# Patient Record
Sex: FEMALE | Race: WHITE | Employment: FULL TIME | URBAN - METROPOLITAN AREA
[De-identification: names, ages, dates, MRNs, and addresses within clinical notes are randomized per-mention and may not be internally consistent; named-entity substitution may affect disease eponyms.]

---

## 2017-01-03 ENCOUNTER — HOSPITAL ENCOUNTER (EMERGENCY)
Facility: HOSPITAL | Age: 32
Discharge: HOME | End: 2017-01-03
Attending: EMERGENCY MEDICINE

## 2017-01-03 ENCOUNTER — APPOINTMENT (OUTPATIENT)
Dept: CT IMAGING | Facility: HOSPITAL | Age: 32
End: 2017-01-03
Attending: EMERGENCY MEDICINE

## 2017-01-03 VITALS
WEIGHT: 180 LBS | HEART RATE: 73 BPM | TEMPERATURE: 97 F | DIASTOLIC BLOOD PRESSURE: 99 MMHG | RESPIRATION RATE: 20 BRPM | BODY MASS INDEX: 28.93 KG/M2 | SYSTOLIC BLOOD PRESSURE: 152 MMHG | OXYGEN SATURATION: 99 % | HEIGHT: 66 IN

## 2017-01-03 DIAGNOSIS — R51.9 ACUTE NONINTRACTABLE HEADACHE, UNSPECIFIED HEADACHE TYPE: Primary | ICD-10-CM

## 2017-01-03 LAB
ANION GAP SERPL CALC-SCNC: 13 MMOL/L (ref 0–18)
B-HCG UR QL: NEGATIVE
BASOPHILS # BLD: 0 K/UL (ref 0–0.2)
BASOPHILS NFR BLD: 0 %
BNP SERPL-MCNC: 45 PG/ML (ref 0–100)
BUN SERPL-MCNC: 12 MG/DL (ref 8–20)
BUN/CREAT SERPL: 19.4 (ref 10–20)
CALCIUM SERPL-MCNC: 9.7 MG/DL (ref 8.5–10.5)
CHLORIDE SERPL-SCNC: 103 MMOL/L (ref 95–110)
CO2 SERPL-SCNC: 26 MMOL/L (ref 22–32)
CREAT SERPL-MCNC: 0.62 MG/DL (ref 0.5–1.5)
EOSINOPHIL # BLD: 0 K/UL (ref 0–0.7)
EOSINOPHIL NFR BLD: 0 %
ERYTHROCYTE [DISTWIDTH] IN BLOOD BY AUTOMATED COUNT: 13.4 % (ref 11–15)
GLUCOSE SERPL-MCNC: 100 MG/DL (ref 70–99)
HCT VFR BLD AUTO: 42.8 % (ref 35–48)
HGB BLD-MCNC: 14.3 G/DL (ref 12–16)
LYMPHOCYTES # BLD: 2.6 K/UL (ref 1–4)
LYMPHOCYTES NFR BLD: 29 %
MCH RBC QN AUTO: 30.3 PG (ref 27–32)
MCHC RBC AUTO-ENTMCNC: 33.4 G/DL (ref 32–37)
MCV RBC AUTO: 90.8 FL (ref 80–100)
MONOCYTES # BLD: 0.4 K/UL (ref 0–1)
MONOCYTES NFR BLD: 4 %
NEUTROPHILS # BLD AUTO: 5.9 K/UL (ref 1.8–7.7)
NEUTROPHILS NFR BLD: 66 %
OSMOLALITY UR CALC.SUM OF ELEC: 294 MOSM/KG (ref 275–295)
PLATELET # BLD AUTO: 228 K/UL (ref 140–400)
PMV BLD AUTO: 8.4 FL (ref 7.4–10.3)
POTASSIUM SERPL-SCNC: 3.4 MMOL/L (ref 3.3–5.1)
RBC # BLD AUTO: 4.72 M/UL (ref 3.7–5.4)
SODIUM SERPL-SCNC: 142 MMOL/L (ref 136–144)
WBC # BLD AUTO: 8.9 K/UL (ref 4–11)

## 2017-01-03 PROCEDURE — 85025 COMPLETE CBC W/AUTO DIFF WBC: CPT | Performed by: EMERGENCY MEDICINE

## 2017-01-03 PROCEDURE — 70450 CT HEAD/BRAIN W/O DYE: CPT

## 2017-01-03 PROCEDURE — 99284 EMERGENCY DEPT VISIT MOD MDM: CPT

## 2017-01-03 PROCEDURE — 83880 ASSAY OF NATRIURETIC PEPTIDE: CPT | Performed by: EMERGENCY MEDICINE

## 2017-01-03 PROCEDURE — 96372 THER/PROPH/DIAG INJ SC/IM: CPT

## 2017-01-03 PROCEDURE — 81025 URINE PREGNANCY TEST: CPT

## 2017-01-03 PROCEDURE — 80048 BASIC METABOLIC PNL TOTAL CA: CPT | Performed by: EMERGENCY MEDICINE

## 2017-01-03 RX ORDER — ACETAMINOPHEN 500 MG
1000 TABLET ORAL ONCE
Status: COMPLETED | OUTPATIENT
Start: 2017-01-03 | End: 2017-01-03

## 2017-01-03 RX ORDER — ONDANSETRON 4 MG/1
4 TABLET, ORALLY DISINTEGRATING ORAL ONCE
Status: COMPLETED | OUTPATIENT
Start: 2017-01-03 | End: 2017-01-03

## 2017-01-03 RX ORDER — DIPHENHYDRAMINE HCL 25 MG
50 CAPSULE ORAL ONCE
Status: COMPLETED | OUTPATIENT
Start: 2017-01-03 | End: 2017-01-03

## 2017-01-03 RX ORDER — BUTALBITAL, ACETAMINOPHEN AND CAFFEINE 50; 325; 40 MG/1; MG/1; MG/1
1-2 TABLET ORAL EVERY 4 HOURS PRN
Qty: 20 TABLET | Refills: 0 | Status: SHIPPED | OUTPATIENT
Start: 2017-01-03 | End: 2017-07-06 | Stop reason: ALTCHOICE

## 2017-01-03 RX ORDER — KETOROLAC TROMETHAMINE 30 MG/ML
60 INJECTION, SOLUTION INTRAMUSCULAR; INTRAVENOUS ONCE
Status: COMPLETED | OUTPATIENT
Start: 2017-01-03 | End: 2017-01-03

## 2017-01-04 NOTE — ED PROVIDER NOTES
Patient Seen in: [facility] AND [facility] Emergency Department    History   Patient presents with:  Headache (neurologic)      HPI    Patient presents complaining of a headache that started roughly 6 hours ago.   She states pain is located to the left side of he Respiratory: Negative for cough and shortness of breath. Cardiovascular: Negative for chest pain and palpitations. Gastrointestinal: Negative for vomiting and abdominal pain. Genitourinary: Negative for dysuria and hematuria.    Musculoskeletal: Ne Skin: Skin is warm and dry. No rash noted. Nursing note and vitals reviewed.       ED Course        Labs Reviewed   BASIC METABOLIC PANEL (8) - Abnormal; Notable for the following:     Glucose 100 (*)     All other components within normal limits   BNP [address] [address]  [phone]    Schedule an appointment as soon as possible for a visit in 2 days        Medications Prescribed:  Discharge Medication List as of 1/3/2017 11:03 PM    START taking these medications    Butalbital-AP

## 2017-01-04 NOTE — ED INITIAL ASSESSMENT (HPI)
Pt reports headache that started this am, pt reports pain to the back of her head with facial numbness. Pt denies light sensitivity or nausea.    Pt sts that this is one of the worst headaches shes had. css negative

## 2017-01-13 NOTE — TELEPHONE ENCOUNTER
Pt requesting   Current Outpatient Prescriptions:        HYDROcodone-acetaminophen  MG Oral Tab Take 1 to 2 tabs po q 8 hr prn for back pain(total of 6tabs/day only) Disp: 84 tablet Rfl: 0

## 2017-01-16 RX ORDER — HYDROCODONE BITARTRATE AND ACETAMINOPHEN 10; 325 MG/1; MG/1
TABLET ORAL
Qty: 84 TABLET | Refills: 0 | Status: SHIPPED | OUTPATIENT
Start: 2017-01-16 | End: 2017-01-23

## 2017-01-23 ENCOUNTER — OFFICE VISIT (OUTPATIENT)
Dept: INTERNAL MEDICINE CLINIC | Facility: CLINIC | Age: 32
End: 2017-01-23

## 2017-01-23 VITALS
HEART RATE: 112 BPM | DIASTOLIC BLOOD PRESSURE: 92 MMHG | SYSTOLIC BLOOD PRESSURE: 144 MMHG | TEMPERATURE: 99 F | WEIGHT: 188 LBS | BODY MASS INDEX: 30 KG/M2

## 2017-01-23 DIAGNOSIS — S39.012A BACK STRAIN, INITIAL ENCOUNTER: Primary | ICD-10-CM

## 2017-01-23 DIAGNOSIS — G43.009 MIGRAINE WITHOUT AURA AND WITHOUT STATUS MIGRAINOSUS, NOT INTRACTABLE: ICD-10-CM

## 2017-01-23 PROCEDURE — 99213 OFFICE O/P EST LOW 20 MIN: CPT | Performed by: INTERNAL MEDICINE

## 2017-01-23 PROCEDURE — 99212 OFFICE O/P EST SF 10 MIN: CPT | Performed by: INTERNAL MEDICINE

## 2017-01-23 RX ORDER — HYDROCODONE BITARTRATE AND ACETAMINOPHEN 10; 325 MG/1; MG/1
TABLET ORAL
Qty: 84 TABLET | Refills: 0 | Status: SHIPPED | OUTPATIENT
Start: 2017-01-26 | End: 2017-02-06

## 2017-01-23 RX ORDER — CYCLOBENZAPRINE HCL 10 MG
10 TABLET ORAL 3 TIMES DAILY
Qty: 30 TABLET | Refills: 0 | Status: SHIPPED | OUTPATIENT
Start: 2017-01-23 | End: 2017-07-10

## 2017-01-23 NOTE — PROGRESS NOTES
HPI:    Patient ID: Mehul Reece is a 33year old female. Back Pain  This is a new (midback pain) problem. The current episode started in the past 7 days. The problem occurs constantly. The problem has been gradually improving since onset.  The pain i reactive to light. Right eye exhibits no discharge. Left eye exhibits no discharge. No scleral icterus. Neck: Normal range of motion. Neck supple. No JVD present. No thyromegaly present.    Pulmonary/Chest: Effort normal and breath sounds normal. No respi

## 2017-01-30 ENCOUNTER — TELEPHONE (OUTPATIENT)
Dept: FAMILY MEDICINE CLINIC | Facility: CLINIC | Age: 32
End: 2017-01-30

## 2017-01-30 NOTE — TELEPHONE ENCOUNTER
Pt is calling state that she fax over some FMLA paper work on Monday pt want to know if form was filled out   Pt is requesting a call back

## 2017-01-31 ENCOUNTER — OFFICE VISIT (OUTPATIENT)
Dept: INTERNAL MEDICINE CLINIC | Facility: CLINIC | Age: 32
End: 2017-01-31

## 2017-01-31 VITALS
BODY MASS INDEX: 30.05 KG/M2 | DIASTOLIC BLOOD PRESSURE: 90 MMHG | RESPIRATION RATE: 12 BRPM | HEART RATE: 120 BPM | HEIGHT: 66 IN | SYSTOLIC BLOOD PRESSURE: 126 MMHG | TEMPERATURE: 100 F | WEIGHT: 187 LBS

## 2017-01-31 DIAGNOSIS — J02.9 SORE THROAT: Primary | ICD-10-CM

## 2017-01-31 LAB
CONTROL LINE PRESENT WITH A CLEAR BACKGROUND (YES/NO): YES YES/NO
KIT LOT #: NORMAL NUMERIC

## 2017-01-31 PROCEDURE — 87880 STREP A ASSAY W/OPTIC: CPT | Performed by: INTERNAL MEDICINE

## 2017-01-31 PROCEDURE — 99213 OFFICE O/P EST LOW 20 MIN: CPT | Performed by: INTERNAL MEDICINE

## 2017-01-31 RX ORDER — HYDROCODONE BITARTRATE AND HOMATROPINE METHYLBROMIDE ORAL SOLUTION 5; 1.5 MG/5ML; MG/5ML
5 LIQUID ORAL EVERY 6 HOURS PRN
Qty: 240 ML | Refills: 0 | Status: SHIPPED | OUTPATIENT
Start: 2017-01-31 | End: 2017-02-10

## 2017-01-31 NOTE — PROGRESS NOTES
HPI:    Patient ID: Marcus Levi is a 33year old female. Ear Pain   Associated symptoms include coughing, rhinorrhea and a sore throat. Pertinent negatives include no ear discharge.    Cough  Associated symptoms include ear pain, postnasal drip, rhin reaction(s): ASPIRIN  Codeine                 Swelling    Comment:Other reaction(s): Swelling   PHYSICAL EXAM:   Physical Exam   Constitutional: She appears well-developed. No distress.    obese   HENT:   Right Ear: External ear normal.   Left Ear: External

## 2017-01-31 NOTE — TELEPHONE ENCOUNTER
FMLA form completed by Dr. Windy Hartley. Patient has office visit today with Dr. Windy Hartley. Will give patient form at that time. Form at nurses desk now.

## 2017-02-06 ENCOUNTER — TELEPHONE (OUTPATIENT)
Dept: INTERNAL MEDICINE CLINIC | Facility: CLINIC | Age: 32
End: 2017-02-06

## 2017-02-06 RX ORDER — CEPHALEXIN 500 MG/1
500 CAPSULE ORAL 2 TIMES DAILY
Qty: 14 CAPSULE | Refills: 0 | Status: SHIPPED | OUTPATIENT
Start: 2017-02-06 | End: 2017-03-06

## 2017-02-06 RX ORDER — HYDROCODONE BITARTRATE AND ACETAMINOPHEN 10; 325 MG/1; MG/1
TABLET ORAL
Qty: 84 TABLET | Refills: 0 | Status: SHIPPED | OUTPATIENT
Start: 2017-02-06 | End: 2017-02-17

## 2017-02-06 NOTE — TELEPHONE ENCOUNTER
Reason for Call/Chief Complaint:   Urinary problems  Onset:   Since yesterday morning. Nursing Assessment/Associated Symptoms:   Patient stated has her usual \"UTI\" symptoms. Has urinary frequency and urgency.   Denies a fever, back pain, blood in the ur

## 2017-02-06 NOTE — TELEPHONE ENCOUNTER
Pt is requesting to have a call from the MD Manish fagan she paiged him this morning   Pt starts training today in 45min and can not use her phone   Pt did not give the reason for the call back

## 2017-02-06 NOTE — TELEPHONE ENCOUNTER
Pt is calling back, states she will be in a training at work. Pt would like a call back right now, to speak to nurse.

## 2017-02-06 NOTE — TELEPHONE ENCOUNTER
Pt called, message per Dr given.   Verbalized understanding and compliance  Will  Rx from [facility]

## 2017-02-10 RX ORDER — HYDROCODONE BITARTRATE AND HOMATROPINE METHYLBROMIDE ORAL SOLUTION 5; 1.5 MG/5ML; MG/5ML
5 LIQUID ORAL EVERY 6 HOURS PRN
Qty: 240 ML | Refills: 0 | Status: SHIPPED | OUTPATIENT
Start: 2017-02-10 | End: 2017-03-06

## 2017-02-10 NOTE — TELEPHONE ENCOUNTER
SATISH changed request from refill to acute. Patient reports that she is still experiencing cough and is nearly out of her medication. Concerned because  is out of office starting tomorrow. Please call [phone] to discuss.

## 2017-02-10 NOTE — TELEPHONE ENCOUNTER
Phone call to patient. Advised patient script ready for p/u at reception B. Related Dr. Eric Millan message will need office visit if symptoms do not resolve.

## 2017-02-15 ENCOUNTER — TELEPHONE (OUTPATIENT)
Dept: FAMILY MEDICINE CLINIC | Facility: CLINIC | Age: 32
End: 2017-02-15

## 2017-02-15 NOTE — TELEPHONE ENCOUNTER
Pt is calling requesting a refill on med    Current Outpatient Prescriptions:  HYDROcodone-acetaminophen  MG Oral Tab Take 1 to 2 tabs po q 8 hr prn for back pain(total of 6tabs/day only) Disp: 84 tablet Rfl: 0

## 2017-02-17 RX ORDER — HYDROCODONE BITARTRATE AND ACETAMINOPHEN 10; 325 MG/1; MG/1
TABLET ORAL
Qty: 84 TABLET | Refills: 0 | Status: SHIPPED | OUTPATIENT
Start: 2017-02-17 | End: 2017-02-23

## 2017-02-17 NOTE — TELEPHONE ENCOUNTER
Pt called in to follow up, pt states she will be out of medication on Monday. Pt states she wants to pick this up on her lunch break, please.

## 2017-02-17 NOTE — TELEPHONE ENCOUNTER
Pt is calling to check status of refill request   Pt is asking if this RX can be approved for today btwn 11-12:30pm  Please advise     Good Call back [phone]

## 2017-02-23 ENCOUNTER — OFFICE VISIT (OUTPATIENT)
Dept: INTERNAL MEDICINE CLINIC | Facility: CLINIC | Age: 32
End: 2017-02-23

## 2017-02-23 VITALS
DIASTOLIC BLOOD PRESSURE: 88 MMHG | TEMPERATURE: 98 F | WEIGHT: 183 LBS | HEART RATE: 102 BPM | RESPIRATION RATE: 18 BRPM | BODY MASS INDEX: 32.43 KG/M2 | SYSTOLIC BLOOD PRESSURE: 127 MMHG | HEIGHT: 63 IN

## 2017-02-23 DIAGNOSIS — G89.29 CHRONIC LOW BACK PAIN, UNSPECIFIED BACK PAIN LATERALITY, WITH SCIATICA PRESENCE UNSPECIFIED: ICD-10-CM

## 2017-02-23 DIAGNOSIS — M54.5 CHRONIC LOW BACK PAIN, UNSPECIFIED BACK PAIN LATERALITY, WITH SCIATICA PRESENCE UNSPECIFIED: ICD-10-CM

## 2017-02-23 DIAGNOSIS — G43.709 CHRONIC MIGRAINE WITHOUT AURA WITHOUT STATUS MIGRAINOSUS, NOT INTRACTABLE: Primary | ICD-10-CM

## 2017-02-23 PROCEDURE — 99212 OFFICE O/P EST SF 10 MIN: CPT | Performed by: INTERNAL MEDICINE

## 2017-02-23 PROCEDURE — 99213 OFFICE O/P EST LOW 20 MIN: CPT | Performed by: INTERNAL MEDICINE

## 2017-02-23 RX ORDER — HYDROCODONE BITARTRATE AND ACETAMINOPHEN 10; 325 MG/1; MG/1
TABLET ORAL
Qty: 112 TABLET | Refills: 0 | Status: SHIPPED | OUTPATIENT
Start: 2017-03-01 | End: 2017-03-13

## 2017-02-23 RX ORDER — KETOROLAC TROMETHAMINE 10 MG/1
10 TABLET, FILM COATED ORAL 2 TIMES DAILY
Qty: 10 TABLET | Refills: 0 | Status: SHIPPED | OUTPATIENT
Start: 2017-02-23 | End: 2017-05-15

## 2017-02-23 NOTE — PROGRESS NOTES
HPI:    Patient ID: Grant Jean-Baptiste is a 33year old female. Back Pain  This is a chronic problem. The current episode started in the past 7 days. The problem has been rapidly worsening since onset. The pain is present in the lumbar spine.  The quality o Rfl: 0   Butalbital-APAP-Caffeine -40 MG Oral Tab Take 1-2 tablets by mouth every 4 (four) hours as needed for Headaches. Disp: 20 tablet Rfl: 0   Fluticasone Propionate 50 MCG/ACT Nasal Suspension 2 sprays by Each Nare route daily.  Disp: 1 Bottle Rf it. I advised pt to call her neurologist and may need to restart migraine prophylactic treatment.     2. Chronic low back pain, unspecified back pain laterality, with sciatica presence unspecified  Pt had increasing low back pain over the weekend since she

## 2017-02-25 NOTE — TELEPHONE ENCOUNTER
Phone call to patient to verify that patient was given script for Hydrocodone at 2/23/17 OV. S/W patient who states she did p/u script.

## 2017-03-06 ENCOUNTER — OFFICE VISIT (OUTPATIENT)
Dept: INTERNAL MEDICINE CLINIC | Facility: CLINIC | Age: 32
End: 2017-03-06

## 2017-03-06 VITALS
HEIGHT: 63 IN | SYSTOLIC BLOOD PRESSURE: 165 MMHG | WEIGHT: 190 LBS | DIASTOLIC BLOOD PRESSURE: 109 MMHG | OXYGEN SATURATION: 99 % | HEART RATE: 140 BPM | BODY MASS INDEX: 33.66 KG/M2 | TEMPERATURE: 99 F

## 2017-03-06 DIAGNOSIS — R68.89 FLU-LIKE SYMPTOMS: Primary | ICD-10-CM

## 2017-03-06 LAB
FLUAV + FLUBV RNA SPEC NAA+PROBE: NEGATIVE

## 2017-03-06 PROCEDURE — 99212 OFFICE O/P EST SF 10 MIN: CPT | Performed by: INTERNAL MEDICINE

## 2017-03-06 PROCEDURE — 99213 OFFICE O/P EST LOW 20 MIN: CPT | Performed by: INTERNAL MEDICINE

## 2017-03-06 RX ORDER — AZITHROMYCIN 250 MG/1
TABLET, FILM COATED ORAL
Qty: 1 PACKAGE | Refills: 0 | Status: SHIPPED | OUTPATIENT
Start: 2017-03-06 | End: 2017-07-06 | Stop reason: ALTCHOICE

## 2017-03-06 RX ORDER — HYDROCODONE BITARTRATE AND HOMATROPINE METHYLBROMIDE ORAL SOLUTION 5; 1.5 MG/5ML; MG/5ML
5 LIQUID ORAL EVERY 6 HOURS PRN
Qty: 240 ML | Refills: 0 | Status: SHIPPED | OUTPATIENT
Start: 2017-03-06 | End: 2017-04-03

## 2017-03-06 RX ORDER — OSELTAMIVIR PHOSPHATE 75 MG/1
75 CAPSULE ORAL 2 TIMES DAILY
Qty: 10 CAPSULE | Refills: 0 | Status: SHIPPED | OUTPATIENT
Start: 2017-03-06 | End: 2017-07-06 | Stop reason: ALTCHOICE

## 2017-03-10 ENCOUNTER — TELEPHONE (OUTPATIENT)
Dept: INTERNAL MEDICINE CLINIC | Facility: CLINIC | Age: 32
End: 2017-03-10

## 2017-03-10 NOTE — TELEPHONE ENCOUNTER
Per pt, she needs refill on her HYDROcodone-acetaminophen  MG Oral Tab will  in [facility] by Tuesday 03/14/2017.       Current Outpatient Prescriptions:                          HYDROcodone-acetaminophen  MG Oral Tab Take 1 to 2 tabs po q 6 hr

## 2017-03-13 ENCOUNTER — OFFICE VISIT (OUTPATIENT)
Dept: INTERNAL MEDICINE CLINIC | Facility: CLINIC | Age: 32
End: 2017-03-13

## 2017-03-13 VITALS
TEMPERATURE: 98 F | SYSTOLIC BLOOD PRESSURE: 120 MMHG | DIASTOLIC BLOOD PRESSURE: 84 MMHG | WEIGHT: 191 LBS | BODY MASS INDEX: 30.7 KG/M2 | HEIGHT: 66 IN | RESPIRATION RATE: 12 BRPM | HEART RATE: 109 BPM

## 2017-03-13 DIAGNOSIS — B34.9 VIRAL SYNDROME: Primary | ICD-10-CM

## 2017-03-13 DIAGNOSIS — G89.29 CHRONIC LOW BACK PAIN, UNSPECIFIED BACK PAIN LATERALITY, WITH SCIATICA PRESENCE UNSPECIFIED: ICD-10-CM

## 2017-03-13 DIAGNOSIS — M54.5 CHRONIC LOW BACK PAIN, UNSPECIFIED BACK PAIN LATERALITY, WITH SCIATICA PRESENCE UNSPECIFIED: ICD-10-CM

## 2017-03-13 RX ORDER — HYDROCODONE BITARTRATE AND ACETAMINOPHEN 10; 325 MG/1; MG/1
TABLET ORAL
Qty: 112 TABLET | Refills: 0 | Status: SHIPPED | OUTPATIENT
Start: 2017-03-13 | End: 2017-03-22

## 2017-03-14 NOTE — PROGRESS NOTES
HPI:    Patient ID: China Romero is a 33year old female. HPI Comments: Patient presents today just to get letter for work for her absence last week due to flu like symptoms.  She was seen last week in clinic and was treated with tamiflu for possible needed a note for work for her absence last week so letter for work given to pt.     2. Chronic low back pain, unspecified back pain laterality, with sciatica presence unspecified  Pt asking for refill of norco; will be 2days earlier though states she wont

## 2017-03-17 NOTE — PROGRESS NOTES
HPI:    Patient ID: Prachi Beavers is a 33year old female.     HPI    Flu like symptoms for a few days  /109 mmHg  Pulse 140  Temp(Src) 99.2 °F (37.3 °C) (Tympanic)  Ht 5' 3\" (1.6 m)  Wt 190 lb (86.183 kg)  BMI 33.67 kg/m2  SpO2 99%  HISTORY:  Past rash.   Neurological: Negative for syncope, weakness, light-headedness and headaches. Hematological: Negative for adenopathy. Does not bruise/bleed easily. Psychiatric/Behavioral: Negative for behavioral problems and agitation.            Current Outpat         Family History   Problem Relation Age of Onset   • Heart Disease Mother    • Lipids Mother    • Hypertension Mother    • ADHD Sister    • Heart Disease Other    • Hypertension Other    • Glaucoma Other       Social History:   Smoking Status:  Fo Empiric tamiflu  rx zithromax bronchitis  rpn hydromet   SIDE EFFECT DISCUSSED  PATIENT IS NOT AT ALL TO OPERATE A MAHCINERY OR DRIVE A VEHICLE WHEN TAKING THIS MEDICATION. PATIENT VOICED UNDERSTANDING.   Patient voiced understanding  and agrees with plan

## 2017-03-22 ENCOUNTER — TELEPHONE (OUTPATIENT)
Dept: INTERNAL MEDICINE CLINIC | Facility: CLINIC | Age: 32
End: 2017-03-22

## 2017-03-22 RX ORDER — HYDROCODONE BITARTRATE AND ACETAMINOPHEN 10; 325 MG/1; MG/1
TABLET ORAL
Qty: 112 TABLET | Refills: 0 | Status: SHIPPED | OUTPATIENT
Start: 2017-03-27 | End: 2017-04-06

## 2017-03-22 NOTE — TELEPHONE ENCOUNTER
Pt  Had asked if she can get her script postdated for March 27 when her refill is due so she doesn't have to come again (was picking up script for her spouse today).  She also told me she had set up apptment with spine surgeon Dr Cristian Tirado

## 2017-03-22 NOTE — TELEPHONE ENCOUNTER
Pt called in requesting a refill on her HYDROcodone-acetaminophen. Pt states her rx is not due until Monday, but she would like to try and  rx before the weekend so she can have it filled on Monday.     Current outpatient prescriptions:   •  HYDROco

## 2017-04-03 ENCOUNTER — TELEPHONE (OUTPATIENT)
Dept: INTERNAL MEDICINE CLINIC | Facility: CLINIC | Age: 32
End: 2017-04-03

## 2017-04-03 RX ORDER — HYDROCODONE BITARTRATE AND HOMATROPINE METHYLBROMIDE ORAL SOLUTION 5; 1.5 MG/5ML; MG/5ML
5 LIQUID ORAL EVERY 6 HOURS PRN
Qty: 240 ML | Refills: 0 | Status: SHIPPED | OUTPATIENT
Start: 2017-04-03 | End: 2017-04-25

## 2017-04-03 RX ORDER — AZITHROMYCIN 250 MG/1
TABLET, FILM COATED ORAL
Qty: 6 TABLET | Refills: 0 | Status: SHIPPED | OUTPATIENT
Start: 2017-04-03 | End: 2017-07-06 | Stop reason: ALTCHOICE

## 2017-04-03 NOTE — TELEPHONE ENCOUNTER
Pt paged me and states for past 3 days had not of sinus congestion/pain, pressure, green thick nasal discharge, maxillary teeth pain, as well as persistent cough; no fever noted. Had called Saturday afternoon but on call doctor didn't respond per pt.  I alecia

## 2017-04-07 RX ORDER — HYDROCODONE BITARTRATE AND ACETAMINOPHEN 10; 325 MG/1; MG/1
TABLET ORAL
Qty: 112 TABLET | Refills: 0 | Status: SHIPPED | OUTPATIENT
Start: 2017-04-10 | End: 2017-04-20

## 2017-04-07 NOTE — TELEPHONE ENCOUNTER
Phone call to patient. S/w patient and advised script for Hydrocodone is ready for  at reception area. Script handed to patient; related Dr. Morro Quintanilla instructions cannot fill until 4/10/17. Patient states she understands.

## 2017-04-18 NOTE — TELEPHONE ENCOUNTER
Pt requesting refill asking if script can be dated for Saturday 4/22 because she will be going out of town until next Wednesday.       Current Outpatient Prescriptions:  HYDROcodone-acetaminophen  MG Oral Tab Take 1 to 2 tabs po q 6 hr prn for back pa

## 2017-04-20 NOTE — TELEPHONE ENCOUNTER
Pt requesting refill asking if script can be dated for Saturday 4/22 because she will be going out of town until next Wednesday.     Medication last refilled on 4/10/17 quantity #112 with zero refills (take 1-2 tabs Q6 hours - can take up to 8 tabs per day)

## 2017-04-21 RX ORDER — HYDROCODONE BITARTRATE AND ACETAMINOPHEN 10; 325 MG/1; MG/1
TABLET ORAL
Qty: 112 TABLET | Refills: 0 | Status: SHIPPED | OUTPATIENT
Start: 2017-04-22 | End: 2017-05-04

## 2017-04-21 NOTE — TELEPHONE ENCOUNTER
Her refill should really  Be on 04/24/2017 but since she is going out of town then I will refill for date 4/22/17 however the next refill should be would be 2 weeks from 4/24/17.

## 2017-04-21 NOTE — TELEPHONE ENCOUNTER
Pt is calling for status of her refill request. Pt would like to  the script today and is leaving out of town first thing tomorrow morning. Pt states that she has to  the script in the next hour and a half.  Pt would like a call back at [phone]

## 2017-04-21 NOTE — TELEPHONE ENCOUNTER
Message from 4//212/17, 10:31 pm fwd high priority.  to Dr. Bertrand Carbajal to address for [facility] request.

## 2017-04-25 ENCOUNTER — TELEPHONE (OUTPATIENT)
Dept: INTERNAL MEDICINE CLINIC | Facility: CLINIC | Age: 32
End: 2017-04-25

## 2017-04-25 ENCOUNTER — OFFICE VISIT (OUTPATIENT)
Dept: INTERNAL MEDICINE CLINIC | Facility: CLINIC | Age: 32
End: 2017-04-25

## 2017-04-25 VITALS
DIASTOLIC BLOOD PRESSURE: 80 MMHG | WEIGHT: 192 LBS | SYSTOLIC BLOOD PRESSURE: 136 MMHG | HEART RATE: 96 BPM | TEMPERATURE: 100 F | BODY MASS INDEX: 31 KG/M2

## 2017-04-25 DIAGNOSIS — J06.9 VIRAL UPPER RESPIRATORY TRACT INFECTION: Primary | ICD-10-CM

## 2017-04-25 DIAGNOSIS — M54.5 CHRONIC LOW BACK PAIN, UNSPECIFIED BACK PAIN LATERALITY, WITH SCIATICA PRESENCE UNSPECIFIED: ICD-10-CM

## 2017-04-25 DIAGNOSIS — G89.29 CHRONIC LOW BACK PAIN, UNSPECIFIED BACK PAIN LATERALITY, WITH SCIATICA PRESENCE UNSPECIFIED: ICD-10-CM

## 2017-04-25 DIAGNOSIS — H92.01 OTALGIA, RIGHT: ICD-10-CM

## 2017-04-25 PROCEDURE — 99213 OFFICE O/P EST LOW 20 MIN: CPT | Performed by: INTERNAL MEDICINE

## 2017-04-25 PROCEDURE — 99212 OFFICE O/P EST SF 10 MIN: CPT | Performed by: INTERNAL MEDICINE

## 2017-04-25 RX ORDER — HYDROCODONE BITARTRATE AND HOMATROPINE METHYLBROMIDE ORAL SOLUTION 5; 1.5 MG/5ML; MG/5ML
5 LIQUID ORAL EVERY 6 HOURS PRN
Qty: 240 ML | Refills: 0 | Status: SHIPPED | OUTPATIENT
Start: 2017-04-25 | End: 2017-06-06

## 2017-04-25 NOTE — PROGRESS NOTES
HPI:    Patient ID: Yael Markham is a 33year old female. Ear Pain   There is pain in the right ear. This is a new problem. The current episode started in the past 7 days. The problem occurs constantly. The problem has been unchanged.  There has been today, then one tablet daily. Disp: 6 tablet Rfl: 0   azithromycin (ZITHROMAX) 250 MG Oral Tab Taken as directed Disp: 1 Package Rfl: 0   Oseltamivir Phosphate (TAMIFLU) 75 MG Oral Cap Take 1 capsule (75 mg total) by mouth 2 (two) times daily.  Disp: 10 cap hyrdromet which both has hyrdocodone.    (H92.01) Otalgia, right  Plan: likely due to ET tube dysfunction from above.  Call back if persist/worsens.    (M54.5,  G89.29) Chronic low back pain, unspecified back pain laterality, with sciatica presence unspecif

## 2017-04-25 NOTE — TELEPHONE ENCOUNTER
Actions Requested: pt demanding to be added to schedule today.   PCP has no access  Situation/Background   Problem: right earache and pressure   Onset: > 4 days    Associated Symptoms: afebrile, no ear drainage, + productive cough, + sinus pressure + right patient)  * Bloody discharge or unexplained bleeding from ear canal  * All other earaches (Exceptions: earache lasting < 1 hour, and earache from air travel)    Care Advice Discussed:  * Pain Medicines  * Pain Medicines - Extra Notes  * Apply Cold to the A

## 2017-04-25 NOTE — TELEPHONE ENCOUNTER
Pt states that she has an ear ache/pain and cold symptoms. Pt wants to make an appt only with . There are no available appointments.

## 2017-04-26 PROBLEM — J06.9 VIRAL UPPER RESPIRATORY TRACT INFECTION: Status: ACTIVE | Noted: 2017-04-26

## 2017-05-02 ENCOUNTER — TELEPHONE (OUTPATIENT)
Dept: INTERNAL MEDICINE CLINIC | Facility: CLINIC | Age: 32
End: 2017-05-02

## 2017-05-02 NOTE — TELEPHONE ENCOUNTER
Pt calling requesting refill on medication.       Current Outpatient Prescriptions:  HYDROcodone-acetaminophen  MG Oral Tab Take 1 to 2 tabs po q 6 hr prn for back pain(total of 8 tabs/day only) Disp: 112 tablet Rfl: 0

## 2017-05-04 ENCOUNTER — TELEPHONE (OUTPATIENT)
Dept: INTERNAL MEDICINE CLINIC | Facility: CLINIC | Age: 32
End: 2017-05-04

## 2017-05-04 RX ORDER — HYDROCODONE BITARTRATE AND ACETAMINOPHEN 10; 325 MG/1; MG/1
TABLET ORAL
Qty: 112 TABLET | Refills: 0 | Status: SHIPPED | OUTPATIENT
Start: 2017-05-05 | End: 2017-05-15

## 2017-05-04 NOTE — TELEPHONE ENCOUNTER
Patient states she needs to s/p with dr. Junie Belcher   Patient does not want to s/p with the nurse  Dr Twin Garland please call her/

## 2017-05-04 NOTE — TELEPHONE ENCOUNTER
Pt leaving out of town tomorrow and would need to get her norco refilled early instead. I told her then next refill will be 17 days from tomorrow and we both agreed we will also start wean down to 6 tabs/day on next refill.

## 2017-05-09 NOTE — TELEPHONE ENCOUNTER
Rx request for Hydrocodone-Acetaminophen Addressed and filled by MD on 5/5/17 #112 with 0 refills. Called pt to informed pt states already being informed of rx and has already picked up prescription. Pt had no further questions at this time.  No further act

## 2017-05-11 ENCOUNTER — TELEPHONE (OUTPATIENT)
Dept: INTERNAL MEDICINE CLINIC | Facility: CLINIC | Age: 32
End: 2017-05-11

## 2017-05-11 NOTE — TELEPHONE ENCOUNTER
Patient came in to speak to Dr. Davide Crum in office today regarding her spouse. Dr. Davide Crum spoke to pt's spouse over the phone.

## 2017-05-11 NOTE — TELEPHONE ENCOUNTER
Patient states she needs to s/p with dr. Florian Garcia    Patient does not want to s/p with the nurse  Dr John Moreau please call her/

## 2017-05-15 ENCOUNTER — OFFICE VISIT (OUTPATIENT)
Dept: INTERNAL MEDICINE CLINIC | Facility: CLINIC | Age: 32
End: 2017-05-15

## 2017-05-15 VITALS
HEIGHT: 65 IN | WEIGHT: 189 LBS | HEART RATE: 106 BPM | SYSTOLIC BLOOD PRESSURE: 138 MMHG | RESPIRATION RATE: 12 BRPM | BODY MASS INDEX: 31.49 KG/M2 | DIASTOLIC BLOOD PRESSURE: 86 MMHG | TEMPERATURE: 98 F

## 2017-05-15 DIAGNOSIS — G89.29 CHRONIC LOW BACK PAIN, UNSPECIFIED BACK PAIN LATERALITY, WITH SCIATICA PRESENCE UNSPECIFIED: ICD-10-CM

## 2017-05-15 DIAGNOSIS — R39.15 URINARY URGENCY: Primary | ICD-10-CM

## 2017-05-15 DIAGNOSIS — M54.5 CHRONIC LOW BACK PAIN, UNSPECIFIED BACK PAIN LATERALITY, WITH SCIATICA PRESENCE UNSPECIFIED: ICD-10-CM

## 2017-05-15 PROCEDURE — 81003 URINALYSIS AUTO W/O SCOPE: CPT | Performed by: INTERNAL MEDICINE

## 2017-05-15 PROCEDURE — 99213 OFFICE O/P EST LOW 20 MIN: CPT | Performed by: INTERNAL MEDICINE

## 2017-05-15 RX ORDER — HYDROCODONE BITARTRATE AND ACETAMINOPHEN 10; 325 MG/1; MG/1
TABLET ORAL
Qty: 98 TABLET | Refills: 0 | Status: SHIPPED | OUTPATIENT
Start: 2017-05-18 | End: 2017-06-01

## 2017-05-15 RX ORDER — KETOROLAC TROMETHAMINE 10 MG/1
TABLET, FILM COATED ORAL
Qty: 10 TABLET | Refills: 0 | Status: SHIPPED | OUTPATIENT
Start: 2017-05-15 | End: 2017-08-17

## 2017-05-15 RX ORDER — CEPHALEXIN 500 MG/1
500 CAPSULE ORAL 2 TIMES DAILY
Qty: 14 CAPSULE | Refills: 0 | Status: SHIPPED | OUTPATIENT
Start: 2017-05-15 | End: 2017-07-06 | Stop reason: ALTCHOICE

## 2017-05-15 NOTE — PROGRESS NOTES
HPI:    Patient ID: Mehul Reece is a 33year old female. Urinary  This is a new problem. The current episode started yesterday. The problem has been unchanged. Associated symptoms include urinary symptoms.  Pertinent negatives include no chills, feve Comment:Other reaction(s): Swelling   PHYSICAL EXAM:   Physical Exam   Constitutional: She appears well-developed. No distress. obese   Pulmonary/Chest: Effort normal and breath sounds normal. No respiratory distress. She has no wheezes.  She has no rales

## 2017-05-30 NOTE — TELEPHONE ENCOUNTER
Pt requesting refill for medication below.     Current outpatient prescriptions:   •  HYDROcodone-acetaminophen  MG Oral Tab, Take 1 to 2 tabs po q 6 hr prn for back pain(total of 7 tabs/day only), Disp: 98 tablet, Rfl: 0

## 2017-05-30 NOTE — TELEPHONE ENCOUNTER
See refill request below. Last office visit 5/15/17. Last refill for Hydrocodone written 5/18/17, qty # 98.  Message fwd to Carlo Grant.

## 2017-05-31 NOTE — TELEPHONE ENCOUNTER
Pt would like to have this refilled on Saturday. Pt stts you can tack on the extra days for the next refill, pt is going out of town.    Pt would like a call from a nurse or the

## 2017-05-31 NOTE — TELEPHONE ENCOUNTER
Her last refill was supposed to last for 18 days since she got the refill 4 days earlier since she was going out of town. She will be due for refill by June 4(sunday) and we can give her refill by Saturday morning since I will be here in the clinic.

## 2017-06-02 RX ORDER — HYDROCODONE BITARTRATE AND ACETAMINOPHEN 10; 325 MG/1; MG/1
TABLET ORAL
Qty: 98 TABLET | Refills: 0 | Status: SHIPPED | OUTPATIENT
Start: 2017-06-03 | End: 2017-06-15

## 2017-06-02 NOTE — TELEPHONE ENCOUNTER
Dr. Camila Hebert, please see message below. Spoke to pt, states she will be going out of town over the weekend will be leaving tomorrow morning and would like to pick prescription up as early as possible. Will be coming back on Tuesday.  Pt requesting if y

## 2017-06-06 ENCOUNTER — OFFICE VISIT (OUTPATIENT)
Dept: INTERNAL MEDICINE CLINIC | Facility: CLINIC | Age: 32
End: 2017-06-06

## 2017-06-06 VITALS
HEIGHT: 66 IN | DIASTOLIC BLOOD PRESSURE: 86 MMHG | RESPIRATION RATE: 12 BRPM | WEIGHT: 187 LBS | SYSTOLIC BLOOD PRESSURE: 136 MMHG | HEART RATE: 82 BPM | TEMPERATURE: 99 F | BODY MASS INDEX: 30.05 KG/M2

## 2017-06-06 DIAGNOSIS — J06.9 URI, ACUTE: ICD-10-CM

## 2017-06-06 DIAGNOSIS — G43.709 CHRONIC MIGRAINE WITHOUT AURA WITHOUT STATUS MIGRAINOSUS, NOT INTRACTABLE: Primary | ICD-10-CM

## 2017-06-06 PROCEDURE — 99213 OFFICE O/P EST LOW 20 MIN: CPT | Performed by: INTERNAL MEDICINE

## 2017-06-06 PROCEDURE — 99212 OFFICE O/P EST SF 10 MIN: CPT | Performed by: INTERNAL MEDICINE

## 2017-06-06 RX ORDER — HYDROCODONE BITARTRATE AND HOMATROPINE METHYLBROMIDE ORAL SOLUTION 5; 1.5 MG/5ML; MG/5ML
5 LIQUID ORAL EVERY 6 HOURS PRN
Qty: 240 ML | Refills: 0 | Status: SHIPPED | OUTPATIENT
Start: 2017-06-06 | End: 2017-06-15

## 2017-06-06 NOTE — PROGRESS NOTES
HPI:    Patient ID: Meño Guajardo is a 33year old female. Migraine   This is a chronic problem. The current episode started more than 1 year ago. The problem occurs intermittently. Associated symptoms include coughing, rhinorrhea and sinus pressure. today, then one tablet daily. Disp: 6 tablet Rfl: 0   azithromycin (ZITHROMAX) 250 MG Oral Tab Taken as directed Disp: 1 Package Rfl: 0   Oseltamivir Phosphate (TAMIFLU) 75 MG Oral Cap Take 1 capsule (75 mg total) by mouth 2 (two) times daily.  Disp: 10 cap orders of the defined types were placed in this encounter. Meds This Visit:  Signed Prescriptions Disp Refills    hydrocodone-homatropine (HYDROMET) 5-1.5 MG/5ML Oral Syrup 240 mL 0      Sig: Take 5 mL by mouth every 6 (six) hours as needed.

## 2017-06-13 NOTE — TELEPHONE ENCOUNTER
Pt needs a refill on med below. Pt stts that the dr approved a refill for Friday, but told her to call it in on Tuesday. Since she will be going out of town this weekend.        HYDROcodone-acetaminophen  MG Oral Tab 98 tablet 0 6/3/2017      Sig :

## 2017-06-14 NOTE — TELEPHONE ENCOUNTER
Pt called in to follow up on refill. Pt asking if she can  script today because she cannot  tomorrow due to work. Pt states she is leaving early Friday morning.

## 2017-06-15 RX ORDER — HYDROCODONE BITARTRATE AND ACETAMINOPHEN 10; 325 MG/1; MG/1
TABLET ORAL
Qty: 98 TABLET | Refills: 0 | OUTPATIENT
Start: 2017-06-15 | End: 2017-06-15

## 2017-06-15 RX ORDER — HYDROCODONE BITARTRATE AND HOMATROPINE METHYLBROMIDE ORAL SOLUTION 5; 1.5 MG/5ML; MG/5ML
5 LIQUID ORAL EVERY 6 HOURS PRN
Qty: 240 ML | Refills: 0 | Status: SHIPPED | OUTPATIENT
Start: 2017-06-15 | End: 2017-07-06 | Stop reason: ALTCHOICE

## 2017-06-15 RX ORDER — HYDROCODONE BITARTRATE AND ACETAMINOPHEN 10; 325 MG/1; MG/1
TABLET ORAL
Qty: 98 TABLET | Refills: 0 | Status: SHIPPED | OUTPATIENT
Start: 2017-06-15 | End: 2017-06-27

## 2017-06-15 NOTE — TELEPHONE ENCOUNTER
Please advise. Patient asking for a refill on her Norco.  Last refilled on 6/3/17 for  98 tablets.     Refill Protocol Appointment Criteria  · Appointment scheduled in the past 6 months or in the next 3 months    Last office visit on 6/6/17

## 2017-06-15 NOTE — TELEPHONE ENCOUNTER
Pt going on lunch in 45 min asking to pick this up today on her lunch break   Pt will be going out of town tomorrow

## 2017-06-15 NOTE — TELEPHONE ENCOUNTER
Script given to pt; on next refill, will need to get it in 16 days instead of 14 days; pt understood. Also made error in printing hydromet instead of norco initially. Pt did return the script for hydromet which I destroyed.

## 2017-06-23 ENCOUNTER — TELEPHONE (OUTPATIENT)
Dept: INTERNAL MEDICINE CLINIC | Facility: CLINIC | Age: 32
End: 2017-06-23

## 2017-06-23 NOTE — TELEPHONE ENCOUNTER
Patient called and stated requesting a call back from Carlo Grant  In regarding to traveling out of town-

## 2017-06-23 NOTE — TELEPHONE ENCOUNTER
Pt states that she was told by Dr. Dick Yanez to call back with the following information:    Pt called to notify  that she will be out of town on Friday, June 30th through Wednesday, July 5th     \"Thursday will be 2 weeks\" and pt has no other plans to leave

## 2017-06-27 NOTE — TELEPHONE ENCOUNTER
Current Outpatient Prescriptions:  HYDROcodone-acetaminophen  MG Oral Tab Take 1 to 2 tabs po q 6 hr prn for back pain(total of 7 tabs/day only) Disp: 98 tablet Rfl: 0     Patient said doctor told her to route to him and to call in on Wednesday.   Debara Bosworth

## 2017-06-27 NOTE — TELEPHONE ENCOUNTER
Dr. Akilah Ann - please see pending Norco prescription. Thank you. Patient states she will  on Wednesday 6/28/17. Last office visit - 6/6/17 and last refill 6/15/17.

## 2017-06-28 RX ORDER — HYDROCODONE BITARTRATE AND ACETAMINOPHEN 10; 325 MG/1; MG/1
TABLET ORAL
Qty: 98 TABLET | Refills: 0 | Status: SHIPPED | OUTPATIENT
Start: 2017-06-28 | End: 2017-07-06

## 2017-07-06 ENCOUNTER — OFFICE VISIT (OUTPATIENT)
Dept: INTERNAL MEDICINE CLINIC | Facility: CLINIC | Age: 32
End: 2017-07-06

## 2017-07-06 ENCOUNTER — TELEPHONE (OUTPATIENT)
Dept: INTERNAL MEDICINE CLINIC | Facility: CLINIC | Age: 32
End: 2017-07-06

## 2017-07-06 VITALS
TEMPERATURE: 98 F | HEART RATE: 108 BPM | DIASTOLIC BLOOD PRESSURE: 115 MMHG | WEIGHT: 184 LBS | BODY MASS INDEX: 30 KG/M2 | SYSTOLIC BLOOD PRESSURE: 164 MMHG

## 2017-07-06 DIAGNOSIS — S30.0XXA LUMBAR CONTUSION, INITIAL ENCOUNTER: Primary | ICD-10-CM

## 2017-07-06 PROCEDURE — 99212 OFFICE O/P EST SF 10 MIN: CPT | Performed by: INTERNAL MEDICINE

## 2017-07-06 PROCEDURE — 99214 OFFICE O/P EST MOD 30 MIN: CPT | Performed by: INTERNAL MEDICINE

## 2017-07-06 RX ORDER — HYDROCODONE BITARTRATE AND ACETAMINOPHEN 10; 325 MG/1; MG/1
TABLET ORAL
Qty: 21 TABLET | Refills: 0 | Status: SHIPPED | OUTPATIENT
Start: 2017-07-06 | End: 2017-07-10

## 2017-07-06 NOTE — TELEPHONE ENCOUNTER
Patient reports that she was just seen in office by one of Dr Joi Suárez colleagues. She scheduled a follow-up appt for Tuesday next week, but she is requesting a call from  or RN as she does not feel that Dr Latoya Parkinson will need to see her.  Please juan

## 2017-07-06 NOTE — PROGRESS NOTES
HPI:    Patient ID: Nazario Galvez is a 33year old female. HPI     Back Pain   This is a new problem. The current episode started in the past 7 days (Pt fell last week). The problem occurs constantly. The problem is unchanged.  The pain is present in t Smoking status: Former Smoker                                                              Packs/day: 0.50      Years: 2.00         Types: Cigarettes     Quit date: 1/1/2000  Smokeless tobacco: Never Used                      Alcohol use:  No examination, slight tenderness of lower lumbar region upon palpation   -XR LUMBAR SPINE  was ordered for further evaluation. Pt was instructed to avoid heavy lifting.   -Pt had elevated BP upon arrival: 164/115.  Pt has white coat syndrome and upon second e

## 2017-07-06 NOTE — TELEPHONE ENCOUNTER
Actions Requested: appointment  Problem: low back and right leg pain due to fall  Onset and Timin week  Associated Symptoms: Pt stts she fell 1 week ago and hurt low back and right leg. Didn't break anything.    Aggravating by: walking  Alleviated by: occur      - Bowel/bladder problems occur      - Pain lasts for more than 2 weeks      - You become worse  Patient provided with clinic contact information, hours of operation and will call back if needed.  Patient was able to restate instructions in their

## 2017-07-10 ENCOUNTER — TELEPHONE (OUTPATIENT)
Dept: INTERNAL MEDICINE CLINIC | Facility: CLINIC | Age: 32
End: 2017-07-10

## 2017-07-10 DIAGNOSIS — S30.0XXA LUMBAR CONTUSION, INITIAL ENCOUNTER: ICD-10-CM

## 2017-07-10 RX ORDER — HYDROCODONE BITARTRATE AND ACETAMINOPHEN 10; 325 MG/1; MG/1
TABLET ORAL
Qty: 112 TABLET | Refills: 0 | Status: SHIPPED | OUTPATIENT
Start: 2017-07-10 | End: 2017-07-20

## 2017-07-10 RX ORDER — HYDROCODONE BITARTRATE AND ACETAMINOPHEN 10; 325 MG/1; MG/1
TABLET ORAL
Qty: 112 TABLET | Refills: 0 | Status: SHIPPED | OUTPATIENT
Start: 2017-07-10 | End: 2017-07-10

## 2017-07-10 RX ORDER — CYCLOBENZAPRINE HCL 10 MG
10 TABLET ORAL 3 TIMES DAILY
Qty: 30 TABLET | Refills: 0 | Status: SHIPPED | OUTPATIENT
Start: 2017-07-10 | End: 2017-07-30

## 2017-07-10 NOTE — TELEPHONE ENCOUNTER
Pt came in today; didn't think and felt xray ordered was not needed; her trauma was noted more the right posterior thgh and not her back although twisted her lumbar muscles on her fall.  She had taken total of  norco tabs/day since her fall so needed to get

## 2017-07-19 DIAGNOSIS — S30.0XXA LUMBAR CONTUSION, INITIAL ENCOUNTER: ICD-10-CM

## 2017-07-19 NOTE — TELEPHONE ENCOUNTER
Pt called in a refill request on medication below. Pt requesting this to be ready for  on Friday because the medication renews on Monday.     Current Outpatient Prescriptions:     •  HYDROcodone-acetaminophen  MG Oral Tab, Take 1to 2 tab po q

## 2017-07-20 RX ORDER — HYDROCODONE BITARTRATE AND ACETAMINOPHEN 10; 325 MG/1; MG/1
TABLET ORAL
Qty: 98 TABLET | Refills: 0 | Status: SHIPPED | OUTPATIENT
Start: 2017-07-23 | End: 2017-08-03

## 2017-07-20 NOTE — TELEPHONE ENCOUNTER
norco refill request  LR 7/10/17 #112    Refill Protocol Appointment Criteria  · Appointment scheduled in the past 6 months or in the next 3 months  Recent Outpatient Visits            2 weeks ago Lumbar contusion, initial encounter    [address]

## 2017-07-21 NOTE — TELEPHONE ENCOUNTER
I had cut down total daily dose to 7 tabs as per discussion with pt last refill that we will start trying to wean down the dose.

## 2017-07-21 NOTE — TELEPHONE ENCOUNTER
Script for Hydrocodone printed and signed by Dr. Daryle Draft placed in file [facility] at reception area B.

## 2017-07-25 NOTE — TELEPHONE ENCOUNTER
Patient was seen by Dr. Erick Loredo 7/6/17 for back pain related to a fall. Was given Hydrocodone. X ray was ordered by Dr. Erick Loredo; our system shows it only as ' ordered ' not done. Phone call to patient. S/W patient who states her back is much better now.  Jalen Prophet

## 2017-07-26 ENCOUNTER — OFFICE VISIT (OUTPATIENT)
Dept: INTERNAL MEDICINE CLINIC | Facility: CLINIC | Age: 32
End: 2017-07-26

## 2017-07-26 VITALS
HEART RATE: 134 BPM | DIASTOLIC BLOOD PRESSURE: 106 MMHG | WEIGHT: 182 LBS | SYSTOLIC BLOOD PRESSURE: 142 MMHG | BODY MASS INDEX: 29 KG/M2 | TEMPERATURE: 99 F

## 2017-07-26 DIAGNOSIS — M54.5 CHRONIC LOW BACK PAIN, UNSPECIFIED BACK PAIN LATERALITY, WITH SCIATICA PRESENCE UNSPECIFIED: Primary | ICD-10-CM

## 2017-07-26 DIAGNOSIS — J06.9 VIRAL UPPER RESPIRATORY TRACT INFECTION: ICD-10-CM

## 2017-07-26 DIAGNOSIS — G89.29 CHRONIC LOW BACK PAIN, UNSPECIFIED BACK PAIN LATERALITY, WITH SCIATICA PRESENCE UNSPECIFIED: Primary | ICD-10-CM

## 2017-07-26 PROCEDURE — 99213 OFFICE O/P EST LOW 20 MIN: CPT | Performed by: INTERNAL MEDICINE

## 2017-07-26 PROCEDURE — 99212 OFFICE O/P EST SF 10 MIN: CPT | Performed by: INTERNAL MEDICINE

## 2017-07-26 RX ORDER — HYDROCODONE BITARTRATE AND HOMATROPINE METHYLBROMIDE ORAL SOLUTION 5; 1.5 MG/5ML; MG/5ML
5 LIQUID ORAL EVERY 6 HOURS PRN
Qty: 240 ML | Refills: 0 | Status: SHIPPED | OUTPATIENT
Start: 2017-07-26 | End: 2017-09-05

## 2017-07-27 NOTE — PROGRESS NOTES
HPI:    Patient ID: Mann Buchanan is a 27year old female. Back Pain   This is a chronic problem. The current episode started more than 1 year ago. The problem occurs constantly. The problem has been waxing and waning since onset.  The pain is present tablet (10 mg total) by mouth 3 (three) times daily.  Disp: 30 tablet Rfl: 0   KETOROLAC TROMETHAMINE 10 MG Oral Tab TAKE 1 TABLET BY MOUTH TWICE DAILY Disp: 10 tablet Rfl: 0   Fluticasone Propionate 50 MCG/ACT Nasal Suspension 2 sprays by Each Nare route d tolerate and had been treated with norco for pain for last 2 yrs. She is planning to get surgical consult again by early next year when she has vacation needed for post op recovery.  We had agreed to contnue to wean down her norco to lowest dose possible fo

## 2017-07-31 DIAGNOSIS — S30.0XXA LUMBAR CONTUSION, INITIAL ENCOUNTER: ICD-10-CM

## 2017-07-31 NOTE — TELEPHONE ENCOUNTER
Pt. States that she needs to get a refill for the Hydrocodone, before she leaves out of town, on Friday 8/4. She states that this will be her last long trip for the summer.        Current Outpatient Prescriptions:        HYDROcodone-acetaminophen  MG

## 2017-08-03 RX ORDER — HYDROCODONE BITARTRATE AND ACETAMINOPHEN 10; 325 MG/1; MG/1
TABLET ORAL
Qty: 98 TABLET | Refills: 0 | Status: SHIPPED | OUTPATIENT
Start: 2017-08-03 | End: 2017-08-17

## 2017-08-03 NOTE — TELEPHONE ENCOUNTER
Dr Austin Foster, patient requests refill for Norco, see pending rx, patient states she is going out of town 8/4/17  Pharmacy contacted, [facility] last filled 7/23/17, Hydromet 7/26/17

## 2017-08-03 NOTE — TELEPHONE ENCOUNTER
Patient called and stated checking status of refill request-  Patient will be traveling out of town in the morning-  Requesting a call back from nurse-

## 2017-08-03 NOTE — TELEPHONE ENCOUNTER
Patient is upset that this is not yet taken care of. She is leaving out of town tomorrow and needs available for  ASAP. Please call when ready.

## 2017-08-03 NOTE — TELEPHONE ENCOUNTER
Pt calling checking status of refill, pt requesting rush since she will be traveling and needs ASAP. Please call when available.

## 2017-08-03 NOTE — TELEPHONE ENCOUNTER
Patient called and informed Rx for Gerri Lu is ready for  at [address] [place] [address] . Patient verbalized understanding and will .

## 2017-08-16 ENCOUNTER — TELEPHONE (OUTPATIENT)
Dept: INTERNAL MEDICINE CLINIC | Facility: CLINIC | Age: 32
End: 2017-08-16

## 2017-08-16 NOTE — TELEPHONE ENCOUNTER
Pt called in requesting a refill on medication below. Pt states she might need to cancel tomorrow's appt because her work may not be letting her have the morning off to make that appt time.   Pt states she will also be out of town again this weekend and is

## 2017-08-17 ENCOUNTER — OFFICE VISIT (OUTPATIENT)
Dept: INTERNAL MEDICINE CLINIC | Facility: CLINIC | Age: 32
End: 2017-08-17

## 2017-08-17 VITALS
TEMPERATURE: 98 F | DIASTOLIC BLOOD PRESSURE: 80 MMHG | BODY MASS INDEX: 29.63 KG/M2 | WEIGHT: 180 LBS | HEIGHT: 65.5 IN | HEART RATE: 94 BPM | SYSTOLIC BLOOD PRESSURE: 134 MMHG

## 2017-08-17 DIAGNOSIS — M54.5 CHRONIC LOW BACK PAIN, UNSPECIFIED BACK PAIN LATERALITY, WITH SCIATICA PRESENCE UNSPECIFIED: ICD-10-CM

## 2017-08-17 DIAGNOSIS — G43.709 CHRONIC MIGRAINE WITHOUT AURA WITHOUT STATUS MIGRAINOSUS, NOT INTRACTABLE: Primary | ICD-10-CM

## 2017-08-17 DIAGNOSIS — G89.29 CHRONIC LOW BACK PAIN, UNSPECIFIED BACK PAIN LATERALITY, WITH SCIATICA PRESENCE UNSPECIFIED: ICD-10-CM

## 2017-08-17 PROCEDURE — 99212 OFFICE O/P EST SF 10 MIN: CPT | Performed by: INTERNAL MEDICINE

## 2017-08-17 PROCEDURE — 99214 OFFICE O/P EST MOD 30 MIN: CPT | Performed by: INTERNAL MEDICINE

## 2017-08-17 RX ORDER — KETOROLAC TROMETHAMINE 10 MG/1
10 TABLET, FILM COATED ORAL 2 TIMES DAILY
Qty: 10 TABLET | Refills: 0 | Status: SHIPPED | OUTPATIENT
Start: 2017-08-17 | End: 2018-04-18

## 2017-08-17 RX ORDER — HYDROCODONE BITARTRATE AND ACETAMINOPHEN 10; 325 MG/1; MG/1
TABLET ORAL
Qty: 84 TABLET | Refills: 0 | Status: SHIPPED | OUTPATIENT
Start: 2017-08-17 | End: 2017-08-28

## 2017-08-17 RX ORDER — HYDROCODONE BITARTRATE AND ACETAMINOPHEN 10; 325 MG/1; MG/1
TABLET ORAL
Qty: 84 TABLET | Refills: 0 | Status: SHIPPED | OUTPATIENT
Start: 2017-08-17 | End: 2017-08-17

## 2017-08-17 RX ORDER — HYDROCODONE BITARTRATE AND ACETAMINOPHEN 10; 325 MG/1; MG/1
TABLET ORAL
Qty: 98 TABLET | Refills: 0 | Status: SHIPPED | OUTPATIENT
Start: 2017-08-17 | End: 2017-08-17

## 2017-08-17 RX ORDER — METOPROLOL SUCCINATE 25 MG/1
25 TABLET, EXTENDED RELEASE ORAL DAILY
Qty: 90 TABLET | Refills: 0 | Status: SHIPPED | OUTPATIENT
Start: 2017-08-17 | End: 2018-05-22

## 2017-08-17 NOTE — PROGRESS NOTES
HPI:    Patient ID: Stephenie Kirk is a 27year old female. Migraine    This is a chronic problem. The current episode started more than 1 year ago. The pain is located in the bilateral region. The pain quality is similar to prior headaches.  The qualit ear normal.   Left Ear: External ear normal.   Nose: Nose normal.   Mouth/Throat: Oropharynx is clear and moist. No oropharyngeal exudate. Eyes: Conjunctivae are normal. Right eye exhibits no discharge. Left eye exhibits no discharge. No scleral icterus.

## 2017-08-22 ENCOUNTER — NURSE TRIAGE (OUTPATIENT)
Dept: OTHER | Age: 32
End: 2017-08-22

## 2017-08-22 ENCOUNTER — OFFICE VISIT (OUTPATIENT)
Dept: INTERNAL MEDICINE CLINIC | Facility: CLINIC | Age: 32
End: 2017-08-22

## 2017-08-22 VITALS
HEART RATE: 90 BPM | RESPIRATION RATE: 12 BRPM | DIASTOLIC BLOOD PRESSURE: 84 MMHG | HEIGHT: 66 IN | WEIGHT: 181 LBS | SYSTOLIC BLOOD PRESSURE: 124 MMHG | TEMPERATURE: 98 F | BODY MASS INDEX: 29.09 KG/M2
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
## 2017-08-22 PROCEDURE — 99213 OFFICE O/P EST LOW 20 MIN: CPT | Performed by: INTERNAL MEDICINE

## 2017-08-22 PROCEDURE — 99212 OFFICE O/P EST SF 10 MIN: CPT | Performed by: INTERNAL MEDICINE

## 2017-08-22 NOTE — PROGRESS NOTES
HPI:    Patient ID: Suzanne Elkins is a 27year old female. Insect Bite   This is a new problem. The current episode started in the past 7 days (3 days). The problem has been gradually worsening since onset. The affected locations include the neck.  The dried already. No redness nor tenderness. Lymphadenopathy:     She has no cervical adenopathy. Skin: No rash noted. She is not diaphoretic. No erythema.               ASSESSMENT/PLAN:   (W57.XXXA) Insect bite, initial encounter  (primary encounter diagn

## 2017-08-22 NOTE — TELEPHONE ENCOUNTER
Reason for Disposition  • Red or very tender (to touch) area, getting larger over 48 hours after the bite    Protocols used: INSECT BITE-A-OH  Action Requested: Summary for Provider     []  Critical Lab, Recommendations Needed  [] Need Additional Advice

## 2017-08-28 NOTE — TELEPHONE ENCOUNTER
Pt called requesting refill.        HYDROcodone-acetaminophen  MG Oral Tab Take 1to 2 tab po q 4 to 6 hr  Prn for back pain Disp: 84 tablet Rfl: 0

## 2017-08-28 NOTE — TELEPHONE ENCOUNTER
Last Rx: 8/17/17 #84    Medication pending for review. If approved, please print, sign, and ask staff to inform pt when ready for .     Recent Outpatient Visits            6 days ago Insect bite, initial encounter    [facility], [facility], [address], [address]

## 2017-08-30 RX ORDER — HYDROCODONE BITARTRATE AND ACETAMINOPHEN 10; 325 MG/1; MG/1
TABLET ORAL
Qty: 84 TABLET | Refills: 0 | Status: SHIPPED | OUTPATIENT
Start: 2017-08-30 | End: 2017-09-09

## 2017-09-05 ENCOUNTER — NURSE TRIAGE (OUTPATIENT)
Dept: INTERNAL MEDICINE CLINIC | Facility: CLINIC | Age: 32
End: 2017-09-05

## 2017-09-05 ENCOUNTER — OFFICE VISIT (OUTPATIENT)
Dept: INTERNAL MEDICINE CLINIC | Facility: CLINIC | Age: 32
End: 2017-09-05

## 2017-09-05 VITALS
TEMPERATURE: 99 F | WEIGHT: 178.81 LBS | DIASTOLIC BLOOD PRESSURE: 92 MMHG | OXYGEN SATURATION: 100 % | SYSTOLIC BLOOD PRESSURE: 128 MMHG | BODY MASS INDEX: 29 KG/M2 | HEART RATE: 120 BPM

## 2017-09-05 DIAGNOSIS — J01.00 ACUTE NON-RECURRENT MAXILLARY SINUSITIS: Primary | ICD-10-CM

## 2017-09-05 PROCEDURE — 99212 OFFICE O/P EST SF 10 MIN: CPT | Performed by: INTERNAL MEDICINE

## 2017-09-05 PROCEDURE — 99214 OFFICE O/P EST MOD 30 MIN: CPT | Performed by: INTERNAL MEDICINE

## 2017-09-05 RX ORDER — AMOXICILLIN 875 MG/1
875 TABLET, COATED ORAL 2 TIMES DAILY
Qty: 20 TABLET | Refills: 0 | Status: SHIPPED | OUTPATIENT
Start: 2017-09-05 | End: 2017-09-15

## 2017-09-05 RX ORDER — HYDROCODONE BITARTRATE AND HOMATROPINE METHYLBROMIDE ORAL SOLUTION 5; 1.5 MG/5ML; MG/5ML
5 LIQUID ORAL EVERY 6 HOURS PRN
Qty: 240 ML | Refills: 0 | Status: SHIPPED | OUTPATIENT
Start: 2017-09-05 | End: 2017-11-10

## 2017-09-05 NOTE — TELEPHONE ENCOUNTER
Action Requested: Summary for Provider     []  Critical Lab, Recommendations Needed  [] Need Additional Advice  []   FYI    []   Need Orders  [] Need Medications Sent to Pharmacy  []  Other     SUMMARY: OFFICE VISIT, sinus pain, congestion  Pt states on 8/

## 2017-09-05 NOTE — PROGRESS NOTES
HPI:    Patient ID: Ainsley Rothman is a 27year old female. Cough   This is a new problem. The current episode started in the past 7 days. The problem has been unchanged. The problem occurs constantly. The cough is non-productive.  Associated symptoms i Rarely              Current Outpatient Prescriptions:  hydrocodone-homatropine (HYDROMET) 5-1.5 MG/5ML Oral Syrup Take 5 mL by mouth every 6 (six) hours as needed.  Disp: 240 mL Rfl: 0   amoxicillin 875 MG Oral Tab Take 1 tablet (875 mg total) by mouth 2 (t Her behavior is normal. Judgment normal.   Nursing note and vitals reviewed. 09/05/17  1326   BP: 128/92   Pulse: 120   Temp: 98.7 °F (37.1 °C)   TempSrc: Oral   SpO2: 100%   Weight: 178 lb 12.8 oz (81.1 kg)         Body mass index is 28.86 kg/m².

## 2017-09-08 NOTE — TELEPHONE ENCOUNTER
Pt calling requesting refill of norco, please call when ready for  at [facility].        Current Outpatient Prescriptions:  HYDROcodone-acetaminophen  MG Oral Tab Take 1to 2 tab po q 4 to 6 hr  Prn for back pain Disp: 84 tablet Rfl: 0

## 2017-09-11 RX ORDER — HYDROCODONE BITARTRATE AND ACETAMINOPHEN 10; 325 MG/1; MG/1
TABLET ORAL
Qty: 84 TABLET | Refills: 0 | Status: SHIPPED | OUTPATIENT
Start: 2017-09-11 | End: 2017-09-21

## 2017-09-21 NOTE — TELEPHONE ENCOUNTER
Current Outpatient Prescriptions:  HYDROcodone-acetaminophen  MG Oral Tab Take 1to 2 tab po q 4 to 6 hr  Prn for back pain Disp: 84 tablet Rfl: 0     P/u ADO today or tomorrow    Pt said she normally stops at [email or web address] does rx   Huan Baldwin she is going ou

## 2017-09-22 RX ORDER — HYDROCODONE BITARTRATE AND ACETAMINOPHEN 10; 325 MG/1; MG/1
TABLET ORAL
Qty: 84 TABLET | Refills: 0 | Status: SHIPPED | OUTPATIENT
Start: 2017-09-22 | End: 2017-09-28

## 2017-09-22 NOTE — TELEPHONE ENCOUNTER
Talked to pt;  Her next refill would be 16 days from now since she is filling the script tomorrow whch is 2 days earlier. I again d/w pt regarding trying to cut down again on pain meds.  Pt states current dose working well though willing to try to cut down

## 2017-09-25 ENCOUNTER — HOSPITAL ENCOUNTER (EMERGENCY)
Facility: HOSPITAL | Age: 32
Discharge: HOME | End: 2017-09-25
Attending: EMERGENCY MEDICINE
Payer: COMMERCIAL

## 2017-09-25 VITALS
BODY MASS INDEX: 28.13 KG/M2 | DIASTOLIC BLOOD PRESSURE: 70 MMHG | SYSTOLIC BLOOD PRESSURE: 144 MMHG | TEMPERATURE: 98 F | OXYGEN SATURATION: 98 % | WEIGHT: 175 LBS | RESPIRATION RATE: 18 BRPM | HEART RATE: 78 BPM | HEIGHT: 66 IN

## 2017-09-25 DIAGNOSIS — L03.211 FACIAL CELLULITIS: Primary | ICD-10-CM

## 2017-09-25 PROCEDURE — 99284 EMERGENCY DEPT VISIT MOD MDM: CPT

## 2017-09-25 PROCEDURE — 96365 THER/PROPH/DIAG IV INF INIT: CPT

## 2017-09-25 RX ORDER — AMOXICILLIN AND CLAVULANATE POTASSIUM 875; 125 MG/1; MG/1
1 TABLET, FILM COATED ORAL 2 TIMES DAILY
Qty: 14 TABLET | Refills: 0 | Status: SHIPPED | OUTPATIENT
Start: 2017-09-25 | End: 2017-10-02

## 2017-09-26 ENCOUNTER — OFFICE VISIT (OUTPATIENT)
Dept: INTERNAL MEDICINE CLINIC | Facility: CLINIC | Age: 32
End: 2017-09-26

## 2017-09-26 VITALS
DIASTOLIC BLOOD PRESSURE: 94 MMHG | BODY MASS INDEX: 28.93 KG/M2 | TEMPERATURE: 99 F | RESPIRATION RATE: 12 BRPM | HEART RATE: 87 BPM | SYSTOLIC BLOOD PRESSURE: 150 MMHG | HEIGHT: 66 IN | WEIGHT: 180 LBS

## 2017-09-26 DIAGNOSIS — L03.211 FACIAL CELLULITIS: Primary | ICD-10-CM

## 2017-09-26 PROCEDURE — 99214 OFFICE O/P EST MOD 30 MIN: CPT | Performed by: INTERNAL MEDICINE

## 2017-09-26 PROCEDURE — 99212 OFFICE O/P EST SF 10 MIN: CPT | Performed by: INTERNAL MEDICINE

## 2017-09-26 RX ORDER — CYCLOBENZAPRINE HCL 10 MG
1 TABLET ORAL EVERY 12 HOURS
COMMUNITY
Start: 2017-07-10 | End: 2017-10-13

## 2017-09-26 RX ORDER — ZOLMITRIPTAN 5 MG/1
5 TABLET, FILM COATED ORAL
COMMUNITY
End: 2017-09-26

## 2017-09-26 NOTE — ED PROVIDER NOTES
Patient Seen in: [facility] Emergency Department    History   Patient presents with:  Cellulitis (integumentary, infectious)    Stated Complaint:     HPI    Patient is a 22-year-old female with a history of migraine headaches she states she had a 88  Resp: 16  Temp: 98.1 °F (36.7 °C)  Temp src: Temporal  SpO2: 99 %  O2 Device: None (Room air)    Current:/100   Pulse 77   Temp 98.1 °F (36.7 °C) (Temporal)   Resp 16   Ht 167.6 cm (5' 6\")   Wt 79.4 kg   LMP 09/11/2017   SpO2 98%   BMI 28.25 kg/ diagnosis)    Disposition:  Discharge    Follow-up:  Christine Jonas MD  [address] [place] [address]  [phone]    Schedule an appointment as soon as possible for a visit in 1 day        Medications Prescribed:  Current Discharg

## 2017-09-26 NOTE — PROGRESS NOTES
HPI:    Patient ID: Farzana Duarte is a 27year old female. Swelling   This is a new (pt here for postEr ffup, had right face rednes and swelling ;initiall had pimple on the bridge of her nose) problem. The current episode started in the past 7 days.  Kavya Mccormack Comment:Other reaction(s): Swelling   PHYSICAL EXAM:   Physical Exam   Constitutional: She appears well-developed. She is active and cooperative. Non-toxic appearance. She does not have a sickly appearance. She does not appear ill. No distress.    obese prescriptions requested or ordered in this encounter       Imaging & Referrals:  None       ME#[id number]

## 2017-09-27 ENCOUNTER — TELEPHONE (OUTPATIENT)
Dept: INTERNAL MEDICINE CLINIC | Facility: CLINIC | Age: 32
End: 2017-09-27

## 2017-09-27 NOTE — TELEPHONE ENCOUNTER
Patient called in wanting to request an appointment with Dr. Bertrand Carbajal for FIRST thing in the morning tomorrow at 09/28. She has to request her job to let her see him. Please advise. Thanks!

## 2017-09-28 ENCOUNTER — OFFICE VISIT (OUTPATIENT)
Dept: INTERNAL MEDICINE CLINIC | Facility: CLINIC | Age: 32
End: 2017-09-28

## 2017-09-28 VITALS
HEART RATE: 99 BPM | SYSTOLIC BLOOD PRESSURE: 135 MMHG | BODY MASS INDEX: 28.93 KG/M2 | TEMPERATURE: 99 F | WEIGHT: 180 LBS | DIASTOLIC BLOOD PRESSURE: 95 MMHG | HEIGHT: 66 IN

## 2017-09-28 DIAGNOSIS — L03.211 FACIAL CELLULITIS: Primary | ICD-10-CM

## 2017-09-28 PROCEDURE — 99212 OFFICE O/P EST SF 10 MIN: CPT | Performed by: INTERNAL MEDICINE

## 2017-09-28 RX ORDER — HYDROCODONE BITARTRATE AND ACETAMINOPHEN 10; 325 MG/1; MG/1
TABLET ORAL
Qty: 126 TABLET | Refills: 0 | Status: SHIPPED | OUTPATIENT
Start: 2017-09-28 | End: 2017-10-13

## 2017-09-28 NOTE — PROGRESS NOTES
HPI:    Patient ID: Deondre Fritz is a 27year old female. Patient here today for ffup of left facial cellulitis; she had been on augmentin for 3 days and showed improvement of redness and swelling of her left face. No fevers noted.  She states her amanda redness. Still no fluctuance or signs of abscess on exam   Neurological: She is alert. Skin: She is not diaphoretic.               ASSESSMENT/PLAN:   (L03.211) Facial cellulitis  (primary encounter diagnosis)  Plan: much better with 2 to 3 days of augment

## 2017-10-04 ENCOUNTER — NURSE TRIAGE (OUTPATIENT)
Dept: OTHER | Age: 32
End: 2017-10-04

## 2017-10-04 NOTE — TELEPHONE ENCOUNTER
Action Requested: Summary for Provider     []  Critical Lab, Recommendations Needed  [x] Need Additional Advice  []   FYI    []   Need Orders  [x] Need Medications Sent to Pharmacy  []  Other     SUMMARY:MD advice/possible additional antibiotic Rx    Pt st

## 2017-10-04 NOTE — TELEPHONE ENCOUNTER
pls have her see our ENT clinic Dr Ayaz Weems or partners; pls assist pt in getting apptment; want her to be checked if she is developing abscess on the area on her nose.

## 2017-10-13 ENCOUNTER — TELEPHONE (OUTPATIENT)
Dept: INTERNAL MEDICINE CLINIC | Facility: CLINIC | Age: 32
End: 2017-10-13

## 2017-10-13 RX ORDER — CYCLOBENZAPRINE HCL 10 MG
10 TABLET ORAL EVERY 12 HOURS
Qty: 30 TABLET | Refills: 0 | Status: SHIPPED | OUTPATIENT
Start: 2017-10-13 | End: 2018-03-08

## 2017-10-13 RX ORDER — HYDROCODONE BITARTRATE AND ACETAMINOPHEN 10; 325 MG/1; MG/1
TABLET ORAL
Qty: 84 TABLET | Refills: 0 | Status: SHIPPED | OUTPATIENT
Start: 2017-10-13 | End: 2017-10-18

## 2017-10-13 NOTE — TELEPHONE ENCOUNTER
Current Outpatient Prescriptions:   • •  Cyclobenzaprine HCl 10 MG Oral Tab, Take 1 tablet by mouth Q12H., Disp: , Rfl:   •

## 2017-10-18 ENCOUNTER — OFFICE VISIT (OUTPATIENT)
Dept: INTERNAL MEDICINE CLINIC | Facility: CLINIC | Age: 32
End: 2017-10-18

## 2017-10-18 VITALS
TEMPERATURE: 99 F | BODY MASS INDEX: 29 KG/M2 | DIASTOLIC BLOOD PRESSURE: 86 MMHG | HEART RATE: 108 BPM | WEIGHT: 181.19 LBS | SYSTOLIC BLOOD PRESSURE: 136 MMHG

## 2017-10-18 DIAGNOSIS — L03.211 FACIAL CELLULITIS: Primary | ICD-10-CM

## 2017-10-18 PROCEDURE — 99212 OFFICE O/P EST SF 10 MIN: CPT | Performed by: INTERNAL MEDICINE

## 2017-10-18 PROCEDURE — 99213 OFFICE O/P EST LOW 20 MIN: CPT | Performed by: INTERNAL MEDICINE

## 2017-10-18 RX ORDER — HYDROCODONE BITARTRATE AND ACETAMINOPHEN 10; 325 MG/1; MG/1
TABLET ORAL
Qty: 126 TABLET | Refills: 0 | Status: SHIPPED | OUTPATIENT
Start: 2017-10-20 | End: 2017-10-30

## 2017-10-18 RX ORDER — AMOXICILLIN AND CLAVULANATE POTASSIUM 875; 125 MG/1; MG/1
1 TABLET, FILM COATED ORAL 2 TIMES DAILY
Qty: 28 TABLET | Refills: 0 | Status: SHIPPED | OUTPATIENT
Start: 2017-10-18 | End: 2017-10-25

## 2017-10-18 NOTE — PROGRESS NOTES
HPI:    Patient ID: Clara Tripp is a 27year old female. Patient presents today with complaint of recurrence of right facial swelling, redness and pain. I had treated her for facial cellulitis about 2 weeks ago which did respond well to augmentin.  P MG/5ML Oral Syrup Take 5 mL by mouth every 6 (six) hours as needed.  Disp: 240 mL Rfl: 0     Allergies:  Acetaminophen-Codei*        Comment:Other reaction(s): Swelling  Aspirin                 Swelling    Comment:Other reaction(s): ASPIRIN  Codeine her pain ast least 9 tabs /day since Friday last week when her swelling recurred so will need to get earlier refill by Oct 20, 2017. Pt told to go to ER if any worsening. No orders of the defined types were placed in this encounter.       Meds This

## 2017-10-20 ENCOUNTER — OFFICE VISIT (OUTPATIENT)
Dept: OTOLARYNGOLOGY | Facility: CLINIC | Age: 32
End: 2017-10-20

## 2017-10-20 VITALS
HEIGHT: 66 IN | WEIGHT: 181 LBS | BODY MASS INDEX: 29.09 KG/M2 | DIASTOLIC BLOOD PRESSURE: 72 MMHG | SYSTOLIC BLOOD PRESSURE: 148 MMHG | TEMPERATURE: 98 F

## 2017-10-20 DIAGNOSIS — L03.211 FACIAL CELLULITIS: Primary | ICD-10-CM

## 2017-10-20 PROCEDURE — 99243 OFF/OP CNSLTJ NEW/EST LOW 30: CPT | Performed by: OTOLARYNGOLOGY

## 2017-10-20 PROCEDURE — 99212 OFFICE O/P EST SF 10 MIN: CPT | Performed by: OTOLARYNGOLOGY

## 2017-10-20 RX ORDER — CLINDAMYCIN HYDROCHLORIDE 150 MG/1
300 CAPSULE ORAL EVERY 8 HOURS
Qty: 42 CAPSULE | Refills: 0 | Status: SHIPPED | OUTPATIENT
Start: 2017-10-20 | End: 2017-10-27

## 2017-10-20 NOTE — PATIENT INSTRUCTIONS
Facial Cellulitis  Cellulitis is an infection of the deep layers of skin. A break in the skin, such as a cut or scratch, can let bacteria under the skin. It may also occur from an infected oil gland (pimple) or hair follicle.  If the bacteria get to deep · Headache or neck pain that gets worse  · Unusual drowsiness or confusion  · Convulsions (seizure)  · Change in eyesight     Date Last Reviewed: 9/1/2016  © 9574-5294 [employer]. [address] [facility], [address] [place] [address].  All rights reserve

## 2017-10-20 NOTE — PROGRESS NOTES
Stephenie Kirk is a 27year old female. Patient presents with:   Other: patient presents for facial cellulitis took a course took 1 course of augmentin and is currently on another course of augmentin    HPI:   He is a small bump but came up along the right Alcohol use:  No               Comment: Rarely       REVIEW OF SYSTEMS:   GENERAL HEALTH: feels well otherwise  GENERAL : denies fever, chills, sweats, weight loss, weight gain  SKIN: denies any unusual skin lesions or rashes  RESPIRATORY: denies shortn understanding of these issues and agrees to the plan. David Bal MD  10/20/2017  7:10 AM

## 2017-10-25 ENCOUNTER — OFFICE VISIT (OUTPATIENT)
Dept: INTERNAL MEDICINE CLINIC | Facility: CLINIC | Age: 32
End: 2017-10-25

## 2017-10-25 ENCOUNTER — LAB ENCOUNTER (OUTPATIENT)
Dept: LAB | Age: 32
End: 2017-10-25
Attending: INTERNAL MEDICINE
Payer: COMMERCIAL

## 2017-10-25 VITALS
DIASTOLIC BLOOD PRESSURE: 80 MMHG | RESPIRATION RATE: 12 BRPM | SYSTOLIC BLOOD PRESSURE: 130 MMHG | BODY MASS INDEX: 29.25 KG/M2 | TEMPERATURE: 98 F | HEART RATE: 114 BPM | WEIGHT: 182 LBS | HEIGHT: 66 IN

## 2017-10-25 DIAGNOSIS — Z00.00 ANNUAL PHYSICAL EXAM: ICD-10-CM

## 2017-10-25 DIAGNOSIS — G89.29 CHRONIC LOW BACK PAIN, UNSPECIFIED BACK PAIN LATERALITY, WITH SCIATICA PRESENCE UNSPECIFIED: ICD-10-CM

## 2017-10-25 DIAGNOSIS — M54.5 CHRONIC LOW BACK PAIN, UNSPECIFIED BACK PAIN LATERALITY, WITH SCIATICA PRESENCE UNSPECIFIED: ICD-10-CM

## 2017-10-25 DIAGNOSIS — Z00.00 ANNUAL PHYSICAL EXAM: Primary | ICD-10-CM

## 2017-10-25 DIAGNOSIS — E78.00 HYPERCHOLESTEROLEMIA: ICD-10-CM

## 2017-10-25 DIAGNOSIS — G43.009 MIGRAINE WITHOUT AURA AND WITHOUT STATUS MIGRAINOSUS, NOT INTRACTABLE: ICD-10-CM

## 2017-10-25 PROCEDURE — 36415 COLL VENOUS BLD VENIPUNCTURE: CPT

## 2017-10-25 PROCEDURE — 80053 COMPREHEN METABOLIC PANEL: CPT

## 2017-10-25 PROCEDURE — 80307 DRUG TEST PRSMV CHEM ANLYZR: CPT

## 2017-10-25 PROCEDURE — 85025 COMPLETE CBC W/AUTO DIFF WBC: CPT

## 2017-10-25 PROCEDURE — 81001 URINALYSIS AUTO W/SCOPE: CPT

## 2017-10-25 PROCEDURE — 99395 PREV VISIT EST AGE 18-39: CPT | Performed by: INTERNAL MEDICINE

## 2017-10-25 PROCEDURE — 80061 LIPID PANEL: CPT

## 2017-10-25 RX ORDER — GABAPENTIN 300 MG/1
300 CAPSULE ORAL NIGHTLY
Qty: 30 CAPSULE | Refills: 0 | Status: SHIPPED | OUTPATIENT
Start: 2017-10-25 | End: 2018-03-08

## 2017-10-25 NOTE — PROGRESS NOTES
HPI:    Patient ID: Prachi Beavers is a 27year old female. Patient presents today for her annual physical.       Back Pain   This is a chronic (low back pain) problem. The current episode started more than 1 year ago. The problem occurs constantly.  Faye Contreras chest pain. Gastrointestinal: Negative. Negative for bowel incontinence. Genitourinary: Negative for bladder incontinence, difficulty urinating, frequency and hematuria. Musculoskeletal: Positive for back pain. Skin: Positive for rash.    Allergic/ Oropharynx is clear and moist. No oropharyngeal exudate. Eyes: Conjunctivae are normal. Pupils are equal, round, and reactive to light. Right eye exhibits no discharge. Left eye exhibits no discharge. No scleral icterus. Neck: Normal range of motion.  [place] adjuvant med like gabapentin to help control her low back pain and also to get her off or use least amount of norco. She will be seeing spine surgeon for consultation as had been discussed before.  She had to use higher dose of norco past 2 weeks due to cur

## 2017-10-26 ENCOUNTER — TELEPHONE (OUTPATIENT)
Dept: INTERNAL MEDICINE CLINIC | Facility: CLINIC | Age: 32
End: 2017-10-26

## 2017-10-26 RX ORDER — CEPHALEXIN 500 MG/1
500 CAPSULE ORAL 2 TIMES DAILY
Qty: 14 CAPSULE | Refills: 0 | Status: SHIPPED | OUTPATIENT
Start: 2017-10-26 | End: 2017-12-15 | Stop reason: ALTCHOICE

## 2017-10-30 ENCOUNTER — TELEPHONE (OUTPATIENT)
Dept: INTERNAL MEDICINE CLINIC | Facility: CLINIC | Age: 32
End: 2017-10-30

## 2017-10-30 RX ORDER — HYDROCODONE BITARTRATE AND ACETAMINOPHEN 10; 325 MG/1; MG/1
TABLET ORAL
Qty: 112 TABLET | Refills: 0 | Status: SHIPPED | OUTPATIENT
Start: 2017-11-01 | End: 2017-11-10

## 2017-10-30 NOTE — TELEPHONE ENCOUNTER
Pt came in to get refill of norco; going out of town so needed to refill by 11/1/17 so I told her next refill after would be on 11/15/17 and agreed to cut down to 8 tab max/day; she will also be starting to gabapentin once done with her abx   As we had agr

## 2017-11-10 ENCOUNTER — TELEPHONE (OUTPATIENT)
Dept: OTHER | Age: 32
End: 2017-11-10

## 2017-11-10 ENCOUNTER — OFFICE VISIT (OUTPATIENT)
Dept: INTERNAL MEDICINE CLINIC | Facility: CLINIC | Age: 32
End: 2017-11-10

## 2017-11-10 VITALS
RESPIRATION RATE: 16 BRPM | DIASTOLIC BLOOD PRESSURE: 86 MMHG | HEIGHT: 66 IN | OXYGEN SATURATION: 99 % | TEMPERATURE: 99 F | WEIGHT: 185 LBS | SYSTOLIC BLOOD PRESSURE: 146 MMHG | HEART RATE: 103 BPM | BODY MASS INDEX: 29.73 KG/M2

## 2017-11-10 DIAGNOSIS — J06.9 VIRAL UPPER RESPIRATORY TRACT INFECTION: Primary | ICD-10-CM

## 2017-11-10 PROCEDURE — 99213 OFFICE O/P EST LOW 20 MIN: CPT | Performed by: INTERNAL MEDICINE

## 2017-11-10 PROCEDURE — 99212 OFFICE O/P EST SF 10 MIN: CPT | Performed by: INTERNAL MEDICINE

## 2017-11-10 RX ORDER — HYDROCODONE BITARTRATE AND HOMATROPINE METHYLBROMIDE ORAL SOLUTION 5; 1.5 MG/5ML; MG/5ML
5 LIQUID ORAL EVERY 6 HOURS PRN
Qty: 240 ML | Refills: 0 | Status: SHIPPED | OUTPATIENT
Start: 2017-11-10 | End: 2017-11-20

## 2017-11-10 RX ORDER — HYDROCODONE BITARTRATE AND ACETAMINOPHEN 10; 325 MG/1; MG/1
TABLET ORAL
Qty: 98 TABLET | Refills: 0 | Status: SHIPPED | OUTPATIENT
Start: 2017-11-15 | End: 2017-11-24

## 2017-11-10 NOTE — TELEPHONE ENCOUNTER
Yes, I had seen her for ffup already and discussed about holding off doing her lumbar xray ordered by Dr Ace Ham since p is getting better already. See progress note. 79 yo male recent Pancreatic CA diagnosis admitted with sepsis after recent chemo with no clear localization no in the proces of dying with palliative care

## 2017-11-10 NOTE — TELEPHONE ENCOUNTER
Spoke with pt & c/o cough with greenish mucous, sinus pressure, sore throat from coughing x 4 days. Pt have not try any OTC due to h/o HTN.    Pt req rx for cough or ov,  she said she has abx ( augmentin ) left at home so she can take that if MD ok for he

## 2017-11-10 NOTE — PROGRESS NOTES
HPI:    Patient ID: Farzana Duarte is a 27year old female. Cough   This is a new problem. The current episode started in the past 7 days (3 days ago). The problem has been unchanged. The problem occurs hourly. The cough is non-productive.  Associated s capsule (500 mg total) by mouth 2 (two) times daily.  Disp: 14 capsule Rfl: 0     Allergies:  Acetaminophen-Codei*        Comment:Other reaction(s): Swelling  Aspirin                 Swelling    Comment:Other reaction(s): ASPIRIN  Codeine                 Sw

## 2017-11-20 ENCOUNTER — TELEPHONE (OUTPATIENT)
Dept: INTERNAL MEDICINE CLINIC | Facility: CLINIC | Age: 32
End: 2017-11-20

## 2017-11-20 RX ORDER — HYDROCODONE BITARTRATE AND HOMATROPINE METHYLBROMIDE ORAL SOLUTION 5; 1.5 MG/5ML; MG/5ML
5 LIQUID ORAL EVERY 6 HOURS PRN
Qty: 240 ML | Refills: 0 | Status: SHIPPED | OUTPATIENT
Start: 2017-11-20 | End: 2018-02-26

## 2017-11-20 RX ORDER — AMOXICILLIN AND CLAVULANATE POTASSIUM 875; 125 MG/1; MG/1
1 TABLET, FILM COATED ORAL 2 TIMES DAILY
Qty: 20 TABLET | Refills: 0 | Status: SHIPPED | OUTPATIENT
Start: 2017-11-20 | End: 2017-11-30

## 2017-11-20 NOTE — TELEPHONE ENCOUNTER
Still having coughing; states cant find her augmentin she had befeore so I gave her script; she also asked for refill of hydromet cough med so one given today. Again  Told pt plan to continue wean her off norco and start gabapentin.  Will cut down # of pill

## 2017-11-24 ENCOUNTER — TELEPHONE (OUTPATIENT)
Dept: INTERNAL MEDICINE CLINIC | Facility: CLINIC | Age: 32
End: 2017-11-24

## 2017-11-24 RX ORDER — HYDROCODONE BITARTRATE AND ACETAMINOPHEN 10; 325 MG/1; MG/1
TABLET ORAL
Qty: 84 TABLET | Refills: 0 | Status: SHIPPED | OUTPATIENT
Start: 2017-11-27 | End: 2017-12-11

## 2017-11-24 NOTE — TELEPHONE ENCOUNTER
states patient here requesting refill on Norco. Note last refill for Nornco , 11/27/17. Patient in waiting room.

## 2017-11-24 NOTE — TELEPHONE ENCOUNTER
Pt here to pickup script for norco. She is going out of town by Monday so needs to get refilled by Monday next week which is 2 days ahead of schedule so her next refill will be 16 days from Monday.   We will also cut down dose to 6 tabs/day and she will sta

## 2017-12-11 ENCOUNTER — TELEPHONE (OUTPATIENT)
Dept: INTERNAL MEDICINE CLINIC | Facility: CLINIC | Age: 32
End: 2017-12-11

## 2017-12-11 RX ORDER — HYDROCODONE BITARTRATE AND ACETAMINOPHEN 10; 325 MG/1; MG/1
TABLET ORAL
Qty: 84 TABLET | Refills: 0 | Status: SHIPPED | OUTPATIENT
Start: 2017-12-13 | End: 2017-12-11

## 2017-12-11 RX ORDER — HYDROCODONE BITARTRATE AND ACETAMINOPHEN 10; 325 MG/1; MG/1
TABLET ORAL
Qty: 84 TABLET | Refills: 0 | Status: SHIPPED | OUTPATIENT
Start: 2017-12-11 | End: 2018-02-01 | Stop reason: ALTCHOICE

## 2017-12-11 RX ORDER — HYDROCODONE BITARTRATE AND ACETAMINOPHEN 10; 325 MG/1; MG/1
TABLET ORAL
Qty: 84 TABLET | Refills: 0 | Status: SHIPPED | OUTPATIENT
Start: 2017-12-13 | End: 2017-12-22

## 2017-12-11 NOTE — TELEPHONE ENCOUNTER
LMTCB. Let patient know script for Mikie Cyr is ready for p/u. Script at nurses station pod 3 by [facility] work space.

## 2017-12-11 NOTE — TELEPHONE ENCOUNTER
Patient need by Tuesday am     Current Outpatient Prescriptions:  HYDROcodone-acetaminophen  MG Oral Tab Take 1to 2 tab po q 4 to 6 hr  Prn for  pain Disp: 84 tablet Rfl: 0

## 2017-12-11 NOTE — TELEPHONE ENCOUNTER
Phone room states pt.  Calling to find out when she can p/u her script for [place]. Message fwd to Dr. Eric Mata

## 2017-12-15 ENCOUNTER — HOSPITAL ENCOUNTER (OUTPATIENT)
Age: 32
Discharge: HOME | End: 2017-12-15
Payer: COMMERCIAL

## 2017-12-15 VITALS
DIASTOLIC BLOOD PRESSURE: 94 MMHG | WEIGHT: 185 LBS | HEART RATE: 122 BPM | OXYGEN SATURATION: 100 % | HEIGHT: 66 IN | RESPIRATION RATE: 20 BRPM | TEMPERATURE: 98 F | SYSTOLIC BLOOD PRESSURE: 170 MMHG | BODY MASS INDEX: 29.73 KG/M2

## 2017-12-15 DIAGNOSIS — J40 BRONCHITIS: ICD-10-CM

## 2017-12-15 DIAGNOSIS — J01.00 ACUTE MAXILLARY SINUSITIS, RECURRENCE NOT SPECIFIED: Primary | ICD-10-CM

## 2017-12-15 PROCEDURE — 99213 OFFICE O/P EST LOW 20 MIN: CPT

## 2017-12-15 PROCEDURE — 99214 OFFICE O/P EST MOD 30 MIN: CPT

## 2017-12-15 RX ORDER — AMOXICILLIN AND CLAVULANATE POTASSIUM 875; 125 MG/1; MG/1
1 TABLET, FILM COATED ORAL 2 TIMES DAILY
Qty: 20 TABLET | Refills: 0 | Status: SHIPPED | OUTPATIENT
Start: 2017-12-15 | End: 2017-12-25

## 2017-12-15 RX ORDER — HYDROCODONE BITARTRATE AND HOMATROPINE METHYLBROMIDE ORAL SOLUTION 5; 1.5 MG/5ML; MG/5ML
5 LIQUID ORAL EVERY 6 HOURS PRN
Qty: 240 ML | Refills: 0 | Status: SHIPPED | OUTPATIENT
Start: 2017-12-15 | End: 2017-12-28

## 2017-12-15 NOTE — ED INITIAL ASSESSMENT (HPI)
PATIENT ARRIVED AMBULATORY TO ROOM C/O A CONGESTED COUGH AND NASAL CONGESTION X1 WEEK. +SINUS PRESSURE. +BILATERAL EAR PAIN. NO N/V/D. NO FEVERS. EASY NON LABORED RESPIRATIONS.  NO DISTRESS

## 2017-12-15 NOTE — ED PROVIDER NOTES
Patient Seen in: [facility]    History   Patient presents with:  Cough/URI    Stated Complaint: both ear pain, sinus    HPI    Patient is a 66-year-old female former smoker who presents for evaluation of sinus pain, bilat All other systems reviewed and negative except as noted above.     Physical Exam   ED Triage Vitals [12/15/17 1728]  BP: (!) 170/94  Pulse: 122  Resp: 20  Temp: 98 °F (36.7 °C)  Temp src: Oral  SpO2: 100 %  O2 Device: None (Room air)    Current:BP (!) 170/9 Patient presented with tachycardia for which she states is normal for her especially since she took Tylenol Cold prior to arrival, otherwise vitals stable. Physical exam as above, lungs CTA. I do not feel CXR is warranted at this time and pt agrees.  Coun

## 2017-12-22 ENCOUNTER — TELEPHONE (OUTPATIENT)
Dept: INTERNAL MEDICINE CLINIC | Facility: CLINIC | Age: 32
End: 2017-12-22

## 2017-12-22 RX ORDER — HYDROCODONE BITARTRATE AND ACETAMINOPHEN 10; 325 MG/1; MG/1
TABLET ORAL
Qty: 70 TABLET | Refills: 0 | Status: SHIPPED | OUTPATIENT
Start: 2017-12-22 | End: 2018-01-05

## 2017-12-22 NOTE — TELEPHONE ENCOUNTER
Pt walked in to  her script for norco; as per our discussion before, we will continue to wean down so now down to 5 tabs/day; she also knows she is getting script early since we are not going to be here over the holiday weekend.  Her refill was suppo

## 2017-12-28 ENCOUNTER — TELEPHONE (OUTPATIENT)
Dept: OTHER | Age: 32
End: 2017-12-28

## 2017-12-28 RX ORDER — HYDROCODONE BITARTRATE AND HOMATROPINE METHYLBROMIDE ORAL SOLUTION 5; 1.5 MG/5ML; MG/5ML
5 LIQUID ORAL EVERY 6 HOURS PRN
Qty: 240 ML | Refills: 0 | Status: SHIPPED | OUTPATIENT
Start: 2017-12-28 | End: 2018-01-11

## 2017-12-28 NOTE — TELEPHONE ENCOUNTER
Patient called and states that she was rx last 12/15/17 with HYDROMET from [facility] ,patient still with \"wicked cold\", \"super congested\", \"feel garbage\", and still coughing, requesting med refill, medication pended, advised that will send the message to MD

## 2017-12-28 NOTE — TELEPHONE ENCOUNTER
Pt called to follow up on refill request, informed her prescription has been refilled and reviewed doctor's recommendations. Pt agreed with plan of care, she state her  is going to  prescription at [facility].

## 2018-01-05 ENCOUNTER — TELEPHONE (OUTPATIENT)
Dept: INTERNAL MEDICINE CLINIC | Facility: CLINIC | Age: 33
End: 2018-01-05

## 2018-01-05 RX ORDER — HYDROCODONE BITARTRATE AND ACETAMINOPHEN 10; 325 MG/1; MG/1
TABLET ORAL
Qty: 56 TABLET | Refills: 0 | Status: SHIPPED | OUTPATIENT
Start: 2018-01-08 | End: 2018-01-05

## 2018-01-05 RX ORDER — HYDROCODONE BITARTRATE AND ACETAMINOPHEN 10; 325 MG/1; MG/1
TABLET ORAL
Qty: 56 TABLET | Refills: 0 | Status: SHIPPED | OUTPATIENT
Start: 2018-01-07 | End: 2018-01-16

## 2018-01-05 NOTE — TELEPHONE ENCOUNTER
Pt here to  her norco script; told she will be due for refill not til 1/8/18 which she said she knew so postdated script for 1/07/18 and to start on 1/8/18.  Also told pt we will continue to wean her off norco. Told to increase gabapentin to bid dosi

## 2018-01-11 ENCOUNTER — TELEPHONE (OUTPATIENT)
Dept: INTERNAL MEDICINE CLINIC | Facility: CLINIC | Age: 33
End: 2018-01-11

## 2018-01-11 ENCOUNTER — OFFICE VISIT (OUTPATIENT)
Dept: INTERNAL MEDICINE CLINIC | Facility: CLINIC | Age: 33
End: 2018-01-11

## 2018-01-11 VITALS
WEIGHT: 187 LBS | HEART RATE: 106 BPM | OXYGEN SATURATION: 97 % | BODY MASS INDEX: 30 KG/M2 | DIASTOLIC BLOOD PRESSURE: 80 MMHG | TEMPERATURE: 99 F | SYSTOLIC BLOOD PRESSURE: 130 MMHG

## 2018-01-11 DIAGNOSIS — M51.26 HERNIATED INTERVERTEBRAL DISC OF LUMBAR SPINE: ICD-10-CM

## 2018-01-11 DIAGNOSIS — R05.9 COUGH: ICD-10-CM

## 2018-01-11 DIAGNOSIS — M54.50 CHRONIC BILATERAL LOW BACK PAIN WITHOUT SCIATICA: Primary | ICD-10-CM

## 2018-01-11 DIAGNOSIS — J01.00 ACUTE MAXILLARY SINUSITIS, RECURRENCE NOT SPECIFIED: Primary | ICD-10-CM

## 2018-01-11 DIAGNOSIS — G89.29 CHRONIC BILATERAL LOW BACK PAIN WITHOUT SCIATICA: Primary | ICD-10-CM

## 2018-01-11 PROCEDURE — 99213 OFFICE O/P EST LOW 20 MIN: CPT | Performed by: INTERNAL MEDICINE

## 2018-01-11 PROCEDURE — 99212 OFFICE O/P EST SF 10 MIN: CPT | Performed by: INTERNAL MEDICINE

## 2018-01-11 RX ORDER — HYDROCODONE BITARTRATE AND HOMATROPINE METHYLBROMIDE ORAL SOLUTION 5; 1.5 MG/5ML; MG/5ML
5 LIQUID ORAL EVERY 6 HOURS PRN
Qty: 240 ML | Refills: 0 | Status: SHIPPED | OUTPATIENT
Start: 2018-01-11 | End: 2018-02-26

## 2018-01-11 RX ORDER — DOXYCYCLINE 100 MG/1
100 TABLET ORAL 2 TIMES DAILY
Qty: 20 TABLET | Refills: 0 | Status: SHIPPED | OUTPATIENT
Start: 2018-01-11 | End: 2018-03-08 | Stop reason: ALTCHOICE

## 2018-01-11 NOTE — TELEPHONE ENCOUNTER
Pt called in stating she seen Dr. Zuri Montes today and was told to call her ortho doctor, asap. Pt states she tried to make an appt with them, but they will not schedule anything unless she has had an MRI or a CT scan.   Pt asking for Dr. Alfaro Moment thoughts or if he

## 2018-01-11 NOTE — PROGRESS NOTES
HPI:    Patient ID: Doreen Lea is a 27year old female. Sinus Problem   This is a new problem. The current episode started 1 to 4 weeks ago (4 weeks). The problem has been gradually worsening since onset. There has been no fever.  The pain is modera Vaginal Ring Place 1 Device vaginally every 21 days.  Disp:  Rfl:    HYDROcodone-acetaminophen  MG Oral Tab Take 1to 2 tab po q 4 to 6 hr  Prn for  pain Disp: 56 tablet Rfl: 0   hydrocodone-homatropine (HYDROMET) 5-1.5 MG/5ML Oral Syrup Take 5 mL by m and breath sounds normal. No respiratory distress. She has no wheezes. She has no rales. She exhibits no tenderness. Musculoskeletal: She exhibits no edema. Neurological: She is alert. Skin: No rash noted. She is not diaphoretic.               ASSESSM

## 2018-01-15 ENCOUNTER — TELEPHONE (OUTPATIENT)
Dept: INTERNAL MEDICINE CLINIC | Facility: CLINIC | Age: 33
End: 2018-01-15

## 2018-01-16 ENCOUNTER — TELEPHONE (OUTPATIENT)
Dept: INTERNAL MEDICINE CLINIC | Facility: CLINIC | Age: 33
End: 2018-01-16

## 2018-01-16 RX ORDER — HYDROCODONE BITARTRATE AND ACETAMINOPHEN 10; 325 MG/1; MG/1
TABLET ORAL
Qty: 56 TABLET | Refills: 0 | Status: SHIPPED | OUTPATIENT
Start: 2018-01-16 | End: 2018-02-01

## 2018-01-16 NOTE — TELEPHONE ENCOUNTER
Pt leaving out of town tomorrow to go to [place] and will be  Out of pain meds while there in 6 days so will need to get her refill earlier to bring with her.  She was supposed to get refill on 1/22/18 so since she is getting her refill nasreen, she will

## 2018-01-17 NOTE — TELEPHONE ENCOUNTER
We will order the mri of lumbar spine but this will need preauthoization from her insurance.  dont really know if  Insurance will approve though with orders coming from us PCP rather than spine specialist.

## 2018-01-18 NOTE — TELEPHONE ENCOUNTER
Prior Kerrie Oiler is pending, need to be worked on my [facility].

## 2018-01-26 ENCOUNTER — TELEPHONE (OUTPATIENT)
Dept: INTERNAL MEDICINE CLINIC | Facility: CLINIC | Age: 33
End: 2018-01-26

## 2018-01-26 NOTE — TELEPHONE ENCOUNTER
Rcv call and fax from [facility], pre-auth was denied for Lumbar Spine MRI because it does not meet medical necessity. A peer to peer can be set up by contacting [phone], referencing case # [id number].

## 2018-01-30 NOTE — TELEPHONE ENCOUNTER
Call pt; I will not be able to get her mri approved since it was denied by insurance. These kind of tests have to be ordered by the spine specialist before the insurance will get approve them.  She needs to see her spine specialist for consultation and then

## 2018-02-01 ENCOUNTER — TELEPHONE (OUTPATIENT)
Dept: INTERNAL MEDICINE CLINIC | Facility: CLINIC | Age: 33
End: 2018-02-01

## 2018-02-01 DIAGNOSIS — M54.5 CHRONIC BILATERAL LOW BACK PAIN, WITH SCIATICA PRESENCE UNSPECIFIED: Primary | ICD-10-CM

## 2018-02-01 DIAGNOSIS — G89.29 CHRONIC BILATERAL LOW BACK PAIN, WITH SCIATICA PRESENCE UNSPECIFIED: Primary | ICD-10-CM

## 2018-02-01 RX ORDER — HYDROCODONE BITARTRATE AND ACETAMINOPHEN 10; 325 MG/1; MG/1
TABLET ORAL
Qty: 56 TABLET | Refills: 0 | Status: SHIPPED | OUTPATIENT
Start: 2018-02-01 | End: 2018-02-19

## 2018-02-01 NOTE — TELEPHONE ENCOUNTER
Pt spoke with Dr. Jeffrey Esparza this morning and was given information about the denial, pt understood and will try calling Dr. King Gillespie

## 2018-02-01 NOTE — TELEPHONE ENCOUNTER
Pt here to get refill of her norco; she will fill this out by Feb 5, 2018 as per our agreement from last refill. Pt also told mri spine denied by insurance. Needs to see neurosurgeon for eval of her chronic back pain and they can order it.  I referred her t

## 2018-02-13 ENCOUNTER — HOSPITAL ENCOUNTER (OUTPATIENT)
Age: 33
Discharge: HOME | End: 2018-02-13
Payer: COMMERCIAL

## 2018-02-13 VITALS
DIASTOLIC BLOOD PRESSURE: 93 MMHG | BODY MASS INDEX: 29.25 KG/M2 | HEART RATE: 122 BPM | WEIGHT: 182 LBS | RESPIRATION RATE: 18 BRPM | OXYGEN SATURATION: 100 % | HEIGHT: 66 IN | TEMPERATURE: 98 F | SYSTOLIC BLOOD PRESSURE: 178 MMHG

## 2018-02-13 DIAGNOSIS — B34.9 VIRAL SYNDROME: Primary | ICD-10-CM

## 2018-02-13 LAB — S PYO AG THROAT QL: NEGATIVE

## 2018-02-13 PROCEDURE — 87430 STREP A AG IA: CPT

## 2018-02-13 PROCEDURE — 99213 OFFICE O/P EST LOW 20 MIN: CPT

## 2018-02-13 PROCEDURE — 99214 OFFICE O/P EST MOD 30 MIN: CPT

## 2018-02-13 RX ORDER — OSELTAMIVIR PHOSPHATE 75 MG/1
75 CAPSULE ORAL 2 TIMES DAILY
Qty: 10 CAPSULE | Refills: 0 | Status: SHIPPED | OUTPATIENT
Start: 2018-02-13 | End: 2018-02-18

## 2018-02-13 NOTE — ED PROVIDER NOTES
Patient presents with:  Cough/URI  Sore Throat      HPI:     Zainab Harris is a 56 [place] old female who presents for evaluation and management of a chief complaint of cough without respiratory distress for the past 2 days.   He denies any chest pain or val LMP 01/23/2018 (Approximate)   SpO2 100%   Breastfeeding? No   BMI 29.38 kg/m²   GENERAL: well developed, well nourished, well hydrated, no distress  SKIN: good skin turgor, no obvious rashes  NECK: supple, no adenopathy. No meningismus.   CARDIO:  [place] [id number]  [phone]    Schedule an appointment as soon as possible for a visit in 2 days

## 2018-02-13 NOTE — ED INITIAL ASSESSMENT (HPI)
Pt reporting mild cough starting yesterday. States woke this morning with body aches, sore throat, sinus congestion. Pt states she had a fever of 101 this morning. Did receive the flu shot this year.

## 2018-02-19 ENCOUNTER — HOSPITAL ENCOUNTER (OUTPATIENT)
Age: 33
Discharge: HOME | End: 2018-02-19
Payer: COMMERCIAL

## 2018-02-19 ENCOUNTER — TELEPHONE (OUTPATIENT)
Dept: ADMINISTRATIVE | Age: 33
End: 2018-02-19

## 2018-02-19 ENCOUNTER — TELEPHONE (OUTPATIENT)
Dept: INTERNAL MEDICINE CLINIC | Facility: CLINIC | Age: 33
End: 2018-02-19

## 2018-02-19 VITALS
WEIGHT: 182 LBS | DIASTOLIC BLOOD PRESSURE: 95 MMHG | BODY MASS INDEX: 29.25 KG/M2 | RESPIRATION RATE: 16 BRPM | HEIGHT: 66 IN | TEMPERATURE: 98 F | OXYGEN SATURATION: 100 % | HEART RATE: 105 BPM | SYSTOLIC BLOOD PRESSURE: 157 MMHG

## 2018-02-19 DIAGNOSIS — Z09 FOLLOW-UP EXAM: Primary | ICD-10-CM

## 2018-02-19 DIAGNOSIS — H92.01 RIGHT EAR PAIN: ICD-10-CM

## 2018-02-19 PROCEDURE — 99212 OFFICE O/P EST SF 10 MIN: CPT

## 2018-02-19 PROCEDURE — 99213 OFFICE O/P EST LOW 20 MIN: CPT

## 2018-02-19 RX ORDER — HYDROCODONE BITARTRATE AND ACETAMINOPHEN 10; 325 MG/1; MG/1
TABLET ORAL
Qty: 56 TABLET | Refills: 0 | Status: SHIPPED | OUTPATIENT
Start: 2018-02-19 | End: 2018-03-02

## 2018-02-19 NOTE — TELEPHONE ENCOUNTER
Short term disabilty recvd in [place] at [facility] by [facility]. Logged for processing. Will contact pt for HIPAA and fee.

## 2018-02-19 NOTE — ED INITIAL ASSESSMENT (HPI)
Pt states she is currently being treated for flu. States she still has cough and pain in right ear. Denies fever.

## 2018-02-19 NOTE — ED PROVIDER NOTES
[facility]    History   Patient presents with:  Cough/URI  Ear Pain    Stated Complaint: follow up     HPI    Patient is a 55-year-old female who presents for follow-up.   Was seen here on 2/13/2018 and [facility] All other systems reviewed and negative except as noted above.     Physical Exam   ED Triage Vitals  BP: 152/95 [02/19/18 1037]  Pulse: 105 [02/19/18 1025]  Resp: 16 [02/19/18 1025]  Temp: 98.1 °F (36.7 °C) [02/19/18 1025]  Temp src: Oral [02/19/18 102 no evidence of infection, lungs CTA. No indication for antibiotics or further workup at this time. Recommended over-the-counter decongestant and close PCP follow-up if symptoms persist or worsen. All questions answered prior to discharge.   Patient unders

## 2018-02-19 NOTE — TELEPHONE ENCOUNTER
dw pt again; she will need to see Dr Mercedes Lora spine specialist and pt plans to do it next month when she is on vacation

## 2018-02-24 ENCOUNTER — HOSPITAL ENCOUNTER (OUTPATIENT)
Age: 33
Discharge: HOME | End: 2018-02-24
Attending: FAMILY MEDICINE
Payer: COMMERCIAL

## 2018-02-24 VITALS
HEART RATE: 100 BPM | BODY MASS INDEX: 28.13 KG/M2 | RESPIRATION RATE: 18 BRPM | DIASTOLIC BLOOD PRESSURE: 84 MMHG | OXYGEN SATURATION: 100 % | SYSTOLIC BLOOD PRESSURE: 145 MMHG | TEMPERATURE: 98 F | HEIGHT: 66 IN | WEIGHT: 175 LBS

## 2018-02-24 DIAGNOSIS — J06.9 VIRAL URI: Primary | ICD-10-CM

## 2018-02-24 LAB — S PYO AG THROAT QL: NEGATIVE

## 2018-02-24 PROCEDURE — 99213 OFFICE O/P EST LOW 20 MIN: CPT

## 2018-02-24 PROCEDURE — 99212 OFFICE O/P EST SF 10 MIN: CPT

## 2018-02-24 PROCEDURE — 87430 STREP A AG IA: CPT

## 2018-02-24 NOTE — ED INITIAL ASSESSMENT (HPI)
Flu last week. Sore throat with green mucus, productive cough started yesterday. Tactile fever yesterday. No N/V/D. Abdominal discomfort.

## 2018-02-24 NOTE — ED PROVIDER NOTES
Patient presents with:  Cough/URI  Sore Throat      HPI:     Yael Markham is a 27year old female who presents with for chief complaint of nasal congestion, sore throat,   X 4 days. Had the flu approximately 10 days ago. And felt better.   Patient he (Oral)   Resp 18   Ht 167.6 cm (5' 6\")   Wt 79.4 kg   LMP  (Within Weeks)   SpO2 100%   BMI 28.25 kg/m²   General Examination:  alert, well hydrated. No acute distress.    HEENT -   Head: Normocephalic, without obvious abnormality, atraumatic  Eyes: conjun

## 2018-02-26 ENCOUNTER — OFFICE VISIT (OUTPATIENT)
Dept: INTERNAL MEDICINE CLINIC | Facility: CLINIC | Age: 33
End: 2018-02-26

## 2018-02-26 ENCOUNTER — TELEPHONE (OUTPATIENT)
Dept: FAMILY MEDICINE CLINIC | Facility: CLINIC | Age: 33
End: 2018-02-26

## 2018-02-26 VITALS
WEIGHT: 193 LBS | BODY MASS INDEX: 31.02 KG/M2 | DIASTOLIC BLOOD PRESSURE: 80 MMHG | HEART RATE: 121 BPM | HEIGHT: 66 IN | TEMPERATURE: 99 F | SYSTOLIC BLOOD PRESSURE: 134 MMHG | RESPIRATION RATE: 12 BRPM

## 2018-02-26 DIAGNOSIS — R05.9 COUGH: ICD-10-CM

## 2018-02-26 DIAGNOSIS — H10.31 ACUTE CONJUNCTIVITIS OF RIGHT EYE, UNSPECIFIED ACUTE CONJUNCTIVITIS TYPE: Primary | ICD-10-CM

## 2018-02-26 PROCEDURE — 99212 OFFICE O/P EST SF 10 MIN: CPT | Performed by: INTERNAL MEDICINE

## 2018-02-26 PROCEDURE — 99213 OFFICE O/P EST LOW 20 MIN: CPT | Performed by: INTERNAL MEDICINE

## 2018-02-26 RX ORDER — HYDROCODONE BITARTRATE AND HOMATROPINE METHYLBROMIDE ORAL SOLUTION 5; 1.5 MG/5ML; MG/5ML
5 LIQUID ORAL EVERY 6 HOURS PRN
Qty: 240 ML | Refills: 0 | Status: SHIPPED | OUTPATIENT
Start: 2018-02-26 | End: 2018-07-17

## 2018-02-26 RX ORDER — TOBRAMYCIN 3 MG/ML
1 SOLUTION/ DROPS OPHTHALMIC EVERY 6 HOURS
Qty: 1 BOTTLE | Refills: 0 | Status: SHIPPED | OUTPATIENT
Start: 2018-02-26 | End: 2018-05-22

## 2018-02-26 NOTE — PROGRESS NOTES
HPI:    Patient ID: Mehul Reece is a 27year old female. Cough   This is a new problem. The current episode started 1 to 4 weeks ago (2 to 3 weeks). The problem has been unchanged. The problem occurs every few hours.  The cough is productive of sputu Monohydrate 100 MG Oral Tab Take 100 mg by mouth 2 (two) times daily. Disp: 20 tablet Rfl: 0   gabapentin 300 MG Oral Cap Take 1 capsule (300 mg total) by mouth nightly.  Disp: 30 capsule Rfl: 0   mupirocin 2 % External Ointment Applied to the facial lesion tobrex eyedrops. I told her to see her optha if not better in 2 to 3 days. (R05) Cough  Plan: suspect pt has now developed acute sinusitis with postnasal drainage and subsequent cough.  Pt still has doxycycline at home which I told her to start (pt stat

## 2018-02-27 NOTE — TELEPHONE ENCOUNTER
Spoke to pt and pt was off less than 7 days. Doesn't need   STD form to be completed.  Will scan blank

## 2018-03-02 ENCOUNTER — TELEPHONE (OUTPATIENT)
Dept: INTERNAL MEDICINE CLINIC | Facility: CLINIC | Age: 33
End: 2018-03-02

## 2018-03-02 RX ORDER — HYDROCODONE BITARTRATE AND ACETAMINOPHEN 10; 325 MG/1; MG/1
TABLET ORAL
Qty: 56 TABLET | Refills: 0 | Status: SHIPPED | OUTPATIENT
Start: 2018-03-05 | End: 2018-03-12

## 2018-03-08 ENCOUNTER — OFFICE VISIT (OUTPATIENT)
Dept: INTERNAL MEDICINE CLINIC | Facility: CLINIC | Age: 33
End: 2018-03-08

## 2018-03-08 VITALS
SYSTOLIC BLOOD PRESSURE: 138 MMHG | TEMPERATURE: 98 F | WEIGHT: 191 LBS | DIASTOLIC BLOOD PRESSURE: 93 MMHG | HEIGHT: 66 IN | HEART RATE: 106 BPM | BODY MASS INDEX: 30.7 KG/M2

## 2018-03-08 DIAGNOSIS — M54.50 ACUTE RIGHT-SIDED LOW BACK PAIN WITHOUT SCIATICA: Primary | ICD-10-CM

## 2018-03-08 PROCEDURE — 99213 OFFICE O/P EST LOW 20 MIN: CPT | Performed by: INTERNAL MEDICINE

## 2018-03-08 PROCEDURE — 99212 OFFICE O/P EST SF 10 MIN: CPT | Performed by: INTERNAL MEDICINE

## 2018-03-08 RX ORDER — CYCLOBENZAPRINE HCL 5 MG
5 TABLET ORAL 3 TIMES DAILY PRN
Qty: 30 TABLET | Refills: 0 | Status: SHIPPED | OUTPATIENT
Start: 2018-03-08 | End: 2018-05-28

## 2018-03-08 RX ORDER — GABAPENTIN 300 MG/1
300 CAPSULE ORAL NIGHTLY
Qty: 30 CAPSULE | Refills: 0 | Status: SHIPPED | OUTPATIENT
Start: 2018-03-08 | End: 2018-04-18

## 2018-03-08 NOTE — PROGRESS NOTES
HPI:    Patient ID: Alfredo Toure is a 27year old female. Back Pain   This is a chronic problem. The current episode started more than 1 year ago. The problem occurs constantly. Progression since onset: worsened over the weekend.  The pain is present 24 Hr Take 1 tablet (25 mg total) by mouth daily. Disp: 90 tablet Rfl: 0   NUVARING 0.12-0.015 MG/24HR Vaginal Ring Place 1 Device vaginally every 21 days.  Disp:  Rfl:      Allergies:  Acetaminophen-Codei*        Comment:Other reaction(s): Swelling  Aspiri lower back likely due to her coughing. She had to increased her  norco to 6 tabs/day for the past 5 days. We will keep her for now on same dose, add flexeril 5mg TID(warned about drowsiness) and also did refill her gabapentin(pt denies being pregnant).  We

## 2018-03-12 ENCOUNTER — TELEPHONE (OUTPATIENT)
Dept: INTERNAL MEDICINE CLINIC | Facility: CLINIC | Age: 33
End: 2018-03-12

## 2018-03-12 RX ORDER — HYDROCODONE BITARTRATE AND ACETAMINOPHEN 10; 325 MG/1; MG/1
TABLET ORAL
Qty: 84 TABLET | Refills: 0 | Status: SHIPPED | OUTPATIENT
Start: 2018-03-13 | End: 2018-03-22

## 2018-03-12 NOTE — TELEPHONE ENCOUNTER
Pt feeling earlier since we did increase her norco dose to 6 /day since last week due to acute back pain. Pt told we will contnue to do weaning down and she will see Dr Zaria pulliam surgery.  [place]

## 2018-03-14 ENCOUNTER — TELEPHONE (OUTPATIENT)
Dept: INTERNAL MEDICINE CLINIC | Facility: CLINIC | Age: 33
End: 2018-03-14

## 2018-03-15 NOTE — TELEPHONE ENCOUNTER
Disability form for Dr. Roberson Form received in [facility]. Logged for processing.  NK

## 2018-03-16 ENCOUNTER — TELEPHONE (OUTPATIENT)
Dept: INTERNAL MEDICINE CLINIC | Facility: CLINIC | Age: 33
End: 2018-03-16

## 2018-03-16 RX ORDER — FLUTICASONE PROPIONATE 50 MCG
2 SPRAY, SUSPENSION (ML) NASAL DAILY
Qty: 1 BOTTLE | Refills: 2 | Status: SHIPPED | OUTPATIENT
Start: 2018-03-16 | End: 2018-06-28

## 2018-03-16 NOTE — TELEPHONE ENCOUNTER
Pt states still coughing from postnasal drip; told to use flonase NS, claritin, and saline sinus rinses; see Dr Ellie Uribe ENT.  Pt agreed

## 2018-03-19 ENCOUNTER — OFFICE VISIT (OUTPATIENT)
Dept: INTERNAL MEDICINE CLINIC | Facility: CLINIC | Age: 33
End: 2018-03-19

## 2018-03-19 ENCOUNTER — HOSPITAL ENCOUNTER (OUTPATIENT)
Dept: GENERAL RADIOLOGY | Age: 33
Discharge: HOME | End: 2018-03-19
Attending: INTERNAL MEDICINE
Payer: COMMERCIAL

## 2018-03-19 DIAGNOSIS — R05.9 COUGH: ICD-10-CM

## 2018-03-19 DIAGNOSIS — Z32.02 ENCOUNTER FOR PREGNANCY TEST WITH RESULT NEGATIVE: ICD-10-CM

## 2018-03-19 DIAGNOSIS — R05.9 COUGH: Primary | ICD-10-CM

## 2018-03-19 PROCEDURE — 99212 OFFICE O/P EST SF 10 MIN: CPT | Performed by: INTERNAL MEDICINE

## 2018-03-19 PROCEDURE — 81025 URINE PREGNANCY TEST: CPT | Performed by: INTERNAL MEDICINE

## 2018-03-19 PROCEDURE — 99213 OFFICE O/P EST LOW 20 MIN: CPT | Performed by: INTERNAL MEDICINE

## 2018-03-19 PROCEDURE — 71046 X-RAY EXAM CHEST 2 VIEWS: CPT | Performed by: INTERNAL MEDICINE

## 2018-03-19 NOTE — TELEPHONE ENCOUNTER
Spoke to pt and she doesn't need forms filled out. She will only be off for a couple days. Will scan in blank.

## 2018-03-20 ENCOUNTER — TELEPHONE (OUTPATIENT)
Dept: INTERNAL MEDICINE CLINIC | Facility: CLINIC | Age: 33
End: 2018-03-20

## 2018-03-20 VITALS
SYSTOLIC BLOOD PRESSURE: 124 MMHG | DIASTOLIC BLOOD PRESSURE: 80 MMHG | WEIGHT: 190 LBS | HEART RATE: 80 BPM | HEIGHT: 66 IN | TEMPERATURE: 99 F | RESPIRATION RATE: 12 BRPM | BODY MASS INDEX: 30.53 KG/M2

## 2018-03-20 LAB
CONTROL LINE PRESENT WITH A CLEAR BACKGROUND (YES/NO): YES YES/NO
KIT LOT #: NORMAL NUMERIC
PREGNANCY TEST, URINE: NEGATIVE

## 2018-03-20 NOTE — PROGRESS NOTES
HPI:    Patient ID: Meño Guajardo is a 27year old female. Sore Throat    This is a new problem. The current episode started in the past 7 days (3 days). The problem has been unchanged. There has been no fever. The pain is moderate.  Associated symptom Oral Cap Take 1 capsule (300 mg total) by mouth nightly. Disp: 30 capsule Rfl: 0   Tobramycin Sulfate 0.3 % Ophthalmic Solution Place 1 drop into the right eye every 6 (six) hours.  Disp: 1 Bottle Rfl: 0   hydrocodone-homatropine (HYDROMET) 5-1.5 MG/5ML [place] sounds normal. No stridor. No respiratory distress. She has no wheezes. She has no rales. Abdominal: Soft. There is no tenderness. Musculoskeletal: She exhibits no edema or tenderness. Lymphadenopathy:     She has no cervical adenopathy.    Neurologic

## 2018-03-20 NOTE — TELEPHONE ENCOUNTER
pls notify pt; her cxr done yesterday was normal. Continue meds as prescribed yesterday.  Call back if symptoms persist.

## 2018-03-22 ENCOUNTER — TELEPHONE (OUTPATIENT)
Dept: INTERNAL MEDICINE CLINIC | Facility: CLINIC | Age: 33
End: 2018-03-22

## 2018-03-22 RX ORDER — HYDROCODONE BITARTRATE AND ACETAMINOPHEN 10; 325 MG/1; MG/1
TABLET ORAL
Qty: 70 TABLET | Refills: 0 | Status: SHIPPED | OUTPATIENT
Start: 2018-03-23 | End: 2018-04-05

## 2018-03-22 NOTE — TELEPHONE ENCOUNTER
Pt leaving out of town for spring break vacation tomorrow so needs refill by [facility]; will go down to 5 tabs/day; next [facility] will be [facility] April 9,2018 [facility]

## 2018-04-05 ENCOUNTER — TELEPHONE (OUTPATIENT)
Dept: INTERNAL MEDICINE CLINIC | Facility: CLINIC | Age: 33
End: 2018-04-05

## 2018-04-05 RX ORDER — HYDROCODONE BITARTRATE AND ACETAMINOPHEN 10; 325 MG/1; MG/1
TABLET ORAL
Qty: 70 TABLET | Refills: 0 | Status: SHIPPED | OUTPATIENT
Start: 2018-04-09 | End: 2018-04-18

## 2018-04-05 NOTE — TELEPHONE ENCOUNTER
Pt rescheduled apptment with DR Bhagat Heading for 4/20/18 [place]

## 2018-04-16 ENCOUNTER — OFFICE VISIT (OUTPATIENT)
Dept: INTERNAL MEDICINE CLINIC | Facility: CLINIC | Age: 33
End: 2018-04-16

## 2018-04-16 VITALS
WEIGHT: 191.5 LBS | HEART RATE: 108 BPM | BODY MASS INDEX: 31 KG/M2 | SYSTOLIC BLOOD PRESSURE: 144 MMHG | TEMPERATURE: 100 F | DIASTOLIC BLOOD PRESSURE: 84 MMHG

## 2018-04-16 DIAGNOSIS — S67.22XA: Primary | ICD-10-CM

## 2018-04-16 PROCEDURE — 99213 OFFICE O/P EST LOW 20 MIN: CPT | Performed by: INTERNAL MEDICINE

## 2018-04-16 PROCEDURE — 99212 OFFICE O/P EST SF 10 MIN: CPT | Performed by: INTERNAL MEDICINE

## 2018-04-16 NOTE — PROGRESS NOTES
HPI:    Patient ID: Ray Johnson is a 27year old female. Hand Pain    The pain is present in the left hand and left fingers. This is a new problem. The current episode started in the past 7 days (3 days).  There has been a history of trauma (left boyce mouth daily.  Disp: 90 tablet Rfl: 0     Allergies:  Acetaminophen-Codei*        Comment:Other reaction(s): Swelling  Aspirin                 Swelling    Comment:Other reaction(s): ASPIRIN  Codeine                 Swelling    Comment:Other reaction(s): Swel

## 2018-04-18 ENCOUNTER — TELEPHONE (OUTPATIENT)
Dept: INTERNAL MEDICINE CLINIC | Facility: CLINIC | Age: 33
End: 2018-04-18

## 2018-04-18 RX ORDER — HYDROCODONE BITARTRATE AND ACETAMINOPHEN 10; 325 MG/1; MG/1
TABLET ORAL
Qty: 112 TABLET | Refills: 0 | Status: SHIPPED | OUTPATIENT
Start: 2018-04-18 | End: 2018-05-01

## 2018-04-18 RX ORDER — KETOROLAC TROMETHAMINE 10 MG/1
10 TABLET, FILM COATED ORAL 2 TIMES DAILY
Qty: 10 TABLET | Refills: 0 | Status: SHIPPED | OUTPATIENT
Start: 2018-04-18 | End: 2018-05-28

## 2018-04-18 RX ORDER — GABAPENTIN 300 MG/1
300 CAPSULE ORAL NIGHTLY
Qty: 30 CAPSULE | Refills: 0 | Status: SHIPPED | OUTPATIENT
Start: 2018-04-18 | End: 2018-06-06

## 2018-04-18 NOTE — TELEPHONE ENCOUNTER
Refill for norco given today; had to increase dose due to last weeks crush injury of left hand, with dose up to 8/day since Friday last week. [place] Plan to go back to previous dose in next refill if hand pain better.  Also pt again told she needs

## 2018-04-30 ENCOUNTER — TELEPHONE (OUTPATIENT)
Dept: INTERNAL MEDICINE CLINIC | Facility: CLINIC | Age: 33
End: 2018-04-30

## 2018-04-30 NOTE — TELEPHONE ENCOUNTER
Pt called in requesting a new Rx for the following medication:  Pt is requesting to lower the quantity to 6 tablets per day.   Please advise      Current Outpatient Prescriptions:     •  HYDROcodone-acetaminophen  MG Oral Tab, Take 1to 2 tab po q 4 to

## 2018-05-01 RX ORDER — HYDROCODONE BITARTRATE AND ACETAMINOPHEN 10; 325 MG/1; MG/1
TABLET ORAL
Qty: 84 TABLET | Refills: 0 | Status: SHIPPED | OUTPATIENT
Start: 2018-05-01 | End: 2018-05-10

## 2018-05-01 NOTE — TELEPHONE ENCOUNTER
Pt here to pickup script for refill of norco. [place] down [place] 6 tabs/day[place] Pt also did see Dr Salvador Valdez spine specialist already per pt and will be getting mri of her spine .

## 2018-05-07 ENCOUNTER — OFFICE VISIT (OUTPATIENT)
Dept: INTERNAL MEDICINE CLINIC | Facility: CLINIC | Age: 33
End: 2018-05-07

## 2018-05-07 VITALS
HEIGHT: 66 IN | BODY MASS INDEX: 30.53 KG/M2 | TEMPERATURE: 99 F | WEIGHT: 190 LBS | DIASTOLIC BLOOD PRESSURE: 108 MMHG | SYSTOLIC BLOOD PRESSURE: 152 MMHG | RESPIRATION RATE: 12 BRPM | HEART RATE: 118 BPM

## 2018-05-07 DIAGNOSIS — L50.8 ACUTE URTICARIA: Primary | ICD-10-CM

## 2018-05-07 PROCEDURE — 99212 OFFICE O/P EST SF 10 MIN: CPT | Performed by: INTERNAL MEDICINE

## 2018-05-07 PROCEDURE — 99213 OFFICE O/P EST LOW 20 MIN: CPT | Performed by: INTERNAL MEDICINE

## 2018-05-07 RX ORDER — PREDNISONE 20 MG/1
20 TABLET ORAL DAILY
Qty: 5 TABLET | Refills: 0 | Status: SHIPPED | OUTPATIENT
Start: 2018-05-07 | End: 2018-05-14

## 2018-05-07 NOTE — PROGRESS NOTES
HPI:    Patient ID: China Romero is a 27year old female. Hives   This is a new problem. The current episode started in the past 7 days. The problem has been gradually improving since onset. The rash is diffuse.  The rash is characterized by itchiness Succinate ER 25 MG Oral Tablet 24 Hr Take 1 tablet (25 mg total) by mouth daily.  Disp: 90 tablet Rfl: 0     Allergies:  Grass                   HIVES    Comment:Nasal congestion  Acetaminophen-Codei*        Comment:Other reaction(s): Swelling  Aspirin This Visit:  No prescriptions requested or ordered in this encounter    Imaging & Referrals:  None       [facility]#[id number]

## 2018-05-10 ENCOUNTER — TELEPHONE (OUTPATIENT)
Dept: INTERNAL MEDICINE CLINIC | Facility: CLINIC | Age: 33
End: 2018-05-10

## 2018-05-10 RX ORDER — HYDROCODONE BITARTRATE AND ACETAMINOPHEN 10; 325 MG/1; MG/1
TABLET ORAL
Qty: 84 TABLET | Refills: 0 | Status: SHIPPED | OUTPATIENT
Start: 2018-05-12 | End: 2018-05-22

## 2018-05-10 NOTE — TELEPHONE ENCOUNTER
Pt came in today asking for refill of her norco. She is leaving out of town on 5/12/18 so needs to refill it then instead of 5/14/18.    I told her I received consult note from Dr Becky Montano and appears to be against the chronic use of norco. I relayed this to p

## 2018-05-16 ENCOUNTER — HOSPITAL ENCOUNTER (OUTPATIENT)
Dept: MRI IMAGING | Facility: HOSPITAL | Age: 33
Discharge: HOME | End: 2018-05-16
Attending: PHYSICIAN ASSISTANT
Payer: COMMERCIAL

## 2018-05-16 DIAGNOSIS — M54.50 LOW BACK PAIN: ICD-10-CM

## 2018-05-16 PROCEDURE — 72148 MRI LUMBAR SPINE W/O DYE: CPT | Performed by: PHYSICIAN ASSISTANT

## 2018-05-18 ENCOUNTER — TELEPHONE (OUTPATIENT)
Dept: INTERNAL MEDICINE CLINIC | Facility: CLINIC | Age: 33
End: 2018-05-18

## 2018-05-18 ENCOUNTER — HOSPITAL ENCOUNTER (OUTPATIENT)
Age: 33
Discharge: HOME | End: 2018-05-18
Payer: COMMERCIAL

## 2018-05-18 VITALS
BODY MASS INDEX: 29.89 KG/M2 | WEIGHT: 186 LBS | HEART RATE: 98 BPM | HEIGHT: 66 IN | RESPIRATION RATE: 18 BRPM | DIASTOLIC BLOOD PRESSURE: 92 MMHG | SYSTOLIC BLOOD PRESSURE: 166 MMHG | OXYGEN SATURATION: 99 %
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
## 2018-05-18 PROCEDURE — 99214 OFFICE O/P EST MOD 30 MIN: CPT

## 2018-05-18 PROCEDURE — 99213 OFFICE O/P EST LOW 20 MIN: CPT

## 2018-05-18 RX ORDER — AMOXICILLIN AND CLAVULANATE POTASSIUM 875; 125 MG/1; MG/1
1 TABLET, FILM COATED ORAL 2 TIMES DAILY
Qty: 20 TABLET | Refills: 0 | Status: SHIPPED | OUTPATIENT
Start: 2018-05-18 | End: 2018-05-28

## 2018-05-18 RX ORDER — GINSENG 100 MG
CAPSULE ORAL ONCE
Status: COMPLETED | OUTPATIENT
Start: 2018-05-18 | End: 2018-05-18

## 2018-05-18 NOTE — ED PROVIDER NOTES
No chief complaint on file. HPI:     Prachi Beavers is a 27year old female presents for a chief complaint of puncture wounds to the left pointer digit that occurred at 1 AM.  Patient states her old cat bit her.   She has a puncture wound to the pad o wounds present to the distal aspect of the left pointer digit. There is a puncture wound to the pad of the digit and a puncture wound to the lateral aspect of the digit. Bleeding is controlled with pressure.   There is no surrounding redness, swelling, or with her primary care doctor in 2 days for wound check. She is aware for any signs of infection, to go to the nearest emergency department for further evaluation. Diagnosis:    ICD-10-CM    1. Cat bite, initial encounter W55. 01XA        All results rev

## 2018-05-18 NOTE — TELEPHONE ENCOUNTER
Pt came in to pickup spouse refill of norco and then told me she was bitten by her cat in her finger last night. I told her to go to ER so they can evaluate since I had full schedule already this am. Pt agreed and will go to ER today.

## 2018-05-22 ENCOUNTER — HOSPITAL ENCOUNTER (OUTPATIENT)
Dept: GENERAL RADIOLOGY | Age: 33
Discharge: HOME | End: 2018-05-22
Attending: INTERNAL MEDICINE
Payer: COMMERCIAL

## 2018-05-22 ENCOUNTER — OFFICE VISIT (OUTPATIENT)
Dept: INTERNAL MEDICINE CLINIC | Facility: CLINIC | Age: 33
End: 2018-05-22

## 2018-05-22 VITALS
TEMPERATURE: 99 F | HEART RATE: 98 BPM | HEIGHT: 66 IN | RESPIRATION RATE: 16 BRPM | WEIGHT: 192 LBS | BODY MASS INDEX: 30.86 KG/M2 | DIASTOLIC BLOOD PRESSURE: 84 MMHG | SYSTOLIC BLOOD PRESSURE: 138 MMHG
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
## 2018-05-22 PROBLEM — B34.9 VIRAL SYNDROME: Status: RESOLVED | Noted: 2017-03-13 | Resolved: 2018-05-22

## 2018-05-22 PROBLEM — J02.9 SORE THROAT: Status: RESOLVED | Noted: 2017-01-31 | Resolved: 2018-05-22

## 2018-05-22 PROBLEM — J06.9 VIRAL UPPER RESPIRATORY TRACT INFECTION: Status: RESOLVED | Noted: 2017-04-26 | Resolved: 2018-05-22

## 2018-05-22 PROCEDURE — 99212 OFFICE O/P EST SF 10 MIN: CPT | Performed by: INTERNAL MEDICINE

## 2018-05-22 PROCEDURE — 99213 OFFICE O/P EST LOW 20 MIN: CPT | Performed by: INTERNAL MEDICINE

## 2018-05-22 PROCEDURE — 73140 X-RAY EXAM OF FINGER(S): CPT | Performed by: INTERNAL MEDICINE

## 2018-05-22 RX ORDER — HYDROCODONE BITARTRATE AND ACETAMINOPHEN 10; 325 MG/1; MG/1
TABLET ORAL
Qty: 84 TABLET | Refills: 0 | Status: SHIPPED | OUTPATIENT
Start: 2018-05-24 | End: 2018-06-05

## 2018-05-22 NOTE — PROGRESS NOTES
HPI:    Patient ID: Jennifer Richey is a 27year old female. Trauma   This is a new (cat bite) problem. The current episode started in the past 7 days (4 days ago, got bitten by her cat when she tried to open mouth of the cat ).  The problem occurs const reaction(s): Swelling   PHYSICAL EXAM:   Physical Exam   Constitutional: She appears well-developed and well-nourished. Non-toxic appearance. She does not have a sickly appearance. She does not appear ill. No distress.    Musculoskeletal: She exhibits no e

## 2018-05-23 ENCOUNTER — TELEPHONE (OUTPATIENT)
Dept: INTERNAL MEDICINE CLINIC | Facility: CLINIC | Age: 33
End: 2018-05-23

## 2018-05-23 NOTE — TELEPHONE ENCOUNTER
Please note. Thank you. Pt contacted (Name and  verified) and informed of PCP message below. Pt states that her finger feels much better since yesterday and the redness is almost completely resolved.     Pt verbalizes understanding, expresses intent t

## 2018-05-23 NOTE — TELEPHONE ENCOUNTER
Notify pt; her xray of her finger was normal; continue her antibiotics.  If finger doesn't improve next few days, proceed with seeing Dr Krystyna Redding as d/w pt yesterday

## 2018-05-29 RX ORDER — KETOROLAC TROMETHAMINE 10 MG/1
TABLET, FILM COATED ORAL
Qty: 10 TABLET | Refills: 0 | Status: SHIPPED | OUTPATIENT
Start: 2018-05-29 | End: 2018-07-23

## 2018-05-29 RX ORDER — GABAPENTIN 300 MG/1
CAPSULE ORAL
Qty: 30 CAPSULE | Refills: 0 | Status: SHIPPED | OUTPATIENT
Start: 2018-05-29 | End: 2018-06-06

## 2018-05-29 RX ORDER — CYCLOBENZAPRINE HCL 5 MG
TABLET ORAL
Qty: 30 TABLET | Refills: 0 | Status: SHIPPED | OUTPATIENT
Start: 2018-05-29 | End: 2018-07-05

## 2018-05-31 ENCOUNTER — OFFICE VISIT (OUTPATIENT)
Dept: INTERNAL MEDICINE CLINIC | Facility: CLINIC | Age: 33
End: 2018-05-31

## 2018-05-31 VITALS
SYSTOLIC BLOOD PRESSURE: 120 MMHG | HEART RATE: 109 BPM | DIASTOLIC BLOOD PRESSURE: 82 MMHG | WEIGHT: 180.31 LBS | TEMPERATURE: 100 F | BODY MASS INDEX: 29 KG/M2

## 2018-05-31 DIAGNOSIS — G43.009 MIGRAINE WITHOUT AURA AND WITHOUT STATUS MIGRAINOSUS, NOT INTRACTABLE: Primary | ICD-10-CM

## 2018-05-31 PROCEDURE — 99213 OFFICE O/P EST LOW 20 MIN: CPT | Performed by: INTERNAL MEDICINE

## 2018-05-31 PROCEDURE — 99212 OFFICE O/P EST SF 10 MIN: CPT | Performed by: INTERNAL MEDICINE

## 2018-05-31 NOTE — PROGRESS NOTES
HPI:    Patient ID: Aries Wu is a 27year old female. Migraine    This is a chronic problem. The current episode started more than 1 year ago. The problem occurs intermittently. The problem has been waxing and waning.  The pain is located in the t Swelling  Aspirin                 SWELLING    Comment:Other reaction(s): ASPIRIN  Codeine                 SWELLING    Comment:Other reaction(s): Swelling   PHYSICAL EXAM:   Physical Exam   Constitutional: She appears well-developed. No distress.    obese

## 2018-06-05 NOTE — TELEPHONE ENCOUNTER
Current Outpatient Prescriptions:  HYDROcodone-acetaminophen  MG Oral Tab Take 1to 2 tab po q 4 to 6 hr  Prn for  pain Disp: 84 tablet Rfl: 0     Will be out of town this weekend- will p/u tomorroew ADO   To be effective  Thursday    States JEFFY king

## 2018-06-05 NOTE — TELEPHONE ENCOUNTER
please advise as does not meet RN protocol for refill criteria    LR 5/24/18  #84    Refill Protocol Appointment Criteria  · Appointment scheduled in the past 6 months or in the next 3 months  Recent Outpatient Visits            5 days ago Migraine withou

## 2018-06-06 RX ORDER — GABAPENTIN 300 MG/1
300 CAPSULE ORAL 2 TIMES DAILY
Qty: 180 CAPSULE | Refills: 0 | Status: SHIPPED | OUTPATIENT
Start: 2018-06-06 | End: 2018-09-06

## 2018-06-06 RX ORDER — HYDROCODONE BITARTRATE AND ACETAMINOPHEN 10; 325 MG/1; MG/1
TABLET ORAL
Qty: 77 TABLET | Refills: 0 | Status: SHIPPED | OUTPATIENT
Start: 2018-06-06 | End: 2018-06-19

## 2018-06-06 NOTE — TELEPHONE ENCOUNTER
Pt picked up script for her norco, will start weaning down again, 6/day max for a week and then 5/day max thereafter. Pt on gabapentin already. Pt also ff[place]sur[place]

## 2018-06-18 ENCOUNTER — TELEPHONE (OUTPATIENT)
Dept: INTERNAL MEDICINE CLINIC | Facility: CLINIC | Age: 33
End: 2018-06-18

## 2018-06-18 NOTE — TELEPHONE ENCOUNTER
Pt requesting refill on medication below. Pt states that she would be coming in tomorrow morning, 6/19, to  the script for her refill. Please advise.        Current Outpatient Prescriptions:   •  HYDROcodone-acetaminophen  MG Oral Tab, Take 1to

## 2018-06-19 ENCOUNTER — HOSPITAL ENCOUNTER (OUTPATIENT)
Age: 33
Discharge: HOME | End: 2018-06-19
Attending: EMERGENCY MEDICINE
Payer: COMMERCIAL

## 2018-06-19 VITALS
HEART RATE: 102 BPM | HEIGHT: 66 IN | BODY MASS INDEX: 29.73 KG/M2 | SYSTOLIC BLOOD PRESSURE: 143 MMHG | OXYGEN SATURATION: 98 % | WEIGHT: 185 LBS | DIASTOLIC BLOOD PRESSURE: 99 MMHG | RESPIRATION RATE: 18 BRPM

## 2018-06-19 DIAGNOSIS — H65.91 RIGHT OTITIS MEDIA WITH EFFUSION: Primary | ICD-10-CM

## 2018-06-19 PROCEDURE — 99214 OFFICE O/P EST MOD 30 MIN: CPT

## 2018-06-19 PROCEDURE — 99213 OFFICE O/P EST LOW 20 MIN: CPT

## 2018-06-19 RX ORDER — AMOXICILLIN 875 MG/1
875 TABLET, COATED ORAL 2 TIMES DAILY
Qty: 20 TABLET | Refills: 0 | Status: SHIPPED | OUTPATIENT
Start: 2018-06-19 | End: 2018-06-29

## 2018-06-19 RX ORDER — HYDROCODONE BITARTRATE AND ACETAMINOPHEN 10; 325 MG/1; MG/1
TABLET ORAL
Qty: 70 TABLET | Refills: 0 | Status: SHIPPED | OUTPATIENT
Start: 2018-06-19 | End: 2018-07-02

## 2018-06-19 NOTE — ED PROVIDER NOTES
Patient Seen in: [facility] In [address] [place] [address]    History   Patient presents with:  Ear Problem Pain (neurosensory)    Stated Complaint: ear pain    HPI    The patient is a 38-year-old female with past history of hyperlipidemia who present Normocephalic, no swelling or tenderness  Eyes: Nonicteric sclera, no conjunctival injection  ENT: There is minimal erythema to the left TM. There is moderate erythema and bulging to the right TM. Both external auditory canals are free of wax. .  There is

## 2018-06-28 ENCOUNTER — OFFICE VISIT (OUTPATIENT)
Dept: INTERNAL MEDICINE CLINIC | Facility: CLINIC | Age: 33
End: 2018-06-28

## 2018-06-28 VITALS
WEIGHT: 190 LBS | HEART RATE: 86 BPM | SYSTOLIC BLOOD PRESSURE: 124 MMHG | BODY MASS INDEX: 30.53 KG/M2 | HEIGHT: 66 IN | DIASTOLIC BLOOD PRESSURE: 74 MMHG | TEMPERATURE: 98 F
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
## 2018-06-28 DIAGNOSIS — H92.01 RIGHT EAR PAIN: Primary | ICD-10-CM

## 2018-06-28 PROBLEM — J01.00 ACUTE NON-RECURRENT MAXILLARY SINUSITIS: Status: RESOLVED | Noted: 2017-09-05 | Resolved: 2018-06-28
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
## 2018-06-28 PROBLEM — L50.8 ACUTE URTICARIA: Status: RESOLVED | Noted: 2018-05-07 | Resolved: 2018-06-28

## 2018-06-28 PROBLEM — R39.15 URINARY URGENCY: Status: RESOLVED | Noted: 2017-05-15 | Resolved: 2018-06-28

## 2018-06-28 PROCEDURE — 99212 OFFICE O/P EST SF 10 MIN: CPT | Performed by: INTERNAL MEDICINE

## 2018-06-28 PROCEDURE — 99213 OFFICE O/P EST LOW 20 MIN: CPT | Performed by: INTERNAL MEDICINE

## 2018-06-28 RX ORDER — FLUTICASONE PROPIONATE 50 MCG
2 SPRAY, SUSPENSION (ML) NASAL DAILY
Qty: 1 BOTTLE | Refills: 2 | Status: SHIPPED | OUTPATIENT
Start: 2018-06-28 | End: 2018-07-09

## 2018-06-28 NOTE — PROGRESS NOTES
HPI:    Patient ID: Nathalie Wang is a 27year old female. Ear Pain    There is pain in the right ear. This is a new problem. The current episode started 1 to 4 weeks ago (9 days). The problem has been unchanged. There has been no fever.  Associated sy appears well-developed. No distress.    obese   HENT:   Right Ear: Hearing, tympanic membrane, external ear and ear canal normal.   Left Ear: Hearing, tympanic membrane, external ear and ear canal normal.   Nose: Nose normal.   Mouth/Throat: Oropharynx is c

## 2018-07-02 ENCOUNTER — OFFICE VISIT (OUTPATIENT)
Dept: OTOLARYNGOLOGY | Facility: CLINIC | Age: 33
End: 2018-07-02

## 2018-07-02 ENCOUNTER — TELEPHONE (OUTPATIENT)
Dept: INTERNAL MEDICINE CLINIC | Facility: CLINIC | Age: 33
End: 2018-07-02

## 2018-07-02 VITALS — TEMPERATURE: 97 F | BODY MASS INDEX: 29.73 KG/M2 | WEIGHT: 185 LBS | HEIGHT: 66 IN

## 2018-07-02 DIAGNOSIS — H60.331 ACUTE SWIMMER'S EAR OF RIGHT SIDE: Primary | ICD-10-CM

## 2018-07-02 PROCEDURE — 99212 OFFICE O/P EST SF 10 MIN: CPT | Performed by: OTOLARYNGOLOGY

## 2018-07-02 PROCEDURE — 99213 OFFICE O/P EST LOW 20 MIN: CPT | Performed by: OTOLARYNGOLOGY

## 2018-07-02 RX ORDER — HYDROCODONE BITARTRATE AND ACETAMINOPHEN 10; 325 MG/1; MG/1
TABLET ORAL
Qty: 56 TABLET | Refills: 0 | Status: SHIPPED | OUTPATIENT
Start: 2018-07-02 | End: 2018-07-09

## 2018-07-02 RX ORDER — NEOMYCIN SULFATE, POLYMYXIN B SULFATE AND HYDROCORTISONE 10; 3.5; 1 MG/ML; MG/ML; [USP'U]/ML
4 SUSPENSION/ DROPS AURICULAR (OTIC) 2 TIMES DAILY
Qty: 10 ML | Refills: 1 | Status: SHIPPED | OUTPATIENT
Start: 2018-07-02 | End: 2018-07-09

## 2018-07-02 NOTE — TELEPHONE ENCOUNTER
Pt here for refill of norco. Will continue to wean down, 4max/day ; has apptment with DR Samina Saul this week. [place]  Pt also complained of right ear pain but tm and eac normal to me. TMJ not tender. Pt told to see ENT DR Nandini Suggs.

## 2018-07-03 NOTE — PROGRESS NOTES
Esequiel Ortega is a 27year old female.  Patient presents with:  Ear Problem: ear infection  right side , pt has been on antibiotics , per still c/o pain and tenderness around ear, per pt had a some drainage from ear this morning , pt states pain also when Years: 2.00         Types: Cigarettes     Quit date: 1/1/2000  Smokeless tobacco: Never Used                      Alcohol use:  No               Comment: Rarely       REVIEW OF SYSTEMS:   GENERAL HEALTH: feels well otherwise  GENERAL : denies fever, chills patient indicates understanding of these issues and agrees to the plan. David Reece MD  7/3/2018  7:15 AM

## 2018-07-07 NOTE — TELEPHONE ENCOUNTER
LOV: 6-28-18 Last Rx: 5-29-18     No protocol     Please advise in regards to refill request. Thank You

## 2018-07-09 ENCOUNTER — OFFICE VISIT (OUTPATIENT)
Dept: INTERNAL MEDICINE CLINIC | Facility: CLINIC | Age: 33
End: 2018-07-09

## 2018-07-09 VITALS
WEIGHT: 186 LBS | RESPIRATION RATE: 12 BRPM | DIASTOLIC BLOOD PRESSURE: 80 MMHG | TEMPERATURE: 98 F | BODY MASS INDEX: 29.89 KG/M2 | HEART RATE: 94 BPM | SYSTOLIC BLOOD PRESSURE: 130 MMHG | HEIGHT: 66 IN

## 2018-07-09 DIAGNOSIS — M51.37 DDD (DEGENERATIVE DISC DISEASE), LUMBOSACRAL: Primary | ICD-10-CM

## 2018-07-09 PROBLEM — M51.379 DDD (DEGENERATIVE DISC DISEASE), LUMBOSACRAL: Status: ACTIVE | Noted: 2018-07-09

## 2018-07-09 PROCEDURE — 99213 OFFICE O/P EST LOW 20 MIN: CPT | Performed by: INTERNAL MEDICINE

## 2018-07-09 PROCEDURE — 99212 OFFICE O/P EST SF 10 MIN: CPT | Performed by: INTERNAL MEDICINE

## 2018-07-09 RX ORDER — HYDROCODONE BITARTRATE AND ACETAMINOPHEN 10; 325 MG/1; MG/1
TABLET ORAL
Qty: 70 TABLET | Refills: 0 | Status: SHIPPED | OUTPATIENT
Start: 2018-07-09 | End: 2018-07-26

## 2018-07-09 RX ORDER — CYCLOBENZAPRINE HCL 5 MG
TABLET ORAL
Qty: 30 TABLET | Refills: 0 | Status: SHIPPED | OUTPATIENT
Start: 2018-07-09 | End: 2018-08-03

## 2018-07-09 RX ORDER — CYCLOBENZAPRINE HCL 10 MG
TABLET ORAL
Qty: 30 TABLET | Refills: 0 | OUTPATIENT
Start: 2018-07-09

## 2018-07-09 NOTE — PROGRESS NOTES
HPI:    Patient ID: Grant Jean-Baptiste is a 27year old female. Back Pain   This is a chronic problem. The current episode started more than 1 year ago. The problem occurs constantly. The problem has been waxing and waning since onset.  The pain is present Comment:Other reaction(s): Swelling  Aspirin                 SWELLING    Comment:Other reaction(s): ASPIRIN  Codeine                 SWELLING    Comment:Other reaction(s): Swelling   PHYSICAL EXAM:   Physical Exam   Constitutional: She appears well-develop Sig: Take 1to 2 tab po q 4 to 6 hr  Prn for  pain           Imaging & Referrals:  None       [facility]#[id number]

## 2018-07-17 ENCOUNTER — OFFICE VISIT (OUTPATIENT)
Dept: INTERNAL MEDICINE CLINIC | Facility: CLINIC | Age: 33
End: 2018-07-17
Payer: COMMERCIAL

## 2018-07-17 ENCOUNTER — NURSE TRIAGE (OUTPATIENT)
Dept: OTHER | Age: 33
End: 2018-07-17

## 2018-07-17 VITALS
TEMPERATURE: 99 F | BODY MASS INDEX: 30 KG/M2 | WEIGHT: 187 LBS | OXYGEN SATURATION: 100 % | HEART RATE: 112 BPM | SYSTOLIC BLOOD PRESSURE: 134 MMHG | DIASTOLIC BLOOD PRESSURE: 94 MMHG

## 2018-07-17 DIAGNOSIS — J06.9 UPPER RESPIRATORY TRACT INFECTION, UNSPECIFIED TYPE: Primary | ICD-10-CM

## 2018-07-17 PROCEDURE — 99213 OFFICE O/P EST LOW 20 MIN: CPT | Performed by: INTERNAL MEDICINE

## 2018-07-17 PROCEDURE — 99212 OFFICE O/P EST SF 10 MIN: CPT | Performed by: INTERNAL MEDICINE

## 2018-07-17 RX ORDER — HYDROCODONE BITARTRATE AND HOMATROPINE METHYLBROMIDE ORAL SOLUTION 5; 1.5 MG/5ML; MG/5ML
5 LIQUID ORAL EVERY 6 HOURS PRN
Qty: 240 ML | Refills: 0 | Status: SHIPPED | OUTPATIENT
Start: 2018-07-17 | End: 2018-09-11 | Stop reason: DRUGHIGH

## 2018-07-17 NOTE — PROGRESS NOTES
HPI:    Patient ID: Stephenie Kirk is a 26year old female. Cough   This is a new problem. The current episode started in the past 7 days (3 days). The problem has been unchanged. The problem occurs hourly.  The cough is productive of sputum and product reaction(s): Swelling  Aspirin                 SWELLING    Comment:Other reaction(s): ASPIRIN  Codeine                 SWELLING    Comment:Other reaction(s): Swelling   PHYSICAL EXAM:   Physical Exam   Constitutional: She is oriented to person, place, and encounter    Imaging & Referrals:  None       #[id number]

## 2018-07-17 NOTE — TELEPHONE ENCOUNTER
Action Requested: Summary for Provider     []  Critical Lab, Recommendations Needed  [] Need Additional Advice  []   FYI    []   Need Orders  [] Need Medications Sent to Pharmacy  []  Other     SUMMARY: Pt asking to be added on today to see Dr Kat Vázquez

## 2018-07-23 RX ORDER — KETOROLAC TROMETHAMINE 10 MG/1
TABLET, FILM COATED ORAL
Qty: 10 TABLET | Refills: 0 | Status: SHIPPED | OUTPATIENT
Start: 2018-07-23 | End: 2018-08-03

## 2018-07-26 ENCOUNTER — TELEPHONE (OUTPATIENT)
Dept: INTERNAL MEDICINE CLINIC | Facility: CLINIC | Age: 33
End: 2018-07-26

## 2018-07-26 RX ORDER — HYDROCODONE BITARTRATE AND ACETAMINOPHEN 10; 325 MG/1; MG/1
TABLET ORAL
Qty: 70 TABLET | Refills: 0 | Status: SHIPPED | OUTPATIENT
Start: 2018-07-26 | End: 2018-08-07

## 2018-08-03 RX ORDER — KETOROLAC TROMETHAMINE 10 MG/1
TABLET, FILM COATED ORAL
Qty: 10 TABLET | Refills: 0 | Status: SHIPPED | OUTPATIENT
Start: 2018-08-03 | End: 2018-09-04

## 2018-08-04 RX ORDER — CYCLOBENZAPRINE HCL 5 MG
TABLET ORAL
Qty: 30 TABLET | Refills: 0 | Status: SHIPPED | OUTPATIENT
Start: 2018-08-04 | End: 2018-08-28

## 2018-08-07 ENCOUNTER — TELEPHONE (OUTPATIENT)
Dept: INTERNAL MEDICINE CLINIC | Facility: CLINIC | Age: 33
End: 2018-08-07

## 2018-08-07 RX ORDER — HYDROCODONE BITARTRATE AND ACETAMINOPHEN 10; 325 MG/1; MG/1
TABLET ORAL
Qty: 70 TABLET | Refills: 0 | Status: SHIPPED | OUTPATIENT
Start: 2018-08-08 | End: 2018-08-17

## 2018-08-17 ENCOUNTER — TELEPHONE (OUTPATIENT)
Dept: INTERNAL MEDICINE CLINIC | Facility: CLINIC | Age: 33
End: 2018-08-17

## 2018-08-17 RX ORDER — HYDROCODONE BITARTRATE AND ACETAMINOPHEN 10; 325 MG/1; MG/1
TABLET ORAL
Qty: 70 TABLET | Refills: 0 | Status: SHIPPED | OUTPATIENT
Start: 2018-08-22 | End: 2018-08-31

## 2018-08-29 NOTE — TELEPHONE ENCOUNTER
No Protocol on this med.        Pending Prescriptions Disp Refills    CYCLOBENZAPRINE HCL 5 MG Oral Tab [Pharmacy Med Name: CYCLOBENZAPRINE 5MG TABLETS] 30 tablet 0     Sig: TAKE 1 TABLET(5 MG) BY MOUTH THREE TIMES DAILY AS NEEDED FOR MUSCLE SPASMS

## 2018-08-31 ENCOUNTER — TELEPHONE (OUTPATIENT)
Dept: INTERNAL MEDICINE CLINIC | Facility: CLINIC | Age: 33
End: 2018-08-31

## 2018-08-31 RX ORDER — CYCLOBENZAPRINE HCL 5 MG
TABLET ORAL
Qty: 30 TABLET | Refills: 0 | Status: SHIPPED | OUTPATIENT
Start: 2018-08-31 | End: 2018-09-27

## 2018-08-31 RX ORDER — HYDROCODONE BITARTRATE AND ACETAMINOPHEN 10; 325 MG/1; MG/1
TABLET ORAL
Qty: 70 TABLET | Refills: 0 | Status: SHIPPED | OUTPATIENT
Start: 2018-09-04 | End: 2018-09-14

## 2018-09-04 RX ORDER — KETOROLAC TROMETHAMINE 10 MG/1
TABLET, FILM COATED ORAL
Qty: 10 TABLET | Refills: 0 | Status: SHIPPED | OUTPATIENT
Start: 2018-09-04 | End: 2018-10-25

## 2018-09-05 NOTE — TELEPHONE ENCOUNTER
Refill Protocol Appointment Criteria  · Appointment scheduled in the past 6 months or in the next 3 months  Recent Outpatient Visits            1 month ago Upper respiratory tract infection, unspecified type    [facility], [facility], [address]

## 2018-09-06 RX ORDER — GABAPENTIN 300 MG/1
CAPSULE ORAL
Qty: 180 CAPSULE | Refills: 0 | Status: SHIPPED | OUTPATIENT
Start: 2018-09-06 | End: 2019-11-29

## 2018-09-10 ENCOUNTER — NURSE TRIAGE (OUTPATIENT)
Dept: OTHER | Age: 33
End: 2018-09-10

## 2018-09-10 ENCOUNTER — TELEPHONE (OUTPATIENT)
Dept: INTERNAL MEDICINE CLINIC | Facility: CLINIC | Age: 33
End: 2018-09-10

## 2018-09-10 NOTE — TELEPHONE ENCOUNTER
Action Requested: Summary for Provider     []  Critical Lab, Recommendations Needed  [x] Need Additional Advice  []   FYI    []   Need Orders  [] Need Medications Sent to Pharmacy  []  Other     SUMMARY: Patient requesting to be added on to your schedule f

## 2018-09-10 NOTE — TELEPHONE ENCOUNTER
Pt called I requesting to come as soon as possible for a sinus infection. Advised pt that [facility] is overbooked right now but pt insisted that she would like to see only her doctor.     Please reply to pool: WILFREDO Eddy

## 2018-09-10 NOTE — TELEPHONE ENCOUNTER
Appointment Information   Name: Sunita Lynch MRN: [id number]   Date: 9/11/2018 Status: Francesco   Appt Time: 10:30 AM Length: 15   Visit Type: EXAM - ESTABLISHED [1299] Copay: $0.00   Provider: Bladimir Becker MD Department: [facility]

## 2018-09-11 ENCOUNTER — OFFICE VISIT (OUTPATIENT)
Dept: INTERNAL MEDICINE CLINIC | Facility: CLINIC | Age: 33
End: 2018-09-11
Payer: COMMERCIAL

## 2018-09-11 VITALS
HEIGHT: 66 IN | HEART RATE: 114 BPM | DIASTOLIC BLOOD PRESSURE: 80 MMHG | WEIGHT: 191 LBS | OXYGEN SATURATION: 97 % | TEMPERATURE: 100 F | SYSTOLIC BLOOD PRESSURE: 126 MMHG | BODY MASS INDEX: 30.7 KG/M2 | RESPIRATION RATE: 12 BRPM

## 2018-09-11 DIAGNOSIS — J01.90 ACUTE SINUSITIS, RECURRENCE NOT SPECIFIED, UNSPECIFIED LOCATION: Primary | ICD-10-CM

## 2018-09-11 PROCEDURE — 99212 OFFICE O/P EST SF 10 MIN: CPT | Performed by: INTERNAL MEDICINE

## 2018-09-11 PROCEDURE — 99214 OFFICE O/P EST MOD 30 MIN: CPT | Performed by: INTERNAL MEDICINE

## 2018-09-11 RX ORDER — AZITHROMYCIN 250 MG/1
TABLET, FILM COATED ORAL
Qty: 6 TABLET | Refills: 0 | Status: SHIPPED | OUTPATIENT
Start: 2018-09-11 | End: 2018-09-16 | Stop reason: ALTCHOICE

## 2018-09-11 RX ORDER — HYDROCODONE BITARTRATE AND HOMATROPINE METHYLBROMIDE ORAL SOLUTION 5; 1.5 MG/5ML; MG/5ML
5 LIQUID ORAL EVERY 6 HOURS PRN
Qty: 240 ML | Refills: 0 | Status: SHIPPED | OUTPATIENT
Start: 2018-09-11 | End: 2018-10-08

## 2018-09-11 NOTE — PROGRESS NOTES
HPI:    Patient ID: Lon Chakraborty is a 26year old female. Sinusitis   This is a new problem. The current episode started 1 to 4 weeks ago (2 to 3 weeks). The problem is unchanged.  The maximum temperature recorded prior to her arrival was 101 - 101.9 SWELLING    Comment:Other reaction(s): Swelling   PHYSICAL EXAM:   Physical Exam   Constitutional: She appears well-developed. She is cooperative. Non-toxic appearance. She does not have a sickly appearance. She does not appear ill. No distress.    obese

## 2018-09-14 ENCOUNTER — TELEPHONE (OUTPATIENT)
Dept: INTERNAL MEDICINE CLINIC | Facility: CLINIC | Age: 33
End: 2018-09-14

## 2018-09-14 RX ORDER — HYDROCODONE BITARTRATE AND ACETAMINOPHEN 10; 325 MG/1; MG/1
TABLET ORAL
Qty: 70 TABLET | Refills: 0 | Status: SHIPPED | OUTPATIENT
Start: 2018-09-18 | End: 2018-09-28

## 2018-09-16 ENCOUNTER — HOSPITAL ENCOUNTER (OUTPATIENT)
Age: 33
Discharge: HOME | End: 2018-09-16
Payer: COMMERCIAL

## 2018-09-16 VITALS
SYSTOLIC BLOOD PRESSURE: 137 MMHG | OXYGEN SATURATION: 98 % | TEMPERATURE: 98 F | BODY MASS INDEX: 28.13 KG/M2 | WEIGHT: 175 LBS | HEART RATE: 90 BPM | RESPIRATION RATE: 18 BRPM | DIASTOLIC BLOOD PRESSURE: 105 MMHG | HEIGHT: 66 IN

## 2018-09-16 DIAGNOSIS — N30.00 ACUTE CYSTITIS WITHOUT HEMATURIA: Primary | ICD-10-CM

## 2018-09-16 LAB
B-HCG UR QL: NEGATIVE
CLARITY UR: CLEAR
COLOR UR: YELLOW
GLUCOSE UR STRIP-MCNC: NEGATIVE MG/DL
KETONES UR STRIP-MCNC: NEGATIVE MG/DL
LEUKOCYTE ESTERASE UR QL STRIP: NEGATIVE
NITRITE UR QL STRIP: POSITIVE
PH UR STRIP: 5.5 [PH]
PROT UR STRIP-MCNC: 30 MG/DL
SP GR UR STRIP: >=1.03
UROBILINOGEN UR STRIP-ACNC: <2 MG/DL

## 2018-09-16 PROCEDURE — 99214 OFFICE O/P EST MOD 30 MIN: CPT

## 2018-09-16 PROCEDURE — 81025 URINE PREGNANCY TEST: CPT

## 2018-09-16 PROCEDURE — 81003 URINALYSIS AUTO W/O SCOPE: CPT

## 2018-09-16 PROCEDURE — 87186 SC STD MICRODIL/AGAR DIL: CPT | Performed by: PHYSICIAN ASSISTANT

## 2018-09-16 PROCEDURE — 87086 URINE CULTURE/COLONY COUNT: CPT | Performed by: PHYSICIAN ASSISTANT

## 2018-09-16 PROCEDURE — 87077 CULTURE AEROBIC IDENTIFY: CPT | Performed by: PHYSICIAN ASSISTANT

## 2018-09-16 RX ORDER — PHENAZOPYRIDINE HYDROCHLORIDE 200 MG/1
200 TABLET, FILM COATED ORAL 3 TIMES DAILY PRN
Qty: 9 TABLET | Refills: 0 | Status: SHIPPED | OUTPATIENT
Start: 2018-09-16 | End: 2018-09-23

## 2018-09-16 RX ORDER — CEPHALEXIN 500 MG/1
500 CAPSULE ORAL 2 TIMES DAILY
Qty: 14 CAPSULE | Refills: 0 | Status: SHIPPED | OUTPATIENT
Start: 2018-09-16 | End: 2018-09-23

## 2018-09-16 NOTE — ED PROVIDER NOTES
Patient Seen in: [facility]    History   Patient presents with:  Urinary Symptoms (urologic)    Stated Complaint: UTI    HPI    Patient is a 61-year-old female who presents for evaluation of dysuria, frequency and urgency vital signs reviewed. All other systems reviewed and negative except as noted above.     Physical Exam     ED Triage Vitals   BP 09/16/18 0825 (!) 144/99   Pulse 09/16/18 0818 90   Resp 09/16/18 0818 18   Temp 09/16/18 0818 98.1 °F (36.7 °C)   Temp src nitrites. Patient agrees to send urine culture. Keflex and pyridium dispensed for UTI. Given good ER precautions for any worsening symptoms. All questions answered prior to discharge. Patient's blood pressure remained elevated here.   She does state th

## 2018-09-16 NOTE — ED INITIAL ASSESSMENT (HPI)
PATIENT ARRIVED AMBULATORY TO ROOM C/O SYMPTOMS OF A UTI THAT STARTED YESTERDAY. +FREQUENCY. +URGENCY. +MALODOROUS. PATIENT STATES \"I GET FREQUENT UTI'S AND THIS FEELS LIKE ONE\" NO ABDOMINAL PAIN. NO BACK PAIN. NO FEVERS.

## 2018-09-28 RX ORDER — CYCLOBENZAPRINE HCL 5 MG
TABLET ORAL
Qty: 30 TABLET | Refills: 0 | Status: SHIPPED | OUTPATIENT
Start: 2018-09-28 | End: 2018-11-01

## 2018-09-28 RX ORDER — HYDROCODONE BITARTRATE AND ACETAMINOPHEN 10; 325 MG/1; MG/1
TABLET ORAL
Qty: 70 TABLET | Refills: 0 | Status: SHIPPED | OUTPATIENT
Start: 2018-10-02 | End: 2018-10-11

## 2018-10-08 ENCOUNTER — NURSE TRIAGE (OUTPATIENT)
Dept: OTHER | Age: 33
End: 2018-10-08

## 2018-10-08 ENCOUNTER — OFFICE VISIT (OUTPATIENT)
Dept: INTERNAL MEDICINE CLINIC | Facility: CLINIC | Age: 33
End: 2018-10-08
Payer: COMMERCIAL

## 2018-10-08 VITALS
HEART RATE: 105 BPM | WEIGHT: 192 LBS | TEMPERATURE: 99 F | RESPIRATION RATE: 12 BRPM | SYSTOLIC BLOOD PRESSURE: 136 MMHG | HEIGHT: 66 IN | BODY MASS INDEX: 30.86 KG/M2 | DIASTOLIC BLOOD PRESSURE: 84 MMHG

## 2018-10-08 DIAGNOSIS — H60.502 ACUTE OTITIS EXTERNA OF LEFT EAR, UNSPECIFIED TYPE: Primary | ICD-10-CM

## 2018-10-08 DIAGNOSIS — R05.9 COUGH: ICD-10-CM

## 2018-10-08 PROCEDURE — 99214 OFFICE O/P EST MOD 30 MIN: CPT | Performed by: INTERNAL MEDICINE

## 2018-10-08 PROCEDURE — 99212 OFFICE O/P EST SF 10 MIN: CPT | Performed by: INTERNAL MEDICINE

## 2018-10-08 RX ORDER — ZOLMITRIPTAN 5 MG/1
5 TABLET, FILM COATED ORAL 2 TIMES DAILY PRN
COMMUNITY
End: 2018-12-17

## 2018-10-08 RX ORDER — NEOMYCIN SULFATE, POLYMYXIN B SULFATE, HYDROCORTISONE 3.5; 10000; 1 MG/ML; [USP'U]/ML; MG/ML
4 SOLUTION/ DROPS AURICULAR (OTIC) 4 TIMES DAILY
Qty: 1 BOTTLE | Refills: 0 | Status: SHIPPED | OUTPATIENT
Start: 2018-10-08 | End: 2019-01-14

## 2018-10-08 RX ORDER — HYDROCODONE BITARTRATE AND HOMATROPINE METHYLBROMIDE ORAL SOLUTION 5; 1.5 MG/5ML; MG/5ML
5 LIQUID ORAL EVERY 6 HOURS PRN
Qty: 240 ML | Refills: 0 | Status: SHIPPED | OUTPATIENT
Start: 2018-10-08 | End: 2018-10-25 | Stop reason: ALTCHOICE

## 2018-10-08 NOTE — TELEPHONE ENCOUNTER
Patient stated she was already called, scheduled today with the Dr Daryle Draft at 4:15 pm Vinh. Apologized to the patient for the call. No documentation in the chart that call was made.

## 2018-10-08 NOTE — TELEPHONE ENCOUNTER
Action Requested: Summary for Provider     []  Critical Lab, Recommendations Needed  [] Need Additional Advice  []   FYI    []   Need Orders  [] Need Medications Sent to Pharmacy  []  Other     SUMMARY: Dr Iman Douglas: any way you can add patient today afte

## 2018-10-08 NOTE — PROGRESS NOTES
HPI:    Patient ID: Aries Wu is a 26year old female. Ear Pain    There is pain in both ears. This is a new problem. The current episode started in the past 7 days. The problem occurs constantly. There has been no fever. The pain is moderate.  Ass SWELLING    Comment:Other reaction(s): ASPIRIN  Codeine                 SWELLING    Comment:Other reaction(s): Swelling   PHYSICAL EXAM:   Physical Exam   Constitutional: She appears well-developed. She is cooperative. Non-toxic appearance.  She medina

## 2018-10-11 ENCOUNTER — TELEPHONE (OUTPATIENT)
Dept: INTERNAL MEDICINE CLINIC | Facility: CLINIC | Age: 33
End: 2018-10-11

## 2018-10-11 RX ORDER — HYDROCODONE BITARTRATE AND ACETAMINOPHEN 10; 325 MG/1; MG/1
TABLET ORAL
Qty: 70 TABLET | Refills: 0 | Status: SHIPPED | OUTPATIENT
Start: 2018-10-11 | End: 2018-10-25

## 2018-10-25 ENCOUNTER — OFFICE VISIT (OUTPATIENT)
Dept: INTERNAL MEDICINE CLINIC | Facility: CLINIC | Age: 33
End: 2018-10-25
Payer: COMMERCIAL

## 2018-10-25 VITALS
HEIGHT: 66 IN | BODY MASS INDEX: 30.53 KG/M2 | HEART RATE: 98 BPM | SYSTOLIC BLOOD PRESSURE: 114 MMHG | WEIGHT: 190 LBS | DIASTOLIC BLOOD PRESSURE: 83 MMHG

## 2018-10-25 DIAGNOSIS — G43.009 MIGRAINE WITHOUT AURA AND WITHOUT STATUS MIGRAINOSUS, NOT INTRACTABLE: Primary | ICD-10-CM

## 2018-10-25 PROBLEM — S39.012A BACK STRAIN: Status: RESOLVED | Noted: 2017-01-23 | Resolved: 2018-10-25

## 2018-10-25 PROBLEM — H60.502 ACUTE OTITIS EXTERNA OF LEFT EAR: Status: RESOLVED | Noted: 2018-10-08 | Resolved: 2018-10-25

## 2018-10-25 PROCEDURE — 99213 OFFICE O/P EST LOW 20 MIN: CPT | Performed by: INTERNAL MEDICINE

## 2018-10-25 PROCEDURE — 99212 OFFICE O/P EST SF 10 MIN: CPT | Performed by: INTERNAL MEDICINE

## 2018-10-25 RX ORDER — HYDROCODONE BITARTRATE AND ACETAMINOPHEN 10; 325 MG/1; MG/1
TABLET ORAL
Qty: 70 TABLET | Refills: 0 | Status: SHIPPED | OUTPATIENT
Start: 2018-10-28 | End: 2018-11-03

## 2018-10-25 RX ORDER — KETOROLAC TROMETHAMINE 10 MG/1
TABLET, FILM COATED ORAL
Qty: 10 TABLET | Refills: 0 | Status: SHIPPED | OUTPATIENT
Start: 2018-10-25 | End: 2018-12-18

## 2018-10-25 NOTE — PROGRESS NOTES
HPI:    Patient ID: Jocelyn Nissen is a 26year old female. Migraine    This is a chronic problem. The current episode started more than 1 year ago. The problem occurs intermittently. The problem has been waxing and waning.  The pain is located in the b Comment:Other reaction(s): ASPIRIN  Codeine                 SWELLING    Comment:Other reaction(s): Swelling   PHYSICAL EXAM:   Physical Exam   Constitutional: She is oriented to person, place, and time. She appears well-developed and well-nourished.  No dis

## 2018-11-01 ENCOUNTER — TELEPHONE (OUTPATIENT)
Dept: OTHER | Age: 33
End: 2018-11-01

## 2018-11-01 ENCOUNTER — OFFICE VISIT (OUTPATIENT)
Dept: INTERNAL MEDICINE CLINIC | Facility: CLINIC | Age: 33
End: 2018-11-01

## 2018-11-01 VITALS
BODY MASS INDEX: 32 KG/M2 | TEMPERATURE: 99 F | WEIGHT: 198.31 LBS | HEART RATE: 109 BPM | SYSTOLIC BLOOD PRESSURE: 124 MMHG | DIASTOLIC BLOOD PRESSURE: 80 MMHG

## 2018-11-01 DIAGNOSIS — G89.29 ACUTE EXACERBATION OF CHRONIC LOW BACK PAIN: Primary | ICD-10-CM

## 2018-11-01 DIAGNOSIS — M54.50 ACUTE EXACERBATION OF CHRONIC LOW BACK PAIN: Primary | ICD-10-CM

## 2018-11-01 PROCEDURE — 99214 OFFICE O/P EST MOD 30 MIN: CPT | Performed by: INTERNAL MEDICINE

## 2018-11-01 PROCEDURE — 99212 OFFICE O/P EST SF 10 MIN: CPT | Performed by: INTERNAL MEDICINE

## 2018-11-01 RX ORDER — CYCLOBENZAPRINE HCL 10 MG
10 TABLET ORAL NIGHTLY
Qty: 30 TABLET | Refills: 0 | Status: SHIPPED | OUTPATIENT
Start: 2018-11-01 | End: 2018-12-01

## 2018-11-01 NOTE — TELEPHONE ENCOUNTER
Spoke with patient--she was just at [facility] office--requesting appt for back flare up--was told nothing available until Monday. Patient is available today any time before 4 p.m.     Contacted EL--1:30 p.m. appt today scheduled with his approval--patient verbali

## 2018-11-01 NOTE — PROGRESS NOTES
HPI:    Patient ID: Anibal Bond is a 26year old female. Low Back Pain   This is a new (pt complains of worsening lower back pain after lifting end table, twisted her lower back ) problem. The current episode started in the past 7 days.  The problem Solution Place 4 drops in ear(s) 4 (four) times daily.  Disp: 1 Bottle Rfl: 0     Allergies:  Grass                   HIVES    Comment:Nasal congestion  Acetaminophen-Codei*        Comment:Other reaction(s): Swelling  Aspirin                 SWELLING    Com her norco to 8/day for the past 4 to 5 days. Neuro exam no neurodeficit. Pt told to start flexeril 10mg q hs and continue with norco but cut down to 7tabs/day. She will need to refill norco sooner due to temporary increase in dose.  Continue heating pad and

## 2018-11-03 ENCOUNTER — TELEPHONE (OUTPATIENT)
Dept: INTERNAL MEDICINE CLINIC | Facility: CLINIC | Age: 33
End: 2018-11-03

## 2018-11-03 RX ORDER — HYDROCODONE BITARTRATE AND ACETAMINOPHEN 10; 325 MG/1; MG/1
TABLET ORAL
Qty: 112 TABLET | Refills: 0 | Status: SHIPPED | OUTPATIENT
Start: 2018-11-03 | End: 2018-11-15

## 2018-11-03 NOTE — TELEPHONE ENCOUNTER
Pt here to pickup refill of norco; dose had been increase past week due to exacerbation of back pain. Pt to get it down back to 5 tabs/day [place] next refill.  [place]

## 2018-11-15 ENCOUNTER — TELEPHONE (OUTPATIENT)
Dept: INTERNAL MEDICINE CLINIC | Facility: CLINIC | Age: 33
End: 2018-11-15

## 2018-11-15 RX ORDER — HYDROCODONE BITARTRATE AND ACETAMINOPHEN 10; 325 MG/1; MG/1
TABLET ORAL
Qty: 84 TABLET | Refills: 0 | Status: SHIPPED | OUTPATIENT
Start: 2018-11-15 | End: 2018-11-26

## 2018-11-15 NOTE — TELEPHONE ENCOUNTER
Pt here to pickup refill for norco; will go down to 84 tabs for 2 weeks (6tabs/day max). Will continue to try to wean down.  Pt told to come back for her annual physical.

## 2018-11-26 ENCOUNTER — TELEPHONE (OUTPATIENT)
Dept: INTERNAL MEDICINE CLINIC | Facility: CLINIC | Age: 33
End: 2018-11-26

## 2018-11-26 RX ORDER — HYDROCODONE BITARTRATE AND ACETAMINOPHEN 10; 325 MG/1; MG/1
TABLET ORAL
Qty: 70 TABLET | Refills: 0 | Status: SHIPPED | OUTPATIENT
Start: 2018-11-29 | End: 2018-12-12

## 2018-12-03 RX ORDER — CYCLOBENZAPRINE HCL 10 MG
TABLET ORAL
Qty: 30 TABLET | Refills: 0 | OUTPATIENT
Start: 2018-12-03

## 2018-12-04 ENCOUNTER — TELEPHONE (OUTPATIENT)
Dept: OTHER | Age: 33
End: 2018-12-04

## 2018-12-04 RX ORDER — CYCLOBENZAPRINE HCL 5 MG
TABLET ORAL
Qty: 30 TABLET | Refills: 0 | Status: SHIPPED | OUTPATIENT
Start: 2018-12-04 | End: 2019-01-15

## 2018-12-04 NOTE — TELEPHONE ENCOUNTER
No Protocol on this med.      Requested Prescriptions     Pending Prescriptions Disp Refills   • CYCLOBENZAPRINE HCL 5 MG Oral Tab [Pharmacy Med Name: CYCLOBENZAPRINE 5MG TABLETS] 30 tablet 0     Sig: TAKE 1 TABLET(5 MG) BY MOUTH THREE TIMES DAILY AS NEEDED

## 2018-12-04 NOTE — TELEPHONE ENCOUNTER
Requested Prescriptions     Pending Prescriptions Disp Refills   • CYCLOBENZAPRINE HCL 10 MG Oral Tab [Pharmacy Med Name: CYCLOBENZAPRINE 10MG TABLETS] 30 tablet 0     Sig: TAKE 1 TABLET(10 MG) BY MOUTH EVERY NIGHT       Duplicate request.

## 2018-12-04 NOTE — TELEPHONE ENCOUNTER
Per CSS, patient calling and states that her pharmacy did not received the refill, advised that will call pharmacy today to phone in the prescription. Pharmacy on file.        Disp Refills Start End    CYCLOBENZAPRINE HCL 5 MG Oral Tab 30 tablet 0 12/4/201

## 2018-12-05 ENCOUNTER — OFFICE VISIT (OUTPATIENT)
Dept: INTERNAL MEDICINE CLINIC | Facility: CLINIC | Age: 33
End: 2018-12-05
Payer: COMMERCIAL

## 2018-12-05 VITALS
HEART RATE: 98 BPM | WEIGHT: 196 LBS | OXYGEN SATURATION: 99 % | BODY MASS INDEX: 31.5 KG/M2 | TEMPERATURE: 99 F | HEIGHT: 66 IN | RESPIRATION RATE: 12 BRPM | DIASTOLIC BLOOD PRESSURE: 80 MMHG | SYSTOLIC BLOOD PRESSURE: 142 MMHG

## 2018-12-05 DIAGNOSIS — J06.9 UPPER RESPIRATORY TRACT INFECTION, UNSPECIFIED TYPE: Primary | ICD-10-CM

## 2018-12-05 PROCEDURE — 99213 OFFICE O/P EST LOW 20 MIN: CPT | Performed by: INTERNAL MEDICINE

## 2018-12-05 PROCEDURE — 99212 OFFICE O/P EST SF 10 MIN: CPT | Performed by: INTERNAL MEDICINE

## 2018-12-05 RX ORDER — HYDROCODONE BITARTRATE AND HOMATROPINE METHYLBROMIDE ORAL SOLUTION 5; 1.5 MG/5ML; MG/5ML
5 LIQUID ORAL EVERY 6 HOURS PRN
Qty: 240 ML | Refills: 0 | Status: SHIPPED | OUTPATIENT
Start: 2018-12-05 | End: 2019-01-14

## 2018-12-05 NOTE — PROGRESS NOTES
HPI:    Patient ID: Cristian Nieves is a 26year old female. Cough   This is a new problem. The current episode started in the past 7 days. The problem has been gradually worsening. The problem occurs hourly.  The cough is productive of sputum and produc reaction(s): ASPIRIN  Codeine                 SWELLING    Comment:Other reaction(s): Swelling   PHYSICAL EXAM:   Physical Exam   Constitutional: She appears well-developed. She is cooperative. Non-toxic appearance. She does not appear ill. No distress. Visit:  Requested Prescriptions      No prescriptions requested or ordered in this encounter       Imaging & Referrals:  None       #[id number]

## 2018-12-11 RX ORDER — HYDROCODONE BITARTRATE AND ACETAMINOPHEN 10; 325 MG/1; MG/1
TABLET ORAL
Qty: 70 TABLET | Refills: 0 | Status: CANCELLED | OUTPATIENT
Start: 2018-12-13

## 2018-12-12 ENCOUNTER — TELEPHONE (OUTPATIENT)
Dept: INTERNAL MEDICINE CLINIC | Facility: CLINIC | Age: 33
End: 2018-12-12

## 2018-12-12 RX ORDER — HYDROCODONE BITARTRATE AND ACETAMINOPHEN 10; 325 MG/1; MG/1
TABLET ORAL
Qty: 70 TABLET | Refills: 0 | Status: SHIPPED | OUTPATIENT
Start: 2018-12-12 | End: 2018-12-21

## 2018-12-12 NOTE — TELEPHONE ENCOUNTER
Last refilled on 11-29-18 #70 0RF max 5 per day. Due to fill on 11-13-18 Script dated for then.    Printed script needed     [address]   Recent Outpatient Visits            6 days ago Upper respiratory tract infection, unspecified type    [facility], [address], Asael Cleveland MD    Office Visit    1 month ago Acute exacerbation of chronic low back pain    [facility] [address], Vinh Oates MD    Office Visit    1 month ago Migraine without aura and without status migrainosus, not intractable    [facility], [place], [address], Asael Cleveland MD    Office Visit    2 months ago Acute otitis externa of left ear, unspecified type    [facility], [address], Asael Cleveland MD    Office Visit    3 months ago Acute sinusitis, recurrence not specified, unspecified location    [address] Asael Bravo MD    Office Visit

## 2018-12-17 ENCOUNTER — OFFICE VISIT (OUTPATIENT)
Dept: INTERNAL MEDICINE CLINIC | Facility: CLINIC | Age: 33
End: 2018-12-17
Payer: COMMERCIAL

## 2018-12-17 ENCOUNTER — LAB ENCOUNTER (OUTPATIENT)
Dept: LAB | Age: 33
End: 2018-12-17
Attending: INTERNAL MEDICINE
Payer: COMMERCIAL

## 2018-12-17 VITALS
BODY MASS INDEX: 31.82 KG/M2 | HEIGHT: 66 IN | OXYGEN SATURATION: 100 % | HEART RATE: 100 BPM | DIASTOLIC BLOOD PRESSURE: 74 MMHG | SYSTOLIC BLOOD PRESSURE: 114 MMHG | WEIGHT: 198 LBS

## 2018-12-17 DIAGNOSIS — G43.009 MIGRAINE WITHOUT AURA AND WITHOUT STATUS MIGRAINOSUS, NOT INTRACTABLE: ICD-10-CM

## 2018-12-17 DIAGNOSIS — M51.37 DDD (DEGENERATIVE DISC DISEASE), LUMBOSACRAL: ICD-10-CM

## 2018-12-17 DIAGNOSIS — E78.1 HYPERTRIGLYCERIDEMIA: ICD-10-CM

## 2018-12-17 DIAGNOSIS — Z00.00 ANNUAL PHYSICAL EXAM: ICD-10-CM

## 2018-12-17 DIAGNOSIS — I10 ESSENTIAL HYPERTENSION: ICD-10-CM

## 2018-12-17 DIAGNOSIS — Z00.00 ANNUAL PHYSICAL EXAM: Primary | ICD-10-CM

## 2018-12-17 PROCEDURE — 99395 PREV VISIT EST AGE 18-39: CPT | Performed by: INTERNAL MEDICINE

## 2018-12-17 PROCEDURE — 36415 COLL VENOUS BLD VENIPUNCTURE: CPT

## 2018-12-17 PROCEDURE — 80361 OPIATES 1 OR MORE: CPT

## 2018-12-17 PROCEDURE — 80053 COMPREHEN METABOLIC PANEL: CPT

## 2018-12-17 PROCEDURE — 80061 LIPID PANEL: CPT

## 2018-12-17 PROCEDURE — 80307 DRUG TEST PRSMV CHEM ANLYZR: CPT

## 2018-12-17 PROCEDURE — 84443 ASSAY THYROID STIM HORMONE: CPT

## 2018-12-17 PROCEDURE — 85025 COMPLETE CBC W/AUTO DIFF WBC: CPT

## 2018-12-17 NOTE — PROGRESS NOTES
HPI:    Patient ID: Anibal Bond is a 26year old female. Patient presents today for his annual physical.         Review of Systems   Constitutional: Negative for appetite change, diaphoresis and fever. HENT: Negative. Eyes: Negative.     Ruthann Huntley Right Ear: External ear normal.   Left Ear: External ear normal.   Nose: Nose normal.   Mouth/Throat: Oropharynx is clear and moist. No oropharyngeal exudate. Eyes: Conjunctivae and EOM are normal. Pupils are equal, round, and reactive to light.  Right pain med norco and gabapentin. Had been seen neurosurgeon before and no surgical indication. Had been trying to wean down her norco before and added gabapentin then.     Check urine drug screen test     (Z68.32) BMI 32.0-32.9,adult  Plan: d/w pt need for he

## 2018-12-19 ENCOUNTER — PATIENT MESSAGE (OUTPATIENT)
Dept: INTERNAL MEDICINE CLINIC | Facility: CLINIC | Age: 33
End: 2018-12-19

## 2018-12-19 DIAGNOSIS — R79.89 LOW TSH LEVEL: Primary | ICD-10-CM

## 2018-12-20 RX ORDER — KETOROLAC TROMETHAMINE 10 MG/1
TABLET, FILM COATED ORAL
Qty: 10 TABLET | Refills: 0 | Status: SHIPPED | OUTPATIENT
Start: 2018-12-20 | End: 2019-01-14

## 2018-12-21 ENCOUNTER — TELEPHONE (OUTPATIENT)
Dept: INTERNAL MEDICINE CLINIC | Facility: CLINIC | Age: 33
End: 2018-12-21

## 2018-12-21 ENCOUNTER — APPOINTMENT (OUTPATIENT)
Dept: LAB | Age: 33
End: 2018-12-21
Attending: INTERNAL MEDICINE
Payer: COMMERCIAL

## 2018-12-21 DIAGNOSIS — R79.89 LOW TSH LEVEL: ICD-10-CM

## 2018-12-21 PROCEDURE — 36415 COLL VENOUS BLD VENIPUNCTURE: CPT

## 2018-12-21 PROCEDURE — 84439 ASSAY OF FREE THYROXINE: CPT

## 2018-12-21 PROCEDURE — 84443 ASSAY THYROID STIM HORMONE: CPT

## 2018-12-21 PROCEDURE — 84481 FREE ASSAY (FT-3): CPT

## 2018-12-21 RX ORDER — HYDROCODONE BITARTRATE AND ACETAMINOPHEN 10; 325 MG/1; MG/1
TABLET ORAL
Qty: 70 TABLET | Refills: 0 | Status: SHIPPED | OUTPATIENT
Start: 2018-12-21 | End: 2019-01-07

## 2018-12-21 NOTE — TELEPHONE ENCOUNTER
From: Lon Chakraborty  To: Martin Perez MD  Sent: 12/19/2018 9:12 PM CST  Subject: Test Results Question    I am trying to locate test results from labs I had done on Monday. Can someone advise when they will be viewable?

## 2019-01-03 ENCOUNTER — TELEPHONE (OUTPATIENT)
Dept: INTERNAL MEDICINE CLINIC | Facility: CLINIC | Age: 34
End: 2019-01-03

## 2019-01-07 ENCOUNTER — TELEPHONE (OUTPATIENT)
Dept: INTERNAL MEDICINE CLINIC | Facility: CLINIC | Age: 34
End: 2019-01-07

## 2019-01-07 RX ORDER — HYDROCODONE BITARTRATE AND ACETAMINOPHEN 10; 325 MG/1; MG/1
TABLET ORAL
Qty: 70 TABLET | Refills: 0 | Status: SHIPPED | OUTPATIENT
Start: 2019-01-07 | End: 2019-01-18

## 2019-01-14 ENCOUNTER — OFFICE VISIT (OUTPATIENT)
Dept: INTERNAL MEDICINE CLINIC | Facility: CLINIC | Age: 34
End: 2019-01-14
Payer: COMMERCIAL

## 2019-01-14 VITALS
HEART RATE: 102 BPM | HEIGHT: 66 IN | RESPIRATION RATE: 12 BRPM | DIASTOLIC BLOOD PRESSURE: 84 MMHG | SYSTOLIC BLOOD PRESSURE: 126 MMHG | WEIGHT: 198 LBS | TEMPERATURE: 99 F | BODY MASS INDEX: 31.82 KG/M2

## 2019-01-14 DIAGNOSIS — R05.9 COUGH: Primary | ICD-10-CM

## 2019-01-14 DIAGNOSIS — S00.419A ABRASION OF EAR, UNSPECIFIED LATERALITY, INITIAL ENCOUNTER: ICD-10-CM

## 2019-01-14 PROCEDURE — 99212 OFFICE O/P EST SF 10 MIN: CPT | Performed by: INTERNAL MEDICINE

## 2019-01-14 PROCEDURE — 99214 OFFICE O/P EST MOD 30 MIN: CPT | Performed by: INTERNAL MEDICINE

## 2019-01-14 RX ORDER — KETOROLAC TROMETHAMINE 10 MG/1
10 TABLET, FILM COATED ORAL 2 TIMES DAILY PRN
Qty: 10 TABLET | Refills: 0 | Status: SHIPPED | OUTPATIENT
Start: 2019-01-14 | End: 2019-02-20

## 2019-01-14 RX ORDER — HYDROCODONE BITARTRATE AND HOMATROPINE METHYLBROMIDE ORAL SOLUTION 5; 1.5 MG/5ML; MG/5ML
5 LIQUID ORAL EVERY 6 HOURS PRN
Qty: 240 ML | Refills: 0 | Status: SHIPPED | OUTPATIENT
Start: 2019-01-14 | End: 2019-02-19

## 2019-01-14 RX ORDER — FLUTICASONE PROPIONATE 50 MCG
2 SPRAY, SUSPENSION (ML) NASAL DAILY
Qty: 1 BOTTLE | Refills: 2 | Status: SHIPPED | OUTPATIENT
Start: 2019-01-14 | End: 2019-02-27

## 2019-01-14 NOTE — PROGRESS NOTES
HPI:    Patient ID: Anibal Bond is a 26year old female. Ear Problem    There is pain in both (blood from ears) ears. This is a new problem. The current episode started today. There has been no fever. The patient is experiencing no pain.  Associated HIVES    Comment:Nasal congestion  Acetaminophen-Codei*        Comment:Other reaction(s): Swelling  Aspirin                 SWELLING    Comment:Other reaction(s): ASPIRIN  Codeine                 SWELLING    Comment:Other reaction(s): Swell encounter.       Meds This Visit:  Requested Prescriptions      No prescriptions requested or ordered in this encounter       Imaging & Referrals:  None       IQ#[id number]

## 2019-01-16 RX ORDER — CYCLOBENZAPRINE HCL 5 MG
TABLET ORAL
Qty: 30 TABLET | Refills: 0 | Status: SHIPPED | OUTPATIENT
Start: 2019-01-16 | End: 2019-02-11

## 2019-01-16 RX ORDER — CYCLOBENZAPRINE HCL 5 MG
TABLET ORAL
Qty: 30 TABLET | Refills: 0 | OUTPATIENT
Start: 2019-01-16

## 2019-01-16 NOTE — TELEPHONE ENCOUNTER
Review pended refill request as it does not fall under a protocol.     Requested Prescriptions     Pending Prescriptions Disp Refills   • Cyclobenzaprine HCl 5 MG Oral Tab 30 tablet 0       Last Office Visit with PCP: 1/14/2019

## 2019-01-18 ENCOUNTER — TELEPHONE (OUTPATIENT)
Dept: INTERNAL MEDICINE CLINIC | Facility: CLINIC | Age: 34
End: 2019-01-18

## 2019-01-18 RX ORDER — HYDROCODONE BITARTRATE AND ACETAMINOPHEN 10; 325 MG/1; MG/1
TABLET ORAL
Qty: 70 TABLET | Refills: 0 | Status: SHIPPED | OUTPATIENT
Start: 2019-01-20 | End: 2019-01-28

## 2019-01-28 ENCOUNTER — TELEPHONE (OUTPATIENT)
Dept: INTERNAL MEDICINE CLINIC | Facility: CLINIC | Age: 34
End: 2019-01-28

## 2019-01-28 RX ORDER — HYDROCODONE BITARTRATE AND ACETAMINOPHEN 10; 325 MG/1; MG/1
TABLET ORAL
Qty: 70 TABLET | Refills: 0 | Status: SHIPPED | OUTPATIENT
Start: 2019-02-04 | End: 2019-02-14

## 2019-01-28 NOTE — TELEPHONE ENCOUNTER
Pt given script for refill of norco for Feb 4/2019; pt will refill this next week( I am going out of town this Wednesday). Pt will fill this [place] the due date[place]

## 2019-02-11 ENCOUNTER — PATIENT MESSAGE (OUTPATIENT)
Dept: INTERNAL MEDICINE CLINIC | Facility: CLINIC | Age: 34
End: 2019-02-11

## 2019-02-11 ENCOUNTER — TELEPHONE (OUTPATIENT)
Dept: OTHER | Age: 34
End: 2019-02-11

## 2019-02-11 RX ORDER — CYCLOBENZAPRINE HCL 5 MG
TABLET ORAL
Qty: 30 TABLET | Refills: 0 | Status: CANCELLED | OUTPATIENT
Start: 2019-02-11

## 2019-02-11 RX ORDER — CYCLOBENZAPRINE HCL 5 MG
TABLET ORAL
Qty: 30 TABLET | Refills: 0 | Status: SHIPPED | OUTPATIENT
Start: 2019-02-11 | End: 2019-02-27

## 2019-02-11 NOTE — TELEPHONE ENCOUNTER
Addressed at 2/11/19 condition update encounter. .    From: Farzana Duarte  To:  Gavin Osorio MD  Sent: 2/11/2019  9:22 AM CST  Subject: Other    I saw the chiropractor she said it feels like I have whiplash if you can forward that onto Dr. Delia Cosby

## 2019-02-11 NOTE — TELEPHONE ENCOUNTER
Patient is asking for a refill for Flexeril  The patient also stated she also took 2 extra doses of the Norco.    The patient stated on Wednesday she fell on ice and landed on her lower back and side.  She stated she went to the Chiropractor today and had a

## 2019-02-11 NOTE — TELEPHONE ENCOUNTER
Addressed at 2/11/19 condition update encounter. .      From: Lon Chakraborty  To:  Martin Perez MD  Sent: 2/11/2019  7:52 AM CST  Subject: Other    Hey Dr. Sandy Roman,    Both in a hurry this AM.  Is there any way I can see you late on your late day this

## 2019-02-11 NOTE — TELEPHONE ENCOUNTER
Spoke with patient and advised that Dr Tanisha Spence ordered her flexeril, verbalized understanding.

## 2019-02-13 ENCOUNTER — APPOINTMENT (OUTPATIENT)
Dept: GENERAL RADIOLOGY | Age: 34
End: 2019-02-13
Attending: NURSE PRACTITIONER
Payer: OTHER MISCELLANEOUS

## 2019-02-13 ENCOUNTER — HOSPITAL ENCOUNTER (OUTPATIENT)
Age: 34
Discharge: HOME | End: 2019-02-13
Payer: OTHER MISCELLANEOUS

## 2019-02-13 DIAGNOSIS — S30.0XXA CONTUSION OF SACRUM, INITIAL ENCOUNTER: ICD-10-CM
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
## 2019-02-13 PROCEDURE — 99213 OFFICE O/P EST LOW 20 MIN: CPT

## 2019-02-13 PROCEDURE — 72220 X-RAY EXAM SACRUM TAILBONE: CPT | Performed by: NURSE PRACTITIONER

## 2019-02-13 NOTE — ED PROVIDER NOTES
Patient presents with:  Trauma 1 & 2 (cardiovascular, musculoskeletal)      HPI:     Stephenie Kirk is a 26year old female who presents for evaluation and management of a chief complaint of pain to the coccyx and sacrum after a fall that occurred yesterd date: 2000        Years since quittin.1      Smokeless tobacco: Never Used    Substance and Sexual Activity      Alcohol use: No        Alcohol/week: 0.0 oz        Comment: Rarely      Drug use: No      Sexual activity: Not on file    Other Topics Comments:              SLIPPED/FELL S/p slipped and fall on ice yesterday, tail bone pain, denies loc, no neck               or back pain                        Order Specific Question: What is the Relevant Clinical Indication / Reason for Exam?          A

## 2019-02-14 ENCOUNTER — TELEPHONE (OUTPATIENT)
Dept: INTERNAL MEDICINE CLINIC | Facility: CLINIC | Age: 34
End: 2019-02-14

## 2019-02-14 RX ORDER — HYDROCODONE BITARTRATE AND ACETAMINOPHEN 10; 325 MG/1; MG/1
TABLET ORAL
Qty: 98 TABLET | Refills: 0 | Status: SHIPPED | OUTPATIENT
Start: 2019-02-14 | End: 2019-02-27

## 2019-02-14 NOTE — TELEPHONE ENCOUNTER
Pt here to pickup refill of norco; had fallen in parking at work last week and injured her lower back/tail bone. Was also seen in UC. Had increased her dose of norco to 7/day since the fall so needs to refill earlier.  Pt given 98 tabs for next 2 weeks and

## 2019-02-16 ENCOUNTER — PATIENT MESSAGE (OUTPATIENT)
Dept: INTERNAL MEDICINE CLINIC | Facility: CLINIC | Age: 34
End: 2019-02-16

## 2019-02-16 NOTE — TELEPHONE ENCOUNTER
From: Gregory De Anda  To:  Darlene Sarah MD  Sent: 2/16/2019 6:03 AM CST  Subject: Other    Hey Dr. Nito Anderson,    I worked yesterday and hit my breaking point at 4:30pm.    I ended up leaving work and had to go home to rest.    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

## 2019-02-19 ENCOUNTER — OFFICE VISIT (OUTPATIENT)
Dept: INTERNAL MEDICINE CLINIC | Facility: CLINIC | Age: 34
End: 2019-02-19
Payer: COMMERCIAL

## 2019-02-19 VITALS
WEIGHT: 201 LBS | TEMPERATURE: 97 F | BODY MASS INDEX: 32 KG/M2 | DIASTOLIC BLOOD PRESSURE: 86 MMHG | SYSTOLIC BLOOD PRESSURE: 120 MMHG | HEART RATE: 98 BPM

## 2019-02-19 DIAGNOSIS — J01.00 ACUTE NON-RECURRENT MAXILLARY SINUSITIS: Primary | ICD-10-CM

## 2019-02-19 PROCEDURE — 99212 OFFICE O/P EST SF 10 MIN: CPT | Performed by: INTERNAL MEDICINE

## 2019-02-19 PROCEDURE — 99213 OFFICE O/P EST LOW 20 MIN: CPT | Performed by: INTERNAL MEDICINE

## 2019-02-19 RX ORDER — HYDROCODONE BITARTRATE AND HOMATROPINE METHYLBROMIDE ORAL SOLUTION 5; 1.5 MG/5ML; MG/5ML
5 LIQUID ORAL EVERY 6 HOURS PRN
Qty: 240 ML | Refills: 0 | Status: SHIPPED | OUTPATIENT
Start: 2019-02-19 | End: 2019-02-27

## 2019-02-19 RX ORDER — AMOXICILLIN AND CLAVULANATE POTASSIUM 875; 125 MG/1; MG/1
1 TABLET, FILM COATED ORAL 2 TIMES DAILY
Qty: 20 TABLET | Refills: 0 | Status: SHIPPED | OUTPATIENT
Start: 2019-02-19 | End: 2019-02-27

## 2019-02-19 NOTE — PROGRESS NOTES
HPI:    Patient ID: Danelle Guerrero is a 26year old female. Cough   This is a new problem. The current episode started in the past 7 days (5 dyas). The problem has been gradually worsening. The problem occurs constantly.  The cough is productive of puru SWELLING    Comment:Other reaction(s): Swelling   PHYSICAL EXAM:   Physical Exam   Constitutional: She appears well-developed. No distress.    obese   HENT:   Right Ear: External ear normal.   Left Ear: External ear normal.   Nose: Mucosal edema and rhinor

## 2019-02-20 RX ORDER — KETOROLAC TROMETHAMINE 10 MG/1
TABLET, FILM COATED ORAL
Qty: 10 TABLET | Refills: 0 | Status: SHIPPED | OUTPATIENT
Start: 2019-02-20 | End: 2019-03-18

## 2019-02-26 ENCOUNTER — TELEPHONE (OUTPATIENT)
Dept: OTHER | Age: 34
End: 2019-02-26

## 2019-02-26 NOTE — TELEPHONE ENCOUNTER
Pt scheduled appt via [facility] 2/27/19 fell a couple of weeks ago and hurt tailbone. Needs refill and note for work  Wants appt today instead of tomorrow as not convenient with work .   Advised no access today as PCP overbooked but can see different provide

## 2019-02-27 ENCOUNTER — OFFICE VISIT (OUTPATIENT)
Dept: INTERNAL MEDICINE CLINIC | Facility: CLINIC | Age: 34
End: 2019-02-27
Payer: COMMERCIAL

## 2019-02-27 VITALS
HEART RATE: 101 BPM | WEIGHT: 200 LBS | HEIGHT: 69 IN | SYSTOLIC BLOOD PRESSURE: 130 MMHG | BODY MASS INDEX: 29.62 KG/M2 | TEMPERATURE: 98 F | DIASTOLIC BLOOD PRESSURE: 80 MMHG

## 2019-02-27 DIAGNOSIS — S30.0XXA CONTUSION OF SACRUM, INITIAL ENCOUNTER: ICD-10-CM
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
## 2019-02-27 PROCEDURE — 99212 OFFICE O/P EST SF 10 MIN: CPT | Performed by: INTERNAL MEDICINE

## 2019-02-27 PROCEDURE — 99213 OFFICE O/P EST LOW 20 MIN: CPT | Performed by: INTERNAL MEDICINE

## 2019-02-27 RX ORDER — HYDROCODONE BITARTRATE AND ACETAMINOPHEN 10; 325 MG/1; MG/1
TABLET ORAL
Qty: 84 TABLET | Refills: 0 | Status: SHIPPED | OUTPATIENT
Start: 2019-02-27 | End: 2019-03-11

## 2019-02-27 RX ORDER — CYCLOBENZAPRINE HCL 5 MG
TABLET ORAL
Qty: 30 TABLET | Refills: 0 | Status: SHIPPED | OUTPATIENT
Start: 2019-02-27 | End: 2019-03-18

## 2019-02-27 NOTE — PROGRESS NOTES
HPI:    Patient ID: Farzana Duarte is a 26year old female. Fall   Incident onset: pt fell and slipped in parking garage at work on 2/12/19. The fall occurred while walking. She fell from a height of 1 to 2 ft. She landed on concrete.  The point of impa moist. No oropharyngeal exudate. Eyes: Conjunctivae and EOM are normal. Pupils are equal, round, and reactive to light. Right eye exhibits no discharge. Left eye exhibits no discharge. Neck: Normal range of motion. Neck supple. No JVD present.    Cardio

## 2019-03-01 ENCOUNTER — APPOINTMENT (OUTPATIENT)
Dept: GENERAL RADIOLOGY | Age: 34
End: 2019-03-01
Attending: NURSE PRACTITIONER
Payer: COMMERCIAL

## 2019-03-01 ENCOUNTER — HOSPITAL ENCOUNTER (OUTPATIENT)
Age: 34
Discharge: HOME | End: 2019-03-01
Payer: COMMERCIAL

## 2019-03-01 VITALS
BODY MASS INDEX: 28.93 KG/M2 | TEMPERATURE: 98 F | SYSTOLIC BLOOD PRESSURE: 177 MMHG | RESPIRATION RATE: 16 BRPM | HEART RATE: 94 BPM | WEIGHT: 180 LBS | DIASTOLIC BLOOD PRESSURE: 99 MMHG | OXYGEN SATURATION: 97 % | HEIGHT: 66 IN

## 2019-03-01 DIAGNOSIS — S61.211A LACERATION OF LEFT INDEX FINGER WITHOUT FOREIGN BODY WITHOUT DAMAGE TO NAIL, INITIAL ENCOUNTER: Primary | ICD-10-CM

## 2019-03-01 PROCEDURE — 12001 RPR S/N/AX/GEN/TRNK 2.5CM/<: CPT

## 2019-03-01 PROCEDURE — 73140 X-RAY EXAM OF FINGER(S): CPT | Performed by: NURSE PRACTITIONER

## 2019-03-01 PROCEDURE — 99213 OFFICE O/P EST LOW 20 MIN: CPT

## 2019-03-01 RX ORDER — LIDOCAINE AND PRILOCAINE 25; 25 MG/G; MG/G
CREAM TOPICAL ONCE
Status: COMPLETED | OUTPATIENT
Start: 2019-03-01 | End: 2019-03-01

## 2019-03-01 NOTE — ED INITIAL ASSESSMENT (HPI)
Reports left second finger laceration sustained this am while opening a toy. States she cut her finger with a steak knife. States she is utd with her tdap.

## 2019-03-01 NOTE — ED PROVIDER NOTES
Patient presents with:  Laceration Abrasion (integumentary)      HPI:     Deondre Fritz is a 26year old female presents for a chief complaint of laceration evaluation and repair.   The patient caught herself with a kitchen knife while trying to open a pa Comment: Rarely      Drug use: No      Sexual activity: Not on file    Lifestyle      Physical activity:        Days per week: Not on file        Minutes per session: Not on file      Stress: Not on file    Relationships      Social connections:        T applied.     Physical Exam:  BP (!) 177/99   Pulse 94   Temp 98.2 °F (36.8 °C) (Oral)   Resp 16   Ht 167.6 cm (5' 6\")   Wt 81.6 kg   SpO2 97%   BMI 29.05 kg/m²   GENERAL: well developed, well nourished, well hydrated, no distress  SKIN: good skin turgor, n and following up closely with the hand specialist she has seen in the past.    Diagnosis:    ICD-10-CM    1.  Laceration of left index finger without foreign body without damage to nail, initial encounter Z71.269W        All results reviewed and discussed w

## 2019-03-04 ENCOUNTER — OFFICE VISIT (OUTPATIENT)
Dept: INTERNAL MEDICINE CLINIC | Facility: CLINIC | Age: 34
End: 2019-03-04
Payer: COMMERCIAL

## 2019-03-04 VITALS
RESPIRATION RATE: 12 BRPM | DIASTOLIC BLOOD PRESSURE: 86 MMHG | BODY MASS INDEX: 32.47 KG/M2 | SYSTOLIC BLOOD PRESSURE: 139 MMHG | WEIGHT: 202 LBS | HEIGHT: 66 IN | TEMPERATURE: 99 F | HEART RATE: 105 BPM

## 2019-03-04 DIAGNOSIS — S61.209A OPEN WOUND OF FINGER, INITIAL ENCOUNTER: Primary | ICD-10-CM

## 2019-03-04 PROCEDURE — 99212 OFFICE O/P EST SF 10 MIN: CPT | Performed by: INTERNAL MEDICINE

## 2019-03-04 PROCEDURE — 99213 OFFICE O/P EST LOW 20 MIN: CPT | Performed by: INTERNAL MEDICINE

## 2019-03-04 RX ORDER — FLUTICASONE PROPIONATE 50 MCG
SPRAY, SUSPENSION (ML) NASAL
COMMUNITY
End: 2020-06-18

## 2019-03-04 NOTE — PROGRESS NOTES
HPI:    Patient ID: Cristian Nieves is a 26year old female. Wound Recheck   This is a new problem. The current episode started in the past 7 days. The problem occurs constantly. The problem has been gradually improving.  Pertinent negatives include no f Neurological: She is alert. Skin: No rash noted. She is not diaphoretic. No pallor.               ASSESSMENT/PLAN:   (S94.109H) Open wound of finger, initial encounter  (primary encounter diagnosis)  Plan: wound healing and not infected; pt wants to sut

## 2019-03-05 ENCOUNTER — NURSE TRIAGE (OUTPATIENT)
Dept: OTHER | Age: 34
End: 2019-03-05

## 2019-03-05 RX ORDER — CEPHALEXIN 500 MG/1
500 CAPSULE ORAL 2 TIMES DAILY
Qty: 14 CAPSULE | Refills: 0 | Status: SHIPPED | OUTPATIENT
Start: 2019-03-05 | End: 2019-03-18

## 2019-03-05 NOTE — TELEPHONE ENCOUNTER
Action Requested: Summary for Provider     []  Critical Lab, Recommendations Needed  [] Need Additional Advice  []   FYI    []   Need Orders  [] Need Medications Sent to Pharmacy  []  Other     SUMMARY: Patient reports having urinary frequency and urgency.

## 2019-03-05 NOTE — TELEPHONE ENCOUNTER
Regarding: Other  Contact: [phone]  ----- Message from Mychart Generic sent at 3/4/2019  5:46 PM CST -----    Jo Crum,    This afternoon I started having UTI symptoms. I have the dip test and it is showing protein at 100.       Right now it's urgenc

## 2019-03-11 ENCOUNTER — TELEPHONE (OUTPATIENT)
Dept: INTERNAL MEDICINE CLINIC | Facility: CLINIC | Age: 34
End: 2019-03-11

## 2019-03-11 RX ORDER — HYDROCODONE BITARTRATE AND ACETAMINOPHEN 10; 325 MG/1; MG/1
TABLET ORAL
Qty: 84 TABLET | Refills: 0 | Status: SHIPPED | OUTPATIENT
Start: 2019-03-11 | End: 2019-03-22

## 2019-03-11 NOTE — TELEPHONE ENCOUNTER
Pt here to  script for norco; have to go out of town tonight for  so will need to fill out earlier but next refill to be 16 days [place] 14 days[place] Plan also to cut down to 5tab/day for next refill.  Kept of same dose this re

## 2019-03-18 ENCOUNTER — OFFICE VISIT (OUTPATIENT)
Dept: INTERNAL MEDICINE CLINIC | Facility: CLINIC | Age: 34
End: 2019-03-18
Payer: COMMERCIAL

## 2019-03-18 VITALS
TEMPERATURE: 98 F | BODY MASS INDEX: 33 KG/M2 | SYSTOLIC BLOOD PRESSURE: 130 MMHG | HEART RATE: 94 BPM | WEIGHT: 201.88 LBS | DIASTOLIC BLOOD PRESSURE: 76 MMHG

## 2019-03-18 DIAGNOSIS — G43.009 MIGRAINE WITHOUT AURA AND WITHOUT STATUS MIGRAINOSUS, NOT INTRACTABLE: Primary | ICD-10-CM

## 2019-03-18 PROCEDURE — 99213 OFFICE O/P EST LOW 20 MIN: CPT | Performed by: INTERNAL MEDICINE

## 2019-03-18 PROCEDURE — 99212 OFFICE O/P EST SF 10 MIN: CPT | Performed by: INTERNAL MEDICINE

## 2019-03-18 RX ORDER — KETOROLAC TROMETHAMINE 10 MG/1
TABLET, FILM COATED ORAL
Qty: 20 TABLET | Refills: 2 | Status: SHIPPED | OUTPATIENT
Start: 2019-03-18 | End: 2019-08-20

## 2019-03-18 RX ORDER — CYCLOBENZAPRINE HCL 5 MG
TABLET ORAL
Qty: 30 TABLET | Refills: 0 | Status: SHIPPED | OUTPATIENT
Start: 2019-03-18 | End: 2019-04-11

## 2019-03-18 NOTE — PROGRESS NOTES
HPI:    Patient ID: Jocelyn Nissen is a 26year old female. Migraine    This is a chronic problem. The current episode started more than 1 year ago. The problem occurs intermittently. The problem has been waxing and waning.  The pain is located in the b appears well-developed and well-nourished. No distress. obese   HENT:   Head: Normocephalic and atraumatic.    Right Ear: External ear normal.   Left Ear: External ear normal.   Nose: Nose normal.   Mouth/Throat: Oropharynx is clear and moist. No orophary

## 2019-03-22 ENCOUNTER — TELEPHONE (OUTPATIENT)
Dept: INTERNAL MEDICINE CLINIC | Facility: CLINIC | Age: 34
End: 2019-03-22

## 2019-03-22 RX ORDER — HYDROCODONE BITARTRATE AND ACETAMINOPHEN 10; 325 MG/1; MG/1
TABLET ORAL
Qty: 84 TABLET | Refills: 0 | Status: SHIPPED | OUTPATIENT
Start: 2019-03-23 | End: 2019-04-06

## 2019-03-22 NOTE — TELEPHONE ENCOUNTER
Pt here to pickup script for norco; pt going out [place]  Pt next refill will be on 4/10/19

## 2019-04-01 ENCOUNTER — OFFICE VISIT (OUTPATIENT)
Dept: INTERNAL MEDICINE CLINIC | Facility: CLINIC | Age: 34
End: 2019-04-01
Payer: COMMERCIAL

## 2019-04-01 ENCOUNTER — TELEPHONE (OUTPATIENT)
Dept: INTERNAL MEDICINE CLINIC | Facility: CLINIC | Age: 34
End: 2019-04-01

## 2019-04-01 VITALS
TEMPERATURE: 98 F | HEIGHT: 66 IN | HEART RATE: 87 BPM | DIASTOLIC BLOOD PRESSURE: 84 MMHG | WEIGHT: 198 LBS | SYSTOLIC BLOOD PRESSURE: 136 MMHG | BODY MASS INDEX: 31.82 KG/M2

## 2019-04-01 DIAGNOSIS — R19.7 DIARRHEA OF PRESUMED INFECTIOUS ORIGIN: Primary | ICD-10-CM

## 2019-04-01 PROCEDURE — 99213 OFFICE O/P EST LOW 20 MIN: CPT | Performed by: INTERNAL MEDICINE

## 2019-04-01 PROCEDURE — 99212 OFFICE O/P EST SF 10 MIN: CPT | Performed by: INTERNAL MEDICINE

## 2019-04-01 RX ORDER — ONDANSETRON 4 MG/1
4 TABLET, FILM COATED ORAL EVERY 8 HOURS PRN
Qty: 15 TABLET | Refills: 0 | Status: SHIPPED | OUTPATIENT
Start: 2019-04-01 | End: 2019-08-20

## 2019-04-01 NOTE — TELEPHONE ENCOUNTER
Pt states ate at [employer] on Saturday, and by later that evening  diarrhea and abdominal pain through today. Also states  and young son with same symptoms. Denies fever.  OV scheduled with [facility] today at 4:30 pm.

## 2019-04-01 NOTE — PROGRESS NOTES
HPI:    Patient ID: Alfredo Toure is a 26year old female. Diarrhea    This is a new problem. The current episode started in the past 7 days (3 days ago (saturday)). The problem occurs 5 to 10 times per day. The problem has been gradually improving.  Isidoro Delatorre sickly appearance. She does not appear ill. No distress. obese   HENT:   Head: Normocephalic and atraumatic.    Right Ear: External ear normal.   Left Ear: External ear normal.   Nose: Nose normal.   Mouth/Throat: Oropharynx is clear and moist. No orophar

## 2019-04-06 ENCOUNTER — TELEPHONE (OUTPATIENT)
Dept: INTERNAL MEDICINE CLINIC | Facility: CLINIC | Age: 34
End: 2019-04-06

## 2019-04-06 RX ORDER — HYDROCODONE BITARTRATE AND ACETAMINOPHEN 10; 325 MG/1; MG/1
TABLET ORAL
Qty: 84 TABLET | Refills: 0 | Status: SHIPPED | OUTPATIENT
Start: 2019-04-09 | End: 2019-04-22

## 2019-04-06 NOTE — TELEPHONE ENCOUNTER
Per patient she is going out of town to help mother unpack for her trailer home, patient asking to get RX for Standard Zahl today.

## 2019-04-12 RX ORDER — CYCLOBENZAPRINE HCL 5 MG
TABLET ORAL
Qty: 30 TABLET | Refills: 0 | Status: SHIPPED | OUTPATIENT
Start: 2019-04-12 | End: 2019-04-25

## 2019-04-15 ENCOUNTER — PATIENT MESSAGE (OUTPATIENT)
Dept: INTERNAL MEDICINE CLINIC | Facility: CLINIC | Age: 34
End: 2019-04-15

## 2019-04-15 DIAGNOSIS — Z11.9 SCREENING EXAMINATION FOR INFECTIOUS DISEASE: Primary | ICD-10-CM

## 2019-04-16 ENCOUNTER — PATIENT MESSAGE (OUTPATIENT)
Dept: INTERNAL MEDICINE CLINIC | Facility: CLINIC | Age: 34
End: 2019-04-16

## 2019-04-16 NOTE — TELEPHONE ENCOUNTER
From: Yael Markham  To: Christine Jonas MD  Sent: 4/15/2019 10:34 PM CDT  Subject: Other    Hi Dr. Hannon Brought,    I was reading up today on how the measles vaccine if you were born up to [facility] was basically barely effective.     Is there a way to te

## 2019-04-16 NOTE — TELEPHONE ENCOUNTER
From: Zainab Harris  To: Ortega Mauricio MD  Sent: 4/16/2019 9:57 AM CDT  Subject: Other    Is this a fasting lab that was ordered? Thanks!

## 2019-04-22 ENCOUNTER — TELEPHONE (OUTPATIENT)
Dept: INTERNAL MEDICINE CLINIC | Facility: CLINIC | Age: 34
End: 2019-04-22

## 2019-04-22 RX ORDER — HYDROCODONE BITARTRATE AND ACETAMINOPHEN 10; 325 MG/1; MG/1
TABLET ORAL
Qty: 70 TABLET | Refills: 0 | Status: SHIPPED | OUTPATIENT
Start: 2019-04-22 | End: 2019-05-03

## 2019-04-22 NOTE — TELEPHONE ENCOUNTER
Pt here to pickup script for her norco; d/w pt about decreasing dose and will go down to 5/day. She had stopped gabapentin since it didn't really helped her back pain. She is also been tried on lyrica and cymbalta before but didn't tolerate side effects.  Erik Padilla

## 2019-04-25 ENCOUNTER — OFFICE VISIT (OUTPATIENT)
Dept: INTERNAL MEDICINE CLINIC | Facility: CLINIC | Age: 34
End: 2019-04-25
Payer: COMMERCIAL

## 2019-04-25 VITALS
TEMPERATURE: 98 F | HEIGHT: 66 IN | WEIGHT: 190 LBS | HEART RATE: 112 BPM | RESPIRATION RATE: 16 BRPM | SYSTOLIC BLOOD PRESSURE: 171 MMHG | BODY MASS INDEX: 30.53 KG/M2 | DIASTOLIC BLOOD PRESSURE: 106 MMHG

## 2019-04-25 DIAGNOSIS — L08.9 LACERATION OF FINGER WITH INFECTION, INITIAL ENCOUNTER: Primary | ICD-10-CM

## 2019-04-25 DIAGNOSIS — S61.219A LACERATION OF FINGER WITH INFECTION, INITIAL ENCOUNTER: Primary | ICD-10-CM

## 2019-04-25 PROCEDURE — 99213 OFFICE O/P EST LOW 20 MIN: CPT | Performed by: INTERNAL MEDICINE

## 2019-04-25 PROCEDURE — 99212 OFFICE O/P EST SF 10 MIN: CPT | Performed by: INTERNAL MEDICINE

## 2019-04-25 RX ORDER — CYCLOBENZAPRINE HCL 10 MG
10 TABLET ORAL 3 TIMES DAILY
Qty: 30 TABLET | Refills: 0 | Status: SHIPPED | OUTPATIENT
Start: 2019-04-25 | End: 2019-06-05

## 2019-04-25 RX ORDER — DOXYCYCLINE 100 MG/1
100 TABLET ORAL 2 TIMES DAILY
Qty: 14 TABLET | Refills: 0 | Status: SHIPPED | OUTPATIENT
Start: 2019-04-25 | End: 2019-05-14 | Stop reason: ALTCHOICE

## 2019-04-25 NOTE — PROGRESS NOTES
HPI:    Patient ID: Nazario Galvez is a 26year old female. Patient presents today with complaint of infected wound on her right 4th finger.  Pt states 10 days ago, she had sliced her finger when she was reaching in the console box of her car and there exhibits no edema. Right 4th finger; there is redness on the dorsal aspect near the cuticle. . Small wound on the medial aspect of the distal phalanx near the nail. No abscess noted. Neurological: She is alert. Skin: No rash noted.  She is not diaphor

## 2019-05-03 ENCOUNTER — TELEPHONE (OUTPATIENT)
Dept: INTERNAL MEDICINE CLINIC | Facility: CLINIC | Age: 34
End: 2019-05-03

## 2019-05-03 RX ORDER — HYDROCODONE BITARTRATE AND ACETAMINOPHEN 10; 325 MG/1; MG/1
TABLET ORAL
Qty: 70 TABLET | Refills: 0 | Status: SHIPPED | OUTPATIENT
Start: 2019-05-03 | End: 2019-05-14

## 2019-05-06 ENCOUNTER — OFFICE VISIT (OUTPATIENT)
Dept: INTERNAL MEDICINE CLINIC | Facility: CLINIC | Age: 34
End: 2019-05-06
Payer: COMMERCIAL

## 2019-05-06 VITALS
WEIGHT: 198 LBS | DIASTOLIC BLOOD PRESSURE: 90 MMHG | SYSTOLIC BLOOD PRESSURE: 127 MMHG | TEMPERATURE: 98 F | HEART RATE: 100 BPM | HEIGHT: 66 IN | BODY MASS INDEX: 31.82 KG/M2

## 2019-05-06 DIAGNOSIS — J02.9 SORE THROAT: Primary | ICD-10-CM

## 2019-05-06 DIAGNOSIS — R05.9 COUGH: ICD-10-CM

## 2019-05-06 PROCEDURE — 99213 OFFICE O/P EST LOW 20 MIN: CPT | Performed by: INTERNAL MEDICINE

## 2019-05-06 PROCEDURE — 87880 STREP A ASSAY W/OPTIC: CPT | Performed by: INTERNAL MEDICINE

## 2019-05-06 PROCEDURE — 99212 OFFICE O/P EST SF 10 MIN: CPT | Performed by: INTERNAL MEDICINE

## 2019-05-06 RX ORDER — HYDROCODONE BITARTRATE AND HOMATROPINE METHYLBROMIDE ORAL SOLUTION 5; 1.5 MG/5ML; MG/5ML
5 LIQUID ORAL EVERY 6 HOURS PRN
Qty: 240 ML | Refills: 0 | Status: SHIPPED | OUTPATIENT
Start: 2019-05-06 | End: 2019-07-16

## 2019-05-06 NOTE — PROGRESS NOTES
HPI:    Patient ID: Unique Rae is a 26year old female. Cough   This is a new problem. The current episode started in the past 7 days. The problem has been unchanged. The problem occurs hourly.  The cough is productive of sputum and productive of pu SWELLING    Comment:Other reaction(s): Swelling   PHYSICAL EXAM:   Physical Exam   Constitutional: She appears well-developed. She is cooperative. Non-toxic appearance. She does not have a sickly appearance. She does not appear ill. No distress.    o

## 2019-05-13 ENCOUNTER — PATIENT MESSAGE (OUTPATIENT)
Dept: INTERNAL MEDICINE CLINIC | Facility: CLINIC | Age: 34
End: 2019-05-13

## 2019-05-13 NOTE — TELEPHONE ENCOUNTER
From: Jocelyn Nissen  To: Fabrizio Garrison MD  Sent: 5/13/2019 8:03 AM CDT  Subject: Non-Urgent Medical Question    Hi Dr. Cedric Iyer-    I scheduled an appointment for Wednesday morning just in case you want to see me.     My rib area and collar bone

## 2019-05-14 ENCOUNTER — OFFICE VISIT (OUTPATIENT)
Dept: INTERNAL MEDICINE CLINIC | Facility: CLINIC | Age: 34
End: 2019-05-14
Payer: COMMERCIAL

## 2019-05-14 VITALS
TEMPERATURE: 99 F | SYSTOLIC BLOOD PRESSURE: 132 MMHG | WEIGHT: 199 LBS | HEIGHT: 66 IN | RESPIRATION RATE: 12 BRPM | HEART RATE: 87 BPM | DIASTOLIC BLOOD PRESSURE: 78 MMHG | BODY MASS INDEX: 31.98 KG/M2

## 2019-05-14 DIAGNOSIS — R05.9 COUGH: Primary | ICD-10-CM

## 2019-05-14 DIAGNOSIS — R07.89 CHEST WALL PAIN: ICD-10-CM

## 2019-05-14 PROBLEM — J02.9 SORE THROAT: Status: RESOLVED | Noted: 2017-01-31 | Resolved: 2019-05-14

## 2019-05-14 PROBLEM — S61.209A OPEN WND OF FINGER: Status: RESOLVED | Noted: 2019-03-04 | Resolved: 2019-05-14

## 2019-05-14 PROBLEM — S61.219A INFECTED FINGER LACERATION: Status: RESOLVED | Noted: 2019-04-25 | Resolved: 2019-05-14

## 2019-05-14 PROBLEM — L08.9 INFECTED FINGER LACERATION: Status: RESOLVED | Noted: 2019-04-25 | Resolved: 2019-05-14

## 2019-05-14 PROCEDURE — 99212 OFFICE O/P EST SF 10 MIN: CPT | Performed by: INTERNAL MEDICINE

## 2019-05-14 PROCEDURE — 99213 OFFICE O/P EST LOW 20 MIN: CPT | Performed by: INTERNAL MEDICINE

## 2019-05-14 RX ORDER — AMOXICILLIN AND CLAVULANATE POTASSIUM 875; 125 MG/1; MG/1
1 TABLET, FILM COATED ORAL 2 TIMES DAILY
Qty: 20 TABLET | Refills: 0 | Status: SHIPPED | OUTPATIENT
Start: 2019-05-14 | End: 2019-05-24

## 2019-05-14 RX ORDER — HYDROCODONE BITARTRATE AND ACETAMINOPHEN 10; 325 MG/1; MG/1
TABLET ORAL
Qty: 105 TABLET | Refills: 0 | Status: SHIPPED | OUTPATIENT
Start: 2019-05-14 | End: 2019-05-21

## 2019-05-21 ENCOUNTER — TELEPHONE (OUTPATIENT)
Dept: INTERNAL MEDICINE CLINIC | Facility: CLINIC | Age: 34
End: 2019-05-21

## 2019-05-21 RX ORDER — HYDROCODONE BITARTRATE AND ACETAMINOPHEN 10; 325 MG/1; MG/1
TABLET ORAL
Qty: 84 TABLET | Refills: 0 | Status: SHIPPED | OUTPATIENT
Start: 2019-05-28 | End: 2019-06-05

## 2019-05-21 RX ORDER — HYDROCODONE BITARTRATE AND ACETAMINOPHEN 10; 325 MG/1; MG/1
TABLET ORAL
Qty: 105 TABLET | Refills: 0 | Status: SHIPPED | OUTPATIENT
Start: 2019-05-28 | End: 2019-05-21 | Stop reason: CLARIF

## 2019-05-31 ENCOUNTER — OFFICE VISIT (OUTPATIENT)
Dept: INTERNAL MEDICINE CLINIC | Facility: CLINIC | Age: 34
End: 2019-05-31
Payer: COMMERCIAL

## 2019-05-31 VITALS
BODY MASS INDEX: 32.14 KG/M2 | TEMPERATURE: 99 F | HEART RATE: 106 BPM | HEIGHT: 66 IN | SYSTOLIC BLOOD PRESSURE: 136 MMHG | WEIGHT: 200 LBS | DIASTOLIC BLOOD PRESSURE: 80 MMHG

## 2019-05-31 DIAGNOSIS — G43.009 MIGRAINE WITHOUT AURA AND WITHOUT STATUS MIGRAINOSUS, NOT INTRACTABLE: Primary | ICD-10-CM

## 2019-05-31 PROCEDURE — 99212 OFFICE O/P EST SF 10 MIN: CPT | Performed by: INTERNAL MEDICINE

## 2019-05-31 PROCEDURE — 99214 OFFICE O/P EST MOD 30 MIN: CPT | Performed by: INTERNAL MEDICINE

## 2019-05-31 NOTE — PROGRESS NOTES
HPI:    Patient ID: Cristian Nieves is a 26year old female. Migraine    This is a chronic problem. The current episode started more than 1 year ago. The problem has been waxing and waning. The pain is located in the bilateral region.  The pain does not PHYSICAL EXAM:   Physical Exam   Constitutional: She is oriented to person, place, and time. She appears well-developed. No distress. HENT:   Head: Normocephalic and atraumatic.    Right Ear: External ear normal.   Left Ear: External ear normal.   Nose:

## 2019-06-05 ENCOUNTER — TELEPHONE (OUTPATIENT)
Dept: INTERNAL MEDICINE CLINIC | Facility: CLINIC | Age: 34
End: 2019-06-05

## 2019-06-05 RX ORDER — HYDROCODONE BITARTRATE AND ACETAMINOPHEN 10; 325 MG/1; MG/1
TABLET ORAL
Qty: 77 TABLET | Refills: 0 | Status: SHIPPED | OUTPATIENT
Start: 2019-06-07 | End: 2019-06-18

## 2019-06-05 NOTE — TELEPHONE ENCOUNTER
Review pended refill request as it does not fall under a protocol.     Last Rx: 4/25/19  LOV: 5/31/19    Requested Prescriptions   Pending Prescriptions Disp Refills   • CYCLOBENZAPRINE HCL 10 MG Oral Tab [Pharmacy Med Name: CYCLOBENZAPRINE 10MG TABLETS] 30

## 2019-06-05 NOTE — TELEPHONE ENCOUNTER
Pt here to pickup script; still having issues with getting /burial for father in law. Asked me if can do earlier refill due to her schedule and no time to pickup script on time. Her next refill then will be 17 days from today as agreed.  We will also

## 2019-06-06 RX ORDER — CYCLOBENZAPRINE HCL 10 MG
TABLET ORAL
Qty: 30 TABLET | Refills: 0 | Status: SHIPPED | OUTPATIENT
Start: 2019-06-06 | End: 2019-06-18

## 2019-06-18 ENCOUNTER — OFFICE VISIT (OUTPATIENT)
Dept: INTERNAL MEDICINE CLINIC | Facility: CLINIC | Age: 34
End: 2019-06-18
Payer: COMMERCIAL

## 2019-06-18 VITALS
RESPIRATION RATE: 12 BRPM | SYSTOLIC BLOOD PRESSURE: 124 MMHG | BODY MASS INDEX: 30.53 KG/M2 | HEART RATE: 93 BPM | HEIGHT: 66 IN | WEIGHT: 190 LBS | DIASTOLIC BLOOD PRESSURE: 80 MMHG | TEMPERATURE: 99 F

## 2019-06-18 DIAGNOSIS — S29.012A UPPER BACK STRAIN, INITIAL ENCOUNTER: Primary | ICD-10-CM

## 2019-06-18 PROCEDURE — 99212 OFFICE O/P EST SF 10 MIN: CPT | Performed by: INTERNAL MEDICINE

## 2019-06-18 PROCEDURE — 99213 OFFICE O/P EST LOW 20 MIN: CPT | Performed by: INTERNAL MEDICINE

## 2019-06-18 RX ORDER — CYCLOBENZAPRINE HCL 10 MG
TABLET ORAL
Qty: 30 TABLET | Refills: 0 | Status: SHIPPED | OUTPATIENT
Start: 2019-06-18 | End: 2019-06-27

## 2019-06-18 RX ORDER — HYDROCODONE BITARTRATE AND ACETAMINOPHEN 10; 325 MG/1; MG/1
TABLET ORAL
Qty: 84 TABLET | Refills: 0 | Status: SHIPPED | OUTPATIENT
Start: 2019-06-21 | End: 2019-06-29

## 2019-06-18 NOTE — PROGRESS NOTES
HPI:    Patient ID: Nelson Booker is a 26year old female. Back Pain   This is a new (pt was walking her dog when it suddenly volted and ran causing to twist her upper back) problem. The current episode started in the past 7 days.  The problem occurs c Comment:Nasal congestion  Aspirin                 SWELLING    Comment:Other reaction(s): ASPIRIN  Codeine                 SWELLING    Comment:Other reaction(s): Swelling   PHYSICAL EXAM:   Physical Exam   Constitutional: She appears well-developed.  No dist

## 2019-06-27 RX ORDER — CYCLOBENZAPRINE HCL 10 MG
TABLET ORAL
Qty: 30 TABLET | Refills: 0 | Status: SHIPPED | OUTPATIENT
Start: 2019-06-27 | End: 2019-07-07

## 2019-06-27 NOTE — TELEPHONE ENCOUNTER
Review pended refill request as it does not fall under a protocol. Last Rx: 6/18/19  LOV: 6/18/19    Please advise on refill request or does patient need f/u visit?     6/18/19 [facility] OV notes  S29.012A) Upper back strain, initial encounter  (primary encount

## 2019-06-28 ENCOUNTER — PATIENT MESSAGE (OUTPATIENT)
Dept: INTERNAL MEDICINE CLINIC | Facility: CLINIC | Age: 34
End: 2019-06-28

## 2019-06-29 NOTE — TELEPHONE ENCOUNTER
From: Mann Buchanan  To: Sherren Roles, MD  Sent: 6/28/2019 11:02 PM CDT  Subject: Renea Hoffman,    As we talked about we will be out of town for the 4th of July.      I will need to fill on Wednesday the 3rd as we will be skye

## 2019-07-02 RX ORDER — HYDROCODONE BITARTRATE AND ACETAMINOPHEN 10; 325 MG/1; MG/1
TABLET ORAL
Qty: 84 TABLET | Refills: 0 | Status: SHIPPED | OUTPATIENT
Start: 2019-07-03 | End: 2019-07-16

## 2019-07-03 RX ORDER — CYCLOBENZAPRINE HCL 10 MG
TABLET ORAL
Qty: 30 TABLET | Refills: 0 | OUTPATIENT
Start: 2019-07-03

## 2019-07-09 RX ORDER — CYCLOBENZAPRINE HCL 10 MG
TABLET ORAL
Qty: 30 TABLET | Refills: 0 | Status: SHIPPED | OUTPATIENT
Start: 2019-07-09 | End: 2019-07-25

## 2019-07-09 RX ORDER — CYCLOBENZAPRINE HCL 10 MG
TABLET ORAL
Qty: 30 TABLET | Refills: 0 | OUTPATIENT
Start: 2019-07-09

## 2019-07-09 NOTE — TELEPHONE ENCOUNTER
Review pended refill request as it does not fall under a protocol.     Last Rx: 6/27/19  LOV: 2 weeks ago

## 2019-07-09 NOTE — TELEPHONE ENCOUNTER
Called [facility], they have the 7/9/19 script.     Pharmacy   [address], [address] [facility]  [address] [place] [address], [phone], [phone]   Outpatient Medication Detail    Disp Refills Start End    Cyclobe

## 2019-07-16 ENCOUNTER — TELEPHONE (OUTPATIENT)
Dept: INTERNAL MEDICINE CLINIC | Facility: CLINIC | Age: 34
End: 2019-07-16

## 2019-07-16 RX ORDER — HYDROCODONE BITARTRATE AND HOMATROPINE METHYLBROMIDE ORAL SOLUTION 5; 1.5 MG/5ML; MG/5ML
5 LIQUID ORAL EVERY 6 HOURS PRN
Qty: 240 ML | Refills: 0 | Status: SHIPPED | OUTPATIENT
Start: 2019-07-16 | End: 2019-09-09

## 2019-07-16 RX ORDER — HYDROCODONE BITARTRATE AND ACETAMINOPHEN 10; 325 MG/1; MG/1
TABLET ORAL
Qty: 84 TABLET | Refills: 0 | Status: SHIPPED | OUTPATIENT
Start: 2019-07-16 | End: 2019-07-29

## 2019-07-16 NOTE — TELEPHONE ENCOUNTER
Pt here to pickup script for refill of norco; had hurt her left 4th toe jamming on the drawer few days ago so unable to cut back on norco dosing at least for now. She declined to get xray; she will continue with luz taping;    Will refill norco ; next re

## 2019-07-25 ENCOUNTER — TELEPHONE (OUTPATIENT)
Dept: INTERNAL MEDICINE CLINIC | Facility: CLINIC | Age: 34
End: 2019-07-25

## 2019-07-25 RX ORDER — CYCLOBENZAPRINE HCL 10 MG
TABLET ORAL
Qty: 30 TABLET | Refills: 0 | Status: SHIPPED | OUTPATIENT
Start: 2019-07-25 | End: 2019-09-05

## 2019-07-29 ENCOUNTER — TELEPHONE (OUTPATIENT)
Dept: INTERNAL MEDICINE CLINIC | Facility: CLINIC | Age: 34
End: 2019-07-29

## 2019-07-29 RX ORDER — HYDROCODONE BITARTRATE AND ACETAMINOPHEN 10; 325 MG/1; MG/1
TABLET ORAL
Qty: 84 TABLET | Refills: 0 | Status: SHIPPED | OUTPATIENT
Start: 2019-07-29 | End: 2019-08-12

## 2019-07-29 NOTE — TELEPHONE ENCOUNTER
Pt here to pickup script for norco; was supposed to refill by Aug 1/2019 but ran out faster due to recent toe injury and was taking more.  Pt has apptment to see podiatrist. Pt told she should stay within the dosing prescribed and let me know if needs to ta

## 2019-08-12 ENCOUNTER — TELEPHONE (OUTPATIENT)
Dept: INTERNAL MEDICINE CLINIC | Facility: CLINIC | Age: 34
End: 2019-08-12

## 2019-08-12 RX ORDER — HYDROCODONE BITARTRATE AND ACETAMINOPHEN 10; 325 MG/1; MG/1
TABLET ORAL
Qty: 70 TABLET | Refills: 0 | Status: SHIPPED | OUTPATIENT
Start: 2019-08-12 | End: 2019-08-23

## 2019-08-12 NOTE — TELEPHONE ENCOUNTER
Pt here to pickup refill for her norco; refill date will be 8/14/19; pt will also cut down to 5 tab/day[place]

## 2019-08-20 ENCOUNTER — NURSE TRIAGE (OUTPATIENT)
Dept: OTHER | Age: 34
End: 2019-08-20

## 2019-08-20 ENCOUNTER — TELEPHONE (OUTPATIENT)
Dept: INTERNAL MEDICINE CLINIC | Facility: CLINIC | Age: 34
End: 2019-08-20

## 2019-08-20 RX ORDER — KETOROLAC TROMETHAMINE 10 MG/1
TABLET, FILM COATED ORAL
Qty: 20 TABLET | Refills: 0 | Status: SHIPPED | OUTPATIENT
Start: 2019-08-20 | End: 2020-01-22

## 2019-08-20 RX ORDER — ONDANSETRON 4 MG/1
4 TABLET, FILM COATED ORAL EVERY 8 HOURS PRN
Qty: 15 TABLET | Refills: 0 | Status: SHIPPED | OUTPATIENT
Start: 2019-08-20 | End: 2020-08-12

## 2019-08-20 NOTE — TELEPHONE ENCOUNTER
Dr. Mary Carmen Bruce walked into office and told Lyssa  that  She is not going to ER and she cannot see you tomorrow because she has to work . Pt would like Zofran and Ketorolac refilled. Please advise, pt is currently waiting in lobby.

## 2019-08-21 ENCOUNTER — HOSPITAL ENCOUNTER (EMERGENCY)
Facility: HOSPITAL | Age: 34
Discharge: HOME | End: 2019-08-21
Payer: COMMERCIAL

## 2019-08-21 ENCOUNTER — HOSPITAL ENCOUNTER (OUTPATIENT)
Age: 34
Discharge: OTHER INSTITUTION | End: 2019-08-21
Attending: NURSE PRACTITIONER
Payer: COMMERCIAL

## 2019-08-21 VITALS
BODY MASS INDEX: 30 KG/M2 | HEART RATE: 82 BPM | DIASTOLIC BLOOD PRESSURE: 101 MMHG | OXYGEN SATURATION: 99 % | WEIGHT: 185 LBS | SYSTOLIC BLOOD PRESSURE: 147 MMHG | RESPIRATION RATE: 18 BRPM | TEMPERATURE: 98 F

## 2019-08-21 VITALS
HEART RATE: 79 BPM | WEIGHT: 185 LBS | BODY MASS INDEX: 30 KG/M2 | TEMPERATURE: 98 F | OXYGEN SATURATION: 98 % | RESPIRATION RATE: 18 BRPM | SYSTOLIC BLOOD PRESSURE: 148 MMHG | DIASTOLIC BLOOD PRESSURE: 91 MMHG

## 2019-08-21 DIAGNOSIS — G43.809 OTHER MIGRAINE WITHOUT STATUS MIGRAINOSUS, NOT INTRACTABLE: Primary | ICD-10-CM

## 2019-08-21 DIAGNOSIS — R51.9 ACUTE NONINTRACTABLE HEADACHE, UNSPECIFIED HEADACHE TYPE: Primary | ICD-10-CM

## 2019-08-21 PROCEDURE — 99213 OFFICE O/P EST LOW 20 MIN: CPT

## 2019-08-21 PROCEDURE — 99282 EMERGENCY DEPT VISIT SF MDM: CPT

## 2019-08-21 PROCEDURE — 99212 OFFICE O/P EST SF 10 MIN: CPT

## 2019-08-21 RX ORDER — DIPHENHYDRAMINE HYDROCHLORIDE 50 MG/ML
25 INJECTION INTRAMUSCULAR; INTRAVENOUS ONCE
Status: DISCONTINUED | OUTPATIENT
Start: 2019-08-21 | End: 2019-08-21

## 2019-08-21 RX ORDER — METOCLOPRAMIDE HYDROCHLORIDE 5 MG/ML
10 INJECTION INTRAMUSCULAR; INTRAVENOUS ONCE
Status: DISCONTINUED | OUTPATIENT
Start: 2019-08-21 | End: 2019-08-21

## 2019-08-21 NOTE — ED PROVIDER NOTES
Patient Seen in: [facility] Emergency Department    History   Patient presents with:  Headache (neurologic)  Fatigue (constitutional, neurologic)    Stated Complaint: Headache    HPI    73-year-old female, with a history of migraines, presents to normal as tested       motor: no weakness     sensory: normal light touch and pin prick       reflexes: normal  Skin: warm and dry, no rashes.   Extremities: no pedal edema, no calf tenderness  Psychiatric: there is no agitation            ED Course   Labs

## 2019-08-21 NOTE — ED INITIAL ASSESSMENT (HPI)
PATIENT ARRIVED AMBULATORY TO ROOM. PATIENT STATES SHE HAD A MIGRAINE 2 DAYS AGO. PATIENT STATES \"THE MIGRAINES ARE CONTROLLED WITH MY TORADOL BUT THE OTHER SYMPTOMS LIKE NAUSEA ARE NOT GETTING BETTER\" PATIENT STATES SHE WAS \"DISORIENTED\" YESTERDAY.  PA

## 2019-08-21 NOTE — ED NOTES
Patient sent by immediate care for migraine that is now resolved. Patient states she had \"foggy feeling\" during, but denies complaints at this time. Patient is a/o x 4, no distress noted.

## 2019-08-21 NOTE — ED PROVIDER NOTES
Patient presents with:  Headache (neurologic)      HPI:     Deondre Fritz is a 37year old female with a past medical history of hypertension, hyperlipidemia, migraines of headache, nausea, and \"disoriented\" for the last 2 days.   Patient reports she ha encounter:  Patient is nontoxic in appearance, she is hypertensive at 147/101. I discussed with the patient that based on her symptoms I do believe she should go to the emergency department for further evaluation.   Patient states she will drive herself to

## 2019-08-21 NOTE — ED INITIAL ASSESSMENT (HPI)
Pt presents s/p Migraine on Monday. C/o 2/10 HA and feeling \"foggy\" still. Denies confusion, numbness/tingling. oriented x4, no neuro deficits in triage  AOx3, RR even/nonlabored.

## 2019-08-23 ENCOUNTER — TELEPHONE (OUTPATIENT)
Dept: INTERNAL MEDICINE CLINIC | Facility: CLINIC | Age: 34
End: 2019-08-23

## 2019-08-23 RX ORDER — HYDROCODONE BITARTRATE AND ACETAMINOPHEN 10; 325 MG/1; MG/1
TABLET ORAL
Qty: 70 TABLET | Refills: 0 | Status: SHIPPED | OUTPATIENT
Start: 2019-08-28 | End: 2019-09-05

## 2019-08-23 RX ORDER — HYDROCODONE BITARTRATE AND ACETAMINOPHEN 10; 325 MG/1; MG/1
TABLET ORAL
Qty: 70 TABLET | Refills: 0 | Status: SHIPPED | OUTPATIENT
Start: 2019-08-28 | End: 2019-08-23

## 2019-08-23 NOTE — TELEPHONE ENCOUNTER
Pt here to pickup [place].  Refill to be dated for 08/28/19 (last refill was to be started on 08/14/19)

## 2019-09-05 ENCOUNTER — OFFICE VISIT (OUTPATIENT)
Dept: INTERNAL MEDICINE CLINIC | Facility: CLINIC | Age: 34
End: 2019-09-05
Payer: COMMERCIAL

## 2019-09-05 VITALS
SYSTOLIC BLOOD PRESSURE: 136 MMHG | TEMPERATURE: 98 F | WEIGHT: 191.19 LBS | DIASTOLIC BLOOD PRESSURE: 80 MMHG | HEART RATE: 73 BPM | BODY MASS INDEX: 31 KG/M2

## 2019-09-05 DIAGNOSIS — G89.29 CHRONIC LOW BACK PAIN, UNSPECIFIED BACK PAIN LATERALITY, UNSPECIFIED WHETHER SCIATICA PRESENT: ICD-10-CM

## 2019-09-05 DIAGNOSIS — G43.009 MIGRAINE WITHOUT AURA AND WITHOUT STATUS MIGRAINOSUS, NOT INTRACTABLE: ICD-10-CM

## 2019-09-05 DIAGNOSIS — M54.50 CHRONIC LOW BACK PAIN, UNSPECIFIED BACK PAIN LATERALITY, UNSPECIFIED WHETHER SCIATICA PRESENT: ICD-10-CM

## 2019-09-05 DIAGNOSIS — J01.90 ACUTE NON-RECURRENT SINUSITIS, UNSPECIFIED LOCATION: Primary | ICD-10-CM

## 2019-09-05 PROCEDURE — 99213 OFFICE O/P EST LOW 20 MIN: CPT | Performed by: INTERNAL MEDICINE

## 2019-09-05 RX ORDER — HYDROCODONE BITARTRATE AND ACETAMINOPHEN 10; 325 MG/1; MG/1
TABLET ORAL
Qty: 84 TABLET | Refills: 0 | Status: SHIPPED | OUTPATIENT
Start: 2019-09-07 | End: 2019-09-18

## 2019-09-05 RX ORDER — BUTALBITAL, ACETAMINOPHEN AND CAFFEINE 50; 325; 40 MG/1; MG/1; MG/1
1 TABLET ORAL 3 TIMES DAILY PRN
Qty: 30 TABLET | Refills: 0 | Status: SHIPPED | OUTPATIENT
Start: 2019-09-05 | End: 2019-11-11

## 2019-09-05 RX ORDER — CYCLOBENZAPRINE HCL 10 MG
TABLET ORAL
Qty: 30 TABLET | Refills: 0 | Status: SHIPPED | OUTPATIENT
Start: 2019-09-05 | End: 2019-09-18

## 2019-09-05 RX ORDER — AMOXICILLIN AND CLAVULANATE POTASSIUM 875; 125 MG/1; MG/1
1 TABLET, FILM COATED ORAL 2 TIMES DAILY
Qty: 20 TABLET | Refills: 0 | Status: SHIPPED | OUTPATIENT
Start: 2019-09-05 | End: 2019-09-15

## 2019-09-05 NOTE — PROGRESS NOTES
HPI:    Patient ID: Kareem Guidry is a 37year old female. Sinusitis   This is a new problem. The current episode started in the past 7 days. The problem is unchanged. There has been no fever. Her pain is at a severity of 5/10. The pain is moderate.  A DAILY Disp: 180 capsule Rfl: 0     Allergies:  Grass                   HIVES    Comment:Nasal congestion  Aspirin                 SWELLING    Comment:Other reaction(s): ASPIRIN  Codeine                 SWELLING    Comment:Other reaction(s): Swelling   PHYS neurologist.     (M54.5,  G89.29) Chronic low back pain, unspecified back pain laterality, unspecified whether sciatica present  Plan: had flared up past weekend since had been lying most of the time due to her migraines.  She had to take extra tab of norco

## 2019-09-18 ENCOUNTER — TELEPHONE (OUTPATIENT)
Dept: INTERNAL MEDICINE CLINIC | Facility: CLINIC | Age: 34
End: 2019-09-18

## 2019-09-18 RX ORDER — HYDROCODONE BITARTRATE AND ACETAMINOPHEN 10; 325 MG/1; MG/1
TABLET ORAL
Qty: 84 TABLET | Refills: 0 | Status: SHIPPED | OUTPATIENT
Start: 2019-09-21 | End: 2019-10-01

## 2019-09-18 RX ORDER — CYCLOBENZAPRINE HCL 10 MG
TABLET ORAL
Qty: 30 TABLET | Refills: 0 | Status: SHIPPED | OUTPATIENT
Start: 2019-09-18 | End: 2019-10-01

## 2019-09-18 NOTE — TELEPHONE ENCOUNTER
Pt here to pickup refill of norco. States still having lower back pain and asked to keep her on 6 tabs/day for now and decrease it on next refill instead. [place] She also asked refill of flexeril.

## 2019-09-26 ENCOUNTER — TELEPHONE (OUTPATIENT)
Dept: INTERNAL MEDICINE CLINIC | Facility: CLINIC | Age: 34
End: 2019-09-26

## 2019-10-01 ENCOUNTER — OFFICE VISIT (OUTPATIENT)
Dept: INTERNAL MEDICINE CLINIC | Facility: CLINIC | Age: 34
End: 2019-10-01
Payer: COMMERCIAL

## 2019-10-01 VITALS
TEMPERATURE: 99 F | RESPIRATION RATE: 12 BRPM | HEART RATE: 98 BPM | BODY MASS INDEX: 30.7 KG/M2 | DIASTOLIC BLOOD PRESSURE: 76 MMHG | WEIGHT: 191 LBS | HEIGHT: 66 IN | SYSTOLIC BLOOD PRESSURE: 122 MMHG

## 2019-10-01 DIAGNOSIS — G43.009 MIGRAINE WITHOUT AURA AND WITHOUT STATUS MIGRAINOSUS, NOT INTRACTABLE: ICD-10-CM

## 2019-10-01 DIAGNOSIS — M54.50 ACUTE BILATERAL LOW BACK PAIN WITHOUT SCIATICA: Primary | ICD-10-CM

## 2019-10-01 PROCEDURE — 99213 OFFICE O/P EST LOW 20 MIN: CPT | Performed by: INTERNAL MEDICINE

## 2019-10-01 RX ORDER — HYDROCODONE BITARTRATE AND ACETAMINOPHEN 10; 325 MG/1; MG/1
TABLET ORAL
Qty: 84 TABLET | Refills: 0 | Status: SHIPPED | OUTPATIENT
Start: 2019-10-03 | End: 2019-10-15

## 2019-10-01 RX ORDER — CYCLOBENZAPRINE HCL 10 MG
TABLET ORAL
Qty: 30 TABLET | Refills: 0 | Status: SHIPPED | OUTPATIENT
Start: 2019-10-01 | End: 2019-10-10

## 2019-10-01 NOTE — PROGRESS NOTES
HPI:    Patient ID: Mann Buchanan is a 37year old female. Low Back Pain   This is a new (pt complained of pain on the upper lumbar area) problem. The current episode started in the past 7 days. The problem occurs constantly.  The problem has been waxi Ondansetron HCl (ZOFRAN) 4 mg tablet Take 1 tablet (4 mg total) by mouth every 8 (eight) hours as needed for Nausea.  Disp: 15 tablet Rfl: 0   Fluticasone Propionate 50 MCG/ACT Nasal Suspension fluticasone propionate 50 mcg/actuation nasal spray,suspension (G43.009) Migraine without aura and without status migrainosus, not intractable  Plan: had tried butalbital but made her groggy but willing to retry again. No orders of the defined types were placed in this encounter.       Meds This Visit:  Request

## 2019-10-07 ENCOUNTER — OFFICE VISIT (OUTPATIENT)
Dept: INTERNAL MEDICINE CLINIC | Facility: CLINIC | Age: 34
End: 2019-10-07
Payer: COMMERCIAL

## 2019-10-07 VITALS
HEIGHT: 66 IN | BODY MASS INDEX: 30.53 KG/M2 | DIASTOLIC BLOOD PRESSURE: 84 MMHG | SYSTOLIC BLOOD PRESSURE: 126 MMHG | WEIGHT: 190 LBS | TEMPERATURE: 99 F | OXYGEN SATURATION: 99 % | HEART RATE: 120 BPM | RESPIRATION RATE: 12 BRPM

## 2019-10-07 DIAGNOSIS — J01.90 ACUTE NON-RECURRENT SINUSITIS, UNSPECIFIED LOCATION: Primary | ICD-10-CM

## 2019-10-07 PROCEDURE — 99214 OFFICE O/P EST MOD 30 MIN: CPT | Performed by: INTERNAL MEDICINE

## 2019-10-07 RX ORDER — DOXYCYCLINE 100 MG/1
100 TABLET ORAL 2 TIMES DAILY
Qty: 20 TABLET | Refills: 0 | Status: SHIPPED | OUTPATIENT
Start: 2019-10-07 | End: 2019-11-29 | Stop reason: ALTCHOICE

## 2019-10-07 RX ORDER — HYDROCODONE BITARTRATE AND HOMATROPINE METHYLBROMIDE ORAL SOLUTION 5; 1.5 MG/5ML; MG/5ML
5 LIQUID ORAL EVERY 6 HOURS PRN
Qty: 240 ML | Refills: 0 | Status: SHIPPED | OUTPATIENT
Start: 2019-10-07 | End: 2019-10-19

## 2019-10-07 NOTE — PROGRESS NOTES
HPI:    Patient ID: Stalin Griffith is a 37year old female. Cough   This is a new problem. The current episode started in the past 7 days. The problem has been gradually worsening. The problem occurs hourly.  The cough is productive of sputum and produc mcg/actuation nasal spray,suspension Disp:  Rfl:    GABAPENTIN 300 MG Oral Cap TAKE 1 CAPSULE(300 MG) BY MOUTH TWICE DAILY Disp: 180 capsule Rfl: 0     Allergies:  Grass                   HIVES    Comment:Nasal congestion  Aspirin                 SWELLING Prescriptions      No prescriptions requested or ordered in this encounter       Imaging & Referrals:  None       AV#[id number]

## 2019-10-08 ENCOUNTER — TELEPHONE (OUTPATIENT)
Dept: INTERNAL MEDICINE CLINIC | Facility: CLINIC | Age: 34
End: 2019-10-08

## 2019-10-08 NOTE — TELEPHONE ENCOUNTER
Received a call from NavinXenon Arclakeisha Romo from [facility] who reports the patient came to the pharmacy today 10/8/19 stating that the Hydromet spilled and she is requesting a refill on the medication. Message routed to provider for review.

## 2019-10-08 NOTE — TELEPHONE ENCOUNTER
[facility] pharmacist Cozetta Eaton called about the Hydromet, informed of Dr Harshad Phillips order to refill it; she verbalized understanding, she will refill it for the patient.

## 2019-10-08 NOTE — TELEPHONE ENCOUNTER
Patient reports received new medication prescribed yesterday for cough Hydromet, the bottle had a faulty cap and medication spilled, went back to [facility], they are willing to refill script due to bottle issue, [facility] awaiting a call back to confirm if

## 2019-10-11 RX ORDER — CYCLOBENZAPRINE HCL 10 MG
TABLET ORAL
Qty: 30 TABLET | Refills: 0 | Status: SHIPPED | OUTPATIENT
Start: 2019-10-11 | End: 2019-10-20

## 2019-10-11 NOTE — TELEPHONE ENCOUNTER
Review pended refill request as it does not fall under a protocol.   Requested Prescriptions     Pending Prescriptions Disp Refills   • Cyclobenzaprine HCl 10 MG Oral Tab 30 tablet 0     Sig: TAKE 1 TABLET(10 MG) BY MOUTH THREE TIMES DAILY FOR 10 DAYS

## 2019-10-15 ENCOUNTER — TELEPHONE (OUTPATIENT)
Dept: INTERNAL MEDICINE CLINIC | Facility: CLINIC | Age: 34
End: 2019-10-15

## 2019-10-15 RX ORDER — HYDROCODONE BITARTRATE AND ACETAMINOPHEN 10; 325 MG/1; MG/1
TABLET ORAL
Qty: 84 TABLET | Refills: 0 | Status: SHIPPED | OUTPATIENT
Start: 2019-10-16 | End: 2019-10-22

## 2019-10-17 ENCOUNTER — PATIENT MESSAGE (OUTPATIENT)
Dept: INTERNAL MEDICINE CLINIC | Facility: CLINIC | Age: 34
End: 2019-10-17

## 2019-10-17 ENCOUNTER — TELEPHONE (OUTPATIENT)
Dept: OTHER | Age: 34
End: 2019-10-17

## 2019-10-17 RX ORDER — HYDROCODONE BITARTRATE AND HOMATROPINE METHYLBROMIDE ORAL SOLUTION 5; 1.5 MG/5ML; MG/5ML
5 LIQUID ORAL EVERY 6 HOURS PRN
Qty: 240 ML | Refills: 0 | Status: CANCELLED | OUTPATIENT
Start: 2019-10-17

## 2019-10-17 NOTE — TELEPHONE ENCOUNTER
Please see message below. Patient was seen in office on 10/07/19 for sinusitis. Patient was prescribed Hydromet, 240 mL, on 10/07. Patient is requesting refill of cough syrup as she will be out of medication by this weekend. Please advise.

## 2019-10-17 NOTE — TELEPHONE ENCOUNTER
----- Message from Yarely Frances sent at 10/17/2019 12:47 AM CDT -----  Regarding: Prescription Question  Contact: [phone]  Hi Dr Pily Salguero just woke up hacking and reminded me that I will be out of cough syrup this weekend and even with the

## 2019-10-17 NOTE — TELEPHONE ENCOUNTER
From: Gregory De Anda  To: Sav Wells MD  Sent: 10/17/2019 12:47 AM CDT  Subject: Prescription Question    Hi Dr Raquel Valdovinos just woke up hacking and reminded me that I will be out of cough syrup this weekend and even with the antibiotics the

## 2019-10-17 NOTE — TELEPHONE ENCOUNTER
Review pended refill request as it does not fall under a protocol.     Last Rx: 10/7/2019  LOV: 10/7/19

## 2019-10-18 RX ORDER — HYDROCODONE BITARTRATE AND HOMATROPINE METHYLBROMIDE ORAL SOLUTION 5; 1.5 MG/5ML; MG/5ML
5 LIQUID ORAL EVERY 6 HOURS PRN
Qty: 240 ML | Refills: 0 | Status: CANCELLED | OUTPATIENT
Start: 2019-10-18

## 2019-10-18 NOTE — TELEPHONE ENCOUNTER
Patient stated she would be here tomorrow at 7:45 am to see Dr. Shae Hurtado. Barrett Hammans scheduled.

## 2019-10-18 NOTE — TELEPHONE ENCOUNTER
Patient indicated that her sinuses are better but the cough is still lingering. Cough is better than it was. Still coughing up tan/yellow/green phlegm and chest is sore when coughing. No fevers. Not short of breath or wheezing. No other symptoms.  Patient o

## 2019-10-18 NOTE — TELEPHONE ENCOUNTER
Patient is following on the medication and state she will be out of the medication this weekend        please advise

## 2019-10-19 ENCOUNTER — OFFICE VISIT (OUTPATIENT)
Dept: INTERNAL MEDICINE CLINIC | Facility: CLINIC | Age: 34
End: 2019-10-19
Payer: COMMERCIAL

## 2019-10-19 VITALS
TEMPERATURE: 98 F | WEIGHT: 194 LBS | OXYGEN SATURATION: 100 % | DIASTOLIC BLOOD PRESSURE: 74 MMHG | HEIGHT: 66 IN | RESPIRATION RATE: 12 BRPM | BODY MASS INDEX: 31.18 KG/M2 | HEART RATE: 96 BPM | SYSTOLIC BLOOD PRESSURE: 124 MMHG

## 2019-10-19 DIAGNOSIS — R05.9 COUGH: Primary | ICD-10-CM

## 2019-10-19 PROCEDURE — 99213 OFFICE O/P EST LOW 20 MIN: CPT | Performed by: INTERNAL MEDICINE

## 2019-10-19 RX ORDER — HYDROCODONE BITARTRATE AND HOMATROPINE METHYLBROMIDE ORAL SOLUTION 5; 1.5 MG/5ML; MG/5ML
5 LIQUID ORAL EVERY 6 HOURS PRN
Qty: 240 ML | Refills: 0 | Status: SHIPPED | OUTPATIENT
Start: 2019-10-19 | End: 2019-11-04

## 2019-10-19 NOTE — PROGRESS NOTES
HPI:    Patient ID: Salvador Benson is a 37year old female. Cough   This is a new problem. The current episode started 1 to 4 weeks ago (3 weeks ago). The problem has been unchanged. The problem occurs every few hours.  The cough is productive of sputum DAILY, Disp: 180 capsule, Rfl: 0      Allergies:  Grass                   HIVES    Comment:Nasal congestion  Aspirin                 SWELLING    Comment:Other reaction(s): ASPIRIN  Codeine                 SWELLING    Comment:Other reaction(s): Swelling   P

## 2019-10-21 RX ORDER — CYCLOBENZAPRINE HCL 10 MG
TABLET ORAL
Qty: 90 TABLET | Refills: 1 | Status: SHIPPED | OUTPATIENT
Start: 2019-10-21 | End: 2019-11-20

## 2019-10-21 NOTE — TELEPHONE ENCOUNTER
Review pended refill request as it does not fall under a protocol.   Requested Prescriptions     Pending Prescriptions Disp Refills   • CYCLOBENZAPRINE HCL 10 MG Oral Tab [Pharmacy Med Name: CYCLOBENZAPRINE 10MG TABLETS] 30 tablet 0     Sig: TAKE 1 TABLET(1

## 2019-10-22 ENCOUNTER — TELEPHONE (OUTPATIENT)
Dept: INTERNAL MEDICINE CLINIC | Facility: CLINIC | Age: 34
End: 2019-10-22

## 2019-10-22 RX ORDER — HYDROCODONE BITARTRATE AND ACETAMINOPHEN 10; 325 MG/1; MG/1
TABLET ORAL
Qty: 84 TABLET | Refills: 0 | Status: SHIPPED | OUTPATIENT
Start: 2019-10-30 | End: 2019-11-11

## 2019-10-22 NOTE — TELEPHONE ENCOUNTER
Refill for norco given postdated for 10/30/19 since I [place] next week . [place]

## 2019-11-04 ENCOUNTER — OFFICE VISIT (OUTPATIENT)
Dept: INTERNAL MEDICINE CLINIC | Facility: CLINIC | Age: 34
End: 2019-11-04
Payer: COMMERCIAL

## 2019-11-04 VITALS
HEART RATE: 76 BPM | DIASTOLIC BLOOD PRESSURE: 96 MMHG | TEMPERATURE: 99 F | SYSTOLIC BLOOD PRESSURE: 140 MMHG | BODY MASS INDEX: 31.02 KG/M2 | OXYGEN SATURATION: 96 % | RESPIRATION RATE: 18 BRPM | HEIGHT: 66 IN | WEIGHT: 193 LBS

## 2019-11-04 DIAGNOSIS — R05.9 COUGH: Primary | ICD-10-CM

## 2019-11-04 PROCEDURE — 99214 OFFICE O/P EST MOD 30 MIN: CPT | Performed by: INTERNAL MEDICINE

## 2019-11-04 RX ORDER — AZITHROMYCIN 250 MG/1
TABLET, FILM COATED ORAL
Qty: 6 TABLET | Refills: 0 | Status: SHIPPED | OUTPATIENT
Start: 2019-11-04 | End: 2019-11-25 | Stop reason: ALTCHOICE

## 2019-11-04 RX ORDER — HYDROCODONE BITARTRATE AND HOMATROPINE METHYLBROMIDE ORAL SOLUTION 5; 1.5 MG/5ML; MG/5ML
5 LIQUID ORAL EVERY 6 HOURS PRN
Qty: 120 ML | Refills: 0 | Status: SHIPPED | OUTPATIENT
Start: 2019-11-04 | End: 2020-01-06

## 2019-11-04 NOTE — PROGRESS NOTES
HPI:    Patient ID: Jennifer Richey is a 37year old female. HPI she is here today  Complaining of cough productive . She states that her symptoms  Are present for almost one month.   According to her initially she did have congestion and her cough was m Medications   Medication Sig Dispense Refill   • HYDROcodone-acetaminophen (NORCO)  MG Oral Tab Take 1 to 2 tabs po q 6hr prn for pain 84 tablet 0   • CYCLOBENZAPRINE HCL 10 MG Oral Tab TAKE 1 TABLET(10 MG) BY MOUTH THREE TIMES DAILY FOR 10 DAYS 90 t Age of Onset   • Heart Disease Other    • Hypertension Other    • Glaucoma Other    • Heart Disease Mother    • Lipids Mother    • Hypertension Mother    • Heart Disorder Mother    • ADHD Sister    • Lipids Father       Social History: Social History    To friction rub. No murmur heard. Pulmonary/Chest: Effort normal and breath sounds normal. No accessory muscle usage. No respiratory distress. She has no wheezes. She has no rales. She exhibits no tenderness. Coarse breath sounds   Abdominal: Soft.  Bowel

## 2019-11-04 NOTE — PATIENT INSTRUCTIONS
Cough -prolonged will order chest x-ray, continue with Z-Alfonso ,advised to take with food drink more fluids cough suppressant if not better follow-up with PCP.

## 2019-11-06 ENCOUNTER — HOSPITAL ENCOUNTER (OUTPATIENT)
Dept: GENERAL RADIOLOGY | Age: 34
Discharge: HOME | End: 2019-11-06
Attending: INTERNAL MEDICINE
Payer: COMMERCIAL

## 2019-11-06 DIAGNOSIS — R05.9 COUGH: ICD-10-CM

## 2019-11-11 ENCOUNTER — TELEPHONE (OUTPATIENT)
Dept: INTERNAL MEDICINE CLINIC | Facility: CLINIC | Age: 34
End: 2019-11-11

## 2019-11-11 RX ORDER — HYDROCODONE BITARTRATE AND ACETAMINOPHEN 10; 325 MG/1; MG/1
TABLET ORAL
Qty: 84 TABLET | Refills: 0 | Status: SHIPPED | OUTPATIENT
Start: 2019-11-11 | End: 2019-11-20

## 2019-11-11 RX ORDER — BUTALBITAL, ACETAMINOPHEN AND CAFFEINE 50; 325; 40 MG/1; MG/1; MG/1
TABLET ORAL
Qty: 30 TABLET | Refills: 0 | Status: CANCELLED | OUTPATIENT
Start: 2019-11-11

## 2019-11-12 ENCOUNTER — HOSPITAL ENCOUNTER (OUTPATIENT)
Dept: GENERAL RADIOLOGY | Age: 34
Discharge: HOME | End: 2019-11-12
Attending: INTERNAL MEDICINE
Payer: COMMERCIAL

## 2019-11-12 PROCEDURE — 71046 X-RAY EXAM CHEST 2 VIEWS: CPT | Performed by: INTERNAL MEDICINE

## 2019-11-14 NOTE — TELEPHONE ENCOUNTER
Passed protocol but this nurse not authorized to sign it. Refill passed per [facility] [place] protocol.     Refill Protocol Appointment Criteria  · Appointment scheduled in the past 6 months or in the next 3 months  Recent Outpatient Visits            1

## 2019-11-18 RX ORDER — BUTALBITAL, ACETAMINOPHEN AND CAFFEINE 50; 325; 40 MG/1; MG/1; MG/1
TABLET ORAL
Qty: 30 TABLET | Refills: 1 | Status: SHIPPED | OUTPATIENT
Start: 2019-11-18 | End: 2020-03-26

## 2019-11-18 NOTE — TELEPHONE ENCOUNTER
pls call pt; she had told me this med didn't reallly work for her headaches so why is this being requested?

## 2019-11-20 ENCOUNTER — TELEPHONE (OUTPATIENT)
Dept: INTERNAL MEDICINE CLINIC | Facility: CLINIC | Age: 34
End: 2019-11-20

## 2019-11-20 ENCOUNTER — OFFICE VISIT (OUTPATIENT)
Dept: INTERNAL MEDICINE CLINIC | Facility: CLINIC | Age: 34
End: 2019-11-20
Payer: COMMERCIAL

## 2019-11-20 VITALS
DIASTOLIC BLOOD PRESSURE: 80 MMHG | HEART RATE: 98 BPM | SYSTOLIC BLOOD PRESSURE: 134 MMHG | BODY MASS INDEX: 31.02 KG/M2 | WEIGHT: 193 LBS | HEIGHT: 66 IN

## 2019-11-20 DIAGNOSIS — S70.11XA CONTUSION OF RIGHT THIGH, INITIAL ENCOUNTER: Primary | ICD-10-CM

## 2019-11-20 DIAGNOSIS — S39.012A STRAIN OF LUMBAR REGION, INITIAL ENCOUNTER: ICD-10-CM

## 2019-11-20 PROBLEM — S30.0XXA CONTUSION OF SACRUM: Status: RESOLVED | Noted: 2019-02-27 | Resolved: 2019-11-20

## 2019-11-20 PROBLEM — J01.00 ACUTE NON-RECURRENT MAXILLARY SINUSITIS: Status: RESOLVED | Noted: 2017-09-05 | Resolved: 2019-11-20

## 2019-11-20 PROCEDURE — 99214 OFFICE O/P EST MOD 30 MIN: CPT | Performed by: INTERNAL MEDICINE

## 2019-11-20 RX ORDER — CYCLOBENZAPRINE HCL 10 MG
10 TABLET ORAL 3 TIMES DAILY PRN
Qty: 90 TABLET | Refills: 0 | Status: SHIPPED | OUTPATIENT
Start: 2019-11-20 | End: 2020-01-08

## 2019-11-20 RX ORDER — HYDROCODONE BITARTRATE AND ACETAMINOPHEN 10; 325 MG/1; MG/1
TABLET ORAL
Qty: 112 TABLET | Refills: 0 | Status: SHIPPED | OUTPATIENT
Start: 2019-11-22 | End: 2019-12-03

## 2019-11-21 NOTE — PROGRESS NOTES
History of Present Illness   Patient ID: Mehul Reece is a 37year old female. Chief Complaint: Fall      Fall   The accident occurred 2 days ago. The fall occurred while walking. She fell from a height of 1 to 2 ft. She landed on concrete.  There was n Normal rate and regular rhythm. Exam reveals no gallop and no friction rub. No murmur heard. Edema not present. Pulmonary/Chest: Effort normal and breath sounds normal. No respiratory distress. She has no wheezes. She has no rales.    Abdominal: Soft SWELLING    Comment:Other reaction(s): Swelling     Reviewed:  Patient Active Problem List    BMI 32.0-32.9,adult      DDD (degenerative disc disease), lumbosacral      Fall      PVC (premature ventricular contraction)      Hypertriglyceridemia      Ess BUTALBITAL-APAP-CAFFEINE -40 MG Oral Tab, TAKE 1 TABLET BY MOUTH THREE TIMES DAILY AS NEEDED FOR HEADACHE, Disp: 30 tablet, Rfl: 1  •  azithromycin 250 MG Oral Tab, Take two tablets by mouth today, then one daily. , Disp: 6 tablet, Rfl: 0  •  hydroco

## 2019-11-25 ENCOUNTER — APPOINTMENT (OUTPATIENT)
Dept: GENERAL RADIOLOGY | Age: 34
End: 2019-11-25
Attending: NURSE PRACTITIONER
Payer: COMMERCIAL

## 2019-11-25 ENCOUNTER — HOSPITAL ENCOUNTER (OUTPATIENT)
Age: 34
Discharge: HOME | End: 2019-11-25
Payer: COMMERCIAL

## 2019-11-25 VITALS
DIASTOLIC BLOOD PRESSURE: 78 MMHG | WEIGHT: 185 LBS | HEART RATE: 104 BPM | TEMPERATURE: 98 F | RESPIRATION RATE: 18 BRPM | OXYGEN SATURATION: 99 % | SYSTOLIC BLOOD PRESSURE: 148 MMHG | BODY MASS INDEX: 29.73 KG/M2 | HEIGHT: 66 IN

## 2019-11-25 DIAGNOSIS — S30.0XXA CONTUSION OF COCCYX, INITIAL ENCOUNTER: Primary | ICD-10-CM

## 2019-11-25 PROCEDURE — 99213 OFFICE O/P EST LOW 20 MIN: CPT

## 2019-11-25 PROCEDURE — 72220 X-RAY EXAM SACRUM TAILBONE: CPT | Performed by: NURSE PRACTITIONER

## 2019-11-25 NOTE — ED PROVIDER NOTES
Patient presents with:  Fall      HPI:     Prachi Beavers is a 37year old female who presents for evaluation and management of a chief complaint of tailbone pain after an injury that occurred yesterday.   The patient states she went on a slide at a Filecubed th Days per week: Not on file        Minutes per session: Not on file      Stress: Not on file    Relationships      Social connections:        Talks on phone: Not on file        Gets together: Not on file        Attends [employer] service: Not on file swelling. No redness or signs of infection.   HEENT: atraumatic, normocephalic, ears, nose and throat are clear  EYES: sclera non icteric bilateral  NECK: supple, no adenopathy  LUNGS: clear to auscultation, no RRW  CARDIO: RRR without murmur  EXTREMITIES: All results reviewed and discussed with patient. See AVS for detailed discharge instructions for your condition today.     Follow Up with:  Armand Scott, [address] [facility] [address]  [phone]    Schedule an ap

## 2019-11-25 NOTE — ED INITIAL ASSESSMENT (HPI)
PATIENT ARRIVED AMBULATORY TO ROOM C/O PAIN TO THE \"TAILBONE\" PATIENT STATES \"I WENT DOWN A SLIDE AND THERE IS A LIP AT THE BOTTOM OF THE SLIDE AND I HIT MY TAILBONE REALLY HARD\"

## 2019-11-29 ENCOUNTER — OFFICE VISIT (OUTPATIENT)
Dept: INTERNAL MEDICINE CLINIC | Facility: CLINIC | Age: 34
End: 2019-11-29
Payer: COMMERCIAL

## 2019-11-29 ENCOUNTER — LAB ENCOUNTER (OUTPATIENT)
Dept: LAB | Age: 34
End: 2019-11-29
Attending: INTERNAL MEDICINE
Payer: COMMERCIAL

## 2019-11-29 VITALS
TEMPERATURE: 99 F | HEIGHT: 64.5 IN | WEIGHT: 198 LBS | HEART RATE: 98 BPM | DIASTOLIC BLOOD PRESSURE: 78 MMHG | RESPIRATION RATE: 12 BRPM | SYSTOLIC BLOOD PRESSURE: 126 MMHG | BODY MASS INDEX: 33.39 KG/M2

## 2019-11-29 DIAGNOSIS — M54.50 CHRONIC LOW BACK PAIN, UNSPECIFIED BACK PAIN LATERALITY, UNSPECIFIED WHETHER SCIATICA PRESENT: ICD-10-CM

## 2019-11-29 DIAGNOSIS — I10 ESSENTIAL HYPERTENSION: ICD-10-CM

## 2019-11-29 DIAGNOSIS — Z00.00 ANNUAL PHYSICAL EXAM: ICD-10-CM

## 2019-11-29 DIAGNOSIS — G89.29 CHRONIC LOW BACK PAIN, UNSPECIFIED BACK PAIN LATERALITY, UNSPECIFIED WHETHER SCIATICA PRESENT: ICD-10-CM

## 2019-11-29 DIAGNOSIS — G43.009 MIGRAINE WITHOUT AURA AND WITHOUT STATUS MIGRAINOSUS, NOT INTRACTABLE: ICD-10-CM

## 2019-11-29 DIAGNOSIS — Z00.00 ANNUAL PHYSICAL EXAM: Primary | ICD-10-CM

## 2019-11-29 DIAGNOSIS — Z11.9 SCREENING EXAMINATION FOR INFECTIOUS DISEASE: ICD-10-CM

## 2019-11-29 PROCEDURE — 85025 COMPLETE CBC W/AUTO DIFF WBC: CPT

## 2019-11-29 PROCEDURE — 80361 OPIATES 1 OR MORE: CPT

## 2019-11-29 PROCEDURE — 80053 COMPREHEN METABOLIC PANEL: CPT

## 2019-11-29 PROCEDURE — 80307 DRUG TEST PRSMV CHEM ANLYZR: CPT

## 2019-11-29 PROCEDURE — 99395 PREV VISIT EST AGE 18-39: CPT | Performed by: INTERNAL MEDICINE

## 2019-11-29 PROCEDURE — 86765 RUBEOLA ANTIBODY: CPT

## 2019-11-29 PROCEDURE — 36415 COLL VENOUS BLD VENIPUNCTURE: CPT

## 2019-11-29 PROCEDURE — 80061 LIPID PANEL: CPT

## 2019-11-29 NOTE — PROGRESS NOTES
HPI:    Patient ID: Stalin Griffith is a 37year old female. Patient presents today for annual physical.       Review of Systems   Constitutional: Negative. HENT: Negative. Respiratory: Negative.          No hemoptysis   Cardiovascular: Negative fo time. She appears well-developed and well-nourished. No distress. obese   HENT:   Head: Normocephalic and atraumatic.    Right Ear: External ear normal.   Left Ear: External ear normal.   Nose: Nose normal.   Mouth/Throat: Oropharynx is clear and moist. N management.    (G43.009) Migraine without aura and without status migrainosus, not intractable  Plan: Overall stable and controlled with current medications.    (I10) Essential hypertension  Plan: Blood pressures controlled with low salt diet.  She monitors

## 2019-12-03 ENCOUNTER — TELEPHONE (OUTPATIENT)
Dept: INTERNAL MEDICINE CLINIC | Facility: CLINIC | Age: 34
End: 2019-12-03

## 2019-12-03 RX ORDER — HYDROCODONE BITARTRATE AND ACETAMINOPHEN 10; 325 MG/1; MG/1
TABLET ORAL
Qty: 112 TABLET | Refills: 0 | Status: SHIPPED | OUTPATIENT
Start: 2019-12-03 | End: 2019-12-16

## 2019-12-03 NOTE — TELEPHONE ENCOUNTER
Pt here to pickup script for norco; she will need to pickup her med tomorrow due to schedule issues at work( had taken another job part time at [employer] for the holidays).  Refill given (pt also had tail bone injury more than a week ago so still having pain a

## 2019-12-09 ENCOUNTER — OFFICE VISIT (OUTPATIENT)
Dept: INTERNAL MEDICINE CLINIC | Facility: CLINIC | Age: 34
End: 2019-12-09
Payer: COMMERCIAL

## 2019-12-09 VITALS
HEART RATE: 118 BPM | TEMPERATURE: 99 F | HEIGHT: 66 IN | RESPIRATION RATE: 12 BRPM | BODY MASS INDEX: 31.5 KG/M2 | WEIGHT: 196 LBS | DIASTOLIC BLOOD PRESSURE: 98 MMHG | SYSTOLIC BLOOD PRESSURE: 153 MMHG

## 2019-12-09 DIAGNOSIS — M53.3 COCCYGALGIA: Primary | ICD-10-CM

## 2019-12-09 PROCEDURE — 99213 OFFICE O/P EST LOW 20 MIN: CPT | Performed by: INTERNAL MEDICINE

## 2019-12-09 NOTE — PROGRESS NOTES
HPI:    Patient ID: Prachi Beavers is a 37year old female. Low Back Pain   This is a new (tail bone pain after using slide in park) problem. The current episode started 1 to 4 weeks ago (2 weeks ago). The problem occurs constantly.  The problem has bee Suspension fluticasone propionate 50 mcg/actuation nasal spray,suspension       Allergies:  Grass                   HIVES    Comment:Nasal congestion  Aspirin                 SWELLING    Comment:Other reaction(s): ASPIRIN   PHYSICAL EXAM:   Physical Exam

## 2019-12-13 ENCOUNTER — HOSPITAL ENCOUNTER (OUTPATIENT)
Age: 34
Discharge: HOME | End: 2019-12-13
Attending: NURSE PRACTITIONER
Payer: COMMERCIAL

## 2019-12-13 VITALS
RESPIRATION RATE: 20 BRPM | HEART RATE: 93 BPM | SYSTOLIC BLOOD PRESSURE: 166 MMHG | TEMPERATURE: 98 F | DIASTOLIC BLOOD PRESSURE: 107 MMHG | OXYGEN SATURATION: 97 %

## 2019-12-13 DIAGNOSIS — J02.9 ACUTE VIRAL PHARYNGITIS: Primary | ICD-10-CM

## 2019-12-13 PROCEDURE — 99212 OFFICE O/P EST SF 10 MIN: CPT

## 2019-12-13 PROCEDURE — 99213 OFFICE O/P EST LOW 20 MIN: CPT

## 2019-12-13 PROCEDURE — 87430 STREP A AG IA: CPT

## 2019-12-13 NOTE — ED PROVIDER NOTES
Patient presents with:  Sore Throat      HPI:     Salvador Benson is a 37year old female with a past history of hyperlipidemia, hypertension, migraines presents with a chief complaint of sore throat over the course of last 1 week. No fever or chills.   Co Rapid Strep Once      POCT Rapid Strep      Labs performed this visit:  Recent Results (from the past 10 hour(s))   [facility] POCT RAPID STREP    Collection Time: 12/13/19  5:08 PM   Result Value Ref Range    POCT Rapid Strep Negative Negative       Diagnosis:

## 2019-12-16 ENCOUNTER — TELEPHONE (OUTPATIENT)
Dept: INTERNAL MEDICINE CLINIC | Facility: CLINIC | Age: 34
End: 2019-12-16

## 2019-12-16 RX ORDER — HYDROCODONE BITARTRATE AND ACETAMINOPHEN 10; 325 MG/1; MG/1
TABLET ORAL
Qty: 112 TABLET | Refills: 0 | Status: SHIPPED | OUTPATIENT
Start: 2019-12-18 | End: 2019-12-26

## 2019-12-26 ENCOUNTER — TELEPHONE (OUTPATIENT)
Dept: INTERNAL MEDICINE CLINIC | Facility: CLINIC | Age: 34
End: 2019-12-26

## 2019-12-26 RX ORDER — HYDROCODONE BITARTRATE AND ACETAMINOPHEN 10; 325 MG/1; MG/1
TABLET ORAL
Qty: 84 TABLET | Refills: 0 | Status: SHIPPED | OUTPATIENT
Start: 2019-01-01 | End: 2020-01-13

## 2019-12-26 NOTE — TELEPHONE ENCOUNTER
Pt here to pickup script for norco which will be refilled on jan 1. Will decrease dose to 6tabs/day [place]

## 2019-12-29 ENCOUNTER — HOSPITAL ENCOUNTER (OUTPATIENT)
Age: 34
Discharge: HOME | End: 2019-12-29
Attending: EMERGENCY MEDICINE
Payer: COMMERCIAL

## 2019-12-29 VITALS
WEIGHT: 187 LBS | SYSTOLIC BLOOD PRESSURE: 139 MMHG | HEART RATE: 106 BPM | RESPIRATION RATE: 18 BRPM | DIASTOLIC BLOOD PRESSURE: 102 MMHG | BODY MASS INDEX: 30.05 KG/M2 | TEMPERATURE: 99 F | OXYGEN SATURATION: 99 % | HEIGHT: 66 IN

## 2019-12-29 DIAGNOSIS — H92.01 RIGHT EAR PAIN: ICD-10-CM

## 2019-12-29 DIAGNOSIS — J01.00 ACUTE NON-RECURRENT MAXILLARY SINUSITIS: Primary | ICD-10-CM

## 2019-12-29 PROCEDURE — 99213 OFFICE O/P EST LOW 20 MIN: CPT

## 2019-12-29 PROCEDURE — 99214 OFFICE O/P EST MOD 30 MIN: CPT

## 2019-12-29 RX ORDER — AMOXICILLIN 875 MG/1
875 TABLET, COATED ORAL 2 TIMES DAILY
Qty: 20 TABLET | Refills: 0 | Status: SHIPPED | OUTPATIENT
Start: 2019-12-29 | End: 2020-01-08

## 2019-12-29 RX ORDER — NEOMYCIN SULFATE, POLYMYXIN B SULFATE AND HYDROCORTISONE 10; 3.5; 1 MG/ML; MG/ML; [USP'U]/ML
4 SUSPENSION/ DROPS AURICULAR (OTIC) 4 TIMES DAILY
Qty: 1 BOTTLE | Refills: 0 | Status: SHIPPED | OUTPATIENT
Start: 2019-12-29 | End: 2020-01-05

## 2019-12-29 NOTE — ED NOTES
Po meds as directed good oral care fluids wash hands cover mouth when coughing motrin or tylenol for fever aches or pain call make appointment for f/u with pcp. on phone texting at d/c.

## 2019-12-29 NOTE — ED INITIAL ASSESSMENT (HPI)
Nidia Jaffe going thru home for month last night coughing and felt pop in ear with pink fluid- complains of facial pressure

## 2019-12-29 NOTE — ED PROVIDER NOTES
Patient Seen in: [facility] In [address] [place] [address]      History   Patient presents with:  Cough/URI    Stated Complaint: Cold Sx    HPI  Patient reports runny nose, postnasal drip and pain over the maxillary sinuses for the last 10 days.   [employer] Head: Normocephalic and atraumatic. Right Ear: External ear normal. Tympanic membrane is not injected, perforated (Unable to visualize perforation), erythematous or retracted.       Left Ear: Tympanic membrane and external ear normal.      Nose: R Anu Cruz, [address] [place] [address] [id number] [phone]    Schedule an appointment as soon as possible for a visit in 1 week  For follow-up        Medications Prescribed:  Current Discharge Medication List    START taking these medicatio

## 2020-01-06 ENCOUNTER — TELEPHONE (OUTPATIENT)
Dept: INTERNAL MEDICINE CLINIC | Facility: CLINIC | Age: 35
End: 2020-01-06

## 2020-01-06 RX ORDER — HYDROCODONE BITARTRATE AND HOMATROPINE METHYLBROMIDE ORAL SOLUTION 5; 1.5 MG/5ML; MG/5ML
5 LIQUID ORAL EVERY 6 HOURS PRN
Qty: 120 ML | Refills: 0 | Status: SHIPPED | OUTPATIENT
Start: 2020-01-06 | End: 2020-01-22 | Stop reason: ALTCHOICE

## 2020-01-06 RX ORDER — HYDROCODONE BITARTRATE AND HOMATROPINE METHYLBROMIDE ORAL SOLUTION 5; 1.5 MG/5ML; MG/5ML
5 LIQUID ORAL EVERY 6 HOURS PRN
Qty: 240 ML | Refills: 0 | Status: SHIPPED | OUTPATIENT
Start: 2020-01-06 | End: 2020-01-17

## 2020-01-06 NOTE — TELEPHONE ENCOUNTER
Pt walked in today to get script for hydromet. Was seen in UC for sore throat, cough and nasal congestion, given amoxicillin  For coverage for strep since spouse and daughter positive already. Pt given refill of hydromet for 240ml.

## 2020-01-08 RX ORDER — CYCLOBENZAPRINE HCL 10 MG
10 TABLET ORAL 3 TIMES DAILY PRN
Qty: 90 TABLET | Refills: 1 | Status: SHIPPED | OUTPATIENT
Start: 2020-01-08 | End: 2020-03-19

## 2020-01-08 NOTE — TELEPHONE ENCOUNTER
Review pended refill request as it does not fall under a protocol.     Requested Prescriptions     Pending Prescriptions Disp Refills   • cyclobenzaprine 10 MG Oral Tab 90 tablet 0     Sig: Take 1 tablet (10 mg total) by mouth 3 (three) times daily as nee

## 2020-01-13 ENCOUNTER — TELEPHONE (OUTPATIENT)
Dept: INTERNAL MEDICINE CLINIC | Facility: CLINIC | Age: 35
End: 2020-01-13

## 2020-01-13 RX ORDER — HYDROCODONE BITARTRATE AND ACETAMINOPHEN 10; 325 MG/1; MG/1
TABLET ORAL
Qty: 84 TABLET | Refills: 0 | Status: SHIPPED | OUTPATIENT
Start: 2020-01-14 | End: 2020-01-27

## 2020-01-17 ENCOUNTER — TELEPHONE (OUTPATIENT)
Dept: INTERNAL MEDICINE CLINIC | Facility: CLINIC | Age: 35
End: 2020-01-17

## 2020-01-17 RX ORDER — HYDROCODONE BITARTRATE AND HOMATROPINE METHYLBROMIDE ORAL SOLUTION 5; 1.5 MG/5ML; MG/5ML
5 LIQUID ORAL EVERY 6 HOURS PRN
Qty: 240 ML | Refills: 0 | Status: SHIPPED | OUTPATIENT
Start: 2020-01-17 | End: 2020-02-28

## 2020-01-17 NOTE — TELEPHONE ENCOUNTER
Pt still w/ persistent coughing since last UC visit, had already been treated with abx. Asked for refill of her hydromet cough med. Refill given. Will also decrease her norco on next refill as agreed upon with pt .

## 2020-01-20 ENCOUNTER — PATIENT MESSAGE (OUTPATIENT)
Dept: INTERNAL MEDICINE CLINIC | Facility: CLINIC | Age: 35
End: 2020-01-20

## 2020-01-21 NOTE — TELEPHONE ENCOUNTER
From: Stalin Griffith  To: Antwon Stroud MD  Sent: 1/20/2020 7:41 PM CST  Subject: Other    Dr. Daryle Draft,    I scheduled a f/u appointment for Thursday bc I want to be swabbed for strep after everyone has finished their antibiotics it's the house.

## 2020-01-21 NOTE — TELEPHONE ENCOUNTER
Appt made for Wednesday, 1/22/2020 at 11:30 a.m. per Dr Lincoln Schulz aware via [employer].           To: WILFREDO Hernandez      From: Cristian Nieves      Created: 1/21/2020 12:15 PM          Thank you!  I'll see you then!    ----- Message -----  From: Wilner Padilla

## 2020-01-21 NOTE — TELEPHONE ENCOUNTER
Spoke with patient they have  to leave for [place] on Thursday for a .   I do not see any openings prior to that     Can she be added to your schedule on Tuesday or Wednesday ?      Please reply to jeff: WILFREDO Obrien

## 2020-01-22 ENCOUNTER — OFFICE VISIT (OUTPATIENT)
Dept: INTERNAL MEDICINE CLINIC | Facility: CLINIC | Age: 35
End: 2020-01-22
Payer: COMMERCIAL

## 2020-01-22 VITALS
SYSTOLIC BLOOD PRESSURE: 124 MMHG | DIASTOLIC BLOOD PRESSURE: 80 MMHG | TEMPERATURE: 99 F | HEIGHT: 66 IN | BODY MASS INDEX: 30.05 KG/M2 | WEIGHT: 187 LBS | HEART RATE: 108 BPM

## 2020-01-22 DIAGNOSIS — J02.9 SORE THROAT: Primary | ICD-10-CM

## 2020-01-22 LAB
CONTROL LINE PRESENT WITH A CLEAR BACKGROUND (YES/NO): YES YES/NO
KIT LOT #: NORMAL NUMERIC
STREP GRP A CUL-SCR: NEGATIVE

## 2020-01-22 PROCEDURE — 87880 STREP A ASSAY W/OPTIC: CPT | Performed by: INTERNAL MEDICINE

## 2020-01-22 PROCEDURE — 99213 OFFICE O/P EST LOW 20 MIN: CPT | Performed by: INTERNAL MEDICINE

## 2020-01-22 RX ORDER — KETOROLAC TROMETHAMINE 10 MG/1
TABLET, FILM COATED ORAL
COMMUNITY
End: 2020-06-01

## 2020-01-22 NOTE — PROGRESS NOTES
HPI:    Patient ID: Asia Mcwilliams is a 37year old female. Sore Throat    This is a new problem. The current episode started in the past 7 days. The problem has been unchanged. There has been no fever. The pain is mild.  Associated symptoms include con Exam   Constitutional: She appears well-developed. Non-toxic appearance. She does not have a sickly appearance. She does not appear ill. No distress.    HENT:   Right Ear: External ear normal.   Left Ear: External ear normal.   Nose: Nose normal.   Mouth/T

## 2020-01-27 ENCOUNTER — TELEPHONE (OUTPATIENT)
Dept: INTERNAL MEDICINE CLINIC | Facility: CLINIC | Age: 35
End: 2020-01-27

## 2020-01-27 RX ORDER — HYDROCODONE BITARTRATE AND ACETAMINOPHEN 10; 325 MG/1; MG/1
TABLET ORAL
Qty: 84 TABLET | Refills: 0 | Status: SHIPPED | OUTPATIENT
Start: 2020-01-27 | End: 2020-02-10

## 2020-01-27 NOTE — TELEPHONE ENCOUNTER
[facility] to [facility]; [place] Next refill to be 16 days.

## 2020-02-10 ENCOUNTER — TELEPHONE (OUTPATIENT)
Dept: INTERNAL MEDICINE CLINIC | Facility: CLINIC | Age: 35
End: 2020-02-10

## 2020-02-10 RX ORDER — HYDROCODONE BITARTRATE AND ACETAMINOPHEN 10; 325 MG/1; MG/1
TABLET ORAL
Qty: 70 TABLET | Refills: 0 | Status: SHIPPED | OUTPATIENT
Start: 2020-02-11 | End: 2020-02-10

## 2020-02-10 RX ORDER — HYDROCODONE BITARTRATE AND ACETAMINOPHEN 10; 325 MG/1; MG/1
TABLET ORAL
Qty: 70 TABLET | Refills: 0 | Status: SHIPPED | OUTPATIENT
Start: 2020-02-11 | End: 2020-02-24

## 2020-02-24 ENCOUNTER — TELEPHONE (OUTPATIENT)
Dept: INTERNAL MEDICINE CLINIC | Facility: CLINIC | Age: 35
End: 2020-02-24

## 2020-02-24 RX ORDER — HYDROCODONE BITARTRATE AND ACETAMINOPHEN 10; 325 MG/1; MG/1
TABLET ORAL
Qty: 70 TABLET | Refills: 0 | Status: SHIPPED | OUTPATIENT
Start: 2020-02-24 | End: 2020-03-09

## 2020-02-28 ENCOUNTER — PATIENT MESSAGE (OUTPATIENT)
Dept: INTERNAL MEDICINE CLINIC | Facility: CLINIC | Age: 35
End: 2020-02-28

## 2020-02-28 ENCOUNTER — TELEPHONE (OUTPATIENT)
Dept: INTERNAL MEDICINE CLINIC | Facility: CLINIC | Age: 35
End: 2020-02-28

## 2020-02-28 ENCOUNTER — NURSE TRIAGE (OUTPATIENT)
Dept: OTHER | Age: 35
End: 2020-02-28

## 2020-02-28 RX ORDER — HYDROCODONE BITARTRATE AND HOMATROPINE METHYLBROMIDE ORAL SOLUTION 5; 1.5 MG/5ML; MG/5ML
5 LIQUID ORAL EVERY 6 HOURS PRN
Qty: 240 ML | Refills: 0 | Status: CANCELLED | OUTPATIENT
Start: 2020-02-28

## 2020-02-28 RX ORDER — HYDROCODONE BITARTRATE AND HOMATROPINE METHYLBROMIDE ORAL SOLUTION 5; 1.5 MG/5ML; MG/5ML
5 LIQUID ORAL EVERY 6 HOURS PRN
Qty: 240 ML | Refills: 0 | Status: SHIPPED | OUTPATIENT
Start: 2020-02-28 | End: 2020-03-16

## 2020-02-28 NOTE — TELEPHONE ENCOUNTER
Called pt had been having coughing, scrathcy throat but no fevers stated 3 days ago. Eduardo Jackson coworkers noted by pt. She was also coughing while talkng on the phone but didn't sound to be in distress, able to talk long sentences. . I told her will refill cou

## 2020-02-28 NOTE — TELEPHONE ENCOUNTER
In office RN, please contact patient to further assess.       Subject Delivery           Non-Urgent Medical Dina Kee 2/28/2020 8:26 AM Reply    To: EM DACIA Ugalde      From: Stephenie Kirk      Created: 2/28/2020 8:26 AM        *-*-*This message has not bee

## 2020-02-28 NOTE — TELEPHONE ENCOUNTER
Patient contacted office. Asking if Jovanni Camacho had refilled her medication request for Hydromet. Dr. Margaret Park-  Please advise.

## 2020-02-28 NOTE — TELEPHONE ENCOUNTER
Action Requested: Summary for Provider     []  Critical Lab, Recommendations Needed  [] Need Additional Advice  []   FYI    []   Need Orders  [] Need Medications Sent to Pharmacy  []  Other     SUMMARY: Pt requesting cough medication \"Hydromet\"     X 4 d

## 2020-02-28 NOTE — TELEPHONE ENCOUNTER
From: Camilla Resendiz  To: Flor José MD  Sent: 2/28/2020 8:26 AM CST  Subject: Non-Urgent Raymond Knowles am sick, we have 8 people out at work with cold/flu symptoms.  I have to work today but I took Monday and Tuesday off to r

## 2020-03-09 ENCOUNTER — TELEPHONE (OUTPATIENT)
Dept: INTERNAL MEDICINE CLINIC | Facility: CLINIC | Age: 35
End: 2020-03-09

## 2020-03-09 RX ORDER — HYDROCODONE BITARTRATE AND ACETAMINOPHEN 10; 325 MG/1; MG/1
TABLET ORAL
Qty: 70 TABLET | Refills: 0 | Status: SHIPPED | OUTPATIENT
Start: 2020-03-09 | End: 2020-03-20

## 2020-03-13 ENCOUNTER — OFFICE VISIT (OUTPATIENT)
Dept: INTERNAL MEDICINE CLINIC | Facility: CLINIC | Age: 35
End: 2020-03-13
Payer: COMMERCIAL

## 2020-03-13 ENCOUNTER — NURSE TRIAGE (OUTPATIENT)
Dept: OTHER | Age: 35
End: 2020-03-13

## 2020-03-13 ENCOUNTER — LAB ENCOUNTER (OUTPATIENT)
Dept: LAB | Age: 35
End: 2020-03-13
Attending: INTERNAL MEDICINE
Payer: COMMERCIAL

## 2020-03-13 VITALS
OXYGEN SATURATION: 98 % | BODY MASS INDEX: 32 KG/M2 | SYSTOLIC BLOOD PRESSURE: 129 MMHG | DIASTOLIC BLOOD PRESSURE: 86 MMHG | RESPIRATION RATE: 17 BRPM | TEMPERATURE: 98 F | HEIGHT: 66 IN | HEART RATE: 91 BPM | WEIGHT: 199.13 LBS

## 2020-03-13 DIAGNOSIS — R00.2 PALPITATIONS: ICD-10-CM

## 2020-03-13 DIAGNOSIS — R00.2 PALPITATIONS: Primary | ICD-10-CM

## 2020-03-13 LAB
ANION GAP SERPL CALC-SCNC: 6 MMOL/L (ref 0–18)
BASOPHILS # BLD AUTO: 0.05 X10(3) UL (ref 0–0.2)
BASOPHILS NFR BLD AUTO: 0.3 %
BUN BLD-MCNC: 13 MG/DL (ref 7–18)
BUN/CREAT SERPL: 21 (ref 10–20)
CALCIUM BLD-MCNC: 9.3 MG/DL (ref 8.5–10.1)
CHLORIDE SERPL-SCNC: 108 MMOL/L (ref 98–112)
CO2 SERPL-SCNC: 28 MMOL/L (ref 21–32)
CREAT BLD-MCNC: 0.62 MG/DL (ref 0.55–1.02)
DEPRECATED RDW RBC AUTO: 43.4 FL (ref 35.1–46.3)
EOSINOPHIL # BLD AUTO: 0.01 X10(3) UL (ref 0–0.7)
EOSINOPHIL NFR BLD AUTO: 0.1 %
ERYTHROCYTE [DISTWIDTH] IN BLOOD BY AUTOMATED COUNT: 12.5 % (ref 11–15)
GLUCOSE BLD-MCNC: 128 MG/DL (ref 70–99)
HAV IGM SER QL: 2 MG/DL (ref 1.6–2.6)
HCT VFR BLD AUTO: 41.8 % (ref 35–48)
HGB BLD-MCNC: 13.8 G/DL (ref 12–16)
IMM GRANULOCYTES # BLD AUTO: 0.03 X10(3) UL (ref 0–1)
IMM GRANULOCYTES NFR BLD: 0.2 %
LYMPHOCYTES # BLD AUTO: 5 X10(3) UL (ref 1–4)
LYMPHOCYTES NFR BLD AUTO: 34.8 %
MCH RBC QN AUTO: 31 PG (ref 26–34)
MCHC RBC AUTO-ENTMCNC: 33 G/DL (ref 31–37)
MCV RBC AUTO: 93.9 FL (ref 80–100)
MONOCYTES # BLD AUTO: 0.56 X10(3) UL (ref 0.1–1)
MONOCYTES NFR BLD AUTO: 3.9 %
NEUTROPHILS # BLD AUTO: 8.7 X10 (3) UL (ref 1.5–7.7)
NEUTROPHILS # BLD AUTO: 8.7 X10(3) UL (ref 1.5–7.7)
NEUTROPHILS NFR BLD AUTO: 60.7 %
OSMOLALITY SERPL CALC.SUM OF ELEC: 296 MOSM/KG (ref 275–295)
PATIENT FASTING Y/N/NP: NO
PLATELET # BLD AUTO: 297 10(3)UL (ref 150–450)
POTASSIUM SERPL-SCNC: 3.9 MMOL/L (ref 3.5–5.1)
RBC # BLD AUTO: 4.45 X10(6)UL (ref 3.8–5.3)
SODIUM SERPL-SCNC: 142 MMOL/L (ref 136–145)
T4 FREE SERPL-MCNC: 1 NG/DL (ref 0.8–1.7)
TSI SER-ACNC: 1.41 MIU/ML (ref 0.36–3.74)
WBC # BLD AUTO: 14.4 X10(3) UL (ref 4–11)

## 2020-03-13 PROCEDURE — 85025 COMPLETE CBC W/AUTO DIFF WBC: CPT

## 2020-03-13 PROCEDURE — 80048 BASIC METABOLIC PNL TOTAL CA: CPT

## 2020-03-13 PROCEDURE — 83735 ASSAY OF MAGNESIUM: CPT

## 2020-03-13 PROCEDURE — 84439 ASSAY OF FREE THYROXINE: CPT

## 2020-03-13 PROCEDURE — 36415 COLL VENOUS BLD VENIPUNCTURE: CPT

## 2020-03-13 PROCEDURE — 93000 ELECTROCARDIOGRAM COMPLETE: CPT | Performed by: INTERNAL MEDICINE

## 2020-03-13 PROCEDURE — 84443 ASSAY THYROID STIM HORMONE: CPT

## 2020-03-13 PROCEDURE — 99214 OFFICE O/P EST MOD 30 MIN: CPT | Performed by: INTERNAL MEDICINE

## 2020-03-13 NOTE — TELEPHONE ENCOUNTER
Action Requested: Summary for Provider     []  Critical Lab, Recommendations Needed  [] Need Additional Advice  []   FYI    []   Need Orders  [] Need Medications Sent to Pharmacy  []  Other     SUMMARY: Pt requesting for OV today with Dr Iman Douglas.    Pt

## 2020-03-13 NOTE — PROGRESS NOTES
HPI:    Patient ID: Prachi Beavers is a 37year old female. Palpitations    This is a new problem. The current episode started in the past 7 days (3 days). The problem occurs intermittently. The problem has been waxing and waning.  Nothing aggravates th mg total) by mouth every 8 (eight) hours as needed for Nausea.  (Patient not taking: Reported on 3/13/2020 ) 15 tablet 0   • Fluticasone Propionate 50 MCG/ACT Nasal Suspension fluticasone propionate 50 mcg/actuation nasal spray,suspension       Allergies: T4, CARD ECHO 2D        DOPPLER (CPT=93306), CARD MONITOR HOLTER 48         HOUR (YKW=24373), CARDIO - INTERNAL        We did ekg and showed SR rate of 80/min, normal axis, occasional Pvc, poor Rwave progression, no change from old ekg except for Pvc.    Wi

## 2020-03-16 ENCOUNTER — TELEPHONE (OUTPATIENT)
Dept: INTERNAL MEDICINE CLINIC | Facility: CLINIC | Age: 35
End: 2020-03-16

## 2020-03-16 RX ORDER — HYDROCODONE BITARTRATE AND HOMATROPINE METHYLBROMIDE ORAL SOLUTION 5; 1.5 MG/5ML; MG/5ML
5 LIQUID ORAL EVERY 6 HOURS PRN
Qty: 240 ML | Refills: 0 | Status: SHIPPED | OUTPATIENT
Start: 2020-03-16 | End: 2020-03-25

## 2020-03-16 RX ORDER — OSELTAMIVIR PHOSPHATE 75 MG/1
75 CAPSULE ORAL 2 TIMES DAILY
Qty: 10 CAPSULE | Refills: 0 | Status: SHIPPED | OUTPATIENT
Start: 2020-03-16 | End: 2020-05-18 | Stop reason: ALTCHOICE

## 2020-03-16 NOTE — TELEPHONE ENCOUNTER
Pt complained of flu like symptoms started yesterday, pt's daughter diagnosed with flu A Saturday. Pt asked for tamiflu and cough med. erx sent.

## 2020-03-18 RX ORDER — HYDROCODONE BITARTRATE AND ACETAMINOPHEN 10; 325 MG/1; MG/1
TABLET ORAL
Qty: 70 TABLET | Refills: 0 | Status: CANCELLED | OUTPATIENT
Start: 2020-03-18

## 2020-03-19 ENCOUNTER — TELEPHONE (OUTPATIENT)
Dept: INTERNAL MEDICINE CLINIC | Facility: CLINIC | Age: 35
End: 2020-03-19

## 2020-03-19 NOTE — TELEPHONE ENCOUNTER
Patient contacted, she requesting refill for cyclobenzaprine due to low back pain, see other 3/19/20 encounter sent to dr Jeffrey Esparza

## 2020-03-19 NOTE — TELEPHONE ENCOUNTER
Per pharmacy pt is requesting refill for the following medication. •  cyclobenzaprine 10 MG Oral Tab, Take 1 tablet (10 mg total) by mouth 3 (three) times daily as needed for Muscle spasms.  (Patient not taking: Reported on 3/13/2020 ), Disp: 90 table

## 2020-03-20 ENCOUNTER — TELEPHONE (OUTPATIENT)
Dept: INTERNAL MEDICINE CLINIC | Facility: CLINIC | Age: 35
End: 2020-03-20

## 2020-03-20 RX ORDER — HYDROCODONE BITARTRATE AND ACETAMINOPHEN 10; 325 MG/1; MG/1
TABLET ORAL
Qty: 70 TABLET | Refills: 0 | Status: SHIPPED | OUTPATIENT
Start: 2020-03-20 | End: 2020-03-30

## 2020-03-20 RX ORDER — CYCLOBENZAPRINE HCL 10 MG
10 TABLET ORAL 3 TIMES DAILY PRN
Qty: 90 TABLET | Refills: 1 | Status: SHIPPED | OUTPATIENT
Start: 2020-03-20 | End: 2020-05-11

## 2020-03-20 NOTE — TELEPHONE ENCOUNTER
Called Carol in [facility], informed she can cancel the appt   Melly Israel will cancel the appt and call the patient

## 2020-03-20 NOTE — TELEPHONE ENCOUNTER
Carol from [facility] calling asking if [facility] scheduled 3/24/20 can be cancelled. May leave voice mail [phone]. Lab will be open until 7 pm tonight.

## 2020-03-24 ENCOUNTER — TELEPHONE (OUTPATIENT)
Dept: INTERNAL MEDICINE CLINIC | Facility: CLINIC | Age: 35
End: 2020-03-24

## 2020-03-24 NOTE — TELEPHONE ENCOUNTER
Pt scheduled appt through [employer] with following sx:    Appointment For: Stephenie Kirk ([id number])   Visit Type: [address] [place] [address] ([id number])      3/26/2020    8:00 AM  15 mins. Stephenie Suh MD  [facility]      Patient Comments:   F/u need

## 2020-03-24 NOTE — TELEPHONE ENCOUNTER
S/W patient who states she did not call today. States she scheduled office visit with Dr. Mary Craig for f/u flu. States she will need another note for work at that time stating symptom/ diagnosis update.

## 2020-03-25 RX ORDER — HYDROCODONE BITARTRATE AND HOMATROPINE METHYLBROMIDE ORAL SOLUTION 5; 1.5 MG/5ML; MG/5ML
5 LIQUID ORAL EVERY 6 HOURS PRN
Qty: 240 ML | Refills: 0 | Status: SHIPPED | OUTPATIENT
Start: 2020-03-29 | End: 2020-04-06

## 2020-03-25 RX ORDER — HYDROCODONE BITARTRATE AND HOMATROPINE METHYLBROMIDE ORAL SOLUTION 5; 1.5 MG/5ML; MG/5ML
5 LIQUID ORAL EVERY 6 HOURS PRN
Qty: 240 ML | Refills: 0 | Status: CANCELLED | OUTPATIENT
Start: 2020-03-25

## 2020-03-25 NOTE — TELEPHONE ENCOUNTER
Patient sent in message through appointment schedule request with Renzo stating that she has not received a call regarding her note. Please advise.

## 2020-03-25 NOTE — TELEPHONE ENCOUNTER
Last refill for hydromet was on 3/16/20 and was for 240ml so should last at least til 3/28/20 so I cant refill it til 3/29/20;  erx sent.

## 2020-03-25 NOTE — TELEPHONE ENCOUNTER
Patient called in to ask for a note stating that she was being treated by Dr. Latoya Parkinson for influenza and although she is feeling better, she is still experiencing a cough.  Patient stated, \"I talk on the phone all day at work and it's difficult when I'm

## 2020-03-25 NOTE — TELEPHONE ENCOUNTER
Per Dr. Robyn Eubanks instructions, S/W Mylo Fabry at [address], [facility] [address] and [address] [facility], [facility], and advised to change release date for Hydromet 5/1.5 mg/5 ml, 240 cc to March 27, 2020. Mylo Fabry repeated back instructions correctly.

## 2020-03-26 RX ORDER — BUTALBITAL, ACETAMINOPHEN AND CAFFEINE 50; 325; 40 MG/1; MG/1; MG/1
TABLET ORAL
Qty: 30 TABLET | Refills: 0 | Status: SHIPPED | OUTPATIENT
Start: 2020-03-26 | End: 2020-04-14

## 2020-03-31 ENCOUNTER — PATIENT MESSAGE (OUTPATIENT)
Dept: INTERNAL MEDICINE CLINIC | Facility: CLINIC | Age: 35
End: 2020-03-31

## 2020-03-31 ENCOUNTER — TELEPHONE (OUTPATIENT)
Dept: FAMILY MEDICINE CLINIC | Facility: CLINIC | Age: 35
End: 2020-03-31

## 2020-03-31 NOTE — TELEPHONE ENCOUNTER
See telephone encounter 3/31/30 acute. From: Marcus Levi  To: Freedom Gerber MD  Sent: 3/31/2020  7:48 AM CDT  Subject: Prescription Question    Good morning,    I requested a refill for my Norco but it is not showing that I did.     Dr. Stefani Forte

## 2020-03-31 NOTE — TELEPHONE ENCOUNTER
TRIAGE RN=please call patient. MyChart sent on the original MyChart encounter. There is refill encounter 3/30/20 for norco.    ----- Message from Dedrick Frances sent at 3/31/2020  7:48 AM CDT -----  Regarding: Prescription Question  Contact: [phone]

## 2020-04-01 RX ORDER — HYDROCODONE BITARTRATE AND ACETAMINOPHEN 10; 325 MG/1; MG/1
TABLET ORAL
Qty: 70 TABLET | Refills: 0 | Status: SHIPPED | OUTPATIENT
Start: 2020-04-02 | End: 2020-04-11

## 2020-04-01 NOTE — TELEPHONE ENCOUNTER
Please note that the prescription from 3/20 was printed at [facility]. Not sure if it is appropriate for patient to come to office to  a script. Her father is currently under quarentine for exposure to [employer] co-worker. Please advise.

## 2020-04-06 ENCOUNTER — TELEPHONE (OUTPATIENT)
Dept: INTERNAL MEDICINE CLINIC | Facility: CLINIC | Age: 35
End: 2020-04-06

## 2020-04-06 ENCOUNTER — PATIENT MESSAGE (OUTPATIENT)
Dept: INTERNAL MEDICINE CLINIC | Facility: CLINIC | Age: 35
End: 2020-04-06

## 2020-04-06 RX ORDER — AZITHROMYCIN 250 MG/1
TABLET, FILM COATED ORAL
Qty: 6 TABLET | Refills: 0 | Status: SHIPPED | OUTPATIENT
Start: 2020-04-06 | End: 2020-05-18 | Stop reason: ALTCHOICE

## 2020-04-06 RX ORDER — HYDROCODONE BITARTRATE AND HOMATROPINE METHYLBROMIDE ORAL SOLUTION 5; 1.5 MG/5ML; MG/5ML
5 LIQUID ORAL EVERY 6 HOURS PRN
Qty: 240 ML | Refills: 0 | Status: SHIPPED | OUTPATIENT
Start: 2020-04-06 | End: 2020-08-12

## 2020-04-06 RX ORDER — HYDROCODONE BITARTRATE AND HOMATROPINE METHYLBROMIDE ORAL SOLUTION 5; 1.5 MG/5ML; MG/5ML
5 LIQUID ORAL EVERY 6 HOURS PRN
Qty: 240 ML | Refills: 0 | Status: CANCELLED | OUTPATIENT
Start: 2020-04-06

## 2020-04-06 NOTE — TELEPHONE ENCOUNTER
From: Nazario Galvez  To: Bradley Sarah MD  Sent: 4/6/2020 10:34 AM CDT  Subject: Prescription Question    Hi Dr. Gerri Cuevas-    Just an update.     I am still coughing reallh bad, i cant come in to be reseen I assume and i am starting to get conges

## 2020-04-06 NOTE — TELEPHONE ENCOUNTER
Patient returned our call, has update from 3/31/2020  ( see MyChart below )     Both ears with congestion but no pain , cough, runny nose    Denies fever     Has been taking Hydromet but almost done with bottle     Allergies reviewed and pharmacy confirmed

## 2020-04-06 NOTE — TELEPHONE ENCOUNTER
Telemedicine Note    Virtual/Telephone Check-In    Connie Patel [facility] verbally consents to a Virtual/Telephone Check-In service on 04/06/20.  Patient understands and accepts financial responsibility for any deductible, co-insurance and/or co-pays associated wi and evaluation due to current COVID-19 outbreak based upon CDC recommendations, as well as limitations of phone triage including but not limited to the inability to perform appropriate physical exam nor face-to-face examination, which may limit and/or redu

## 2020-04-06 NOTE — TELEPHONE ENCOUNTER
Encounter routed to the triage pool for the patient to be contacted. [employer] message was also sent to the patient.             ----- Message from Rima Frances sent at 4/6/2020 10:34 AM CDT -----  Regarding: Prescription Question  Contact: [phone]

## 2020-04-14 ENCOUNTER — NURSE TRIAGE (OUTPATIENT)
Dept: OTHER | Age: 35
End: 2020-04-14

## 2020-04-14 RX ORDER — CEPHALEXIN 500 MG/1
500 CAPSULE ORAL 2 TIMES DAILY
Qty: 14 CAPSULE | Refills: 0 | Status: SHIPPED | OUTPATIENT
Start: 2020-04-14 | End: 2020-05-18 | Stop reason: ALTCHOICE

## 2020-04-14 RX ORDER — BUTALBITAL, ACETAMINOPHEN AND CAFFEINE 50; 325; 40 MG/1; MG/1; MG/1
1 TABLET ORAL EVERY 8 HOURS PRN
Qty: 30 TABLET | Refills: 0 | Status: SHIPPED | OUTPATIENT
Start: 2020-04-14 | End: 2020-05-11

## 2020-04-14 RX ORDER — HYDROCODONE BITARTRATE AND ACETAMINOPHEN 10; 325 MG/1; MG/1
TABLET ORAL
Qty: 70 TABLET | Refills: 0 | Status: SHIPPED | OUTPATIENT
Start: 2020-04-16 | End: 2020-04-24

## 2020-04-14 NOTE — TELEPHONE ENCOUNTER
Action Requested: Summary for Provider     []  Critical Lab, Recommendations Needed  [] Need Additional Advice  []   FYI    []   Need Orders  [] Need Medications Sent to Pharmacy  []  Other     SUMMARY:     Per Covid 19 Protocol    Discussed with patient t

## 2020-04-14 NOTE — TELEPHONE ENCOUNTER
Virtual/Telephone Check-In    [address] verbally consents to a Virtual/Telephone Check-In service on 04/14/20. Patient understands and accepts financial responsibility for any deductible, co-insurance and/or co-pays associated with this service.     I

## 2020-04-26 ENCOUNTER — HOSPITAL ENCOUNTER (OUTPATIENT)
Age: 35
Discharge: HOME | End: 2020-04-26
Attending: EMERGENCY MEDICINE
Payer: COMMERCIAL

## 2020-04-26 VITALS
OXYGEN SATURATION: 97 % | DIASTOLIC BLOOD PRESSURE: 113 MMHG | RESPIRATION RATE: 18 BRPM | TEMPERATURE: 98 F | SYSTOLIC BLOOD PRESSURE: 154 MMHG | HEART RATE: 98 BPM

## 2020-04-26 DIAGNOSIS — N30.01 ACUTE CYSTITIS WITH HEMATURIA: Primary | ICD-10-CM

## 2020-04-26 PROCEDURE — 99214 OFFICE O/P EST MOD 30 MIN: CPT

## 2020-04-26 PROCEDURE — 81025 URINE PREGNANCY TEST: CPT

## 2020-04-26 PROCEDURE — 81002 URINALYSIS NONAUTO W/O SCOPE: CPT

## 2020-04-26 PROCEDURE — 87086 URINE CULTURE/COLONY COUNT: CPT | Performed by: EMERGENCY MEDICINE

## 2020-04-26 RX ORDER — NITROFURANTOIN 25; 75 MG/1; MG/1
100 CAPSULE ORAL 2 TIMES DAILY
Qty: 20 CAPSULE | Refills: 0 | Status: SHIPPED | OUTPATIENT
Start: 2020-04-26 | End: 2020-05-06

## 2020-04-26 NOTE — ED PROVIDER NOTES
Patient Seen in: [facility] In [address] [place] [address]    History   Patient presents with:  Urinary Symptoms    Stated Complaint: UTI    HPI    Patient complains of urinary frequency, urgency and dysuria that began 5.   Patient denies fever or ch tablet   Levonorgestrel (LILETTA, 52 MG,) 19.5 MCG/DAY Intrauterine IUD,  Liletta   Ondansetron HCl (ZOFRAN) 4 mg tablet,  Take 1 tablet (4 mg total) by mouth every 8 (eight) hours as needed for Nausea.   Patient not taking: Reported on 3/13/2020    Flutica edema  BACK: no CVA tenderness  GI: soft, mild suprapubic tenderness, no guarding      ED Course     Labs Reviewed   [facility] POCT URINALYSIS DIPSTICK - Abnormal; Notable for the following components:       Result Value    Protein urine Trace (*)     Blood, Jody Dusky

## 2020-04-27 ENCOUNTER — PATIENT MESSAGE (OUTPATIENT)
Dept: INTERNAL MEDICINE CLINIC | Facility: CLINIC | Age: 35
End: 2020-04-27

## 2020-04-27 NOTE — TELEPHONE ENCOUNTER
From: Farzana Duarte  To: Willy Morales MD  Sent: 4/27/2020 8:28 AM CDT  Subject: Non-Urgent Medical Question    Hi Dr. Addis Summers you're doing well.  Can you review the urine results and let me know if you think macrobid is the best antibiotic to b

## 2020-04-28 ENCOUNTER — PATIENT MESSAGE (OUTPATIENT)
Dept: INTERNAL MEDICINE CLINIC | Facility: CLINIC | Age: 35
End: 2020-04-28

## 2020-04-28 RX ORDER — HYDROCODONE BITARTRATE AND ACETAMINOPHEN 10; 325 MG/1; MG/1
TABLET ORAL
Qty: 70 TABLET | Refills: 0 | Status: SHIPPED | OUTPATIENT
Start: 2020-04-29 | End: 2020-05-12

## 2020-04-29 NOTE — TELEPHONE ENCOUNTER
From: Anibal Bond  To: Catarino Lucas MD  Sent: 4/28/2020 5:11 PM CDT  Subject: Prescription Question    Hi Dr. Isabela Trinh,    I submitted my refill Friday and have not seen anything as of yet. Just Cristine Buenrostro make sure it wasn't overlooked!      Thanks,  John Gibson

## 2020-05-11 ENCOUNTER — TELEPHONE (OUTPATIENT)
Dept: INTERNAL MEDICINE CLINIC | Facility: CLINIC | Age: 35
End: 2020-05-11

## 2020-05-11 RX ORDER — BUTALBITAL, ACETAMINOPHEN AND CAFFEINE 50; 325; 40 MG/1; MG/1; MG/1
1 TABLET ORAL EVERY 8 HOURS PRN
Qty: 30 TABLET | Refills: 0 | Status: SHIPPED | OUTPATIENT
Start: 2020-05-11 | End: 2020-07-21

## 2020-05-11 RX ORDER — CYCLOBENZAPRINE HCL 10 MG
10 TABLET ORAL 3 TIMES DAILY PRN
Qty: 90 TABLET | Refills: 0 | Status: SHIPPED | OUTPATIENT
Start: 2020-05-11 | End: 2020-06-10

## 2020-05-11 NOTE — TELEPHONE ENCOUNTER
• Butalbital-APAP-Caffeine -40 MG Oral Tab Take 1 tablet by mouth every 8 (eight) hours as needed for Headaches.  TAKE ONE TABLET BY MOUTH THREE TIMES DAILY AS NEEDED FOR HEADACHE 30 tablet 0

## 2020-05-11 NOTE — TELEPHONE ENCOUNTER
• cyclobenzaprine 10 MG Oral Tab Take 1 tablet (10 mg total) by mouth 3 (three) times daily as needed for Muscle spasms.  90 tablet 1

## 2020-05-12 RX ORDER — HYDROCODONE BITARTRATE AND ACETAMINOPHEN 10; 325 MG/1; MG/1
TABLET ORAL
Qty: 70 TABLET | Refills: 0 | OUTPATIENT
Start: 2020-05-12

## 2020-05-18 ENCOUNTER — OFFICE VISIT (OUTPATIENT)
Dept: INTERNAL MEDICINE CLINIC | Facility: CLINIC | Age: 35
End: 2020-05-18
Payer: COMMERCIAL

## 2020-05-18 ENCOUNTER — PATIENT MESSAGE (OUTPATIENT)
Dept: INTERNAL MEDICINE CLINIC | Facility: CLINIC | Age: 35
End: 2020-05-18

## 2020-05-18 VITALS
RESPIRATION RATE: 12 BRPM | BODY MASS INDEX: 32.78 KG/M2 | DIASTOLIC BLOOD PRESSURE: 33 MMHG | TEMPERATURE: 99 F | HEIGHT: 66 IN | SYSTOLIC BLOOD PRESSURE: 129 MMHG | HEART RATE: 130 BPM | WEIGHT: 204 LBS

## 2020-05-18 DIAGNOSIS — R00.2 PALPITATIONS: Primary | ICD-10-CM

## 2020-05-18 DIAGNOSIS — S39.012A LOW BACK STRAIN, INITIAL ENCOUNTER: ICD-10-CM

## 2020-05-18 PROCEDURE — 93000 ELECTROCARDIOGRAM COMPLETE: CPT | Performed by: INTERNAL MEDICINE

## 2020-05-18 PROCEDURE — 99214 OFFICE O/P EST MOD 30 MIN: CPT | Performed by: INTERNAL MEDICINE

## 2020-05-18 RX ORDER — HYDROCODONE BITARTRATE AND ACETAMINOPHEN 10; 325 MG/1; MG/1
TABLET ORAL
Qty: 98 TABLET | Refills: 0 | Status: SHIPPED | OUTPATIENT
Start: 2020-05-21 | End: 2020-06-02

## 2020-05-18 NOTE — TELEPHONE ENCOUNTER
From: Jennifer Richey  Sent: 5/18/2020 6:01 PM CDT  To: Em Rn Triage  Subject: RE: Non-Urgent Medical Question    Thank you so much :)    ----- Message -----  From: Orlando Agarwal  Sent: 5/18/20, 6:00 PM  To: Jennifer Richey  Subject: RE: Non-Urgent Medical Q

## 2020-05-18 NOTE — PROGRESS NOTES
HPI:    Patient ID: China Romero is a 37year old female. Palpitations    This is a recurrent problem. The current episode started in the past 7 days (3days). The problem occurs intermittently. The problem has been waxing and waning.  Pertinent negati (NORCO)  MG Oral Tab Take 1 to 2 tabs po q 6hr prn for pain 98 tablet 0   • cyclobenzaprine 10 MG Oral Tab Take 1 tablet (10 mg total) by mouth 3 (three) times daily as needed for Muscle spasms.  90 tablet 0   • Butalbital-APAP-Caffeine -40 MG O strength. No sensory deficit. She displays no Babinski's sign on the right side. She displays no Babinski's sign on the left side. Reflex Scores:       Patellar reflexes are 2+ on the right side and 2+ on the left side.        Achilles reflexes are 2+ on

## 2020-05-19 DIAGNOSIS — R00.2 PALPITATIONS: Primary | ICD-10-CM

## 2020-05-20 ENCOUNTER — TELEPHONE (OUTPATIENT)
Dept: INTERNAL MEDICINE CLINIC | Facility: CLINIC | Age: 35
End: 2020-05-20

## 2020-05-20 NOTE — TELEPHONE ENCOUNTER
[facility], Referral # [id number] is currently on open status. See message below.

## 2020-05-20 NOTE — TELEPHONE ENCOUNTER
Jeremie Garcia from [facility] stated this order CARD ECHO 2D DOPPLER (CPT=93306) (Order #[id number]) on 5/18/20 needs a prior authorization. Please call Jeremie Garcia back [phone]. Thank you.

## 2020-05-25 ENCOUNTER — TELEPHONE (OUTPATIENT)
Dept: INTERNAL MEDICINE CLINIC | Facility: CLINIC | Age: 35
End: 2020-05-25

## 2020-05-25 RX ORDER — TOBRAMYCIN 3 MG/ML
1 SOLUTION/ DROPS OPHTHALMIC EVERY 6 HOURS
Qty: 1 BOTTLE | Refills: 0 | Status: SHIPPED | OUTPATIENT
Start: 2020-05-25 | End: 2020-08-12

## 2020-05-25 NOTE — TELEPHONE ENCOUNTER
Pt called having pink eye started yesterday. No trauma to eyes, no vision problems. Eye red, irritated, eyelids crusting. No use of contact lenses. Not pregnant. Pt used left over tobrex eyedrop yesterday but ran out today. Will refill.    Pt told to call

## 2020-05-30 ENCOUNTER — PATIENT MESSAGE (OUTPATIENT)
Dept: INTERNAL MEDICINE CLINIC | Facility: CLINIC | Age: 35
End: 2020-05-30

## 2020-05-30 NOTE — TELEPHONE ENCOUNTER
From: Nazario Galvez  To: Bradley Sarah MD  Sent: 5/30/2020 11:32 AM CDT  Subject: Bonilla Osman.,    Just sending you a quick update. Back is a little better but not 100%.  Can we keep refill at 7 just for the next 2 weeks then

## 2020-06-01 ENCOUNTER — PATIENT MESSAGE (OUTPATIENT)
Dept: INTERNAL MEDICINE CLINIC | Facility: CLINIC | Age: 35
End: 2020-06-01

## 2020-06-01 RX ORDER — KETOROLAC TROMETHAMINE 10 MG/1
TABLET, FILM COATED ORAL
Qty: 20 TABLET | Refills: 0 | Status: SHIPPED | OUTPATIENT
Start: 2020-06-01 | End: 2020-08-10

## 2020-06-01 NOTE — TELEPHONE ENCOUNTER
Pt walked in and asking to get old fmla form to be resigned since needed renewal for last year. Copy given to pt after renewed signature. Also requested refill for toradol for her migraines.

## 2020-06-01 NOTE — TELEPHONE ENCOUNTER
From: Cristian Nieves  To: Ena Mcdermott MD  Sent: 6/1/2020 9:12 AM CDT  Subject: Non-Urgent Reford Drricki Pemberton follow up to my message. We have community alerts here in  and our local stores may remain closed.  I would like to get

## 2020-06-10 ENCOUNTER — TELEPHONE (OUTPATIENT)
Dept: INTERNAL MEDICINE CLINIC | Facility: CLINIC | Age: 35
End: 2020-06-10

## 2020-06-10 RX ORDER — CYCLOBENZAPRINE HCL 10 MG
TABLET ORAL
Qty: 90 TABLET | Refills: 0 | Status: SHIPPED | OUTPATIENT
Start: 2020-06-10 | End: 2020-07-09

## 2020-06-10 NOTE — TELEPHONE ENCOUNTER
Patient stated that has had ongoing migraines since she was a teenager. Nothing new. Needs a follow-up appointment with doctor in the office every 3-4 months to maintain FMLA paperwork. Advised patient to keep appointment on 6/12/2020 with doctor.

## 2020-06-11 ENCOUNTER — OFFICE VISIT (OUTPATIENT)
Dept: INTERNAL MEDICINE CLINIC | Facility: CLINIC | Age: 35
End: 2020-06-11
Payer: COMMERCIAL

## 2020-06-11 ENCOUNTER — TELEPHONE (OUTPATIENT)
Dept: INTERNAL MEDICINE CLINIC | Facility: CLINIC | Age: 35
End: 2020-06-11

## 2020-06-11 VITALS
TEMPERATURE: 98 F | WEIGHT: 204 LBS | HEIGHT: 66 IN | DIASTOLIC BLOOD PRESSURE: 82 MMHG | SYSTOLIC BLOOD PRESSURE: 136 MMHG | HEART RATE: 90 BPM | BODY MASS INDEX: 32.78 KG/M2

## 2020-06-11 DIAGNOSIS — G43.009 MIGRAINE WITHOUT AURA AND WITHOUT STATUS MIGRAINOSUS, NOT INTRACTABLE: Primary | ICD-10-CM

## 2020-06-11 PROCEDURE — 99214 OFFICE O/P EST MOD 30 MIN: CPT | Performed by: INTERNAL MEDICINE

## 2020-06-11 RX ORDER — HYDROCODONE BITARTRATE AND ACETAMINOPHEN 10; 325 MG/1; MG/1
TABLET ORAL
Qty: 98 TABLET | Refills: 0 | Status: SHIPPED | OUTPATIENT
Start: 2020-06-11 | End: 2020-06-24

## 2020-06-11 NOTE — PROGRESS NOTES
HPI:    Patient ID: Asia Mcwilliams is a 37year old female. Migraine    This is a chronic problem. The current episode started more than 1 year ago. The problem occurs intermittently. The problem has been waxing and waning.  The pain is located in the f IUD Liletta     • Ondansetron HCl (ZOFRAN) 4 mg tablet Take 1 tablet (4 mg total) by mouth every 8 (eight) hours as needed for Nausea.  15 tablet 0   • Fluticasone Propionate 50 MCG/ACT Nasal Suspension fluticasone propionate 50 mcg/actuation nasal spray,wiggins were placed in this encounter.       Meds This Visit:  Requested Prescriptions      No prescriptions requested or ordered in this encounter       Imaging & Referrals:  None       #[phone]

## 2020-06-11 NOTE — TELEPHONE ENCOUNTER
Patient requesting to know if Dr. Latoya Parkinson is able to see her today. 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 Please advise.

## 2020-06-18 RX ORDER — FLUTICASONE PROPIONATE 50 MCG
SPRAY, SUSPENSION (ML) NASAL
Qty: 16 G | Refills: 0 | Status: SHIPPED | OUTPATIENT
Start: 2020-06-18 | End: 2020-07-28

## 2020-06-25 RX ORDER — HYDROCODONE BITARTRATE AND ACETAMINOPHEN 10; 325 MG/1; MG/1
TABLET ORAL
Qty: 70 TABLET | Refills: 0 | Status: SHIPPED | OUTPATIENT
Start: 2020-06-25 | End: 2020-07-06

## 2020-07-09 ENCOUNTER — TELEPHONE (OUTPATIENT)
Dept: INTERNAL MEDICINE CLINIC | Facility: CLINIC | Age: 35
End: 2020-07-09

## 2020-07-09 RX ORDER — HYDROCODONE BITARTRATE AND ACETAMINOPHEN 10; 325 MG/1; MG/1
TABLET ORAL
Qty: 70 TABLET | Refills: 0 | Status: SHIPPED | OUTPATIENT
Start: 2020-07-09 | End: 2020-07-21

## 2020-07-09 NOTE — TELEPHONE ENCOUNTER
Pt paged me and complained of pain right ear. Did go swimming yesterday. Pt denies fevers or any covid 19 symptoms. She has abx eardrop left before so I told her she can use it for now. She cant come in today due to work.  I told her to call back if doesn't

## 2020-07-10 RX ORDER — CYCLOBENZAPRINE HCL 10 MG
10 TABLET ORAL 3 TIMES DAILY PRN
Qty: 90 TABLET | Refills: 0 | Status: SHIPPED | OUTPATIENT
Start: 2020-07-10 | End: 2020-08-06

## 2020-07-13 ENCOUNTER — OFFICE VISIT (OUTPATIENT)
Dept: INTERNAL MEDICINE CLINIC | Facility: CLINIC | Age: 35
End: 2020-07-13
Payer: COMMERCIAL

## 2020-07-13 VITALS
HEIGHT: 66 IN | DIASTOLIC BLOOD PRESSURE: 80 MMHG | HEART RATE: 93 BPM | BODY MASS INDEX: 32.62 KG/M2 | WEIGHT: 203 LBS | RESPIRATION RATE: 12 BRPM | TEMPERATURE: 100 F | SYSTOLIC BLOOD PRESSURE: 136 MMHG

## 2020-07-13 DIAGNOSIS — H65.01 NON-RECURRENT ACUTE SEROUS OTITIS MEDIA OF RIGHT EAR: Primary | ICD-10-CM

## 2020-07-13 PROCEDURE — 99213 OFFICE O/P EST LOW 20 MIN: CPT | Performed by: INTERNAL MEDICINE

## 2020-07-13 RX ORDER — ETONOGESTREL AND ETHINYL ESTRADIOL 11.7; 2.7 MG/1; MG/1
INSERT, EXTENDED RELEASE VAGINAL
COMMUNITY
End: 2020-12-09 | Stop reason: ALTCHOICE

## 2020-07-13 NOTE — PROGRESS NOTES
HPI:    Patient ID: Camilla Resendiz is a 37year old female. Ear Pain    There is pain in both ears. This is a new problem. The current episode started in the past 7 days (5 days). The problem occurs constantly. The problem has been waxing and waning.  [address] 1 drop into both eyes every 6 (six) hours. (Patient not taking: Reported on 7/13/2020 ) 1 Bottle 0   • hydrocodone-homatropine (HYDROMET) 5-1.5 MG/5ML Oral Syrup Take 5 mL by mouth every 6 (six) hours as needed.  (Patient not taking: Reported on 7/13/

## 2020-07-13 NOTE — TELEPHONE ENCOUNTER
Patient calling in regarding continued right ear pain. (see note below) no fever, sore throat or cough.      Scheduled OV at 11 am today and canceled her appointment for tomorrow per her request.

## 2020-07-21 ENCOUNTER — TELEPHONE (OUTPATIENT)
Dept: INTERNAL MEDICINE CLINIC | Facility: CLINIC | Age: 35
End: 2020-07-21

## 2020-07-21 RX ORDER — BUTALBITAL, ACETAMINOPHEN AND CAFFEINE 50; 325; 40 MG/1; MG/1; MG/1
1 TABLET ORAL EVERY 8 HOURS PRN
Qty: 30 TABLET | Refills: 0 | Status: SHIPPED | OUTPATIENT
Start: 2020-07-21 | End: 2020-11-09

## 2020-07-21 RX ORDER — HYDROCODONE BITARTRATE AND ACETAMINOPHEN 10; 325 MG/1; MG/1
TABLET ORAL
Qty: 77 TABLET | Refills: 0 | Status: SHIPPED | OUTPATIENT
Start: 2020-07-21 | End: 2020-07-31

## 2020-07-21 NOTE — TELEPHONE ENCOUNTER
Pt just needed me to sign her fmla form today and get refill of her bultalbiltal and norco.   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  She said she had juan

## 2020-07-28 RX ORDER — FLUTICASONE PROPIONATE 50 MCG
2 SPRAY, SUSPENSION (ML) NASAL DAILY
Qty: 16 G | Refills: 2 | Status: SHIPPED | OUTPATIENT
Start: 2020-07-28 | End: 2020-08-12

## 2020-07-28 NOTE — TELEPHONE ENCOUNTER
Dr Ewelina Mendez:   Patient called requested refill for flonase. Requested Prescriptions     Pending Prescriptions Disp Refills   • Fluticasone Propionate 50 MCG/ACT Nasal Suspension 16 g 2     Si sprays by Nasal route daily.          Recent Visits  Ruben

## 2020-07-30 ENCOUNTER — PATIENT MESSAGE (OUTPATIENT)
Dept: INTERNAL MEDICINE CLINIC | Facility: CLINIC | Age: 35
End: 2020-07-30

## 2020-07-30 RX ORDER — HYDROCODONE BITARTRATE AND ACETAMINOPHEN 10; 325 MG/1; MG/1
TABLET ORAL
Qty: 77 TABLET | Refills: 0 | Status: CANCELLED | OUTPATIENT
Start: 2020-07-30

## 2020-07-30 NOTE — TELEPHONE ENCOUNTER
From: Isabel Rasheed  To: Aleena Britton MD  Sent: 7/30/2020 12:09 PM CDT  Subject: Other    Hi Dr. Windy Hartley-  With Clari Childs being sick and his Covid test still pending I have been sleeping on the couch and my back is very sore.      Itook an extra [employer]

## 2020-07-31 ENCOUNTER — TELEPHONE (OUTPATIENT)
Dept: INTERNAL MEDICINE CLINIC | Facility: CLINIC | Age: 35
End: 2020-07-31

## 2020-07-31 RX ORDER — HYDROCODONE BITARTRATE AND ACETAMINOPHEN 10; 325 MG/1; MG/1
TABLET ORAL
Qty: 98 TABLET | Refills: 0 | Status: SHIPPED | OUTPATIENT
Start: 2020-08-01 | End: 2020-08-12

## 2020-07-31 NOTE — TELEPHONE ENCOUNTER
Spoke with patient ( verified) RE: Renzo message below:    Patient reports back pain 6-7/10, intermittent d/t sleeping on the couch. Patient confirms she did  2 week supply of Norco 2020, but will run out by tomorrow night.     Please see 7

## 2020-07-31 NOTE — TELEPHONE ENCOUNTER
Patient calling on the status of her refill for [place]. Spoke with pharmacy and they are in receipt and will fill tomorrow 8/1/20 per directions. They noted that insurance will not cover until 8/3/20. Patient aware that this will be a self pay.

## 2020-07-31 NOTE — TELEPHONE ENCOUNTER
Ok to refill earlier and can increase dose for now to 7tab/day for next 2 weeks. erx will be sent to refill for tomorrow.

## 2020-08-06 ENCOUNTER — TELEPHONE (OUTPATIENT)
Dept: INTERNAL MEDICINE CLINIC | Facility: CLINIC | Age: 35
End: 2020-08-06

## 2020-08-06 RX ORDER — CYCLOBENZAPRINE HCL 10 MG
10 TABLET ORAL 3 TIMES DAILY PRN
Qty: 90 TABLET | Refills: 0 | Status: SHIPPED | OUTPATIENT
Start: 2020-08-06 | End: 2020-08-30

## 2020-08-06 NOTE — TELEPHONE ENCOUNTER
Current Outpatient Medications:     •  cyclobenzaprine 10 MG Oral Tab, Take 1 tablet (10 mg total) by mouth 3 (three) times daily as needed for Muscle spasms. , Disp: 90 tablet, Rfl: 0

## 2020-08-10 RX ORDER — KETOROLAC TROMETHAMINE 10 MG/1
TABLET, FILM COATED ORAL
Qty: 20 TABLET | Refills: 0 | Status: SHIPPED | OUTPATIENT
Start: 2020-08-10 | End: 2021-05-15

## 2020-08-12 ENCOUNTER — OFFICE VISIT (OUTPATIENT)
Dept: INTERNAL MEDICINE CLINIC | Facility: CLINIC | Age: 35
End: 2020-08-12
Payer: COMMERCIAL

## 2020-08-12 VITALS
HEART RATE: 114 BPM | HEIGHT: 66 IN | SYSTOLIC BLOOD PRESSURE: 137 MMHG | WEIGHT: 203 LBS | TEMPERATURE: 100 F | BODY MASS INDEX: 32.62 KG/M2 | DIASTOLIC BLOOD PRESSURE: 86 MMHG

## 2020-08-12 DIAGNOSIS — R35.0 FREQUENCY OF URINATION: Primary | ICD-10-CM

## 2020-08-12 DIAGNOSIS — M54.50 CHRONIC LOW BACK PAIN, UNSPECIFIED BACK PAIN LATERALITY, UNSPECIFIED WHETHER SCIATICA PRESENT: ICD-10-CM

## 2020-08-12 DIAGNOSIS — G89.29 CHRONIC LOW BACK PAIN, UNSPECIFIED BACK PAIN LATERALITY, UNSPECIFIED WHETHER SCIATICA PRESENT: ICD-10-CM

## 2020-08-12 LAB
APPEARANCE: CLEAR
GLUCOSE (URINE DIPSTICK): NEGATIVE MG/DL
KETONES (URINE DIPSTICK): NEGATIVE MG/DL
MULTISTIX LOT#: 1044 NUMERIC
NITRITE, URINE: NEGATIVE
PH, URINE: 6.5 (ref 4.5–8)
PROTEIN (URINE DIPSTICK): 30 MG/DL
SPECIFIC GRAVITY: 1.02 (ref 1–1.03)
URINE-COLOR: YELLOW
UROBILINOGEN,SEMI-QN: 0.2 MG/DL (ref 0–1.9)

## 2020-08-12 PROCEDURE — 99213 OFFICE O/P EST LOW 20 MIN: CPT | Performed by: INTERNAL MEDICINE

## 2020-08-12 PROCEDURE — 3079F DIAST BP 80-89 MM HG: CPT | Performed by: INTERNAL MEDICINE

## 2020-08-12 PROCEDURE — 3008F BODY MASS INDEX DOCD: CPT | Performed by: INTERNAL MEDICINE

## 2020-08-12 PROCEDURE — 3075F SYST BP GE 130 - 139MM HG: CPT | Performed by: INTERNAL MEDICINE

## 2020-08-12 PROCEDURE — 81002 URINALYSIS NONAUTO W/O SCOPE: CPT | Performed by: INTERNAL MEDICINE

## 2020-08-12 RX ORDER — CEPHALEXIN 500 MG/1
500 CAPSULE ORAL 2 TIMES DAILY
Qty: 14 CAPSULE | Refills: 0 | Status: SHIPPED | OUTPATIENT
Start: 2020-08-12 | End: 2020-10-08

## 2020-08-12 RX ORDER — HYDROCODONE BITARTRATE AND ACETAMINOPHEN 10; 325 MG/1; MG/1
TABLET ORAL
Qty: 98 TABLET | Refills: 0 | Status: SHIPPED | OUTPATIENT
Start: 2020-08-13 | End: 2020-08-21

## 2020-08-12 NOTE — PROGRESS NOTES
HPI:    Patient ID: Stalin Griffith is a 38year old female. UTI   This is a new problem. The current episode started in the past 7 days (3 days). The problem occurs intermittently. The problem has been unchanged.  Associated symptoms include urinary sym Right Ear: External ear normal.   Left Ear: External ear normal.   Eyes: Conjunctivae are normal. Right eye exhibits no discharge. Left eye exhibits no discharge. No scleral icterus. Neck: No thyromegaly present.    Cardiovascular: Normal rate, regular

## 2020-08-21 ENCOUNTER — TELEPHONE (OUTPATIENT)
Dept: INTERNAL MEDICINE CLINIC | Facility: CLINIC | Age: 35
End: 2020-08-21

## 2020-08-21 RX ORDER — HYDROCODONE BITARTRATE AND ACETAMINOPHEN 10; 325 MG/1; MG/1
TABLET ORAL
Qty: 98 TABLET | Refills: 0 | Status: SHIPPED | OUTPATIENT
Start: 2020-08-26 | End: 2020-09-04

## 2020-08-21 NOTE — TELEPHONE ENCOUNTER
Pt here to pickup her script, going out of town so getting script postdated. Next refill in 2 weeks plan to go back down on previoius dose as before.

## 2020-08-23 ENCOUNTER — PATIENT MESSAGE (OUTPATIENT)
Dept: INTERNAL MEDICINE CLINIC | Facility: CLINIC | Age: 35
End: 2020-08-23

## 2020-08-24 RX ORDER — NYSTATIN 100000 U/G
CREAM TOPICAL
Qty: 15 G | Refills: 0 | Status: SHIPPED | OUTPATIENT
Start: 2020-08-24 | End: 2020-12-09 | Stop reason: ALTCHOICE

## 2020-08-24 NOTE — TELEPHONE ENCOUNTER
From: Clara Tripp  To: Mekhi Murray MD  Sent: 8/23/2020 2:53 PM CDT  Subject: Non-Urgent Medical Question    Hi,     My sister is an RN and for the last 2-3 months about once a month the corners of my mouth dry out and crack.  She believes it is

## 2020-08-31 RX ORDER — CYCLOBENZAPRINE HCL 10 MG
10 TABLET ORAL 3 TIMES DAILY PRN
Qty: 90 TABLET | Refills: 0 | Status: SHIPPED | OUTPATIENT
Start: 2020-08-31 | End: 2020-09-23

## 2020-09-01 ENCOUNTER — OFFICE VISIT (OUTPATIENT)
Dept: INTERNAL MEDICINE CLINIC | Facility: CLINIC | Age: 35
End: 2020-09-01
Payer: COMMERCIAL

## 2020-09-01 ENCOUNTER — TELEPHONE (OUTPATIENT)
Dept: INTERNAL MEDICINE CLINIC | Facility: CLINIC | Age: 35
End: 2020-09-01

## 2020-09-01 VITALS
DIASTOLIC BLOOD PRESSURE: 80 MMHG | SYSTOLIC BLOOD PRESSURE: 126 MMHG | BODY MASS INDEX: 31.66 KG/M2 | HEIGHT: 66 IN | HEART RATE: 93 BPM | RESPIRATION RATE: 2 BRPM | WEIGHT: 197 LBS | TEMPERATURE: 99 F

## 2020-09-01 DIAGNOSIS — K13.0 ANGULAR CHEILITIS: Primary | ICD-10-CM

## 2020-09-01 PROCEDURE — 3008F BODY MASS INDEX DOCD: CPT | Performed by: INTERNAL MEDICINE

## 2020-09-01 PROCEDURE — 3079F DIAST BP 80-89 MM HG: CPT | Performed by: INTERNAL MEDICINE

## 2020-09-01 PROCEDURE — 3074F SYST BP LT 130 MM HG: CPT | Performed by: INTERNAL MEDICINE

## 2020-09-01 PROCEDURE — 99213 OFFICE O/P EST LOW 20 MIN: CPT | Performed by: INTERNAL MEDICINE

## 2020-09-01 RX ORDER — MUPIROCIN CALCIUM 20 MG/G
1 CREAM TOPICAL 2 TIMES DAILY
Qty: 15 G | Refills: 1 | Status: SHIPPED | OUTPATIENT
Start: 2020-09-01 | End: 2020-09-01

## 2020-09-01 NOTE — TELEPHONE ENCOUNTER
Patient called and asked if there was a generic brand of this medication. Her insurance will NOT cover it and out of pocket it was over $300. Please Advise.        Please Use Pharmacy:  [facility] [address], [phone]

## 2020-09-01 NOTE — PROGRESS NOTES
HPI:    Patient ID: Jennifer Richey is a 38year old female. Patient presents today with complaint of persistent sore on angles of her mouth. Had started about 2 weeks ago. She states they were sore/tender and painful.  She had tried moisturizing cream Constitutional: She appears well-developed. No distress. HENT:   Head: Normocephalic and atraumatic.    Right Ear: External ear normal.   Left Ear: External ear normal.   Mouth/Throat: Uvula is midline, oropharynx is clear and moist and mucous membranes a

## 2020-09-01 NOTE — TELEPHONE ENCOUNTER
Dr Shae Hurtado, see pending rx for muciprocin 2% external ointment, the cream ordered earlier is not covered $300!!!   Please reply to pool: EM TRIAGE SUPPORT

## 2020-09-07 RX ORDER — HYDROCODONE BITARTRATE AND ACETAMINOPHEN 10; 325 MG/1; MG/1
TABLET ORAL
Qty: 84 TABLET | Refills: 0 | Status: SHIPPED | OUTPATIENT
Start: 2020-09-07 | End: 2020-09-21

## 2020-09-11 ENCOUNTER — HOSPITAL ENCOUNTER (OUTPATIENT)
Age: 35
Discharge: HOME | End: 2020-09-11
Attending: EMERGENCY MEDICINE
Payer: COMMERCIAL

## 2020-09-11 ENCOUNTER — TELEPHONE (OUTPATIENT)
Dept: INTERNAL MEDICINE CLINIC | Facility: CLINIC | Age: 35
End: 2020-09-11

## 2020-09-11 ENCOUNTER — NURSE TRIAGE (OUTPATIENT)
Dept: INTERNAL MEDICINE CLINIC | Facility: CLINIC | Age: 35
End: 2020-09-11

## 2020-09-11 VITALS
BODY MASS INDEX: 30.37 KG/M2 | WEIGHT: 189 LBS | RESPIRATION RATE: 16 BRPM | SYSTOLIC BLOOD PRESSURE: 183 MMHG | OXYGEN SATURATION: 100 % | HEART RATE: 104 BPM | HEIGHT: 66 IN | TEMPERATURE: 97 F | DIASTOLIC BLOOD PRESSURE: 115 MMHG

## 2020-09-11 DIAGNOSIS — R05.9 COUGH: Primary | ICD-10-CM

## 2020-09-11 DIAGNOSIS — R05.8 COUGH WITH EXPOSURE TO COVID-19 VIRUS: ICD-10-CM

## 2020-09-11 DIAGNOSIS — Z20.822 COUGH WITH EXPOSURE TO COVID-19 VIRUS: ICD-10-CM

## 2020-09-11 DIAGNOSIS — R09.81 CONGESTION OF NASAL SINUS: ICD-10-CM

## 2020-09-11 PROCEDURE — U0003 INFECTIOUS AGENT DETECTION BY NUCLEIC ACID (DNA OR RNA); SEVERE ACUTE RESPIRATORY SYNDROME CORONAVIRUS 2 (SARS-COV-2) (CORONAVIRUS DISEASE [COVID-19]), AMPLIFIED PROBE TECHNIQUE, MAKING USE OF HIGH THROUGHPUT TECHNOLOGIES AS DESCRIBED BY CMS-2020-01-R: HCPCS | Performed by: EMERGENCY MEDICINE

## 2020-09-11 PROCEDURE — 99213 OFFICE O/P EST LOW 20 MIN: CPT | Performed by: EMERGENCY MEDICINE

## 2020-09-11 RX ORDER — HYDROCODONE BITARTRATE AND HOMATROPINE METHYLBROMIDE ORAL SOLUTION 5; 1.5 MG/5ML; MG/5ML
2.5 LIQUID ORAL EVERY 6 HOURS PRN
Qty: 120 ML | Refills: 0 | Status: SHIPPED | OUTPATIENT
Start: 2020-09-11 | End: 2020-09-16

## 2020-09-11 RX ORDER — HYDROCODONE BITARTRATE AND HOMATROPINE METHYLBROMIDE ORAL SOLUTION 5; 1.5 MG/5ML; MG/5ML
5 LIQUID ORAL EVERY 6 HOURS PRN
Qty: 240 ML | Refills: 0 | Status: SHIPPED | OUTPATIENT
Start: 2020-09-11 | End: 2020-09-14

## 2020-09-11 NOTE — ED PROVIDER NOTES
Patient Seen in: [facility] [place]    History   No chief complaint on file. Stated Complaint: Covid Test-Cough, Runny Nose    HPI    Patient here with cough, congestion for 2 days. No travel, no known sick contacts.   Pratibha TABLET BY MOUTH THREE TIMES DAILY AS NEEDED FOR HEADACHE   Etonogestrel-Ethinyl Estradiol (NUVARING) 0.12-0.015 MG/24HR Vaginal Ring,  NuvaRing 0.12 mg-0.015 mg/24 hr vaginal   INSERT 1 RING VAGINALLY FOR 3 WEEKS THEN REMOVE FOR 1 WEEK AND REPEAT AS DIRECT thyromegaly  THROAT: pnd noted, post phaynx injected,  LUNGS: no accessory use, increased upper airway sounds, ctab  CARDIO: RRR without murmur  EXTREMITIES: no cyanosis, clubbing or edema  GI: soft, non-tender, normal bowel sounds  SKIN: good skin turgor,

## 2020-09-11 NOTE — TELEPHONE ENCOUNTER
Would recommend she goes to UC so she can also be possibly tested for covid 19 given current symptoms. I will refill her cough med but she still needs to go to UC. Thanks.

## 2020-09-11 NOTE — TELEPHONE ENCOUNTER
Spoke with pt,  verified  Pt informed of MD recommendation, pt stated she has no one to watch her little one tonight so probably she will go to [facility] tomorrow morning.         STEFFANIE

## 2020-09-11 NOTE — TELEPHONE ENCOUNTER
On-call, received page  Called and spoke to pt   She has a cough and runny nose   No fevers   Was tested for covid   Works from home   Not exposed to anyone with covi that she is aware of but  was sick mohinder 7 weeks ago   She is also asking rosy hinkle

## 2020-09-14 ENCOUNTER — TELEPHONE (OUTPATIENT)
Dept: INTERNAL MEDICINE CLINIC | Facility: CLINIC | Age: 35
End: 2020-09-14

## 2020-09-14 LAB — SARS-COV-2 RNA RESP QL NAA+PROBE: NOT DETECTED

## 2020-09-14 RX ORDER — HYDROCODONE BITARTRATE AND HOMATROPINE METHYLBROMIDE ORAL SOLUTION 5; 1.5 MG/5ML; MG/5ML
5 LIQUID ORAL EVERY 6 HOURS PRN
Qty: 240 ML | Refills: 0 | Status: SHIPPED | OUTPATIENT
Start: 2020-09-14 | End: 2020-11-17

## 2020-09-14 NOTE — TELEPHONE ENCOUNTER
Message # [phone]         2020 07:06a   [MONICAS]  To:  From:  BREANN Hernandez MD:  Phone#:    Amita Corona [phone]  [birth year], INSTRUCTED TO CONTACT THIS MORNING, HAD COVID TEST FRIDAY.  PLEASE ADVISE Paged at number :  PAGE: [phone] at : Sep-

## 2020-09-14 NOTE — TELEPHONE ENCOUNTER
Received a call from the patient who reports the [facility] in [facility] does not have the Hydromet available until Wednesday 9/16/20. Patient is requesting for the rx to be transferred to the [facility] in [address] [place] [address].  This nurse confirmed with the [facility] i

## 2020-09-14 NOTE — TELEPHONE ENCOUNTER
Pharmacy asking for approval to dispense cough suppressant with hydrocodone prescribed on Friday as patient also takes Norco. Provided verbal okay to dispense.

## 2020-09-14 NOTE — TELEPHONE ENCOUNTER
Pt called this am asking for covid 19 testing, still pending, continue her quarantine for now as advised. Once available, will let her know.

## 2020-09-15 NOTE — TELEPHONE ENCOUNTER
Patient contacted, she will stay in quarantine for 10 days, staying in room by herself, masking and maintain social distancing when out of the room. She has reviewed CDC website.

## 2020-09-15 NOTE — TELEPHONE ENCOUNTER
Notify pt; her covid 19 pcr test was negative. Still a chance it can be false negative so would reocmmend completing a 10 day quarantine as per CDC guideline. Continue symptomatic treatment.  ER if symptoms worsens

## 2020-09-18 RX ORDER — HYDROCODONE BITARTRATE AND ACETAMINOPHEN 10; 325 MG/1; MG/1
TABLET ORAL
Qty: 84 TABLET | Refills: 0 | Status: CANCELLED | OUTPATIENT
Start: 2020-09-18

## 2020-09-21 ENCOUNTER — OFFICE VISIT (OUTPATIENT)
Dept: INTERNAL MEDICINE CLINIC | Facility: CLINIC | Age: 35
End: 2020-09-21
Payer: COMMERCIAL

## 2020-09-21 VITALS
SYSTOLIC BLOOD PRESSURE: 124 MMHG | OXYGEN SATURATION: 100 % | DIASTOLIC BLOOD PRESSURE: 76 MMHG | BODY MASS INDEX: 33.15 KG/M2 | WEIGHT: 199 LBS | RESPIRATION RATE: 12 BRPM | HEART RATE: 98 BPM | HEIGHT: 65 IN | TEMPERATURE: 99 F

## 2020-09-21 DIAGNOSIS — R05.9 COUGH: Primary | ICD-10-CM

## 2020-09-21 PROCEDURE — 3008F BODY MASS INDEX DOCD: CPT | Performed by: INTERNAL MEDICINE

## 2020-09-21 PROCEDURE — 99213 OFFICE O/P EST LOW 20 MIN: CPT | Performed by: INTERNAL MEDICINE

## 2020-09-21 PROCEDURE — 3074F SYST BP LT 130 MM HG: CPT | Performed by: INTERNAL MEDICINE

## 2020-09-21 PROCEDURE — 3078F DIAST BP <80 MM HG: CPT | Performed by: INTERNAL MEDICINE

## 2020-09-21 RX ORDER — HYDROCODONE BITARTRATE AND ACETAMINOPHEN 10; 325 MG/1; MG/1
TABLET ORAL
Qty: 84 TABLET | Refills: 0 | Status: SHIPPED | OUTPATIENT
Start: 2020-09-21 | End: 2020-09-30

## 2020-09-21 RX ORDER — HYDROCODONE BITARTRATE AND ACETAMINOPHEN 10; 325 MG/1; MG/1
TABLET ORAL
Qty: 84 TABLET | Refills: 0 | Status: SHIPPED | OUTPATIENT
Start: 2020-09-21 | End: 2020-09-21

## 2020-09-21 NOTE — PROGRESS NOTES
HPI:    Patient ID: Lon Chakraborty is a 38year old female. Cough  This is a new problem. The current episode started in the past 7 days. The problem has been resolved. The problem occurs every few hours. The cough is non-productive.  Associated symptom HOURS AS NEEDED FOR MIGRAINES 20 tablet 0   • Butalbital-APAP-Caffeine -40 MG Oral Tab Take 1 tablet by mouth every 8 (eight) hours as needed for Headaches.  TAKE ONE TABLET BY MOUTH THREE TIMES DAILY AS NEEDED FOR HEADACHE 30 tablet 0   • Etonogestre

## 2020-09-23 ENCOUNTER — TELEPHONE (OUTPATIENT)
Dept: INTERNAL MEDICINE CLINIC | Facility: CLINIC | Age: 35
End: 2020-09-23

## 2020-09-23 RX ORDER — CYCLOBENZAPRINE HCL 10 MG
10 TABLET ORAL 3 TIMES DAILY PRN
Qty: 90 TABLET | Refills: 0 | Status: SHIPPED | OUTPATIENT
Start: 2020-09-23 | End: 2020-10-20

## 2020-10-02 RX ORDER — HYDROCODONE BITARTRATE AND ACETAMINOPHEN 10; 325 MG/1; MG/1
TABLET ORAL
Qty: 84 TABLET | Refills: 0 | Status: SHIPPED | OUTPATIENT
Start: 2020-10-04 | End: 2020-10-13

## 2020-10-08 ENCOUNTER — OFFICE VISIT (OUTPATIENT)
Dept: INTERNAL MEDICINE CLINIC | Facility: CLINIC | Age: 35
End: 2020-10-08
Payer: COMMERCIAL

## 2020-10-08 ENCOUNTER — TELEPHONE (OUTPATIENT)
Dept: INTERNAL MEDICINE CLINIC | Facility: CLINIC | Age: 35
End: 2020-10-08

## 2020-10-08 VITALS
SYSTOLIC BLOOD PRESSURE: 130 MMHG | BODY MASS INDEX: 33.32 KG/M2 | HEART RATE: 90 BPM | DIASTOLIC BLOOD PRESSURE: 80 MMHG | WEIGHT: 200 LBS | HEIGHT: 65 IN | TEMPERATURE: 98 F

## 2020-10-08 DIAGNOSIS — G43.009 MIGRAINE WITHOUT AURA AND WITHOUT STATUS MIGRAINOSUS, NOT INTRACTABLE: Primary | ICD-10-CM

## 2020-10-08 PROCEDURE — 3079F DIAST BP 80-89 MM HG: CPT | Performed by: INTERNAL MEDICINE

## 2020-10-08 PROCEDURE — 3075F SYST BP GE 130 - 139MM HG: CPT | Performed by: INTERNAL MEDICINE

## 2020-10-08 PROCEDURE — 3008F BODY MASS INDEX DOCD: CPT | Performed by: INTERNAL MEDICINE

## 2020-10-08 PROCEDURE — 99213 OFFICE O/P EST LOW 20 MIN: CPT | Performed by: INTERNAL MEDICINE

## 2020-10-08 RX ORDER — NITROFURANTOIN MACROCRYSTALS 100 MG/1
CAPSULE ORAL
COMMUNITY
End: 2020-12-09 | Stop reason: ALTCHOICE

## 2020-10-08 NOTE — PROGRESS NOTES
HPI:    Patient ID: Cristian Nieves is a 38year old female. Headache   This is a chronic problem. The current episode started more than 1 year ago. The problem occurs intermittently (3 to 4 times/month). The problem has been waxing and waning.  The pain MG Oral Tab TAKE 1 TABLET BY MOUTH EVERY 6 HOURS AS NEEDED FOR MIGRAINES 20 tablet 0   • Butalbital-APAP-Caffeine -40 MG Oral Tab Take 1 tablet by mouth every 8 (eight) hours as needed for Headaches.  TAKE ONE TABLET BY MOUTH THREE TIMES DAILY AS NEED without aura and without status migrainosus, not intractable  (primary encounter diagnosis)  Plan: had been tried on prophylactic migraine meds before by neuro but not tolerated by pt. Migraines otherwise  stable on current treatment.  Cpm.         No order

## 2020-10-08 NOTE — TELEPHONE ENCOUNTER
Patient called in stating that she needs to see Dr. Iman Douglas this week in order to keep her FMLA active. Dr. Hilario Ambrosio first available is 10/14 and she is requesting a sooner appointment. Please advise.

## 2020-10-20 ENCOUNTER — TELEPHONE (OUTPATIENT)
Dept: INTERNAL MEDICINE CLINIC | Facility: CLINIC | Age: 35
End: 2020-10-20

## 2020-10-20 RX ORDER — CYCLOBENZAPRINE HCL 10 MG
10 TABLET ORAL 3 TIMES DAILY PRN
Qty: 90 TABLET | Refills: 0 | Status: SHIPPED | OUTPATIENT
Start: 2020-10-20 | End: 2020-11-17

## 2020-10-20 NOTE — TELEPHONE ENCOUNTER
Per pharmacy pt is requesting refill for the following medication. •  cyclobenzaprine 10 MG Oral Tab, Take 1 tablet (10 mg total) by mouth 3 (three) times daily as needed for Muscle spasms. , Disp: 90 tablet, Rfl: 0

## 2020-10-28 RX ORDER — HYDROCODONE BITARTRATE AND ACETAMINOPHEN 10; 325 MG/1; MG/1
TABLET ORAL
Qty: 70 TABLET | Refills: 0 | Status: SHIPPED | OUTPATIENT
Start: 2020-10-28 | End: 2020-11-08

## 2020-11-05 ENCOUNTER — TELEPHONE (OUTPATIENT)
Dept: INTERNAL MEDICINE CLINIC | Facility: CLINIC | Age: 35
End: 2020-11-05

## 2020-11-05 RX ORDER — CEPHALEXIN 500 MG/1
500 CAPSULE ORAL 2 TIMES DAILY
Qty: 14 CAPSULE | Refills: 0 | Status: SHIPPED | OUTPATIENT
Start: 2020-11-05 | End: 2020-12-09 | Stop reason: ALTCHOICE

## 2020-11-05 NOTE — TELEPHONE ENCOUNTER
Pt complained of having dysuria and urgency; no fevers or vomiting or flank pain. Symptoms typical for her utis before. Also father had been positive for covid 19 so has contact.  Pt doesn't ereally want to go out to be tested for covid 19 so she will ob

## 2020-11-06 ENCOUNTER — TELEPHONE (OUTPATIENT)
Dept: INTERNAL MEDICINE CLINIC | Facility: CLINIC | Age: 35
End: 2020-11-06

## 2020-11-06 NOTE — TELEPHONE ENCOUNTER
Patient states she talked to Dr. Martin Martinez yesterday and is quarantining with COVID symptoms. Her father and son also have [place].   Patient requesting a note for work sent to her MyChart that she is under 's care and will need to quarantine fo

## 2020-11-10 RX ORDER — HYDROCODONE BITARTRATE AND ACETAMINOPHEN 10; 325 MG/1; MG/1
TABLET ORAL
Qty: 70 TABLET | Refills: 0 | Status: SHIPPED | OUTPATIENT
Start: 2020-11-10 | End: 2020-11-20

## 2020-11-10 RX ORDER — BUTALBITAL, ACETAMINOPHEN AND CAFFEINE 50; 325; 40 MG/1; MG/1; MG/1
1 TABLET ORAL EVERY 8 HOURS PRN
Qty: 30 TABLET | Refills: 0 | Status: SHIPPED | OUTPATIENT
Start: 2020-11-10 | End: 2021-05-04

## 2020-11-17 ENCOUNTER — TELEPHONE (OUTPATIENT)
Dept: INTERNAL MEDICINE CLINIC | Facility: CLINIC | Age: 35
End: 2020-11-17

## 2020-11-17 RX ORDER — HYDROCODONE BITARTRATE AND HOMATROPINE METHYLBROMIDE ORAL SOLUTION 5; 1.5 MG/5ML; MG/5ML
5 LIQUID ORAL EVERY 6 HOURS PRN
Qty: 240 ML | Refills: 0 | Status: SHIPPED | OUTPATIENT
Start: 2020-11-17 | End: 2021-03-22

## 2020-11-17 NOTE — TELEPHONE ENCOUNTER
Dr Leann Ramirez,    Pharmacy held 11/17/20 hydromet 5-1.5mg/5l due to allergy contraindication to current allergies on chart. Needs MD to approve refill. Pt has taken this medication before without any problems.      Please reply to jeff: WILFREDO Ruiz

## 2020-11-17 NOTE — TELEPHONE ENCOUNTER
Called pt to clarify below pharmacy message and possible allergy to codeine (not listed on her chart).  She states 15 years ago she was an egg donor and was prescribed Tylenol #3 and states she noticed swelling in her hands once she arrived home and only la

## 2020-11-17 NOTE — TELEPHONE ENCOUNTER
Spoke with Glenora Galeazzi at [facility] in [place] and MD message relayed. Late entry pt informed and thankful.

## 2020-11-17 NOTE — TELEPHONE ENCOUNTER
Pharmacy called and advised that the medication listed below has Codeine in it. Pharmacy advised that the patient told them that she is allergic to Codeine based medications.      Can we look into this as she also takes Norco? Can we please call the [facility]

## 2020-11-17 NOTE — TELEPHONE ENCOUNTER
Patient requesting refill of cough syrup. 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    Please reply to pool: EM TRIAGE SUPPORT

## 2020-11-18 RX ORDER — CYCLOBENZAPRINE HCL 10 MG
10 TABLET ORAL 3 TIMES DAILY PRN
Qty: 90 TABLET | Refills: 0 | Status: SHIPPED | OUTPATIENT
Start: 2020-11-18 | End: 2020-12-16

## 2020-11-20 ENCOUNTER — TELEPHONE (OUTPATIENT)
Dept: INTERNAL MEDICINE CLINIC | Facility: CLINIC | Age: 35
End: 2020-11-20

## 2020-11-20 RX ORDER — HYDROCODONE BITARTRATE AND ACETAMINOPHEN 10; 325 MG/1; MG/1
TABLET ORAL
Qty: 70 TABLET | Refills: 0 | Status: SHIPPED | OUTPATIENT
Start: 2020-11-23 | End: 2020-12-03

## 2020-12-05 RX ORDER — HYDROCODONE BITARTRATE AND ACETAMINOPHEN 10; 325 MG/1; MG/1
TABLET ORAL
Qty: 70 TABLET | Refills: 0 | Status: SHIPPED | OUTPATIENT
Start: 2020-12-05 | End: 2020-12-15

## 2020-12-07 RX ORDER — HYDROCODONE BITARTRATE AND ACETAMINOPHEN 10; 325 MG/1; MG/1
TABLET ORAL
Qty: 70 TABLET | Refills: 0 | OUTPATIENT
Start: 2020-12-07

## 2020-12-09 ENCOUNTER — LAB ENCOUNTER (OUTPATIENT)
Dept: LAB | Age: 35
End: 2020-12-09
Attending: INTERNAL MEDICINE
Payer: COMMERCIAL

## 2020-12-09 ENCOUNTER — OFFICE VISIT (OUTPATIENT)
Dept: INTERNAL MEDICINE CLINIC | Facility: CLINIC | Age: 35
End: 2020-12-09
Payer: COMMERCIAL

## 2020-12-09 VITALS
RESPIRATION RATE: 18 BRPM | DIASTOLIC BLOOD PRESSURE: 80 MMHG | WEIGHT: 200.31 LBS | SYSTOLIC BLOOD PRESSURE: 120 MMHG | HEIGHT: 65 IN | TEMPERATURE: 98 F | BODY MASS INDEX: 33.37 KG/M2 | HEART RATE: 95 BPM

## 2020-12-09 DIAGNOSIS — M54.50 CHRONIC LOW BACK PAIN, UNSPECIFIED BACK PAIN LATERALITY, UNSPECIFIED WHETHER SCIATICA PRESENT: ICD-10-CM

## 2020-12-09 DIAGNOSIS — G89.29 CHRONIC LOW BACK PAIN, UNSPECIFIED BACK PAIN LATERALITY, UNSPECIFIED WHETHER SCIATICA PRESENT: ICD-10-CM

## 2020-12-09 DIAGNOSIS — G43.009 MIGRAINE WITHOUT AURA AND WITHOUT STATUS MIGRAINOSUS, NOT INTRACTABLE: ICD-10-CM

## 2020-12-09 DIAGNOSIS — I10 ESSENTIAL HYPERTENSION: ICD-10-CM

## 2020-12-09 DIAGNOSIS — Z00.00 ANNUAL PHYSICAL EXAM: ICD-10-CM

## 2020-12-09 DIAGNOSIS — E78.00 HYPERCHOLESTEREMIA: ICD-10-CM

## 2020-12-09 DIAGNOSIS — Z00.00 ANNUAL PHYSICAL EXAM: Primary | ICD-10-CM

## 2020-12-09 PROCEDURE — 85025 COMPLETE CBC W/AUTO DIFF WBC: CPT

## 2020-12-09 PROCEDURE — 3079F DIAST BP 80-89 MM HG: CPT | Performed by: INTERNAL MEDICINE

## 2020-12-09 PROCEDURE — 84443 ASSAY THYROID STIM HORMONE: CPT

## 2020-12-09 PROCEDURE — 3074F SYST BP LT 130 MM HG: CPT | Performed by: INTERNAL MEDICINE

## 2020-12-09 PROCEDURE — 80361 OPIATES 1 OR MORE: CPT

## 2020-12-09 PROCEDURE — 80061 LIPID PANEL: CPT

## 2020-12-09 PROCEDURE — 80307 DRUG TEST PRSMV CHEM ANLYZR: CPT

## 2020-12-09 PROCEDURE — 82306 VITAMIN D 25 HYDROXY: CPT

## 2020-12-09 PROCEDURE — 99395 PREV VISIT EST AGE 18-39: CPT | Performed by: INTERNAL MEDICINE

## 2020-12-09 PROCEDURE — 80053 COMPREHEN METABOLIC PANEL: CPT

## 2020-12-09 PROCEDURE — 36415 COLL VENOUS BLD VENIPUNCTURE: CPT

## 2020-12-09 PROCEDURE — 3008F BODY MASS INDEX DOCD: CPT | Performed by: INTERNAL MEDICINE

## 2020-12-09 NOTE — PROGRESS NOTES
HPI:    Patient ID: Deondre Fritz is a 38year old female. Patient presents today for her annual physical.      Review of Systems   Constitutional: Negative. HENT: Negative. Eyes: Negative. Respiratory: Negative.           No hemoptysis   Card Urethral stricture       Past Surgical History:   Procedure Laterality Date   • APPENDECTOMY     • EYE SURGERY     •      • OTHER SURGICAL HISTORY      Excision lipoma of left forearm   • OTHER SURGICAL HISTORY  2018    IUD placement   • OTHER SURG There is no rebound and no guarding. Musculoskeletal: Normal range of motion. General: No tenderness or edema. Lymphadenopathy:     She has no cervical adenopathy. Neurological: She is alert and oriented to person, place, and time.    Skin: Sk however did not also helped. We will continue to try to hopefully wean her down or based on her dose they have been trying before.         Orders Placed This Encounter      CBC W Differential W Platelet [E]      Comp Metabolic Panel (14) [E]      Lipid Pan

## 2020-12-16 RX ORDER — CYCLOBENZAPRINE HCL 10 MG
10 TABLET ORAL 3 TIMES DAILY PRN
Qty: 90 TABLET | Refills: 0 | Status: SHIPPED | OUTPATIENT
Start: 2020-12-16 | End: 2021-01-12

## 2020-12-18 ENCOUNTER — OFFICE VISIT (OUTPATIENT)
Dept: INTERNAL MEDICINE CLINIC | Facility: CLINIC | Age: 35
End: 2020-12-18
Payer: COMMERCIAL

## 2020-12-18 ENCOUNTER — NURSE TRIAGE (OUTPATIENT)
Dept: INTERNAL MEDICINE CLINIC | Facility: CLINIC | Age: 35
End: 2020-12-18

## 2020-12-18 VITALS
SYSTOLIC BLOOD PRESSURE: 117 MMHG | DIASTOLIC BLOOD PRESSURE: 84 MMHG | HEIGHT: 65 IN | WEIGHT: 197 LBS | HEART RATE: 102 BPM | BODY MASS INDEX: 32.82 KG/M2

## 2020-12-18 DIAGNOSIS — H60.503 ACUTE OTITIS EXTERNA OF BOTH EARS, UNSPECIFIED TYPE: Primary | ICD-10-CM

## 2020-12-18 PROCEDURE — 3074F SYST BP LT 130 MM HG: CPT | Performed by: INTERNAL MEDICINE

## 2020-12-18 PROCEDURE — 3008F BODY MASS INDEX DOCD: CPT | Performed by: INTERNAL MEDICINE

## 2020-12-18 PROCEDURE — 99213 OFFICE O/P EST LOW 20 MIN: CPT | Performed by: INTERNAL MEDICINE

## 2020-12-18 PROCEDURE — 3079F DIAST BP 80-89 MM HG: CPT | Performed by: INTERNAL MEDICINE

## 2020-12-18 RX ORDER — NEOMYCIN SULFATE, POLYMYXIN B SULFATE AND HYDROCORTISONE 10; 3.5; 1 MG/ML; MG/ML; [USP'U]/ML
4 SUSPENSION/ DROPS AURICULAR (OTIC) 4 TIMES DAILY
Qty: 1 BOTTLE | Refills: 1 | Status: SHIPPED | OUTPATIENT
Start: 2020-12-18 | End: 2021-06-17

## 2020-12-18 RX ORDER — HYDROCODONE BITARTRATE AND ACETAMINOPHEN 10; 325 MG/1; MG/1
TABLET ORAL
Qty: 70 TABLET | Refills: 0 | Status: SHIPPED | OUTPATIENT
Start: 2020-12-18 | End: 2020-12-28

## 2020-12-18 NOTE — PROGRESS NOTES
HPI:    Patient ID: Ainsley Rothman is a 38year old female. Ear Problem   There is pain in both ears. This is a new problem. The current episode started in the past 7 days. The problem occurs constantly. The problem has been unchanged.  There has been n Comment:Other reaction(s): ASPIRIN    HISTORY:  Past Medical History:   Diagnosis Date   • Back pain    • Essential hypertension    • Gestational hypertension    • Herniated disc    • Herniated lumbar intervertebral disc    • Hx of migraine headaches    • decreased hearing is noted. Skin: She is not diaphoretic. ASSESSMENT/PLAN:   (H60.503) Acute otitis externa of both ears, unspecified type  (primary encounter diagnosis)  Plan: Patient given Cortisporin ear solution to be applied in both ears.

## 2020-12-18 NOTE — TELEPHONE ENCOUNTER
Lesa العراقي pt stated that earlier this week she fell asleep in her bathtub. Then she started to feel soreness around both ears she placed a cotton ball and noticed that there is some bloody drainage on both ears.  Pt denied have severe pain, no fever or

## 2020-12-18 NOTE — TELEPHONE ENCOUNTER
Pt was called and she will see  today at 4 pm. Pt denied any other s/sx. No cough, fever sore throat, congestion or runny nose.   Future Appointments   Date Time Provider Simone Alejandro   12/18/2020  3:45 PM MD SOLOMON Jordan

## 2020-12-28 ENCOUNTER — TELEPHONE (OUTPATIENT)
Dept: INTERNAL MEDICINE CLINIC | Facility: CLINIC | Age: 35
End: 2020-12-28

## 2020-12-28 RX ORDER — HYDROCODONE BITARTRATE AND ACETAMINOPHEN 10; 325 MG/1; MG/1
TABLET ORAL
Qty: 70 TABLET | Refills: 0 | Status: SHIPPED | OUTPATIENT
Start: 2020-12-31 | End: 2021-01-12

## 2021-01-12 ENCOUNTER — TELEPHONE (OUTPATIENT)
Dept: INTERNAL MEDICINE CLINIC | Facility: CLINIC | Age: 36
End: 2021-01-12

## 2021-01-12 RX ORDER — HYDROCODONE BITARTRATE AND ACETAMINOPHEN 10; 325 MG/1; MG/1
TABLET ORAL
Qty: 70 TABLET | Refills: 0 | Status: SHIPPED | OUTPATIENT
Start: 2021-01-13 | End: 2021-01-12

## 2021-01-12 RX ORDER — CYCLOBENZAPRINE HCL 10 MG
TABLET ORAL
Qty: 90 TABLET | Refills: 0 | Status: SHIPPED | OUTPATIENT
Start: 2021-01-12 | End: 2021-02-10

## 2021-01-12 RX ORDER — HYDROCODONE BITARTRATE AND ACETAMINOPHEN 10; 325 MG/1; MG/1
TABLET ORAL
Qty: 98 TABLET | Refills: 0 | Status: SHIPPED | OUTPATIENT
Start: 2021-01-13 | End: 2021-01-12

## 2021-01-12 RX ORDER — HYDROCODONE BITARTRATE AND ACETAMINOPHEN 10; 325 MG/1; MG/1
TABLET ORAL
Qty: 98 TABLET | Refills: 0 | Status: SHIPPED | OUTPATIENT
Start: 2021-01-12 | End: 2021-01-25

## 2021-01-12 NOTE — TELEPHONE ENCOUNTER
Pt here to pickup norco script; had increased tail bone pain late last week, she was tryting to help her mother who's car got stuck in mad, pushing the car. Had increased pain.  She had tried to call last week to be seen but no openings and made apptment fo

## 2021-01-14 ENCOUNTER — OFFICE VISIT (OUTPATIENT)
Dept: INTERNAL MEDICINE CLINIC | Facility: CLINIC | Age: 36
End: 2021-01-14
Payer: COMMERCIAL

## 2021-01-14 VITALS
DIASTOLIC BLOOD PRESSURE: 76 MMHG | HEART RATE: 95 BPM | BODY MASS INDEX: 33.53 KG/M2 | WEIGHT: 201.25 LBS | RESPIRATION RATE: 17 BRPM | HEIGHT: 65 IN | SYSTOLIC BLOOD PRESSURE: 118 MMHG | TEMPERATURE: 98 F

## 2021-01-14 DIAGNOSIS — S39.012A ACUTE MYOFASCIAL STRAIN OF LUMBAR REGION, INITIAL ENCOUNTER: Primary | ICD-10-CM

## 2021-01-14 DIAGNOSIS — L81.9 DISCOLORATION OF SKIN OF FINGER: ICD-10-CM

## 2021-01-14 PROCEDURE — 99213 OFFICE O/P EST LOW 20 MIN: CPT | Performed by: INTERNAL MEDICINE

## 2021-01-14 PROCEDURE — 3078F DIAST BP <80 MM HG: CPT | Performed by: INTERNAL MEDICINE

## 2021-01-14 PROCEDURE — 3008F BODY MASS INDEX DOCD: CPT | Performed by: INTERNAL MEDICINE

## 2021-01-14 PROCEDURE — 3074F SYST BP LT 130 MM HG: CPT | Performed by: INTERNAL MEDICINE

## 2021-01-15 NOTE — PROGRESS NOTES
HPI:    Patient ID: Doreen Lea is a 38year old female. Low Back Pain  This is a chronic problem. The current episode started more than 1 year ago. The problem occurs constantly. The problem has been gradually worsening since onset.  The pain is pre mouth every 6 (six) hours as needed.  (Patient not taking: Reported on 12/9/2020 ) 240 mL 0   • Ketorolac Tromethamine 10 MG Oral Tab TAKE 1 TABLET BY MOUTH EVERY 6 HOURS AS NEEDED FOR MIGRAINES (Patient not taking: Reported on 12/18/2020 ) 20 tablet 0 exhibits no discharge. Left eye exhibits no discharge. No scleral icterus. Neck: Normal range of motion. No thyromegaly present. Cardiovascular: Normal rate, regular rhythm and normal heart sounds. No murmur heard.   Pulmonary/Chest: Effort normal. No

## 2021-01-25 ENCOUNTER — TELEPHONE (OUTPATIENT)
Dept: INTERNAL MEDICINE CLINIC | Facility: CLINIC | Age: 36
End: 2021-01-25

## 2021-01-25 RX ORDER — HYDROCODONE BITARTRATE AND ACETAMINOPHEN 10; 325 MG/1; MG/1
TABLET ORAL
Qty: 91 TABLET | Refills: 0 | Status: SHIPPED | OUTPATIENT
Start: 2021-01-25 | End: 2021-02-04

## 2021-02-04 ENCOUNTER — TELEPHONE (OUTPATIENT)
Dept: INTERNAL MEDICINE CLINIC | Facility: CLINIC | Age: 36
End: 2021-02-04

## 2021-02-04 RX ORDER — HYDROCODONE BITARTRATE AND ACETAMINOPHEN 10; 325 MG/1; MG/1
TABLET ORAL
Qty: 84 TABLET | Refills: 0 | Status: SHIPPED | OUTPATIENT
Start: 2021-02-08 | End: 2021-02-19

## 2021-02-10 RX ORDER — CYCLOBENZAPRINE HCL 10 MG
TABLET ORAL
Qty: 90 TABLET | Refills: 0 | Status: SHIPPED | OUTPATIENT
Start: 2021-02-10 | End: 2021-03-09

## 2021-02-15 ENCOUNTER — OFFICE VISIT (OUTPATIENT)
Dept: INTERNAL MEDICINE CLINIC | Facility: CLINIC | Age: 36
End: 2021-02-15
Payer: COMMERCIAL

## 2021-02-15 VITALS
HEIGHT: 66 IN | RESPIRATION RATE: 12 BRPM | DIASTOLIC BLOOD PRESSURE: 80 MMHG | TEMPERATURE: 99 F | WEIGHT: 210 LBS | SYSTOLIC BLOOD PRESSURE: 126 MMHG | HEART RATE: 101 BPM | BODY MASS INDEX: 33.75 KG/M2

## 2021-02-15 DIAGNOSIS — N30.00 ACUTE CYSTITIS WITHOUT HEMATURIA: Primary | ICD-10-CM

## 2021-02-15 PROCEDURE — 3079F DIAST BP 80-89 MM HG: CPT | Performed by: INTERNAL MEDICINE

## 2021-02-15 PROCEDURE — 3008F BODY MASS INDEX DOCD: CPT | Performed by: INTERNAL MEDICINE

## 2021-02-15 PROCEDURE — 3074F SYST BP LT 130 MM HG: CPT | Performed by: INTERNAL MEDICINE

## 2021-02-15 PROCEDURE — 99213 OFFICE O/P EST LOW 20 MIN: CPT | Performed by: INTERNAL MEDICINE

## 2021-02-15 RX ORDER — TERBINAFINE HYDROCHLORIDE 250 MG/1
500 TABLET ORAL DAILY
COMMUNITY
Start: 2021-02-06 | End: 2021-06-17

## 2021-02-15 RX ORDER — NITROFURANTOIN 25; 75 MG/1; MG/1
100 CAPSULE ORAL 2 TIMES DAILY
Qty: 14 CAPSULE | Refills: 0 | Status: SHIPPED | OUTPATIENT
Start: 2021-02-15 | End: 2021-04-08 | Stop reason: ALTCHOICE

## 2021-02-15 NOTE — PROGRESS NOTES
HPI:    Patient ID: Clara Tripp is a 38year old female. Urinary  This is a new problem. The current episode started in the past 7 days (4 days). The problem occurs intermittently. The problem has been gradually improving.  Associated symptoms includ Diagnosis Date   • Back pain    • Essential hypertension    • Gestational hypertension    • Herniated disc    • Herniated lumbar intervertebral disc    • Hx of migraine headaches    • Hyperlipidemia    • Mass of left forearm 2013   • Migraines    • Cynthia Pierre She is alert. Skin: No rash noted. She is not diaphoretic.             ASSESSMENT/PLAN:   (N30.00) Acute cystitis without hematuria  (primary encounter diagnosis)  Plan: URINALYSIS WITH CULTURE REFLEX        Pt already had started abx keflex for past 2 da

## 2021-02-19 ENCOUNTER — TELEPHONE (OUTPATIENT)
Dept: INTERNAL MEDICINE CLINIC | Facility: CLINIC | Age: 36
End: 2021-02-19

## 2021-02-19 RX ORDER — HYDROCODONE BITARTRATE AND ACETAMINOPHEN 10; 325 MG/1; MG/1
TABLET ORAL
Qty: 70 TABLET | Refills: 0 | Status: SHIPPED | OUTPATIENT
Start: 2021-02-21 | End: 2021-03-04

## 2021-03-04 RX ORDER — HYDROCODONE BITARTRATE AND ACETAMINOPHEN 10; 325 MG/1; MG/1
TABLET ORAL
Qty: 70 TABLET | Refills: 0 | Status: SHIPPED | OUTPATIENT
Start: 2021-03-04 | End: 2021-03-16

## 2021-03-09 RX ORDER — CYCLOBENZAPRINE HCL 10 MG
10 TABLET ORAL 3 TIMES DAILY PRN
Qty: 90 TABLET | Refills: 0 | Status: SHIPPED | OUTPATIENT
Start: 2021-03-09 | End: 2021-04-07

## 2021-03-16 RX ORDER — HYDROCODONE BITARTRATE AND ACETAMINOPHEN 10; 325 MG/1; MG/1
TABLET ORAL
Qty: 70 TABLET | Refills: 0 | Status: SHIPPED | OUTPATIENT
Start: 2021-03-16 | End: 2021-03-29

## 2021-03-22 ENCOUNTER — TELEPHONE (OUTPATIENT)
Dept: INTERNAL MEDICINE CLINIC | Facility: CLINIC | Age: 36
End: 2021-03-22

## 2021-03-22 RX ORDER — HYDROCODONE BITARTRATE AND HOMATROPINE METHYLBROMIDE ORAL SOLUTION 5; 1.5 MG/5ML; MG/5ML
5 LIQUID ORAL EVERY 6 HOURS PRN
Qty: 240 ML | Refills: 0 | Status: SHIPPED | OUTPATIENT
Start: 2021-03-22 | End: 2021-05-11

## 2021-03-22 NOTE — TELEPHONE ENCOUNTER
Patient requesting refill cough medicine, see pending rx    Patient has developed a cough for about last 2 days, she has scheduled a video visit for tomorrow. She has cough at night while laying down, or after talking for a long time.  Without fever/runny

## 2021-03-23 ENCOUNTER — TELEMEDICINE (OUTPATIENT)
Dept: INTERNAL MEDICINE CLINIC | Facility: CLINIC | Age: 36
End: 2021-03-23

## 2021-03-23 DIAGNOSIS — R05.9 COUGH: Primary | ICD-10-CM

## 2021-03-23 PROCEDURE — 99213 OFFICE O/P EST LOW 20 MIN: CPT | Performed by: INTERNAL MEDICINE

## 2021-03-24 ENCOUNTER — LAB ENCOUNTER (OUTPATIENT)
Dept: LAB | Age: 36
End: 2021-03-24
Attending: INTERNAL MEDICINE
Payer: COMMERCIAL

## 2021-03-24 DIAGNOSIS — R05.9 COUGH: ICD-10-CM

## 2021-03-24 NOTE — PROGRESS NOTES
HPI/Subjective:     Patient ID: Doreen Lea is a 38year old female. This is a telemedicine visit with live, interactive video and audio.       Patient understands and accepts financial responsibility for any deductible, co-insurance and/or co-pays a Neomycin-Polymyxin-HC 3.5-72383-5 Otic Suspension Place 4 drops into both ears 4 (four) times daily.  (Patient not taking: Reported on 1/14/2021 ) 1 Bottle 1   • Butalbital-APAP-Caffeine -40 MG Oral Tab Take 1 tablet by mouth every 8 (eight) hours as standard drinks      Comment: Rarely    Drug use: No       Objective:   Physical Exam  Constitutional:       General: She is not in acute distress. Appearance: Normal appearance. She is not ill-appearing or toxic-appearing.    Neurological:      Mental

## 2021-03-26 LAB — SARS-COV-2 RNA RESP QL NAA+PROBE: NOT DETECTED

## 2021-03-30 RX ORDER — HYDROCODONE BITARTRATE AND ACETAMINOPHEN 10; 325 MG/1; MG/1
TABLET ORAL
Qty: 70 TABLET | Refills: 0 | Status: SHIPPED | OUTPATIENT
Start: 2021-03-30 | End: 2021-04-08

## 2021-04-07 RX ORDER — CYCLOBENZAPRINE HCL 10 MG
TABLET ORAL
Qty: 90 TABLET | Refills: 0 | Status: SHIPPED | OUTPATIENT
Start: 2021-04-07 | End: 2021-05-12

## 2021-04-08 ENCOUNTER — OFFICE VISIT (OUTPATIENT)
Dept: INTERNAL MEDICINE CLINIC | Facility: CLINIC | Age: 36
End: 2021-04-08
Payer: COMMERCIAL

## 2021-04-08 ENCOUNTER — TELEPHONE (OUTPATIENT)
Dept: INTERNAL MEDICINE CLINIC | Facility: CLINIC | Age: 36
End: 2021-04-08

## 2021-04-08 VITALS
DIASTOLIC BLOOD PRESSURE: 80 MMHG | HEIGHT: 66 IN | BODY MASS INDEX: 33.32 KG/M2 | SYSTOLIC BLOOD PRESSURE: 132 MMHG | HEART RATE: 98 BPM | WEIGHT: 207.31 LBS | TEMPERATURE: 97 F

## 2021-04-08 DIAGNOSIS — Z01.818 PREOP GENERAL PHYSICAL EXAM: Primary | ICD-10-CM

## 2021-04-08 DIAGNOSIS — I10 ESSENTIAL HYPERTENSION: ICD-10-CM

## 2021-04-08 DIAGNOSIS — H02.401 PTOSIS, RIGHT EYELID: ICD-10-CM

## 2021-04-08 PROCEDURE — 3075F SYST BP GE 130 - 139MM HG: CPT | Performed by: INTERNAL MEDICINE

## 2021-04-08 PROCEDURE — 99214 OFFICE O/P EST MOD 30 MIN: CPT | Performed by: INTERNAL MEDICINE

## 2021-04-08 PROCEDURE — 3008F BODY MASS INDEX DOCD: CPT | Performed by: INTERNAL MEDICINE

## 2021-04-08 PROCEDURE — 3079F DIAST BP 80-89 MM HG: CPT | Performed by: INTERNAL MEDICINE

## 2021-04-08 RX ORDER — HYDROCODONE BITARTRATE AND ACETAMINOPHEN 10; 325 MG/1; MG/1
TABLET ORAL
Qty: 112 TABLET | Refills: 0 | Status: SHIPPED | OUTPATIENT
Start: 2021-04-08 | End: 2021-04-22

## 2021-04-08 NOTE — TELEPHONE ENCOUNTER
Patient having right eyelid surgery 4/14/2021 and needs to have a pre-op exam today with Dr Tigist Adams. Patient is going out of town tomorrow. Please advise if patient can be added in today.

## 2021-04-08 NOTE — TELEPHONE ENCOUNTER
Advised patient of Dr. Bianca Lowe note. Patient verbalized understanding. Appointment made for today at 4:30pm with Dr Cedric Iyer in [address] [place] [address]. Travel screening completed. [4/8/2021 12:16 PM]  Debbie Martinez MD:    add her in at 4:30pm today.

## 2021-04-08 NOTE — PROGRESS NOTES
HPI/Subjective:     Patient ID: Mann Buchanan is a 38year old female.     Patient presents today for preop medical clearance as requested by Dr. Cathy Brasher ophthalmologist, for scheduled right ptosis repair to be done April 14, 2021 in [address] [place] [address] Essential hypertension    • Gestational hypertension    • Herniated disc    • Herniated lumbar intervertebral disc    • Hx of migraine headaches    • Hyperlipidemia    • Mass of left forearm 2013   • Migraines    • Obesity, unspecified    • Urethral strict rate and regular rhythm. Pulses: Normal pulses. Heart sounds: Normal heart sounds. No murmur heard. No gallop. Pulmonary:      Effort: Pulmonary effort is normal. No respiratory distress. Breath sounds: Normal breath sounds.  No wheezing

## 2021-04-22 ENCOUNTER — TELEPHONE (OUTPATIENT)
Dept: INTERNAL MEDICINE CLINIC | Facility: CLINIC | Age: 36
End: 2021-04-22

## 2021-04-22 RX ORDER — HYDROCODONE BITARTRATE AND ACETAMINOPHEN 10; 325 MG/1; MG/1
TABLET ORAL
Qty: 84 TABLET | Refills: 0 | Status: SHIPPED | OUTPATIENT
Start: 2021-04-22 | End: 2021-05-04

## 2021-05-03 ENCOUNTER — PATIENT MESSAGE (OUTPATIENT)
Dept: FAMILY MEDICINE CLINIC | Facility: CLINIC | Age: 36
End: 2021-05-03

## 2021-05-04 ENCOUNTER — OFFICE VISIT (OUTPATIENT)
Dept: INTERNAL MEDICINE CLINIC | Facility: CLINIC | Age: 36
End: 2021-05-04
Payer: COMMERCIAL

## 2021-05-04 VITALS
DIASTOLIC BLOOD PRESSURE: 80 MMHG | TEMPERATURE: 98 F | SYSTOLIC BLOOD PRESSURE: 126 MMHG | BODY MASS INDEX: 33.11 KG/M2 | HEART RATE: 95 BPM | WEIGHT: 206 LBS | HEIGHT: 66 IN

## 2021-05-04 DIAGNOSIS — G43.009 MIGRAINE WITHOUT AURA AND WITHOUT STATUS MIGRAINOSUS, NOT INTRACTABLE: Primary | ICD-10-CM

## 2021-05-04 PROCEDURE — 3008F BODY MASS INDEX DOCD: CPT | Performed by: INTERNAL MEDICINE

## 2021-05-04 PROCEDURE — 3074F SYST BP LT 130 MM HG: CPT | Performed by: INTERNAL MEDICINE

## 2021-05-04 PROCEDURE — 3079F DIAST BP 80-89 MM HG: CPT | Performed by: INTERNAL MEDICINE

## 2021-05-04 PROCEDURE — 99213 OFFICE O/P EST LOW 20 MIN: CPT | Performed by: INTERNAL MEDICINE

## 2021-05-04 RX ORDER — BUTALBITAL, ACETAMINOPHEN AND CAFFEINE 50; 325; 40 MG/1; MG/1; MG/1
1 TABLET ORAL EVERY 8 HOURS PRN
Qty: 30 TABLET | Refills: 0 | Status: SHIPPED | OUTPATIENT
Start: 2021-05-04 | End: 2021-07-13

## 2021-05-04 RX ORDER — HYDROCODONE BITARTRATE AND ACETAMINOPHEN 10; 325 MG/1; MG/1
TABLET ORAL
Qty: 84 TABLET | Refills: 0 | Status: SHIPPED | OUTPATIENT
Start: 2021-05-04 | End: 2021-05-15

## 2021-05-04 NOTE — TELEPHONE ENCOUNTER
[facility] approval notice for Hydrocodone/Acetaminophen received and placed in Dr. Mansfield Bound in basket for review.

## 2021-05-10 RX ORDER — HYDROCODONE BITARTRATE AND HOMATROPINE METHYLBROMIDE ORAL SOLUTION 5; 1.5 MG/5ML; MG/5ML
5 LIQUID ORAL EVERY 6 HOURS PRN
Qty: 240 ML | Refills: 0 | Status: CANCELLED | OUTPATIENT
Start: 2021-05-10

## 2021-05-10 NOTE — TELEPHONE ENCOUNTER
She needs to go to local UC or ER, needs to get tested for covid since she has traveled and now has symptojms .

## 2021-05-10 NOTE — TELEPHONE ENCOUNTER
Spoke with pt,  verified, pt stated he was seen by PCP on 21. Pt is currently out of [facility]) and pt will be back in [place] on Wednesday.    Pt stated she got sick 2 days after she got there, pt c/o cough with clear phlegm, sore throat, runny n

## 2021-05-11 ENCOUNTER — TELEMEDICINE (OUTPATIENT)
Dept: INTERNAL MEDICINE CLINIC | Facility: CLINIC | Age: 36
End: 2021-05-11

## 2021-05-11 DIAGNOSIS — R05.9 COUGH: Primary | ICD-10-CM

## 2021-05-11 PROCEDURE — 99213 OFFICE O/P EST LOW 20 MIN: CPT | Performed by: INTERNAL MEDICINE

## 2021-05-11 RX ORDER — HYDROCODONE BITARTRATE AND HOMATROPINE METHYLBROMIDE ORAL SOLUTION 5; 1.5 MG/5ML; MG/5ML
5 LIQUID ORAL EVERY 6 HOURS PRN
Qty: 240 ML | Refills: 0 | Status: SHIPPED | OUTPATIENT
Start: 2021-05-11 | End: 2021-10-02

## 2021-05-11 NOTE — TELEPHONE ENCOUNTER
Patient informed of message below. Patient was tested in [place] yesterday, negative result. States she was tested on day 5. Patient returned home this morning from [place]. Patient is returning to work tomorrow. She is asking for cough syrup.      P

## 2021-05-11 NOTE — PROGRESS NOTES
HPI/Subjective:     Patient ID: Unique Rae is a 38year old female. Cough  This is a new problem. The current episode started in the past 7 days. The problem has been unchanged. The problem occurs every few hours. The cough is productive of sputum. by mouth daily. (Patient not taking: Reported on 4/8/2021 )     • Neomycin-Polymyxin-HC 3.5-91576-9 Otic Suspension Place 4 drops into both ears 4 (four) times daily.  (Patient not taking: Reported on 1/14/2021 ) 1 Bottle 1   • Ketorolac Tromethamine 10 MG Exam  Constitutional:       General: She is not in acute distress. Appearance: She is obese. She is not ill-appearing or toxic-appearing. Neurological:      Mental Status: She is alert.          Assessment & Plan:   (R05) Cough  (primary encounter danilo

## 2021-05-12 RX ORDER — CYCLOBENZAPRINE HCL 10 MG
TABLET ORAL
Qty: 90 TABLET | Refills: 0 | Status: SHIPPED | OUTPATIENT
Start: 2021-05-12 | End: 2021-06-20

## 2021-05-12 RX ORDER — CYCLOBENZAPRINE HCL 10 MG
10 TABLET ORAL 3 TIMES DAILY PRN
Qty: 90 TABLET | Refills: 0 | OUTPATIENT
Start: 2021-05-12

## 2021-05-15 ENCOUNTER — OFFICE VISIT (OUTPATIENT)
Dept: INTERNAL MEDICINE CLINIC | Facility: CLINIC | Age: 36
End: 2021-05-15
Payer: COMMERCIAL

## 2021-05-15 ENCOUNTER — HOSPITAL ENCOUNTER (OUTPATIENT)
Dept: GENERAL RADIOLOGY | Age: 36
Discharge: HOME | End: 2021-05-15
Attending: INTERNAL MEDICINE
Payer: COMMERCIAL

## 2021-05-15 VITALS
TEMPERATURE: 99 F | SYSTOLIC BLOOD PRESSURE: 151 MMHG | HEART RATE: 109 BPM | BODY MASS INDEX: 36.25 KG/M2 | RESPIRATION RATE: 12 BRPM | DIASTOLIC BLOOD PRESSURE: 99 MMHG | HEIGHT: 62 IN | WEIGHT: 197 LBS

## 2021-05-15 DIAGNOSIS — G89.11 ACUTE LOW BACK PAIN DUE TO TRAUMA: ICD-10-CM

## 2021-05-15 DIAGNOSIS — M54.50 ACUTE LOW BACK PAIN DUE TO TRAUMA: Primary | ICD-10-CM

## 2021-05-15 DIAGNOSIS — M54.50 ACUTE LOW BACK PAIN DUE TO TRAUMA: ICD-10-CM

## 2021-05-15 DIAGNOSIS — G89.11 ACUTE LOW BACK PAIN DUE TO TRAUMA: Primary | ICD-10-CM

## 2021-05-15 PROCEDURE — 99213 OFFICE O/P EST LOW 20 MIN: CPT | Performed by: INTERNAL MEDICINE

## 2021-05-15 PROCEDURE — 72100 X-RAY EXAM L-S SPINE 2/3 VWS: CPT | Performed by: INTERNAL MEDICINE

## 2021-05-15 PROCEDURE — 3008F BODY MASS INDEX DOCD: CPT | Performed by: INTERNAL MEDICINE

## 2021-05-15 PROCEDURE — 3080F DIAST BP >= 90 MM HG: CPT | Performed by: INTERNAL MEDICINE

## 2021-05-15 PROCEDURE — 3077F SYST BP >= 140 MM HG: CPT | Performed by: INTERNAL MEDICINE

## 2021-05-15 RX ORDER — HYDROCODONE BITARTRATE AND ACETAMINOPHEN 10; 325 MG/1; MG/1
TABLET ORAL
Qty: 112 TABLET | Refills: 0 | Status: SHIPPED | OUTPATIENT
Start: 2021-05-15 | End: 2021-05-27

## 2021-05-15 NOTE — PROGRESS NOTES
HPI/Subjective:     Patient ID: Unique Rae is a 38year old female. Back Pain  This is a recurrent problem. The problem occurs constantly. The problem is unchanged. The pain is present in the lumbar spine.  The quality of the pain is described as ac (four) times daily. (Patient not taking: Reported on 1/14/2021 ) 1 Bottle 1     Allergies:  Dust Mite Extract       HIVES    Comment:Nasal congestion  Grass                   HIVES    Comment:Nasal congestion  Aspirin                 SWELLING    Comment: Ot Pulses: Normal pulses. Heart sounds: Normal heart sounds. No murmur heard. Pulmonary:      Effort: No respiratory distress. Breath sounds: Normal breath sounds. Abdominal:      General: Abdomen is flat.  Bowel sounds are normal. There is no

## 2021-05-27 ENCOUNTER — TELEPHONE (OUTPATIENT)
Dept: INTERNAL MEDICINE CLINIC | Facility: CLINIC | Age: 36
End: 2021-05-27

## 2021-05-27 RX ORDER — HYDROCODONE BITARTRATE AND ACETAMINOPHEN 10; 325 MG/1; MG/1
TABLET ORAL
Qty: 112 TABLET | Refills: 0 | Status: SHIPPED | OUTPATIENT
Start: 2021-05-28 | End: 2021-05-27

## 2021-05-27 RX ORDER — HYDROCODONE BITARTRATE AND ACETAMINOPHEN 10; 325 MG/1; MG/1
TABLET ORAL
Qty: 112 TABLET | Refills: 0 | Status: SHIPPED | OUTPATIENT
Start: 2021-05-28 | End: 2021-06-07

## 2021-06-06 ENCOUNTER — PATIENT MESSAGE (OUTPATIENT)
Dept: INTERNAL MEDICINE CLINIC | Facility: CLINIC | Age: 36
End: 2021-06-06

## 2021-06-07 ENCOUNTER — TELEPHONE (OUTPATIENT)
Dept: INTERNAL MEDICINE CLINIC | Facility: CLINIC | Age: 36
End: 2021-06-07

## 2021-06-07 RX ORDER — HYDROCODONE BITARTRATE AND ACETAMINOPHEN 10; 325 MG/1; MG/1
TABLET ORAL
Qty: 98 TABLET | Refills: 0 | Status: SHIPPED | OUTPATIENT
Start: 2021-06-07 | End: 2021-06-07

## 2021-06-07 RX ORDER — HYDROCODONE BITARTRATE AND ACETAMINOPHEN 10; 325 MG/1; MG/1
TABLET ORAL
Qty: 98 TABLET | Refills: 0 | Status: SHIPPED | OUTPATIENT
Start: 2021-06-07 | End: 2021-06-17

## 2021-06-07 NOTE — TELEPHONE ENCOUNTER
From: Stalin Griffith  To: Antwon Stroud MD  Sent: 6/6/2021 2:19 PM CDT  Subject: Noe Salazar,    First of the two planned trips for the summer months is this week, we discussed this a few months ago but I didn't expect you to

## 2021-06-07 NOTE — TELEPHONE ENCOUNTER
Prescription for Norco generated by Dr. Randa Soriano, Prescription is at [facility] work station. Dr. Ananth Oh, stated to me that patient will p/u.

## 2021-06-07 NOTE — TELEPHONE ENCOUNTER
Script revised date of refill since pt going out of town; [facility] reviewed

## 2021-06-11 ENCOUNTER — NURSE TRIAGE (OUTPATIENT)
Dept: INTERNAL MEDICINE CLINIC | Facility: CLINIC | Age: 36
End: 2021-06-11

## 2021-06-11 NOTE — TELEPHONE ENCOUNTER
Action Requested: Summary for Provider     []  Critical Lab, Recommendations Needed  [] Need Additional Advice  []   FYI    []   Need Orders  [] Need Medications Sent to Pharmacy  []  Other     SUMMARY:  Patient states today she noticed egg sized lump on

## 2021-06-17 ENCOUNTER — OFFICE VISIT (OUTPATIENT)
Dept: INTERNAL MEDICINE CLINIC | Facility: CLINIC | Age: 36
End: 2021-06-17
Payer: COMMERCIAL

## 2021-06-17 VITALS
HEIGHT: 62 IN | BODY MASS INDEX: 38.28 KG/M2 | WEIGHT: 208 LBS | DIASTOLIC BLOOD PRESSURE: 76 MMHG | SYSTOLIC BLOOD PRESSURE: 124 MMHG | HEART RATE: 98 BPM

## 2021-06-17 DIAGNOSIS — R22.2 MASS OF LEFT CHEST WALL: Primary | ICD-10-CM

## 2021-06-17 PROCEDURE — 99213 OFFICE O/P EST LOW 20 MIN: CPT | Performed by: INTERNAL MEDICINE

## 2021-06-17 PROCEDURE — 3008F BODY MASS INDEX DOCD: CPT | Performed by: INTERNAL MEDICINE

## 2021-06-17 PROCEDURE — 3074F SYST BP LT 130 MM HG: CPT | Performed by: INTERNAL MEDICINE

## 2021-06-17 PROCEDURE — 3078F DIAST BP <80 MM HG: CPT | Performed by: INTERNAL MEDICINE

## 2021-06-17 RX ORDER — HYDROCODONE BITARTRATE AND ACETAMINOPHEN 10; 325 MG/1; MG/1
TABLET ORAL
Qty: 180 TABLET | Refills: 0 | Status: SHIPPED | OUTPATIENT
Start: 2021-06-17 | End: 2021-07-13

## 2021-06-17 NOTE — PROGRESS NOTES
HPI/Subjective:     Patient ID: Salvador Benson is a 38year old female. Mass  This is a new (noted a mass on the left upper chest wall ) problem. The current episode started in the past 7 days (4 days). The problem occurs constantly.  The problem has be placement   • OTHER SURGICAL HISTORY      eye surgery   • TONSILLECTOMY        Family History   Problem Relation Age of Onset   • Heart Disease Other    • Hypertension Other    • Glaucoma Other    • Heart Disease Mother    • Lipids Mother    • Hypertension Left cervical: No superficial, deep or posterior cervical adenopathy. Upper Body:      Right upper body: No supraclavicular, axillary or pectoral adenopathy. Left upper body: No supraclavicular, axillary or pectoral adenopathy.    Neurological:

## 2021-06-20 RX ORDER — CYCLOBENZAPRINE HCL 10 MG
TABLET ORAL
Qty: 90 TABLET | Refills: 0 | Status: SHIPPED | OUTPATIENT
Start: 2021-06-20 | End: 2021-08-10

## 2021-07-01 ENCOUNTER — HOSPITAL ENCOUNTER (OUTPATIENT)
Dept: MAMMOGRAPHY | Facility: HOSPITAL | Age: 36
Discharge: HOME | End: 2021-07-01
Attending: SURGERY
Payer: COMMERCIAL

## 2021-07-01 ENCOUNTER — HOSPITAL ENCOUNTER (OUTPATIENT)
Dept: ULTRASOUND IMAGING | Facility: HOSPITAL | Age: 36
Discharge: HOME | End: 2021-07-01
Attending: SURGERY
Payer: COMMERCIAL

## 2021-07-01 DIAGNOSIS — R22.2 MASS OF LEFT CHEST WALL: ICD-10-CM

## 2021-07-01 PROCEDURE — 77066 DX MAMMO INCL CAD BI: CPT | Performed by: SURGERY

## 2021-07-01 PROCEDURE — 76642 ULTRASOUND BREAST LIMITED: CPT | Performed by: SURGERY

## 2021-07-01 PROCEDURE — 77062 BREAST TOMOSYNTHESIS BI: CPT | Performed by: SURGERY

## 2021-07-13 ENCOUNTER — OFFICE VISIT (OUTPATIENT)
Dept: INTERNAL MEDICINE CLINIC | Facility: CLINIC | Age: 36
End: 2021-07-13
Payer: COMMERCIAL

## 2021-07-13 VITALS
RESPIRATION RATE: 12 BRPM | TEMPERATURE: 98 F | HEIGHT: 66 IN | DIASTOLIC BLOOD PRESSURE: 87 MMHG | SYSTOLIC BLOOD PRESSURE: 135 MMHG | HEART RATE: 101 BPM | WEIGHT: 200 LBS | BODY MASS INDEX: 32.14 KG/M2

## 2021-07-13 DIAGNOSIS — G89.29 CHRONIC LOW BACK PAIN, UNSPECIFIED BACK PAIN LATERALITY, UNSPECIFIED WHETHER SCIATICA PRESENT: ICD-10-CM

## 2021-07-13 DIAGNOSIS — D17.79 LIPOMA OF OTHER SPECIFIED SITES: Primary | ICD-10-CM

## 2021-07-13 DIAGNOSIS — M54.50 CHRONIC LOW BACK PAIN, UNSPECIFIED BACK PAIN LATERALITY, UNSPECIFIED WHETHER SCIATICA PRESENT: ICD-10-CM

## 2021-07-13 DIAGNOSIS — G43.009 MIGRAINE WITHOUT AURA AND WITHOUT STATUS MIGRAINOSUS, NOT INTRACTABLE: ICD-10-CM

## 2021-07-13 PROCEDURE — 99213 OFFICE O/P EST LOW 20 MIN: CPT | Performed by: INTERNAL MEDICINE

## 2021-07-13 PROCEDURE — 3008F BODY MASS INDEX DOCD: CPT | Performed by: INTERNAL MEDICINE

## 2021-07-13 PROCEDURE — 3079F DIAST BP 80-89 MM HG: CPT | Performed by: INTERNAL MEDICINE

## 2021-07-13 PROCEDURE — 3075F SYST BP GE 130 - 139MM HG: CPT | Performed by: INTERNAL MEDICINE

## 2021-07-13 RX ORDER — BUTALBITAL, ACETAMINOPHEN AND CAFFEINE 50; 325; 40 MG/1; MG/1; MG/1
1 TABLET ORAL EVERY 8 HOURS PRN
Qty: 30 TABLET | Refills: 0 | Status: SHIPPED | OUTPATIENT
Start: 2021-07-13 | End: 2022-01-11

## 2021-07-13 RX ORDER — HYDROCODONE BITARTRATE AND ACETAMINOPHEN 10; 325 MG/1; MG/1
TABLET ORAL
Qty: 84 TABLET | Refills: 0 | Status: SHIPPED | OUTPATIENT
Start: 2021-07-13 | End: 2021-07-26

## 2021-07-13 RX ORDER — HYDROCODONE BITARTRATE AND ACETAMINOPHEN 10; 325 MG/1; MG/1
TABLET ORAL
Qty: 180 TABLET | Refills: 0 | Status: SHIPPED | OUTPATIENT
Start: 2021-07-13 | End: 2021-07-13

## 2021-07-14 NOTE — PROGRESS NOTES
HPI/Subjective:     Patient ID: Aries Wu is a 38year old female. Patient presents today for ffup regarding the lump on the left upper chest. She did see Dr Elena Nicole surgeon who diagnosed pt to have lipoma on her left upper chest wall.  Pt also ha Family History   Problem Relation Age of Onset   • Heart Disease Other    • Hypertension Other    • Glaucoma Other    • Heart Disease Mother    • Lipids Mother    • Hypertension Mother    • Heart Disorder Mother    • ADHD Sister    • Lipids Father    • Str migraines. refilll given. No orders of the defined types were placed in this encounter.       Meds This Visit:  Requested Prescriptions     Signed Prescriptions Disp Refills   • Butalbital-APAP-Caffeine -40 MG Oral Tab 30 tablet 0     Sig: Take

## 2021-07-26 ENCOUNTER — TELEPHONE (OUTPATIENT)
Dept: INTERNAL MEDICINE CLINIC | Facility: CLINIC | Age: 36
End: 2021-07-26

## 2021-07-26 RX ORDER — HYDROCODONE BITARTRATE AND ACETAMINOPHEN 10; 325 MG/1; MG/1
TABLET ORAL
Qty: 70 TABLET | Refills: 0 | Status: SHIPPED | OUTPATIENT
Start: 2021-07-26 | End: 2021-08-03

## 2021-07-26 NOTE — TELEPHONE ENCOUNTER
[place] to take 1 additional tab over the weekend due to increase in back pain after doing lot of lifting helping relative clean house, also took her flexeril. Better today, refill given for her norco but cut down dose to 5 tabs/day.

## 2021-08-03 ENCOUNTER — TELEPHONE (OUTPATIENT)
Dept: INTERNAL MEDICINE CLINIC | Facility: CLINIC | Age: 36
End: 2021-08-03

## 2021-08-03 RX ORDER — HYDROCODONE BITARTRATE AND ACETAMINOPHEN 10; 325 MG/1; MG/1
TABLET ORAL
Qty: 70 TABLET | Refills: 0 | Status: SHIPPED | OUTPATIENT
Start: 2021-08-08 | End: 2021-08-19

## 2021-08-10 RX ORDER — CYCLOBENZAPRINE HCL 10 MG
TABLET ORAL
Qty: 90 TABLET | Refills: 0 | Status: SHIPPED | OUTPATIENT
Start: 2021-08-10 | End: 2021-09-20

## 2021-08-12 ENCOUNTER — TELEPHONE (OUTPATIENT)
Dept: INTERNAL MEDICINE CLINIC | Facility: CLINIC | Age: 36
End: 2021-08-12

## 2021-08-12 NOTE — TELEPHONE ENCOUNTER
Patient received Moderna Vaccine on 7/14 & 8/10 at [place] in [facility] on [address].

## 2021-08-19 RX ORDER — HYDROCODONE BITARTRATE AND ACETAMINOPHEN 10; 325 MG/1; MG/1
TABLET ORAL
Qty: 70 TABLET | Refills: 0 | Status: SHIPPED | OUTPATIENT
Start: 2021-08-19 | End: 2021-08-30

## 2021-08-19 NOTE — TELEPHONE ENCOUNTER
Please review. Protocol failed or does not have a protocol.      Requested Prescriptions   Pending Prescriptions Disp Refills    HYDROcodone-acetaminophen (NORCO)  MG Oral Tab 70 tablet 0     Sig: Take 1 to 2 tabs po q 6hr prn for pain        There is no refill protocol information for this order           Recent Outpatient Visits              1 month ago Lipoma of other specified sites    [facility] Abby [facility]    Office Visit    1 month ago Mass of left chest wall    [facility] [place], Cherelle Sierra MD    Office Visit    2 months ago Mass of left chest wall    [place] [facility], Vinh Hughes MD    Office Visit    3 months ago Acute low back pain due to trauma    [address] Abby Bravo MD    Office Visit    3 months ago Cough    [facility], Abby Bianchi MD    Telemedicine

## 2021-08-30 ENCOUNTER — TELEPHONE (OUTPATIENT)
Dept: INTERNAL MEDICINE CLINIC | Facility: CLINIC | Age: 36
End: 2021-08-30

## 2021-08-30 NOTE — TELEPHONE ENCOUNTER
Please review. Protocol failed / No protocol.     Requested Prescriptions   Pending Prescriptions Disp Refills    HYDROcodone-acetaminophen (NORCO)  MG Oral Tab 70 tablet 0     Sig: Take 1 to 2 tabs po q 6hr prn for pain        There is no refill protocol information for this order               Recent Outpatient Visits              1 month ago Lipoma of other specified sites    [place] [facility], Radha Malave, Parrish Montero MD    Office Visit    2 months ago Mass of left chest wall    [facility] [place], Cam Sierra MD    Office Visit    2 months ago Mass of left chest wall    [place] [facility], Radha Malave, Parrish Montero MD    Office Visit    3 months ago Acute low back pain due to trauma    [address], Parrish Montero MD    Office Visit    3 months ago Cough    [place] [facility], Radha Malave, Parrish Montero MD    Telemedicine

## 2021-08-31 RX ORDER — HYDROCODONE BITARTRATE AND ACETAMINOPHEN 10; 325 MG/1; MG/1
TABLET ORAL
Qty: 70 TABLET | Refills: 0 | Status: SHIPPED | OUTPATIENT
Start: 2021-08-31 | End: 2021-09-01

## 2021-09-01 ENCOUNTER — TELEMEDICINE (OUTPATIENT)
Dept: INTERNAL MEDICINE CLINIC | Facility: CLINIC | Age: 36
End: 2021-09-01

## 2021-09-01 ENCOUNTER — NURSE TRIAGE (OUTPATIENT)
Dept: INTERNAL MEDICINE CLINIC | Facility: CLINIC | Age: 36
End: 2021-09-01

## 2021-09-01 DIAGNOSIS — S39.012A STRAIN OF LUMBAR REGION, INITIAL ENCOUNTER: Primary | ICD-10-CM

## 2021-09-01 PROCEDURE — 99213 OFFICE O/P EST LOW 20 MIN: CPT | Performed by: INTERNAL MEDICINE

## 2021-09-01 RX ORDER — HYDROCODONE BITARTRATE AND ACETAMINOPHEN 10; 325 MG/1; MG/1
TABLET ORAL
Qty: 77 TABLET | Refills: 0 | Status: SHIPPED | OUTPATIENT
Start: 2021-09-01 | End: 2021-09-13

## 2021-09-01 NOTE — TELEPHONE ENCOUNTER
Verified name and . Patient agreeable to video visit at 3:00 pm today.   Appointment scheduled:  Future Appointments   Date Time Provider Simone Alejandro   2021  3:00 PM MD DWIGHT Dahl

## 2021-09-01 NOTE — TELEPHONE ENCOUNTER
Action Requested: Summary for Provider     []  Critical Lab, Recommendations Needed  [x] Need Additional Advice  []   FYI    []   Need Orders  [] Need Medications Sent to Pharmacy  []  Other     SUMMARY: Patient states she twisted her back yesterday liftin

## 2021-09-02 NOTE — PROGRESS NOTES
HPI/Subjective:     Patient ID: Chi Khan is a 43year old female. This is a telemedicine visit with live, interactive video and audio.       Patient understands and accepts financial responsibility for any deductible, co-insurance and/or co-pays a Comment:Nasal congestion  Grass                   HIVES    Comment:Nasal congestion  Aspirin                 SWELLING    Comment:Other reaction(s): ASPIRIN    Past Medical History:   Diagnosis Date   • Back pain    • Essential hypertension    • Gestational of her norco to 6 tabs/day to ease her pain, she is also taking flexeril. Pt told to call back if doesn't improve. Please note that the following visit was completed using two-way, real-time interactive audio and/or video communication.   This has been

## 2021-09-11 RX ORDER — HYDROCODONE BITARTRATE AND ACETAMINOPHEN 10; 325 MG/1; MG/1
TABLET ORAL
Qty: 77 TABLET | Refills: 0 | Status: CANCELLED | OUTPATIENT
Start: 2021-09-11

## 2021-09-13 ENCOUNTER — TELEPHONE (OUTPATIENT)
Dept: INTERNAL MEDICINE CLINIC | Facility: CLINIC | Age: 36
End: 2021-09-13

## 2021-09-13 RX ORDER — HYDROCODONE BITARTRATE AND ACETAMINOPHEN 10; 325 MG/1; MG/1
TABLET ORAL
Qty: 70 TABLET | Refills: 0 | Status: SHIPPED | OUTPATIENT
Start: 2021-09-13 | End: 2021-09-20

## 2021-09-20 ENCOUNTER — TELEPHONE (OUTPATIENT)
Dept: INTERNAL MEDICINE CLINIC | Facility: CLINIC | Age: 36
End: 2021-09-20

## 2021-09-20 RX ORDER — HYDROCODONE BITARTRATE AND ACETAMINOPHEN 10; 325 MG/1; MG/1
TABLET ORAL
Qty: 84 TABLET | Refills: 0 | Status: SHIPPED | OUTPATIENT
Start: 2021-09-20 | End: 2021-10-02

## 2021-09-20 NOTE — TELEPHONE ENCOUNTER
[place] with aunt out of town since aunt just had surgery. Pt asked to increase dose to 6 tabs/day next 2 weeks since sleeping on hard couch, and caring for animals.

## 2021-09-21 RX ORDER — CYCLOBENZAPRINE HCL 10 MG
10 TABLET ORAL 3 TIMES DAILY PRN
Qty: 90 TABLET | Refills: 0 | Status: SHIPPED | OUTPATIENT
Start: 2021-09-21 | End: 2021-11-30

## 2021-09-21 NOTE — TELEPHONE ENCOUNTER
Please review. Protocol Failed / No Protocol. Requested Prescriptions   Pending Prescriptions Disp Refills    cyclobenzaprine 10 MG Oral Tab 90 tablet 0     Sig: Take 1 tablet (10 mg total) by mouth 3 (three) times daily as needed for Muscle spasms.

## 2021-10-01 ENCOUNTER — NURSE TRIAGE (OUTPATIENT)
Dept: INTERNAL MEDICINE CLINIC | Facility: CLINIC | Age: 36
End: 2021-10-01

## 2021-10-01 RX ORDER — CEPHALEXIN 500 MG/1
500 CAPSULE ORAL 2 TIMES DAILY
Qty: 14 CAPSULE | Refills: 0 | Status: SHIPPED | OUTPATIENT
Start: 2021-10-01 | End: 2021-10-27 | Stop reason: ALTCHOICE

## 2021-10-01 NOTE — TELEPHONE ENCOUNTER
Message # [id number]          10/01/2021 07:12a   [MONICAS]  To:  From:  BREANN Hernandez MD:  Phone#:  ----------------------------------------------------------------------  Ross Prince [phone]  [birth year], JULIUS PT HAS PAINFUL UTI SYMPTOMS Paged at number :

## 2021-10-01 NOTE — TELEPHONE ENCOUNTER
The patient was called and informed. Instructed next time not to take the antibiotic or pyridium until a urine test is completed.

## 2021-10-01 NOTE — TELEPHONE ENCOUNTER
Action Requested: Summary for Provider     []  Critical Lab, Recommendations Needed  [] Need Additional Advice  []   FYI    []   Need Orders  [] Need Medications Sent to Pharmacy  []  Other     SUMMARY:    Please advice.    Asking for antibiotics    The pat

## 2021-10-02 ENCOUNTER — TELEPHONE (OUTPATIENT)
Dept: INTERNAL MEDICINE CLINIC | Facility: CLINIC | Age: 36
End: 2021-10-02

## 2021-10-02 ENCOUNTER — TELEMEDICINE (OUTPATIENT)
Dept: INTERNAL MEDICINE CLINIC | Facility: CLINIC | Age: 36
End: 2021-10-02
Payer: COMMERCIAL

## 2021-10-02 DIAGNOSIS — J06.9 VIRAL UPPER RESPIRATORY TRACT INFECTION: Primary | ICD-10-CM

## 2021-10-02 PROCEDURE — 99213 OFFICE O/P EST LOW 20 MIN: CPT | Performed by: INTERNAL MEDICINE

## 2021-10-02 RX ORDER — HYDROCODONE BITARTRATE AND HOMATROPINE METHYLBROMIDE ORAL SOLUTION 5; 1.5 MG/5ML; MG/5ML
5 LIQUID ORAL EVERY 6 HOURS PRN
Qty: 240 ML | Refills: 0 | Status: SHIPPED | OUTPATIENT
Start: 2021-10-02 | End: 2021-10-02

## 2021-10-02 RX ORDER — HYDROCODONE BITARTRATE AND ACETAMINOPHEN 10; 325 MG/1; MG/1
TABLET ORAL
Qty: 84 TABLET | Refills: 0 | Status: SHIPPED | OUTPATIENT
Start: 2021-10-02 | End: 2021-10-16

## 2021-10-02 RX ORDER — HYDROCODONE BITARTRATE AND HOMATROPINE METHYLBROMIDE ORAL SOLUTION 5; 1.5 MG/5ML; MG/5ML
5 LIQUID ORAL EVERY 6 HOURS PRN
Qty: 120 ML | Refills: 0 | Status: SHIPPED | OUTPATIENT
Start: 2021-10-02 | End: 2021-11-17

## 2021-10-02 NOTE — PROGRESS NOTES
Subjective:     Patient ID: Jocelyn Nissen is a 43year old female. This is a telemedicine visit with live, interactive video and audio.       Patient understands and accepts financial responsibility for any deductible, co-insurance and/or co-pays assoc taking: Reported on 7/13/2021 ) 240 mL 0     Allergies:  Dust Mite Extract       HIVES    Comment:Nasal congestion  Grass                   HIVES    Comment:Nasal congestion  Aspirin                 SWELLING    Comment:Other reaction(s): ASPIRIN    Past Me respiratory tract infection  (primary encounter diagnosis)  Plan: she did covid test Wednesday and was negative. She is also on keflex for uti. Continue symptomatic tx with cough med. erx sent. Pt to call back if symptoms persist/worsens.     Please note

## 2021-10-02 NOTE — TELEPHONE ENCOUNTER
Spoke with pharmacist who states the prescription was flagging an allergy to aspirin and pt had reported swelling in the past.   Per pt \"I think I had a reaction to aspirin many years ago, but I have taken hydromet several times in the past and felt fine\

## 2021-10-02 NOTE — TELEPHONE ENCOUNTER
hydromet is for her cough which I just prescribed today. Hydrocodone isnt due for refill til next week and I just sent the script today and pharmacy can refill it next week when it's due.    Pt gets her scripts from [facility] in [facility] where pharmacist

## 2021-10-02 NOTE — TELEPHONE ENCOUNTER
Pt states the [facility] in [facility] didn't have the Hydromet in [address] so she went to [place]. Spoke with the Pharmacist-Yvan at [facility] in [facility] and she is concerned with 12 days of cough syrup along with her Hydrocodone.  She feels it is too m

## 2021-10-02 NOTE — TELEPHONE ENCOUNTER
Dr. Akilah Ann - Seeking clarification on two scripts for hydrocodone sent today (norco and hydrocodone-homatropine). Please note that patient filled several times this month. Is this okay?     Please reply to jeff: WILFREDO Huston RN received call from [facility]

## 2021-10-14 ENCOUNTER — TELEPHONE (OUTPATIENT)
Dept: INTERNAL MEDICINE CLINIC | Facility: CLINIC | Age: 36
End: 2021-10-14

## 2021-10-14 NOTE — TELEPHONE ENCOUNTER
Pt wanted to let Dr know she received her flu shot at [facility] at [address]. Pt got it done today.  Please advise

## 2021-10-16 ENCOUNTER — TELEPHONE (OUTPATIENT)
Dept: INTERNAL MEDICINE CLINIC | Facility: CLINIC | Age: 36
End: 2021-10-16

## 2021-10-16 RX ORDER — HYDROCODONE BITARTRATE AND ACETAMINOPHEN 10; 325 MG/1; MG/1
TABLET ORAL
Qty: 70 TABLET | Refills: 0 | Status: SHIPPED | OUTPATIENT
Start: 2021-10-16 | End: 2021-10-27

## 2021-10-27 ENCOUNTER — OFFICE VISIT (OUTPATIENT)
Dept: INTERNAL MEDICINE CLINIC | Facility: CLINIC | Age: 36
End: 2021-10-27
Payer: COMMERCIAL

## 2021-10-27 VITALS
DIASTOLIC BLOOD PRESSURE: 80 MMHG | BODY MASS INDEX: 31.98 KG/M2 | HEIGHT: 66 IN | SYSTOLIC BLOOD PRESSURE: 134 MMHG | WEIGHT: 199 LBS | HEART RATE: 98 BPM

## 2021-10-27 DIAGNOSIS — G89.29 CHRONIC LOW BACK PAIN, UNSPECIFIED BACK PAIN LATERALITY, UNSPECIFIED WHETHER SCIATICA PRESENT: Primary | ICD-10-CM

## 2021-10-27 DIAGNOSIS — M54.50 CHRONIC LOW BACK PAIN, UNSPECIFIED BACK PAIN LATERALITY, UNSPECIFIED WHETHER SCIATICA PRESENT: Primary | ICD-10-CM

## 2021-10-27 PROCEDURE — 99213 OFFICE O/P EST LOW 20 MIN: CPT | Performed by: INTERNAL MEDICINE

## 2021-10-27 PROCEDURE — 3079F DIAST BP 80-89 MM HG: CPT | Performed by: INTERNAL MEDICINE

## 2021-10-27 PROCEDURE — 3075F SYST BP GE 130 - 139MM HG: CPT | Performed by: INTERNAL MEDICINE

## 2021-10-27 PROCEDURE — 3008F BODY MASS INDEX DOCD: CPT | Performed by: INTERNAL MEDICINE

## 2021-10-27 RX ORDER — DULOXETIN HYDROCHLORIDE 30 MG/1
30 CAPSULE, DELAYED RELEASE ORAL 2 TIMES DAILY
Qty: 60 CAPSULE | Refills: 0 | Status: SHIPPED | OUTPATIENT
Start: 2021-10-27 | End: 2022-01-17

## 2021-10-27 RX ORDER — HYDROCODONE BITARTRATE AND ACETAMINOPHEN 10; 325 MG/1; MG/1
TABLET ORAL
Qty: 84 TABLET | Refills: 0 | Status: SHIPPED | OUTPATIENT
Start: 2021-10-27 | End: 2021-11-05

## 2021-10-27 NOTE — PROGRESS NOTES
Subjective:     Patient ID: Stephenie Kirk is a 43year old female. Low Back Pain  This is a recurrent problem. The current episode started more than 1 year ago. The problem occurs constantly. The problem has been waxing and waning since onset.  The amanda Mite Extract       HIVES    Comment:Nasal congestion  Grass                   HIVES    Comment:Nasal congestion  Aspirin                 SWELLING    Comment:Other reaction(s): ASPIRIN    Past Medical History:   Diagnosis Date   • Back pain    • Essential h Breath sounds: Normal breath sounds. No wheezing or rales. Abdominal:      General: Abdomen is flat. Bowel sounds are normal. There is no distension. Palpations: Abdomen is soft. There is no mass. Tenderness: There is no abdominal tenderness.  Basia Anna symptoms. She will update me in about a month regarding her response towards her duloxetine. No orders of the defined types were placed in this encounter.       Meds This Visit:  Requested Prescriptions      No prescriptions requested or ordered in

## 2021-11-05 ENCOUNTER — TELEPHONE (OUTPATIENT)
Dept: INTERNAL MEDICINE CLINIC | Facility: CLINIC | Age: 36
End: 2021-11-05

## 2021-11-05 RX ORDER — HYDROCODONE BITARTRATE AND ACETAMINOPHEN 10; 325 MG/1; MG/1
TABLET ORAL
Qty: 84 TABLET | Refills: 0 | Status: SHIPPED | OUTPATIENT
Start: 2021-11-05 | End: 2021-11-19

## 2021-11-05 NOTE — TELEPHONE ENCOUNTER
Pt here to  script for her norco; askd to pickup on Sunday since going to [place]; Nena Grant reviewed; next refill set on 11/23/21

## 2021-11-17 ENCOUNTER — OFFICE VISIT (OUTPATIENT)
Dept: INTERNAL MEDICINE CLINIC | Facility: CLINIC | Age: 36
End: 2021-11-17
Payer: COMMERCIAL

## 2021-11-17 ENCOUNTER — NURSE TRIAGE (OUTPATIENT)
Dept: INTERNAL MEDICINE CLINIC | Facility: CLINIC | Age: 36
End: 2021-11-17

## 2021-11-17 VITALS
SYSTOLIC BLOOD PRESSURE: 124 MMHG | WEIGHT: 199 LBS | HEART RATE: 95 BPM | BODY MASS INDEX: 31.98 KG/M2 | DIASTOLIC BLOOD PRESSURE: 80 MMHG | HEIGHT: 66 IN

## 2021-11-17 DIAGNOSIS — J06.9 VIRAL UPPER RESPIRATORY TRACT INFECTION: ICD-10-CM

## 2021-11-17 DIAGNOSIS — H60.501 ACUTE OTITIS EXTERNA OF RIGHT EAR, UNSPECIFIED TYPE: Primary | ICD-10-CM

## 2021-11-17 PROCEDURE — 99213 OFFICE O/P EST LOW 20 MIN: CPT | Performed by: INTERNAL MEDICINE

## 2021-11-17 PROCEDURE — 3079F DIAST BP 80-89 MM HG: CPT | Performed by: INTERNAL MEDICINE

## 2021-11-17 PROCEDURE — 3074F SYST BP LT 130 MM HG: CPT | Performed by: INTERNAL MEDICINE

## 2021-11-17 PROCEDURE — 3008F BODY MASS INDEX DOCD: CPT | Performed by: INTERNAL MEDICINE

## 2021-11-17 RX ORDER — HYDROCODONE BITARTRATE AND HOMATROPINE METHYLBROMIDE ORAL SOLUTION 5; 1.5 MG/5ML; MG/5ML
5 LIQUID ORAL EVERY 6 HOURS PRN
Qty: 240 ML | Refills: 0 | Status: SHIPPED | OUTPATIENT
Start: 2021-11-17 | End: 2022-01-11

## 2021-11-17 RX ORDER — NEOMYCIN SULFATE, POLYMYXIN B SULFATE AND HYDROCORTISONE 10; 3.5; 1 MG/ML; MG/ML; [USP'U]/ML
4 SUSPENSION/ DROPS AURICULAR (OTIC) 4 TIMES DAILY
Qty: 1 EACH | Refills: 0 | Status: SHIPPED | OUTPATIENT
Start: 2021-11-17

## 2021-11-17 NOTE — TELEPHONE ENCOUNTER
Action Requested: Summary for Provider     []  Critical Lab, Recommendations Needed  [x] Need Additional Advice  []   FYI    []   Need Orders  [] Need Medications Sent to Pharmacy  []  Other     SUMMARY: pt declined scheduling appt with another provider, s

## 2021-11-17 NOTE — TELEPHONE ENCOUNTER
Spoke with patient, (  verified ) informed of 's   instructions below    Added at 3pm .     Provided with one time call line [phone] to call from her car  Informed mask is required for building entry and appointment.   May have one supp

## 2021-11-18 ENCOUNTER — PATIENT MESSAGE (OUTPATIENT)
Dept: INTERNAL MEDICINE CLINIC | Facility: CLINIC | Age: 36
End: 2021-11-18

## 2021-11-18 RX ORDER — HYDROCODONE BITARTRATE AND ACETAMINOPHEN 10; 325 MG/1; MG/1
TABLET ORAL
Qty: 84 TABLET | Refills: 0 | Status: CANCELLED | OUTPATIENT
Start: 2021-11-18

## 2021-11-18 NOTE — TELEPHONE ENCOUNTER
Please review. Protocol failed or has no protocol.      Requested Prescriptions   Pending Prescriptions Disp Refills    HYDROcodone-acetaminophen (NORCO)  MG Oral Tab 84 tablet 0     Sig: Take 1 to 2 tabs po q 6hr prn for pain        There is no refill protocol information for this order           Recent Outpatient Visits              Yesterday Acute otitis externa of right ear, unspecified type    [place] [facility], [address], Vinh Mayer MD    Office Visit    3 weeks ago Chronic low back pain, unspecified back pain laterality, unspecified whether sciatica present    [facility], [address], Vinh Mayer MD    Office Visit    1 month ago Viral upper respiratory tract infection    [place] [facility], Vinh Mayer MD    Telemedicine    2 months ago Strain of lumbar region, initial encounter    [place] [facility], Vinh Mayer MD    Telemedicine    4 months ago Lipoma of other specified sites    [place] [facility], Wallace Patel MD    Office Visit

## 2021-11-18 NOTE — PROGRESS NOTES
Subjective:     Patient ID: Stalin Griffith is a 43year old female. Ear Pain   There is pain in the right ear. This is a new problem. The current episode started in the past 7 days. The problem occurs constantly. The problem has been unchanged.  There h q 6hr prn for pain 84 tablet 0   • DULoxetine 30 MG Oral Cap DR Particles Take 1 capsule (30 mg total) by mouth 2 (two) times daily.  60 capsule 0   • cyclobenzaprine 10 MG Oral Tab Take 1 tablet (10 mg total) by mouth 3 (three) times daily as needed for Mu Alcohol/week: 0.0 standard drinks      Comment: Rarely    Drug use: No       Objective:   Physical Exam  Constitutional:       General: She is not in acute distress. Appearance: She is obese. She is not ill-appearing, toxic-appearing or diaphoretic.

## 2021-11-19 RX ORDER — HYDROCODONE BITARTRATE AND ACETAMINOPHEN 10; 325 MG/1; MG/1
TABLET ORAL
Qty: 70 TABLET | Refills: 0 | Status: SHIPPED | OUTPATIENT
Start: 2021-11-23 | End: 2021-11-22

## 2021-11-19 NOTE — TELEPHONE ENCOUNTER
From: Mann Buchanan  To: Jacquelyn Cortes MD  Sent: 11/18/2021 6:27 PM CST  Subject: Nito Hoffman.,    My covid test was negative and I am feeling much better with the ear drops.     As Meaghan Hugo and I will be able to go to my Aunts as originally

## 2021-11-19 NOTE — TELEPHONE ENCOUNTER
Dr Angel Luis Contreras,    See pts' mychart message. Last norco 10-325mg refilled 11/5/21 for 84 tablets. Next refill due 11/23/21. Please advise.     Please reply to pool: WILFREDO Gaitan

## 2021-11-22 ENCOUNTER — TELEPHONE (OUTPATIENT)
Dept: INTERNAL MEDICINE CLINIC | Facility: CLINIC | Age: 36
End: 2021-11-22

## 2021-11-22 RX ORDER — HYDROCODONE BITARTRATE AND ACETAMINOPHEN 10; 325 MG/1; MG/1
TABLET ORAL
Qty: 70 TABLET | Refills: 0 | Status: SHIPPED | OUTPATIENT
Start: 2021-11-23 | End: 2021-11-22

## 2021-11-22 RX ORDER — HYDROCODONE BITARTRATE AND ACETAMINOPHEN 10; 325 MG/1; MG/1
TABLET ORAL
Qty: 70 TABLET | Refills: 0 | Status: SHIPPED | OUTPATIENT
Start: 2021-11-22 | End: 2021-11-30

## 2021-11-22 NOTE — TELEPHONE ENCOUNTER
Pt here to pickup norco script; going out of town today and needs to pickup today. Script given last week dated for 11/23/21 for pickup so [place] cancel that.  [place]

## 2021-11-22 NOTE — TELEPHONE ENCOUNTER
Patient transferred to . Hand-off to [place] [id number].  Electronically signed by Akira Osuna RN on 11/22/21 at 7:28 AM EST Please see patient email and advise.

## 2021-11-30 ENCOUNTER — TELEMEDICINE (OUTPATIENT)
Dept: INTERNAL MEDICINE CLINIC | Facility: CLINIC | Age: 36
End: 2021-11-30

## 2021-11-30 ENCOUNTER — PATIENT MESSAGE (OUTPATIENT)
Dept: INTERNAL MEDICINE CLINIC | Facility: CLINIC | Age: 36
End: 2021-11-30

## 2021-11-30 DIAGNOSIS — Z20.822 EXPOSURE TO CONFIRMED CASE OF COVID-19: Primary | ICD-10-CM

## 2021-11-30 DIAGNOSIS — M54.50 CHRONIC BILATERAL LOW BACK PAIN, UNSPECIFIED WHETHER SCIATICA PRESENT: ICD-10-CM

## 2021-11-30 DIAGNOSIS — G89.29 CHRONIC BILATERAL LOW BACK PAIN, UNSPECIFIED WHETHER SCIATICA PRESENT: ICD-10-CM

## 2021-11-30 PROCEDURE — 99213 OFFICE O/P EST LOW 20 MIN: CPT | Performed by: INTERNAL MEDICINE

## 2021-11-30 RX ORDER — HYDROCODONE BITARTRATE AND ACETAMINOPHEN 10; 325 MG/1; MG/1
TABLET ORAL
Qty: 98 TABLET | Refills: 0 | Status: SHIPPED | OUTPATIENT
Start: 2021-11-30 | End: 2021-12-13

## 2021-11-30 RX ORDER — CYCLOBENZAPRINE HCL 10 MG
10 TABLET ORAL 3 TIMES DAILY PRN
Qty: 90 TABLET | Refills: 0 | Status: SHIPPED | OUTPATIENT
Start: 2021-11-30 | End: 2022-01-02

## 2021-12-01 ENCOUNTER — TELEPHONE (OUTPATIENT)
Dept: INTERNAL MEDICINE CLINIC | Facility: CLINIC | Age: 36
End: 2021-12-01

## 2021-12-01 NOTE — PROGRESS NOTES
Subjective:     Patient ID: Chi Khan is a 43year old female. This is a telemedicine visit with live, interactive video and audio.       Patient understands and accepts financial responsibility for any deductible, co-insurance and/or co-pays asso ASPIRIN    Past Medical History:   Diagnosis Date   • Back pain    • Essential hypertension    • Gestational hypertension    • Herniated disc    • Herniated lumbar intervertebral disc    • Hx of migraine headaches    • Hyperlipidemia    • Mass of left fore norco.  [place] did [place] Please note that the following visit was completed using two-way, real-time interactive audio and/or video communication.   This has been done in good fouzia to provide continuity of care in the best interest

## 2021-12-01 NOTE — TELEPHONE ENCOUNTER
The patient calling to stated the letter from yesterday was written incorrectly. Should state until July 2022. I discussed with Dr. Austin Foster and letter written for 2022 and sent to the patient.

## 2021-12-13 ENCOUNTER — TELEPHONE (OUTPATIENT)
Dept: INTERNAL MEDICINE CLINIC | Facility: CLINIC | Age: 36
End: 2021-12-13

## 2021-12-13 RX ORDER — HYDROCODONE BITARTRATE AND ACETAMINOPHEN 10; 325 MG/1; MG/1
TABLET ORAL
Qty: 84 TABLET | Refills: 0 | Status: SHIPPED | OUTPATIENT
Start: 2021-12-13 | End: 2021-12-22

## 2021-12-20 NOTE — TELEPHONE ENCOUNTER
"Triage Call:    Patient called to report \"kidney stones\".  He reports 10/10 pain that woke him from sleep last night.  He reports the pain felt like \"sticking pin in penis\".  He reports today he is having intermittent 6/10 back pain that \"wraps around to belly\".  Patient reports extensive history of kidney stones and \"wants to get ahead of it\".    According to the protocol, patient should see today in office.  Care advice given. Patient verbalizes understanding and agrees with plan of care. Transferred to scheduling, no appointments available.  Patient plans to go to [facility] today.    Rosa De La Cruz RN  12/20/21 3:59 PM  [facility] Nurse Advisor    COVID 19 Nurse Triage Plan/Patient Instructions    Please be aware that novel coronavirus (COVID-19) may be circulating in the community. If you develop symptoms such as fever, cough, or SOB or if you have concerns about the presence of another infection including coronavirus (COVID-19), please contact your health care provider or visit [email or web address].     Disposition/Instructions    In-Person Visit with provider recommended. Reference Visit Selection Guide.    Thank you for taking steps to prevent the spread of this virus.  o Limit your contact with others.  o Wear a simple mask to cover your cough.  o Wash your hands well and often.    Resources    [facility]: About COVID-19: [email or web address]    CDC: What to Do If You're Sick: www.cdc.gov/coronavirus/2019-ncov/about/steps-when-sick.html    CDC: Ending Home Isolation: www.cdc.gov/coronavirus/2019-ncov/hcp/disposition-in-home-patients.html     CDC: Caring for Someone: www.cdc.gov/coronavirus/2019-ncov/if-you-are-sick/care-for-someone.html     [facility]: Interim Guidance for Hospital Discharge to Home: www.health.[facility].mn.us/diseases/coronavirus/hcp/hospdischarge.pdf    [facility] clinical trials (COVID-19 research studies): " Pt called in to follow up on refill. Pt states she will be over around 12 to the clinic to  script. clinicalaffairs.[facility]-clinical-trials     Below are the COVID-19 hotlines at the [place] Department of Health ([facility]). Interpreters are available.   o For health questions: Call 637-797-0045 or 1-836.296.1603 (7 a.m. to 7 p.m.)  o For questions about schools and childcare: Call 017-690-9556 or 1-247.374.1654 (7 a.m. to 7 p.m.)       Reason for Disposition    MODERATE pain (e.g., interferes with normal activities or awakens from sleep)    Additional Information    Negative: Passed out (i.e., lost consciousness, collapsed and was not responding)    Negative: Shock suspected (e.g., cold/pale/clammy skin, too weak to stand, low BP, rapid pulse)    Negative: Sounds like a life-threatening emergency to the triager    Negative: Followed a major injury to the back (e.g., MVA, fall > 10 feet or 3 meters, penetrating injury, etc.)    Negative: Upper, mid or lower back pain that occurs mainly in the midline    Negative: SEVERE pain (e.g., excruciating, scale 8-10) and present > 1 hour    Negative: Sudden onset of severe flank pain and age > 60    Negative: Abdominal pain and age > 60    Negative: Unable to urinate (or only a few drops) > 4 hours and bladder feels very full (e.g., palpable bladder or strong urge to urinate)    Negative: Pain radiates into groin, scrotum    Negative: Blood in urine (red, pink, or tea-colored)    Negative: Vomiting    Negative: Weakness of a leg or foot (e.g., unable to bear weight, dragging foot)    Negative: Patient sounds very sick or weak to the triager    Negative: Fever > 100.4 F (38.0 C)    Negative: Pain or burning with passing urine (urination)    Protocols used: FLANK PAIN-A-[place]

## 2021-12-23 RX ORDER — HYDROCODONE BITARTRATE AND ACETAMINOPHEN 10; 325 MG/1; MG/1
TABLET ORAL
Qty: 70 TABLET | Refills: 0 | Status: SHIPPED | OUTPATIENT
Start: 2021-12-23 | End: 2021-12-23

## 2022-01-03 RX ORDER — CYCLOBENZAPRINE HCL 10 MG
10 TABLET ORAL 3 TIMES DAILY PRN
Qty: 90 TABLET | Refills: 0 | Status: SHIPPED | OUTPATIENT
Start: 2022-01-03 | End: 2022-02-01

## 2022-01-04 RX ORDER — HYDROCODONE BITARTRATE AND ACETAMINOPHEN 10; 325 MG/1; MG/1
TABLET ORAL
Qty: 70 TABLET | Refills: 0 | Status: SHIPPED | OUTPATIENT
Start: 2022-01-04 | End: 2022-01-17

## 2022-01-11 ENCOUNTER — TELEMEDICINE (OUTPATIENT)
Dept: INTERNAL MEDICINE CLINIC | Facility: CLINIC | Age: 37
End: 2022-01-11

## 2022-01-11 DIAGNOSIS — R05.9 COUGH: Primary | ICD-10-CM

## 2022-01-11 PROCEDURE — 99213 OFFICE O/P EST LOW 20 MIN: CPT | Performed by: INTERNAL MEDICINE

## 2022-01-11 RX ORDER — HYDROCODONE BITARTRATE AND HOMATROPINE METHYLBROMIDE ORAL SOLUTION 5; 1.5 MG/5ML; MG/5ML
5 LIQUID ORAL EVERY 6 HOURS PRN
Qty: 240 ML | Refills: 0 | Status: SHIPPED | OUTPATIENT
Start: 2022-01-11

## 2022-01-11 RX ORDER — BUTALBITAL, ACETAMINOPHEN AND CAFFEINE 50; 325; 40 MG/1; MG/1; MG/1
1 TABLET ORAL EVERY 8 HOURS PRN
Qty: 30 TABLET | Refills: 0 | Status: SHIPPED | OUTPATIENT
Start: 2022-01-11

## 2022-01-11 NOTE — PROGRESS NOTES
Subjective:     Patient ID: Mc Dolan is a 43year old female. This is a telemedicine visit with live, interactive video and audio.       Patient understands and accepts financial responsibility for any deductible, co-insurance and/or co-pays assoc 30 tablet 0   • HYDROcodone-acetaminophen (NORCO)  MG Oral Tab Take 1 to 2 tabs po q 6hr prn for pain 70 tablet 0   • cyclobenzaprine 10 MG Oral Tab Take 1 tablet (10 mg total) by mouth 3 (three) times daily as needed for Muscle spasms.  90 tablet 0 Alcohol use: Yes      Alcohol/week: 0.0 standard drinks      Comment: Rarely    Drug use: No       Objective:   Physical Exam  Constitutional:       General: She is not in acute distress. Appearance: She is obese.  She is not ill-appearing, toxic-appear Oral Syrup 240 mL 0     Sig: Take 5 mL by mouth every 6 (six) hours as needed. • butalbital-acetaminophen-caffeine -40 MG Oral Tab 30 tablet 0     Sig: Take 1 tablet by mouth every 8 (eight) hours as needed for Headaches.  TAKE ONE TABLET BY MOUTH T

## 2022-01-17 ENCOUNTER — TELEPHONE (OUTPATIENT)
Dept: INTERNAL MEDICINE CLINIC | Facility: CLINIC | Age: 37
End: 2022-01-17

## 2022-01-17 RX ORDER — DULOXETIN HYDROCHLORIDE 30 MG/1
30 CAPSULE, DELAYED RELEASE ORAL 2 TIMES DAILY
Qty: 60 CAPSULE | Refills: 0 | Status: SHIPPED | OUTPATIENT
Start: 2022-01-17

## 2022-01-17 RX ORDER — HYDROCODONE BITARTRATE AND ACETAMINOPHEN 10; 325 MG/1; MG/1
TABLET ORAL
Qty: 84 TABLET | Refills: 0 | Status: SHIPPED | OUTPATIENT
Start: 2022-01-17 | End: 2022-02-01

## 2022-01-17 NOTE — TELEPHONE ENCOUNTER
Pt here to pic[place]up [place]; [place] will be with mother who needs help; pt will be sleeping on couch in mothers home which increases her low back pain thus needs extra 1 pill daily to her current dose.

## 2022-01-17 NOTE — TELEPHONE ENCOUNTER
Spoke with LUIS A Godoy verified  He was given a verbal Ok to release gen Yahaira per MD ordered, see below PCP message. Inessa Nicole just want to let PCP know that pt been getting rx ref too soon.    The last ref was on 22 for 70 pills, that should last he

## 2022-01-17 NOTE — TELEPHONE ENCOUNTER
Spoke with pt,  verified. She stated she saw Dr Dar Cordova today and was given paper script for Norco  mg  take 1 to 2 tabs po q 6hr prn for pain.   She requested [facility] [place] to give them verbal OK to fill Rx today, th

## 2022-02-01 ENCOUNTER — OFFICE VISIT (OUTPATIENT)
Dept: INTERNAL MEDICINE CLINIC | Facility: CLINIC | Age: 37
End: 2022-02-01
Payer: COMMERCIAL

## 2022-02-01 VITALS
HEIGHT: 66 IN | WEIGHT: 201 LBS | HEART RATE: 98 BPM | SYSTOLIC BLOOD PRESSURE: 120 MMHG | DIASTOLIC BLOOD PRESSURE: 78 MMHG | BODY MASS INDEX: 32.3 KG/M2

## 2022-02-01 DIAGNOSIS — M54.50 CHRONIC LOW BACK PAIN, UNSPECIFIED BACK PAIN LATERALITY, UNSPECIFIED WHETHER SCIATICA PRESENT: ICD-10-CM

## 2022-02-01 DIAGNOSIS — G89.29 CHRONIC LOW BACK PAIN, UNSPECIFIED BACK PAIN LATERALITY, UNSPECIFIED WHETHER SCIATICA PRESENT: ICD-10-CM

## 2022-02-01 DIAGNOSIS — U07.1 COVID-19 VIRUS INFECTION: Primary | ICD-10-CM

## 2022-02-01 DIAGNOSIS — G43.009 MIGRAINE WITHOUT AURA AND WITHOUT STATUS MIGRAINOSUS, NOT INTRACTABLE: ICD-10-CM

## 2022-02-01 PROCEDURE — 3074F SYST BP LT 130 MM HG: CPT | Performed by: INTERNAL MEDICINE

## 2022-02-01 PROCEDURE — 99213 OFFICE O/P EST LOW 20 MIN: CPT | Performed by: INTERNAL MEDICINE

## 2022-02-01 PROCEDURE — 3078F DIAST BP <80 MM HG: CPT | Performed by: INTERNAL MEDICINE

## 2022-02-01 PROCEDURE — 3008F BODY MASS INDEX DOCD: CPT | Performed by: INTERNAL MEDICINE

## 2022-02-01 RX ORDER — HYDROCODONE BITARTRATE AND ACETAMINOPHEN 10; 325 MG/1; MG/1
TABLET ORAL
Qty: 84 TABLET | Refills: 0 | Status: CANCELLED | OUTPATIENT
Start: 2022-02-01

## 2022-02-01 RX ORDER — HYDROCODONE BITARTRATE AND ACETAMINOPHEN 10; 325 MG/1; MG/1
TABLET ORAL
Qty: 84 TABLET | Refills: 0 | Status: SHIPPED | OUTPATIENT
Start: 2022-02-03 | End: 2022-02-01

## 2022-02-01 RX ORDER — CYCLOBENZAPRINE HCL 10 MG
10 TABLET ORAL 3 TIMES DAILY PRN
Qty: 90 TABLET | Refills: 0 | Status: CANCELLED | OUTPATIENT
Start: 2022-02-01

## 2022-02-01 RX ORDER — CYCLOBENZAPRINE HCL 10 MG
10 TABLET ORAL 3 TIMES DAILY PRN
Qty: 90 TABLET | Refills: 0 | Status: SHIPPED | OUTPATIENT
Start: 2022-02-01 | End: 2022-03-22

## 2022-02-01 RX ORDER — HYDROCODONE BITARTRATE AND ACETAMINOPHEN 10; 325 MG/1; MG/1
TABLET ORAL
Qty: 84 TABLET | Refills: 0 | Status: SHIPPED | OUTPATIENT
Start: 2022-02-02 | End: 2022-02-15

## 2022-02-08 ENCOUNTER — OFFICE VISIT (OUTPATIENT)
Dept: INTERNAL MEDICINE CLINIC | Facility: CLINIC | Age: 37
End: 2022-02-08
Payer: COMMERCIAL

## 2022-02-08 ENCOUNTER — NURSE TRIAGE (OUTPATIENT)
Dept: INTERNAL MEDICINE CLINIC | Facility: CLINIC | Age: 37
End: 2022-02-08

## 2022-02-08 VITALS
HEIGHT: 66 IN | HEART RATE: 98 BPM | WEIGHT: 201.88 LBS | TEMPERATURE: 99 F | DIASTOLIC BLOOD PRESSURE: 84 MMHG | BODY MASS INDEX: 32.44 KG/M2 | OXYGEN SATURATION: 99 % | SYSTOLIC BLOOD PRESSURE: 124 MMHG

## 2022-02-08 DIAGNOSIS — H10.32 ACUTE CONJUNCTIVITIS OF LEFT EYE, UNSPECIFIED ACUTE CONJUNCTIVITIS TYPE: Primary | ICD-10-CM

## 2022-02-08 PROCEDURE — 3079F DIAST BP 80-89 MM HG: CPT | Performed by: INTERNAL MEDICINE

## 2022-02-08 PROCEDURE — 99213 OFFICE O/P EST LOW 20 MIN: CPT | Performed by: INTERNAL MEDICINE

## 2022-02-08 PROCEDURE — 3074F SYST BP LT 130 MM HG: CPT | Performed by: INTERNAL MEDICINE

## 2022-02-08 PROCEDURE — 3008F BODY MASS INDEX DOCD: CPT | Performed by: INTERNAL MEDICINE

## 2022-02-08 RX ORDER — TOBRAMYCIN 3 MG/ML
1 SOLUTION/ DROPS OPHTHALMIC EVERY 6 HOURS
Qty: 1 EACH | Refills: 0 | Status: SHIPPED | OUTPATIENT
Start: 2022-02-08

## 2022-02-15 ENCOUNTER — TELEPHONE (OUTPATIENT)
Dept: INTERNAL MEDICINE CLINIC | Facility: CLINIC | Age: 37
End: 2022-02-15

## 2022-02-15 RX ORDER — HYDROCODONE BITARTRATE AND ACETAMINOPHEN 10; 325 MG/1; MG/1
TABLET ORAL
Qty: 70 TABLET | Refills: 0 | Status: SHIPPED | OUTPATIENT
Start: 2022-02-15 | End: 2022-02-28

## 2022-02-28 ENCOUNTER — TELEPHONE (OUTPATIENT)
Dept: INTERNAL MEDICINE CLINIC | Facility: CLINIC | Age: 37
End: 2022-02-28

## 2022-02-28 RX ORDER — HYDROCODONE BITARTRATE AND ACETAMINOPHEN 10; 325 MG/1; MG/1
TABLET ORAL
Qty: 70 TABLET | Refills: 0 | Status: SHIPPED | OUTPATIENT
Start: 2022-02-28 | End: 2022-03-14

## 2022-03-14 ENCOUNTER — TELEPHONE (OUTPATIENT)
Dept: INTERNAL MEDICINE CLINIC | Facility: CLINIC | Age: 37
End: 2022-03-14

## 2022-03-14 RX ORDER — HYDROCODONE BITARTRATE AND ACETAMINOPHEN 10; 325 MG/1; MG/1
TABLET ORAL
Qty: 70 TABLET | Refills: 0 | Status: SHIPPED | OUTPATIENT
Start: 2022-03-14 | End: 2022-03-25

## 2022-03-18 ENCOUNTER — TELEPHONE (OUTPATIENT)
Dept: INTERNAL MEDICINE CLINIC | Facility: CLINIC | Age: 37
End: 2022-03-18

## 2022-03-18 RX ORDER — HYDROCODONE BITARTRATE AND HOMATROPINE METHYLBROMIDE ORAL SOLUTION 5; 1.5 MG/5ML; MG/5ML
5 LIQUID ORAL EVERY 6 HOURS PRN
Qty: 240 ML | Refills: 0 | Status: SHIPPED | OUTPATIENT
Start: 2022-03-18

## 2022-03-18 NOTE — TELEPHONE ENCOUNTER
Walked in clinic to get script for spouse for his norco refill. She complained of having cold symptoms, cough for the past 4 to 5 days. No fevers. No anosmia/loss of taste. No covid exposure; fully vaccinated for covid. Pt also tested negative for covid  Both antigen and pcr 2 days ago paul. Asking for refill of her cough med given her persisent cough otherwise feeling well. erx sent. Told to call back if persist/worsens.

## 2022-03-22 RX ORDER — CYCLOBENZAPRINE HCL 10 MG
10 TABLET ORAL 3 TIMES DAILY PRN
Qty: 90 TABLET | Refills: 0 | Status: SHIPPED | OUTPATIENT
Start: 2022-03-22

## 2022-03-22 NOTE — TELEPHONE ENCOUNTER
Please review; protocol failed/no protocol    Requested Prescriptions   Pending Prescriptions Disp Refills    cyclobenzaprine 10 MG Oral Tab 90 tablet 0     Sig: Take 1 tablet (10 mg total) by mouth 3 (three) times daily as needed for Muscle spasms.         There is no refill protocol information for this order            Recent Outpatient Visits              1 month ago Acute conjunctivitis of left eye, unspecified acute conjunctivitis type    [place] [facility], [place] [address], Vinh Paredes MD    Office Visit    1 month ago COVID-19 virus infection    [place] [facility], [place] [address], Devon Ha MD    Office Visit    2 months ago Cough    [facility], [place] [address], Vinh Paredes MD    Telemedicine    3 months ago Exposure to confirmed [facility] [address] [facility] [address] Devon Ha MD    Telemedicine    4 months ago Acute otitis externa of right ear, unspecified type    [place] [facility], [place] [address], Devon Ha MD    Office Visit             Future Appointments         Provider Department Appt Notes    In 3 weeks Luca Paredes MD [place] [facility], [place] [address], Vinh Medication F/U and Physical

## 2022-03-25 ENCOUNTER — TELEPHONE (OUTPATIENT)
Dept: INTERNAL MEDICINE CLINIC | Facility: CLINIC | Age: 37
End: 2022-03-25

## 2022-03-25 RX ORDER — HYDROCODONE BITARTRATE AND ACETAMINOPHEN 10; 325 MG/1; MG/1
TABLET ORAL
Qty: 70 TABLET | Refills: 0 | Status: SHIPPED | OUTPATIENT
Start: 2022-03-25 | End: 2022-03-25

## 2022-03-25 RX ORDER — HYDROCODONE BITARTRATE AND ACETAMINOPHEN 10; 325 MG/1; MG/1
TABLET ORAL
Qty: 84 TABLET | Refills: 0 | Status: SHIPPED | OUTPATIENT
Start: 2022-03-25

## 2022-03-25 NOTE — TELEPHONE ENCOUNTER
Pt here to get  [place] Had asked to inrease to 6/day due to vacation trip which usually causes some more pain taking care of her child.

## 2022-04-07 ENCOUNTER — TELEPHONE (OUTPATIENT)
Dept: INTERNAL MEDICINE CLINIC | Facility: CLINIC | Age: 37
End: 2022-04-07

## 2022-04-07 RX ORDER — HYDROCODONE BITARTRATE AND ACETAMINOPHEN 10; 325 MG/1; MG/1
TABLET ORAL
Qty: 70 TABLET | Refills: 0 | Status: SHIPPED | OUTPATIENT
Start: 2022-04-07

## 2022-04-16 ENCOUNTER — HOSPITAL ENCOUNTER (OUTPATIENT)
Dept: GENERAL RADIOLOGY | Age: 37
Discharge: HOME | End: 2022-04-16
Attending: INTERNAL MEDICINE
Payer: COMMERCIAL

## 2022-04-16 ENCOUNTER — OFFICE VISIT (OUTPATIENT)
Dept: INTERNAL MEDICINE CLINIC | Facility: CLINIC | Age: 37
End: 2022-04-16
Payer: COMMERCIAL

## 2022-04-16 VITALS
SYSTOLIC BLOOD PRESSURE: 124 MMHG | DIASTOLIC BLOOD PRESSURE: 80 MMHG | HEIGHT: 66 IN | BODY MASS INDEX: 33 KG/M2 | TEMPERATURE: 99 F | HEART RATE: 95 BPM

## 2022-04-16 DIAGNOSIS — M79.672 FOOT PAIN, LEFT: Primary | ICD-10-CM

## 2022-04-16 DIAGNOSIS — M79.672 FOOT PAIN, LEFT: ICD-10-CM

## 2022-04-16 DIAGNOSIS — R39.15 URGENCY OF URINATION: ICD-10-CM

## 2022-04-16 LAB
APPEARANCE: CLEAR
GLUCOSE (URINE DIPSTICK): NEGATIVE MG/DL
KETONES (URINE DIPSTICK): NEGATIVE MG/DL
LEUKOCYTES: NEGATIVE
MULTISTIX LOT#: ABNORMAL NUMERIC
NITRITE, URINE: NEGATIVE
PH, URINE: 6 (ref 4.5–8)
PROTEIN (URINE DIPSTICK): NEGATIVE MG/DL
SPECIFIC GRAVITY: 1.03 (ref 1–1.03)
UROBILINOGEN,SEMI-QN: 0.2 MG/DL (ref 0–1.9)

## 2022-04-16 PROCEDURE — 87086 URINE CULTURE/COLONY COUNT: CPT | Performed by: INTERNAL MEDICINE

## 2022-04-16 PROCEDURE — 73630 X-RAY EXAM OF FOOT: CPT | Performed by: INTERNAL MEDICINE

## 2022-04-16 RX ORDER — HYDROCODONE BITARTRATE AND ACETAMINOPHEN 10; 325 MG/1; MG/1
TABLET ORAL
Qty: 112 TABLET | Refills: 0 | Status: SHIPPED | OUTPATIENT
Start: 2022-04-16

## 2022-04-20 ENCOUNTER — LAB ENCOUNTER (OUTPATIENT)
Dept: LAB | Age: 37
End: 2022-04-20
Attending: INTERNAL MEDICINE
Payer: COMMERCIAL

## 2022-04-20 ENCOUNTER — OFFICE VISIT (OUTPATIENT)
Dept: INTERNAL MEDICINE CLINIC | Facility: CLINIC | Age: 37
End: 2022-04-20
Payer: COMMERCIAL

## 2022-04-20 VITALS
WEIGHT: 202 LBS | SYSTOLIC BLOOD PRESSURE: 143 MMHG | RESPIRATION RATE: 17 BRPM | HEIGHT: 66 IN | DIASTOLIC BLOOD PRESSURE: 87 MMHG | HEART RATE: 82 BPM | BODY MASS INDEX: 32.47 KG/M2

## 2022-04-20 DIAGNOSIS — Z00.00 ANNUAL PHYSICAL EXAM: Primary | ICD-10-CM

## 2022-04-20 DIAGNOSIS — G89.29 CHRONIC BILATERAL LOW BACK PAIN, UNSPECIFIED WHETHER SCIATICA PRESENT: ICD-10-CM

## 2022-04-20 DIAGNOSIS — M54.50 CHRONIC BILATERAL LOW BACK PAIN, UNSPECIFIED WHETHER SCIATICA PRESENT: ICD-10-CM

## 2022-04-20 DIAGNOSIS — G43.009 MIGRAINE WITHOUT AURA AND WITHOUT STATUS MIGRAINOSUS, NOT INTRACTABLE: ICD-10-CM

## 2022-04-20 DIAGNOSIS — E78.00 HYPERCHOLESTEREMIA: ICD-10-CM

## 2022-04-20 DIAGNOSIS — Z00.00 ANNUAL PHYSICAL EXAM: ICD-10-CM

## 2022-04-20 DIAGNOSIS — I10 ESSENTIAL HYPERTENSION: ICD-10-CM

## 2022-04-20 PROBLEM — S39.012A ACUTE LUMBAR MYOFASCIAL STRAIN: Status: RESOLVED | Noted: 2017-01-23 | Resolved: 2022-04-20

## 2022-04-20 LAB
ALBUMIN SERPL-MCNC: 4.1 G/DL (ref 3.4–5)
ALBUMIN/GLOB SERPL: 1.4 {RATIO} (ref 1–2)
ALP LIVER SERPL-CCNC: 42 U/L
ALT SERPL-CCNC: 18 U/L
AMPHET UR QL SCN: NEGATIVE
ANION GAP SERPL CALC-SCNC: 4 MMOL/L (ref 0–18)
AST SERPL-CCNC: 10 U/L (ref 15–37)
BASOPHILS # BLD AUTO: 0.04 X10(3) UL (ref 0–0.2)
BASOPHILS NFR BLD AUTO: 0.5 %
BENZODIAZ UR QL SCN: NEGATIVE
BILIRUB SERPL-MCNC: 0.2 MG/DL (ref 0.1–2)
BUN BLD-MCNC: 14 MG/DL (ref 7–18)
BUN/CREAT SERPL: 22.2 (ref 10–20)
CALCIUM BLD-MCNC: 9.4 MG/DL (ref 8.5–10.1)
CANNABINOIDS UR QL SCN: NEGATIVE
CHLORIDE SERPL-SCNC: 109 MMOL/L (ref 98–112)
CHOLEST SERPL-MCNC: 195 MG/DL (ref ?–200)
CO2 SERPL-SCNC: 27 MMOL/L (ref 21–32)
COCAINE UR QL: NEGATIVE
CREAT BLD-MCNC: 0.63 MG/DL
CREAT UR-SCNC: 178 MG/DL
DEPRECATED RDW RBC AUTO: 47 FL (ref 35.1–46.3)
EOSINOPHIL # BLD AUTO: 0.03 X10(3) UL (ref 0–0.7)
EOSINOPHIL NFR BLD AUTO: 0.3 %
ERYTHROCYTE [DISTWIDTH] IN BLOOD BY AUTOMATED COUNT: 13.3 % (ref 11–15)
FASTING PATIENT LIPID ANSWER: YES
FASTING STATUS PATIENT QL REPORTED: YES
GLOBULIN PLAS-MCNC: 3 G/DL (ref 2.8–4.4)
GLUCOSE BLD-MCNC: 85 MG/DL (ref 70–99)
HCT VFR BLD AUTO: 42.6 %
HDLC SERPL-MCNC: 40 MG/DL (ref 40–59)
HGB BLD-MCNC: 13.8 G/DL
IMM GRANULOCYTES # BLD AUTO: 0.02 X10(3) UL (ref 0–1)
IMM GRANULOCYTES NFR BLD: 0.2 %
LDLC SERPL CALC-MCNC: 119 MG/DL (ref ?–100)
LYMPHOCYTES # BLD AUTO: 4.27 X10(3) UL (ref 1–4)
LYMPHOCYTES NFR BLD AUTO: 49 %
MCH RBC QN AUTO: 31 PG (ref 26–34)
MCHC RBC AUTO-ENTMCNC: 32.4 G/DL (ref 31–37)
MCV RBC AUTO: 95.7 FL
MDMA UR QL SCN: NEGATIVE
MONOCYTES # BLD AUTO: 0.29 X10(3) UL (ref 0.1–1)
MONOCYTES NFR BLD AUTO: 3.3 %
NEUTROPHILS # BLD AUTO: 4.07 X10 (3) UL (ref 1.5–7.7)
NEUTROPHILS # BLD AUTO: 4.07 X10(3) UL (ref 1.5–7.7)
NEUTROPHILS NFR BLD AUTO: 46.7 %
NONHDLC SERPL-MCNC: 155 MG/DL (ref ?–130)
OSMOLALITY SERPL CALC.SUM OF ELEC: 290 MOSM/KG (ref 275–295)
OXYCODONE UR QL SCN: NEGATIVE
PLATELET # BLD AUTO: 330 10(3)UL (ref 150–450)
POTASSIUM SERPL-SCNC: 4.1 MMOL/L (ref 3.5–5.1)
PROT SERPL-MCNC: 7.1 G/DL (ref 6.4–8.2)
RBC # BLD AUTO: 4.45 X10(6)UL
SODIUM SERPL-SCNC: 140 MMOL/L (ref 136–145)
TRIGL SERPL-MCNC: 207 MG/DL (ref 30–149)
TSI SER-ACNC: 0.46 MIU/ML (ref 0.36–3.74)
VLDLC SERPL CALC-MCNC: 37 MG/DL (ref 0–30)
WBC # BLD AUTO: 8.7 X10(3) UL (ref 4–11)

## 2022-04-20 PROCEDURE — 3008F BODY MASS INDEX DOCD: CPT | Performed by: INTERNAL MEDICINE

## 2022-04-20 PROCEDURE — 85025 COMPLETE CBC W/AUTO DIFF WBC: CPT

## 2022-04-20 PROCEDURE — 3079F DIAST BP 80-89 MM HG: CPT | Performed by: INTERNAL MEDICINE

## 2022-04-20 PROCEDURE — 36415 COLL VENOUS BLD VENIPUNCTURE: CPT

## 2022-04-20 PROCEDURE — 80061 LIPID PANEL: CPT

## 2022-04-20 PROCEDURE — 80361 OPIATES 1 OR MORE: CPT

## 2022-04-20 PROCEDURE — 99395 PREV VISIT EST AGE 18-39: CPT | Performed by: INTERNAL MEDICINE

## 2022-04-20 PROCEDURE — 84443 ASSAY THYROID STIM HORMONE: CPT

## 2022-04-20 PROCEDURE — 80307 DRUG TEST PRSMV CHEM ANLYZR: CPT

## 2022-04-20 PROCEDURE — 80053 COMPREHEN METABOLIC PANEL: CPT

## 2022-04-20 PROCEDURE — 3077F SYST BP >= 140 MM HG: CPT | Performed by: INTERNAL MEDICINE

## 2022-04-22 ENCOUNTER — PATIENT MESSAGE (OUTPATIENT)
Dept: INTERNAL MEDICINE CLINIC | Facility: CLINIC | Age: 37
End: 2022-04-22

## 2022-04-22 RX ORDER — CYCLOBENZAPRINE HCL 10 MG
10 TABLET ORAL 3 TIMES DAILY PRN
Qty: 90 TABLET | Refills: 0 | Status: SHIPPED | OUTPATIENT
Start: 2022-04-22

## 2022-04-22 NOTE — TELEPHONE ENCOUNTER
Please review. Protocol failed or does not have a protocol. Requested Prescriptions   Pending Prescriptions Disp Refills    cyclobenzaprine 10 MG Oral Tab 90 tablet 0     Sig: Take 1 tablet (10 mg total) by mouth 3 (three) times daily as needed for Muscle spasms.         There is no refill protocol information for this order           Recent Outpatient Visits              2 days ago Annual physical exam    [address] Par[address] Bravo MD    Office Visit    6 days ago Foot pain, left    [address] Vinh Bravo MD    Office Visit    2 months ago Acute conjunctivitis of left eye, unspecified acute conjunctivitis type    [address] MD    Office Visit    2 months ago COVID-19 virus infection    [facility], [address], Parrish Montero MD    Office Visit    3 months ago Cough    [facility], [facility], Aravind Bobo MD    Telemedicine

## 2022-04-22 NOTE — TELEPHONE ENCOUNTER
From: Antonio Costa MD  To: Fernando Goodpasture  Sent: 4/22/2022 12:55 PM CDT  Subject: re; labs    Hi isabella    Labs overall good; cbc, chemistries, were good. Cholesterol levels good, triglyceride mildy elevated so ff low fat diet. Drug screen positive for opiates as expected. Thanks.  Dr [facility]

## 2022-04-23 LAB
OPIATES, UR, 6-ACETYLMORPHINE: <10 NG/ML
OPIATES, URINE, CODEINE: <20 NG/ML
OPIATES, URINE, HYDROCODONE: >4000 NG/ML
OPIATES, URINE, HYDROMORPHONE: 137 NG/ML
OPIATES, URINE, MORPHINE: <20 NG/ML
OPIATES, URINE, NORHYDROCODONE: >4000 NG/ML
OPIATES, URINE, NOROXYCODONE: <20 NG/ML
OPIATES, URINE, NOROXYMORPHONE: <20 NG/ML
OPIATES, URINE, OXYCODONE: <20 NG/ML
OPIATES, URINE, OXYMORPHONE: <20 NG/ML

## 2022-04-25 ENCOUNTER — TELEMEDICINE (OUTPATIENT)
Dept: INTERNAL MEDICINE CLINIC | Facility: CLINIC | Age: 37
End: 2022-04-25

## 2022-04-25 DIAGNOSIS — J10.1 INFLUENZA A: Primary | ICD-10-CM

## 2022-04-25 PROCEDURE — 99213 OFFICE O/P EST LOW 20 MIN: CPT | Performed by: INTERNAL MEDICINE

## 2022-04-25 RX ORDER — OSELTAMIVIR PHOSPHATE 75 MG/1
75 CAPSULE ORAL 2 TIMES DAILY
Qty: 10 CAPSULE | Refills: 0 | Status: SHIPPED | OUTPATIENT
Start: 2022-04-25

## 2022-04-25 RX ORDER — HYDROCODONE BITARTRATE AND HOMATROPINE METHYLBROMIDE ORAL SOLUTION 5; 1.5 MG/5ML; MG/5ML
5 LIQUID ORAL EVERY 6 HOURS PRN
Qty: 240 ML | Refills: 0 | Status: SHIPPED | OUTPATIENT
Start: 2022-04-25

## 2022-04-29 RX ORDER — HYDROCODONE BITARTRATE AND ACETAMINOPHEN 10; 325 MG/1; MG/1
TABLET ORAL
Qty: 98 TABLET | Refills: 0 | Status: SHIPPED | OUTPATIENT
Start: 2022-04-29

## 2022-05-09 ENCOUNTER — TELEPHONE (OUTPATIENT)
Dept: INTERNAL MEDICINE CLINIC | Facility: CLINIC | Age: 37
End: 2022-05-09

## 2022-05-09 RX ORDER — HYDROCODONE BITARTRATE AND ACETAMINOPHEN 10; 325 MG/1; MG/1
TABLET ORAL
Qty: 84 TABLET | Refills: 0 | Status: SHIPPED | OUTPATIENT
Start: 2022-05-12

## 2022-05-09 RX ORDER — HYDROCODONE BITARTRATE AND HOMATROPINE METHYLBROMIDE ORAL SOLUTION 5; 1.5 MG/5ML; MG/5ML
5 LIQUID ORAL EVERY 6 HOURS PRN
Qty: 240 ML | Refills: 0 | Status: SHIPPED | OUTPATIENT
Start: 2022-05-09

## 2022-05-09 NOTE — TELEPHONE ENCOUNTER
Pt here to pickup refill for norco, to be refille on 5/12/22  Also coughing since the [facility] care of child this weekend and started to get sick since last night with cough and colds. Tested negativr for covid test this am. Asked for refill for hydromet, refill given,  Pt told to call back if any worsening of symptoms. [place]

## 2022-05-16 ENCOUNTER — TELEPHONE (OUTPATIENT)
Dept: INTERNAL MEDICINE CLINIC | Facility: CLINIC | Age: 37
End: 2022-05-16

## 2022-05-16 RX ORDER — HYDROCODONE BITARTRATE AND HOMATROPINE METHYLBROMIDE ORAL SOLUTION 5; 1.5 MG/5ML; MG/5ML
5 LIQUID ORAL EVERY 6 HOURS PRN
Qty: 240 ML | Refills: 0 | Status: SHIPPED | OUTPATIENT
Start: 2022-05-16

## 2022-05-16 NOTE — TELEPHONE ENCOUNTER
Pt needed refill for cough med; was given only 120ml from previous script  'pt still coughing and congested but improving; will refill cough med.  Script given

## 2022-05-17 ENCOUNTER — OFFICE VISIT (OUTPATIENT)
Dept: INTERNAL MEDICINE CLINIC | Facility: CLINIC | Age: 37
End: 2022-05-17
Payer: COMMERCIAL

## 2022-05-17 ENCOUNTER — TELEPHONE (OUTPATIENT)
Dept: INTERNAL MEDICINE CLINIC | Facility: CLINIC | Age: 37
End: 2022-05-17

## 2022-05-17 VITALS
WEIGHT: 202.5 LBS | OXYGEN SATURATION: 98 % | DIASTOLIC BLOOD PRESSURE: 80 MMHG | HEIGHT: 66 IN | BODY MASS INDEX: 32.54 KG/M2 | TEMPERATURE: 98 F | SYSTOLIC BLOOD PRESSURE: 126 MMHG | HEART RATE: 90 BPM

## 2022-05-17 DIAGNOSIS — J06.9 VIRAL UPPER RESPIRATORY TRACT INFECTION: Primary | ICD-10-CM

## 2022-05-17 PROCEDURE — 3079F DIAST BP 80-89 MM HG: CPT | Performed by: INTERNAL MEDICINE

## 2022-05-17 PROCEDURE — 3074F SYST BP LT 130 MM HG: CPT | Performed by: INTERNAL MEDICINE

## 2022-05-17 PROCEDURE — 99213 OFFICE O/P EST LOW 20 MIN: CPT | Performed by: INTERNAL MEDICINE

## 2022-05-17 PROCEDURE — 3008F BODY MASS INDEX DOCD: CPT | Performed by: INTERNAL MEDICINE

## 2022-05-23 ENCOUNTER — TELEPHONE (OUTPATIENT)
Dept: INTERNAL MEDICINE CLINIC | Facility: CLINIC | Age: 37
End: 2022-05-23

## 2022-05-23 RX ORDER — HYDROCODONE BITARTRATE AND ACETAMINOPHEN 10; 325 MG/1; MG/1
TABLET ORAL
Qty: 84 TABLET | Refills: 0 | Status: SHIPPED | OUTPATIENT
Start: 2022-05-23

## 2022-05-24 ENCOUNTER — TELEPHONE (OUTPATIENT)
Dept: INTERNAL MEDICINE CLINIC | Facility: CLINIC | Age: 37
End: 2022-05-24

## 2022-05-24 NOTE — TELEPHONE ENCOUNTER
Medication approval received for Hydrocodone/Acetaminophen 10/325 mg oral tab. Valid 5/23/22 - 11/9/22.

## 2022-06-03 ENCOUNTER — TELEPHONE (OUTPATIENT)
Dept: INTERNAL MEDICINE CLINIC | Facility: CLINIC | Age: 37
End: 2022-06-03

## 2022-06-03 RX ORDER — HYDROCODONE BITARTRATE AND ACETAMINOPHEN 10; 325 MG/1; MG/1
TABLET ORAL
Qty: 98 TABLET | Refills: 0 | Status: SHIPPED | OUTPATIENT
Start: 2022-06-03

## 2022-06-03 RX ORDER — CYCLOBENZAPRINE HCL 10 MG
10 TABLET ORAL 3 TIMES DAILY PRN
Qty: 90 TABLET | Refills: 0 | Status: SHIPPED | OUTPATIENT
Start: 2022-06-03

## 2022-06-03 NOTE — TELEPHONE ENCOUNTER
Pt here to pikup refill for norco and flexeril; had been working on their above ground swiimming pool thus increasing her low back pain, had asked if can increase pain med 7/day for next 2 weeks. Script given for both med.   [place]

## 2022-06-06 ENCOUNTER — HOSPITAL ENCOUNTER (OUTPATIENT)
Age: 37
Discharge: HOME | End: 2022-06-06
Payer: COMMERCIAL

## 2022-06-06 VITALS
HEART RATE: 86 BPM | RESPIRATION RATE: 18 BRPM | SYSTOLIC BLOOD PRESSURE: 150 MMHG | OXYGEN SATURATION: 100 % | DIASTOLIC BLOOD PRESSURE: 90 MMHG | TEMPERATURE: 99 F

## 2022-06-06 DIAGNOSIS — H61.892 FOREIGN BODY SENSATION IN LEFT EAR CANAL: Primary | ICD-10-CM

## 2022-06-06 DIAGNOSIS — R03.0 ELEVATED BLOOD PRESSURE READING: ICD-10-CM

## 2022-06-06 PROCEDURE — 99212 OFFICE O/P EST SF 10 MIN: CPT

## 2022-06-06 NOTE — ED INITIAL ASSESSMENT (HPI)
Possible FB to left ear. Used a Q-tip to clean ear today and noticed the cotton tip missing. C/o irritation and muffled hearing.

## 2022-06-07 ENCOUNTER — TELEPHONE (OUTPATIENT)
Dept: INTERNAL MEDICINE CLINIC | Facility: CLINIC | Age: 37
End: 2022-06-07

## 2022-06-07 ENCOUNTER — OFFICE VISIT (OUTPATIENT)
Dept: INTERNAL MEDICINE CLINIC | Facility: CLINIC | Age: 37
End: 2022-06-07
Payer: COMMERCIAL

## 2022-06-07 VITALS
RESPIRATION RATE: 12 BRPM | HEIGHT: 66 IN | BODY MASS INDEX: 30.86 KG/M2 | HEART RATE: 96 BPM | OXYGEN SATURATION: 98 % | SYSTOLIC BLOOD PRESSURE: 122 MMHG | DIASTOLIC BLOOD PRESSURE: 80 MMHG | WEIGHT: 192 LBS

## 2022-06-07 DIAGNOSIS — R05.9 COUGH: ICD-10-CM

## 2022-06-07 DIAGNOSIS — T16.2XXA FOREIGN BODY OF LEFT EAR, INITIAL ENCOUNTER: Primary | ICD-10-CM

## 2022-06-07 PROCEDURE — 3079F DIAST BP 80-89 MM HG: CPT | Performed by: INTERNAL MEDICINE

## 2022-06-07 PROCEDURE — 3074F SYST BP LT 130 MM HG: CPT | Performed by: INTERNAL MEDICINE

## 2022-06-07 PROCEDURE — 99213 OFFICE O/P EST LOW 20 MIN: CPT | Performed by: INTERNAL MEDICINE

## 2022-06-07 PROCEDURE — 3008F BODY MASS INDEX DOCD: CPT | Performed by: INTERNAL MEDICINE

## 2022-06-07 RX ORDER — HYDROCODONE BITARTRATE AND HOMATROPINE METHYLBROMIDE ORAL SOLUTION 5; 1.5 MG/5ML; MG/5ML
5 LIQUID ORAL EVERY 6 HOURS PRN
Qty: 240 ML | Refills: 0 | Status: SHIPPED | OUTPATIENT
Start: 2022-06-07

## 2022-06-07 NOTE — TELEPHONE ENCOUNTER
Spoke to patient. She was in UC yesterday because she used a qtip to get deep conditioner from her ear and used a bad qtip and the cotton came off in her ear. UC flushed her ear but only found a a few small pieces of cotton. She woke up today and is certain there is still something stuck in her ear. She would like to see Dr. Aniya Pace that he is full today and offered to find appointment with different provider but patient declined and requested message be sent to Dr. Trupti Valente.     Dr. Trupti Valente, please advise if patient can be added onto your schedule today.

## 2022-06-15 ENCOUNTER — TELEPHONE (OUTPATIENT)
Dept: INTERNAL MEDICINE CLINIC | Facility: CLINIC | Age: 37
End: 2022-06-15

## 2022-06-15 RX ORDER — HYDROCODONE BITARTRATE AND ACETAMINOPHEN 10; 325 MG/1; MG/1
TABLET ORAL
Qty: 84 TABLET | Refills: 0 | Status: SHIPPED | OUTPATIENT
Start: 2022-06-15

## 2022-06-24 ENCOUNTER — TELEPHONE (OUTPATIENT)
Dept: INTERNAL MEDICINE CLINIC | Facility: CLINIC | Age: 37
End: 2022-06-24

## 2022-06-24 RX ORDER — HYDROCODONE BITARTRATE AND ACETAMINOPHEN 10; 325 MG/1; MG/1
TABLET ORAL
Qty: 70 TABLET | Refills: 0 | Status: SHIPPED | OUTPATIENT
Start: 2022-06-24

## 2022-07-06 ENCOUNTER — HOSPITAL ENCOUNTER (OUTPATIENT)
Dept: MAMMOGRAPHY | Facility: HOSPITAL | Age: 37
Discharge: HOME | End: 2022-07-06
Attending: OBSTETRICS & GYNECOLOGY
Payer: COMMERCIAL

## 2022-07-06 ENCOUNTER — HOSPITAL ENCOUNTER (OUTPATIENT)
Dept: ULTRASOUND IMAGING | Facility: HOSPITAL | Age: 37
Discharge: HOME | End: 2022-07-06
Attending: OBSTETRICS & GYNECOLOGY
Payer: COMMERCIAL

## 2022-07-06 DIAGNOSIS — N63.10 LUMP OF RIGHT BREAST: ICD-10-CM

## 2022-07-06 PROCEDURE — 77066 DX MAMMO INCL CAD BI: CPT | Performed by: OBSTETRICS & GYNECOLOGY

## 2022-07-06 PROCEDURE — 77062 BREAST TOMOSYNTHESIS BI: CPT | Performed by: OBSTETRICS & GYNECOLOGY

## 2022-07-06 PROCEDURE — 76642 ULTRASOUND BREAST LIMITED: CPT | Performed by: OBSTETRICS & GYNECOLOGY

## 2022-07-08 ENCOUNTER — HOSPITAL ENCOUNTER (OUTPATIENT)
Dept: ULTRASOUND IMAGING | Facility: HOSPITAL | Age: 37
Discharge: HOME | End: 2022-07-08
Attending: OBSTETRICS & GYNECOLOGY
Payer: COMMERCIAL

## 2022-07-08 ENCOUNTER — HOSPITAL ENCOUNTER (OUTPATIENT)
Dept: MAMMOGRAPHY | Facility: HOSPITAL | Age: 37
Discharge: HOME | End: 2022-07-08
Attending: OBSTETRICS & GYNECOLOGY
Payer: COMMERCIAL

## 2022-07-08 ENCOUNTER — TELEPHONE (OUTPATIENT)
Dept: INTERNAL MEDICINE CLINIC | Facility: CLINIC | Age: 37
End: 2022-07-08

## 2022-07-08 DIAGNOSIS — N63.10 BREAST MASS, RIGHT: ICD-10-CM

## 2022-07-08 DIAGNOSIS — R92.1 BREAST CALCIFICATION, RIGHT: ICD-10-CM

## 2022-07-08 PROCEDURE — 19081 BX BREAST 1ST LESION STRTCTC: CPT | Performed by: OBSTETRICS & GYNECOLOGY

## 2022-07-08 PROCEDURE — 77065 DX MAMMO INCL CAD UNI: CPT | Performed by: OBSTETRICS & GYNECOLOGY

## 2022-07-08 PROCEDURE — 88305 TISSUE EXAM BY PATHOLOGIST: CPT | Performed by: OBSTETRICS & GYNECOLOGY

## 2022-07-08 PROCEDURE — 19083 BX BREAST 1ST LESION US IMAG: CPT | Performed by: OBSTETRICS & GYNECOLOGY

## 2022-07-08 PROCEDURE — 88360 TUMOR IMMUNOHISTOCHEM/MANUAL: CPT | Performed by: OBSTETRICS & GYNECOLOGY

## 2022-07-08 RX ORDER — CYCLOBENZAPRINE HCL 10 MG
TABLET ORAL
Qty: 90 TABLET | Refills: 0 | Status: SHIPPED | OUTPATIENT
Start: 2022-07-08

## 2022-07-08 RX ORDER — HYDROCODONE BITARTRATE AND ACETAMINOPHEN 10; 325 MG/1; MG/1
TABLET ORAL
Qty: 98 TABLET | Refills: 0 | Status: SHIPPED | OUTPATIENT
Start: 2022-07-08

## 2022-07-08 NOTE — TELEPHONE ENCOUNTER
Pt [place] to [place]/US ; had increased low back pain due to positioning during US. Pt also will have biopsy done, increase norco dose to max 7/day for now .

## 2022-07-08 NOTE — PROCEDURES
[facility]  Procedure Note    [address] Patient Status:  Outpatient    7/15/1985 MRN [id number]   Location [address] [place], [address] [place] [address] Day # 0 PCP Minnie Mantilla MD     Procedure: ultrasound and tomosynthesis guided biopsies of the R breast    Pre-Procedure Diagnosis:  Right breast mass at 12:00 and UOQ A.D    Post-Procedure Diagnosis: Right breast mass at 12:00 and UOQ A.D    Anesthesia:  Local    Findings:  Right breast mass at 12:00 and UOQ A.D    Specimens: 3-6    Blood Loss:  minimal    Tourniquet Time: none  Complications:  None  Drains:  None    Gracia Frazier DO  2022

## 2022-07-11 ENCOUNTER — NURSE NAVIGATOR ENCOUNTER (OUTPATIENT)
Dept: HEMATOLOGY/ONCOLOGY | Facility: HOSPITAL | Age: 37
End: 2022-07-11

## 2022-07-12 ENCOUNTER — OFFICE VISIT (OUTPATIENT)
Dept: INTERNAL MEDICINE CLINIC | Facility: CLINIC | Age: 37
End: 2022-07-12
Payer: COMMERCIAL

## 2022-07-12 DIAGNOSIS — Z17.0 BREAST CANCER, STAGE 1, ESTROGEN RECEPTOR POSITIVE, RIGHT (HCC): Primary | ICD-10-CM

## 2022-07-12 DIAGNOSIS — C50.911 BREAST CANCER, STAGE 1, ESTROGEN RECEPTOR POSITIVE, RIGHT (HCC): Primary | ICD-10-CM

## 2022-07-12 PROCEDURE — 3074F SYST BP LT 130 MM HG: CPT | Performed by: INTERNAL MEDICINE

## 2022-07-12 PROCEDURE — 3008F BODY MASS INDEX DOCD: CPT | Performed by: INTERNAL MEDICINE

## 2022-07-12 PROCEDURE — 3079F DIAST BP 80-89 MM HG: CPT | Performed by: INTERNAL MEDICINE

## 2022-07-13 ENCOUNTER — NURSE NAVIGATOR ENCOUNTER (OUTPATIENT)
Dept: HEMATOLOGY/ONCOLOGY | Facility: HOSPITAL | Age: 37
End: 2022-07-13

## 2022-07-13 ENCOUNTER — HOSPITAL ENCOUNTER (OUTPATIENT)
Dept: MRI IMAGING | Facility: HOSPITAL | Age: 37
Discharge: HOME | End: 2022-07-13
Attending: INTERNAL MEDICINE
Payer: COMMERCIAL

## 2022-07-13 ENCOUNTER — OFFICE VISIT (OUTPATIENT)
Dept: HEMATOLOGY/ONCOLOGY | Facility: HOSPITAL | Age: 37
End: 2022-07-13
Attending: INTERNAL MEDICINE
Payer: COMMERCIAL

## 2022-07-13 ENCOUNTER — MEDICAL CORRESPONDENCE (OUTPATIENT)
Dept: INTERNAL MEDICINE CLINIC | Facility: CLINIC | Age: 37
End: 2022-07-13

## 2022-07-13 ENCOUNTER — TELEPHONE (OUTPATIENT)
Dept: HEMATOLOGY/ONCOLOGY | Facility: HOSPITAL | Age: 37
End: 2022-07-13

## 2022-07-13 VITALS
OXYGEN SATURATION: 100 % | WEIGHT: 190 LBS | DIASTOLIC BLOOD PRESSURE: 94 MMHG | HEIGHT: 66 IN | SYSTOLIC BLOOD PRESSURE: 153 MMHG | BODY MASS INDEX: 30.53 KG/M2 | RESPIRATION RATE: 16 BRPM | TEMPERATURE: 100 F

## 2022-07-13 DIAGNOSIS — C50.911 INVASIVE DUCTAL CARCINOMA OF RIGHT BREAST (HCC): Primary | ICD-10-CM

## 2022-07-13 DIAGNOSIS — R92.2 BREAST DENSITY: ICD-10-CM

## 2022-07-13 DIAGNOSIS — Z51.81 ENCOUNTER FOR MONITORING CARDIOTOXIC DRUG THERAPY: ICD-10-CM

## 2022-07-13 DIAGNOSIS — C50.911 INVASIVE DUCTAL CARCINOMA OF BREAST, FEMALE, RIGHT (HCC): Primary | ICD-10-CM

## 2022-07-13 DIAGNOSIS — Z79.899 ENCOUNTER FOR MONITORING CARDIOTOXIC DRUG THERAPY: ICD-10-CM

## 2022-07-13 DIAGNOSIS — C50.911 INVASIVE DUCTAL CARCINOMA OF RIGHT BREAST (HCC): ICD-10-CM

## 2022-07-13 DIAGNOSIS — C50.911 BREAST CANCER, STAGE 1, ESTROGEN RECEPTOR POSITIVE, RIGHT (HCC): Primary | ICD-10-CM

## 2022-07-13 DIAGNOSIS — Z17.0 BREAST CANCER, STAGE 1, ESTROGEN RECEPTOR POSITIVE, RIGHT (HCC): Primary | ICD-10-CM

## 2022-07-13 LAB — CREAT BLD-MCNC: 0.7 MG/DL

## 2022-07-13 PROCEDURE — 77049 MRI BREAST C-+ W/CAD BI: CPT | Performed by: INTERNAL MEDICINE

## 2022-07-13 PROCEDURE — A9575 INJ GADOTERATE MEGLUMI 0.1ML: HCPCS | Performed by: INTERNAL MEDICINE

## 2022-07-13 PROCEDURE — 99245 OFF/OP CONSLTJ NEW/EST HI 55: CPT | Performed by: INTERNAL MEDICINE

## 2022-07-13 PROCEDURE — 82565 ASSAY OF CREATININE: CPT

## 2022-07-13 NOTE — PROGRESS NOTES
Patient presents to the [facility] for consultation with Dr. Manny Gifford. Patient is unaccompanied. Introduced myself to patient as Breast RN Navigator. Reinforced my role as Navigator, having spoken with patient earlier this week. Dr. Manny Gifford has discussed neoadjuvant TCHP, and has placed orders for breast MRI, echo, and port placement. Educated patient as to breast MRI, what to expect during the exam as well as possibility for additional imaging and biopsy based on results. Shared with patient once insurance authorization has been obtained will proceed with scheduling. Educated patient as to strain echo, what to expect and why this is performed as it relates to her treatment plan. Educated patient as to port placement, why this is placed for administration of chemotherapy, and what to expect. Patient aware she will be contacted by IR for scheduling. Shared with patient will schedule chemotherapy education once the above are accomplished. Encouraged patient to bring her spouse or loved one with her for this appointment. Patient currently lives in [place] with her spouse, two children as well as her parents. Patient's daughter is 7 and her son is 16. Patient works full time in a customer service role. She works from home several days a week, and her office for the remainder. Patient shares she has a good support system. Discussed with patient [employer]. Patient declines at this time. Provided patient with information for wigs and hair coverings, as well as [facility] Resources including [employer]. Provided patient with Breast Cancer Treatment. Educated as to how to use this resource. Provided patient with my contact information and have encouraged patient to call with any questions, concerns, or needs.

## 2022-07-13 NOTE — TELEPHONE ENCOUNTER
Phoned patient to share breast MRI has been authorized. Exam scheduled for this evening at 6pm.  All appointment information was provided to patient. She acknowledged and denied questions.

## 2022-07-14 ENCOUNTER — TELEPHONE (OUTPATIENT)
Dept: HEMATOLOGY/ONCOLOGY | Facility: HOSPITAL | Age: 37
End: 2022-07-14

## 2022-07-14 NOTE — TELEPHONE ENCOUNTER
Call from patient eager to discuss next steps in her care. Patient has had her breast MRI previous evening. Shared with patient awaiting results from Radiologist of her MRI. Discussed scheduling chemotherapy education. This appointment scheduled for Wednesday 7/20/22 at 10:15 with Fabian Dubose, following her consultation with Dr. Alejandro Pena. Shared with patient my follow up pending MRI results and any further care coordination as indicated.

## 2022-07-15 ENCOUNTER — TELEPHONE (OUTPATIENT)
Dept: HEMATOLOGY/ONCOLOGY | Facility: HOSPITAL | Age: 37
End: 2022-07-15

## 2022-07-15 DIAGNOSIS — C50.911 BREAST CANCER, STAGE 1, ESTROGEN RECEPTOR POSITIVE, RIGHT (HCC): Primary | ICD-10-CM

## 2022-07-15 DIAGNOSIS — R92.8 ABNORMAL MRI, BREAST: ICD-10-CM

## 2022-07-15 DIAGNOSIS — Z17.0 BREAST CANCER, STAGE 1, ESTROGEN RECEPTOR POSITIVE, RIGHT (HCC): Primary | ICD-10-CM

## 2022-07-16 VITALS
SYSTOLIC BLOOD PRESSURE: 124 MMHG | TEMPERATURE: 98 F | HEIGHT: 66 IN | OXYGEN SATURATION: 98 % | HEART RATE: 98 BPM | BODY MASS INDEX: 30.81 KG/M2 | WEIGHT: 191.69 LBS | DIASTOLIC BLOOD PRESSURE: 80 MMHG

## 2022-07-18 ENCOUNTER — TELEPHONE (OUTPATIENT)
Dept: INTERNAL MEDICINE CLINIC | Facility: CLINIC | Age: 37
End: 2022-07-18

## 2022-07-18 ENCOUNTER — TELEPHONE (OUTPATIENT)
Dept: HEMATOLOGY/ONCOLOGY | Facility: HOSPITAL | Age: 37
End: 2022-07-18

## 2022-07-18 ENCOUNTER — HOSPITAL ENCOUNTER (OUTPATIENT)
Dept: CV DIAGNOSTICS | Facility: HOSPITAL | Age: 37
Discharge: HOME | End: 2022-07-18
Attending: INTERNAL MEDICINE
Payer: COMMERCIAL

## 2022-07-18 DIAGNOSIS — C50.911 INVASIVE DUCTAL CARCINOMA OF BREAST, FEMALE, RIGHT (HCC): ICD-10-CM

## 2022-07-18 DIAGNOSIS — Z79.899 ENCOUNTER FOR MONITORING CARDIOTOXIC DRUG THERAPY: ICD-10-CM

## 2022-07-18 DIAGNOSIS — Z51.81 ENCOUNTER FOR MONITORING CARDIOTOXIC DRUG THERAPY: ICD-10-CM

## 2022-07-18 PROCEDURE — 93306 TTE W/DOPPLER COMPLETE: CPT | Performed by: INTERNAL MEDICINE

## 2022-07-18 RX ORDER — CEPHALEXIN 500 MG/1
500 CAPSULE ORAL 2 TIMES DAILY
Qty: 14 CAPSULE | Refills: 0 | Status: SHIPPED | OUTPATIENT
Start: 2022-07-18

## 2022-07-18 RX ORDER — HYDROCODONE BITARTRATE AND ACETAMINOPHEN 10; 325 MG/1; MG/1
TABLET ORAL
Qty: 98 TABLET | Refills: 0 | Status: SHIPPED | OUTPATIENT
Start: 2022-07-18

## 2022-07-18 NOTE — TELEPHONE ENCOUNTER
Phoned patient for care coordination. Strain echo scheduled for today at 10am.  Patient shares she has not received a call from IR for port scheduling. Shared with patient I have left messages for IR to facilitate this. Asked that patient please contact my office should she not receive a call for scheduling purposes.

## 2022-07-18 NOTE — TELEPHONE ENCOUNTER
Patient states that she got a bladder infection due to IUD removed and would like antibiotics prescribed. Please advise.

## 2022-07-19 ENCOUNTER — NURSE ONLY (OUTPATIENT)
Dept: LAB | Facility: HOSPITAL | Age: 37
End: 2022-07-19
Attending: RADIOLOGY
Payer: COMMERCIAL

## 2022-07-19 ENCOUNTER — LAB ENCOUNTER (OUTPATIENT)
Dept: LAB | Facility: HOSPITAL | Age: 37
End: 2022-07-19
Attending: RADIOLOGY
Payer: COMMERCIAL

## 2022-07-19 ENCOUNTER — PATIENT MESSAGE (OUTPATIENT)
Dept: INTERNAL MEDICINE CLINIC | Facility: CLINIC | Age: 37
End: 2022-07-19

## 2022-07-19 ENCOUNTER — HOSPITAL ENCOUNTER (OUTPATIENT)
Dept: MAMMOGRAPHY | Facility: HOSPITAL | Age: 37
Discharge: HOME | End: 2022-07-19
Attending: INTERNAL MEDICINE
Payer: COMMERCIAL

## 2022-07-19 ENCOUNTER — HOSPITAL ENCOUNTER (OUTPATIENT)
Dept: MRI IMAGING | Facility: HOSPITAL | Age: 37
Discharge: HOME | End: 2022-07-19
Attending: INTERNAL MEDICINE
Payer: COMMERCIAL

## 2022-07-19 DIAGNOSIS — Z17.0 BREAST CANCER, STAGE 1, ESTROGEN RECEPTOR POSITIVE, RIGHT (HCC): ICD-10-CM

## 2022-07-19 DIAGNOSIS — Z01.818 PRE-OP TESTING: ICD-10-CM

## 2022-07-19 DIAGNOSIS — C50.911 BREAST CANCER, STAGE 1, ESTROGEN RECEPTOR POSITIVE, RIGHT (HCC): ICD-10-CM

## 2022-07-19 DIAGNOSIS — R92.8 ABNORMAL MRI, BREAST: ICD-10-CM

## 2022-07-19 LAB
BASOPHILS # BLD AUTO: 0.03 X10(3) UL (ref 0–0.2)
BASOPHILS NFR BLD AUTO: 0.4 %
DEPRECATED RDW RBC AUTO: 46.3 FL (ref 35.1–46.3)
EOSINOPHIL # BLD AUTO: 0.05 X10(3) UL (ref 0–0.7)
EOSINOPHIL NFR BLD AUTO: 0.6 %
ERYTHROCYTE [DISTWIDTH] IN BLOOD BY AUTOMATED COUNT: 12.8 % (ref 11–15)
HCT VFR BLD AUTO: 42 %
HGB BLD-MCNC: 13.4 G/DL
IMM GRANULOCYTES # BLD AUTO: 0.02 X10(3) UL (ref 0–1)
IMM GRANULOCYTES NFR BLD: 0.3 %
LYMPHOCYTES # BLD AUTO: 4.61 X10(3) UL (ref 1–4)
LYMPHOCYTES NFR BLD AUTO: 57.8 %
MCH RBC QN AUTO: 31.2 PG (ref 26–34)
MCHC RBC AUTO-ENTMCNC: 31.9 G/DL (ref 31–37)
MCV RBC AUTO: 97.9 FL
MONOCYTES # BLD AUTO: 0.48 X10(3) UL (ref 0.1–1)
MONOCYTES NFR BLD AUTO: 6 %
NEUTROPHILS # BLD AUTO: 2.78 X10 (3) UL (ref 1.5–7.7)
NEUTROPHILS # BLD AUTO: 2.78 X10(3) UL (ref 1.5–7.7)
NEUTROPHILS NFR BLD AUTO: 34.9 %
PLATELET # BLD AUTO: 283 10(3)UL (ref 150–450)
RBC # BLD AUTO: 4.29 X10(6)UL
SARS-COV-2 RNA RESP QL NAA+PROBE: NOT DETECTED
WBC # BLD AUTO: 8 X10(3) UL (ref 4–11)

## 2022-07-19 PROCEDURE — 85025 COMPLETE CBC W/AUTO DIFF WBC: CPT

## 2022-07-19 PROCEDURE — 19085 BX BREAST 1ST LESION MR IMAG: CPT | Performed by: INTERNAL MEDICINE

## 2022-07-19 PROCEDURE — 19086 BX BREAST ADD LESION MR IMAG: CPT | Performed by: INTERNAL MEDICINE

## 2022-07-19 PROCEDURE — 77065 DX MAMMO INCL CAD UNI: CPT | Performed by: INTERNAL MEDICINE

## 2022-07-19 PROCEDURE — 36415 COLL VENOUS BLD VENIPUNCTURE: CPT

## 2022-07-19 PROCEDURE — A9575 INJ GADOTERATE MEGLUMI 0.1ML: HCPCS | Performed by: INTERNAL MEDICINE

## 2022-07-19 PROCEDURE — 88305 TISSUE EXAM BY PATHOLOGIST: CPT | Performed by: INTERNAL MEDICINE

## 2022-07-19 NOTE — TELEPHONE ENCOUNTER
Noted. Miriam Saeed RN 7/19/2022  2:06 PM CDT        ----- Message -----  From: Douglas Sequeira  Sent: 7/19/2022  10:17 AM CDT  To: Em Rn Triage  Subject: Question regarding CBC W/ DIFFERENTIAL           Thank you so much!

## 2022-07-19 NOTE — TELEPHONE ENCOUNTER
From: Giuseppe Pierre  To: Robin Hernández MD  Sent: 7/19/2022 8:28 AM CDT  Subject: Question regarding CBC W/ DIFFERENTIAL    Is there a chance the absolute lymphocytes high bc of the ITI? Is this high enough to be concerned?

## 2022-07-20 ENCOUNTER — OFFICE VISIT (OUTPATIENT)
Dept: HEMATOLOGY/ONCOLOGY | Facility: HOSPITAL | Age: 37
End: 2022-07-20
Attending: NURSE PRACTITIONER
Payer: COMMERCIAL

## 2022-07-20 ENCOUNTER — OFFICE VISIT (OUTPATIENT)
Dept: SURGERY | Facility: CLINIC | Age: 37
End: 2022-07-20
Payer: COMMERCIAL

## 2022-07-20 VITALS
SYSTOLIC BLOOD PRESSURE: 142 MMHG | HEIGHT: 66 IN | WEIGHT: 194.38 LBS | OXYGEN SATURATION: 98 % | DIASTOLIC BLOOD PRESSURE: 100 MMHG | HEART RATE: 100 BPM | RESPIRATION RATE: 16 BRPM | BODY MASS INDEX: 31.24 KG/M2 | TEMPERATURE: 97 F

## 2022-07-20 DIAGNOSIS — Z17.0 BREAST CANCER, STAGE 1, ESTROGEN RECEPTOR POSITIVE, RIGHT (HCC): Primary | ICD-10-CM

## 2022-07-20 DIAGNOSIS — Z17.0 BREAST CANCER, STAGE 1, ESTROGEN RECEPTOR POSITIVE, RIGHT (HCC): ICD-10-CM

## 2022-07-20 DIAGNOSIS — Z51.11 CHEMOTHERAPY MANAGEMENT, ENCOUNTER FOR: ICD-10-CM

## 2022-07-20 DIAGNOSIS — C50.911 BREAST CANCER, STAGE 1, ESTROGEN RECEPTOR POSITIVE, RIGHT (HCC): ICD-10-CM

## 2022-07-20 DIAGNOSIS — C50.911 BREAST CANCER, STAGE 1, ESTROGEN RECEPTOR POSITIVE, RIGHT (HCC): Primary | ICD-10-CM

## 2022-07-20 DIAGNOSIS — Z71.9 ENCOUNTER FOR HEALTH EDUCATION: Primary | ICD-10-CM

## 2022-07-20 PROCEDURE — 99245 OFF/OP CONSLTJ NEW/EST HI 55: CPT | Performed by: SURGERY

## 2022-07-20 PROCEDURE — 3080F DIAST BP >= 90 MM HG: CPT | Performed by: SURGERY

## 2022-07-20 PROCEDURE — 3008F BODY MASS INDEX DOCD: CPT | Performed by: SURGERY

## 2022-07-20 PROCEDURE — 3077F SYST BP >= 140 MM HG: CPT | Performed by: SURGERY

## 2022-07-20 RX ORDER — ONDANSETRON HYDROCHLORIDE 8 MG/1
8 TABLET, FILM COATED ORAL EVERY 8 HOURS PRN
Qty: 30 TABLET | Refills: 3 | Status: SHIPPED | OUTPATIENT
Start: 2022-07-20

## 2022-07-20 RX ORDER — PROCHLORPERAZINE MALEATE 10 MG
10 TABLET ORAL EVERY 6 HOURS PRN
Qty: 30 TABLET | Refills: 3 | Status: SHIPPED | OUTPATIENT
Start: 2022-07-20

## 2022-07-22 ENCOUNTER — HOSPITAL ENCOUNTER (OUTPATIENT)
Dept: INTERVENTIONAL RADIOLOGY/VASCULAR | Facility: HOSPITAL | Age: 37
Discharge: HOME | End: 2022-07-22
Attending: INTERNAL MEDICINE | Admitting: RADIOLOGY
Payer: COMMERCIAL

## 2022-07-22 VITALS
TEMPERATURE: 98 F | HEIGHT: 66 IN | RESPIRATION RATE: 14 BRPM | BODY MASS INDEX: 30.53 KG/M2 | DIASTOLIC BLOOD PRESSURE: 104 MMHG | HEART RATE: 75 BPM | SYSTOLIC BLOOD PRESSURE: 160 MMHG | OXYGEN SATURATION: 99 % | WEIGHT: 190 LBS

## 2022-07-22 DIAGNOSIS — C50.911 INVASIVE DUCTAL CARCINOMA OF BREAST, FEMALE, RIGHT (HCC): ICD-10-CM

## 2022-07-22 DIAGNOSIS — Z79.899 ENCOUNTER FOR MONITORING CARDIOTOXIC DRUG THERAPY: ICD-10-CM

## 2022-07-22 DIAGNOSIS — Z01.818 PRE-OP TESTING: Primary | ICD-10-CM

## 2022-07-22 DIAGNOSIS — Z51.81 ENCOUNTER FOR MONITORING CARDIOTOXIC DRUG THERAPY: ICD-10-CM

## 2022-07-22 PROCEDURE — 76937 US GUIDE VASCULAR ACCESS: CPT

## 2022-07-22 PROCEDURE — 0JH60WZ INSERTION OF TOTALLY IMPLANTABLE VASCULAR ACCESS DEVICE INTO CHEST SUBCUTANEOUS TISSUE AND FASCIA, OPEN APPROACH: ICD-10-PCS | Performed by: RADIOLOGY

## 2022-07-22 PROCEDURE — 77001 FLUOROGUIDE FOR VEIN DEVICE: CPT

## 2022-07-22 PROCEDURE — 02HV33Z INSERTION OF INFUSION DEVICE INTO SUPERIOR VENA CAVA, PERCUTANEOUS APPROACH: ICD-10-PCS | Performed by: RADIOLOGY

## 2022-07-22 PROCEDURE — 99153 MOD SED SAME PHYS/QHP EA: CPT

## 2022-07-22 PROCEDURE — 36561 INSERT TUNNELED CV CATH: CPT

## 2022-07-22 PROCEDURE — 99152 MOD SED SAME PHYS/QHP 5/>YRS: CPT

## 2022-07-22 PROCEDURE — 36415 COLL VENOUS BLD VENIPUNCTURE: CPT

## 2022-07-22 RX ORDER — CEFAZOLIN SODIUM/WATER 2 G/20 ML
SYRINGE (ML) INTRAVENOUS
Status: COMPLETED
Start: 2022-07-22 | End: 2022-07-22

## 2022-07-22 RX ORDER — MIDAZOLAM HYDROCHLORIDE 1 MG/ML
INJECTION INTRAMUSCULAR; INTRAVENOUS
Status: COMPLETED
Start: 2022-07-22 | End: 2022-07-22

## 2022-07-22 RX ORDER — HEPARIN SODIUM 1000 [USP'U]/ML
INJECTION, SOLUTION INTRAVENOUS; SUBCUTANEOUS
Status: DISCONTINUED
Start: 2022-07-22 | End: 2022-07-22 | Stop reason: WASHOUT

## 2022-07-22 RX ORDER — HYDROCODONE BITARTRATE AND ACETAMINOPHEN 5; 325 MG/1; MG/1
2 TABLET ORAL EVERY 4 HOURS PRN
OUTPATIENT
Start: 2022-07-22

## 2022-07-22 RX ORDER — ACETAMINOPHEN 325 MG/1
650 TABLET ORAL EVERY 4 HOURS PRN
OUTPATIENT
Start: 2022-07-22

## 2022-07-22 RX ORDER — SODIUM CHLORIDE 9 MG/ML
INJECTION, SOLUTION INTRAVENOUS CONTINUOUS
Status: DISCONTINUED | OUTPATIENT
Start: 2022-07-22 | End: 2022-07-22

## 2022-07-22 RX ORDER — HYDROCODONE BITARTRATE AND ACETAMINOPHEN 5; 325 MG/1; MG/1
1 TABLET ORAL EVERY 4 HOURS PRN
OUTPATIENT
Start: 2022-07-22

## 2022-07-22 RX ORDER — HEPARIN SODIUM (PORCINE) LOCK FLUSH IV SOLN 100 UNIT/ML 100 UNIT/ML
SOLUTION INTRAVENOUS
Status: COMPLETED
Start: 2022-07-22 | End: 2022-07-22

## 2022-07-22 RX ORDER — LIDOCAINE HYDROCHLORIDE 20 MG/ML
INJECTION, SOLUTION INFILTRATION; PERINEURAL
Status: COMPLETED
Start: 2022-07-22 | End: 2022-07-22

## 2022-07-22 RX ADMIN — SODIUM CHLORIDE: 9 INJECTION, SOLUTION INTRAVENOUS at 08:15:00

## 2022-07-22 NOTE — IVS NOTE
Patient awake and alert x 4    Left chest wound well approximated, no drainage, closed with skin glue in two areas    WATSON KEEN    Patient stated that she has \"horrible white coat syndrome\"     Instructions given to patient she stated understanding    PIV removed    Patient brought down to main entrance via wheel chair, father present for ride home

## 2022-07-22 NOTE — INTERVAL H&P NOTE
The above referenced H&P was reviewed by Lara Perdomo MD on 7/22/2022, the patient was examined and no significant changes have occurred in the patient's condition since the H&P was performed. Risks, benefits, alternative treatments and consequences of no treatment were discussed. We will proceed with procedure as planned.       Lara Perdomo MD  7/22/2022  8:45 AM

## 2022-07-26 ENCOUNTER — SOCIAL WORK SERVICES (OUTPATIENT)
Dept: HEMATOLOGY/ONCOLOGY | Facility: HOSPITAL | Age: 37
End: 2022-07-26

## 2022-07-26 ENCOUNTER — TELEPHONE (OUTPATIENT)
Dept: HEMATOLOGY/ONCOLOGY | Facility: HOSPITAL | Age: 37
End: 2022-07-26

## 2022-07-26 NOTE — PROGRESS NOTES
CALLIE assisted patient with short term disability paperwork. CALLIE spoke to patient via phone to discuss terms of leave. Paperwork has been faxed to [employer] at [phone]. CALLIE sent patient a copy of paperwork to her [facility]. Social Work will remain available for assessment, education and discharge planning as needed.

## 2022-07-27 ENCOUNTER — TELEPHONE (OUTPATIENT)
Dept: HEMATOLOGY/ONCOLOGY | Facility: HOSPITAL | Age: 37
End: 2022-07-27

## 2022-07-27 DIAGNOSIS — Z17.0 BREAST CANCER, STAGE 1, ESTROGEN RECEPTOR POSITIVE, RIGHT (HCC): Primary | ICD-10-CM

## 2022-07-27 DIAGNOSIS — C50.911 BREAST CANCER, STAGE 1, ESTROGEN RECEPTOR POSITIVE, RIGHT (HCC): Primary | ICD-10-CM

## 2022-07-27 NOTE — TELEPHONE ENCOUNTER
Spoke with pt. Ro sent, reviewed how to apply. Pt is requesting therapy services. Will help set that up for her.

## 2022-07-27 NOTE — TELEPHONE ENCOUNTER
Phoned patient for care coordination. Patient shares interest in connecting with a therapist for support as she starts her cancer treatment. Patient is agreeable to [facility] referral.  She is aware she will be contacted. Instructed patient to contact my office if she does not receive a call.

## 2022-07-28 ENCOUNTER — OFFICE VISIT (OUTPATIENT)
Dept: HEMATOLOGY/ONCOLOGY | Facility: HOSPITAL | Age: 37
End: 2022-07-28
Attending: INTERNAL MEDICINE
Payer: COMMERCIAL

## 2022-07-28 ENCOUNTER — NURSE ONLY (OUTPATIENT)
Dept: HEMATOLOGY/ONCOLOGY | Facility: HOSPITAL | Age: 37
End: 2022-07-28
Attending: NURSE PRACTITIONER
Payer: COMMERCIAL

## 2022-07-28 VITALS
OXYGEN SATURATION: 100 % | HEART RATE: 85 BPM | RESPIRATION RATE: 16 BRPM | TEMPERATURE: 98 F | BODY MASS INDEX: 32 KG/M2 | WEIGHT: 199 LBS | SYSTOLIC BLOOD PRESSURE: 155 MMHG | DIASTOLIC BLOOD PRESSURE: 89 MMHG

## 2022-07-28 VITALS
DIASTOLIC BLOOD PRESSURE: 89 MMHG | BODY MASS INDEX: 31.98 KG/M2 | OXYGEN SATURATION: 100 % | TEMPERATURE: 98 F | HEART RATE: 85 BPM | RESPIRATION RATE: 16 BRPM | SYSTOLIC BLOOD PRESSURE: 155 MMHG | HEIGHT: 66 IN | WEIGHT: 199 LBS

## 2022-07-28 DIAGNOSIS — Z51.11 CHEMOTHERAPY MANAGEMENT, ENCOUNTER FOR: Primary | ICD-10-CM

## 2022-07-28 DIAGNOSIS — Z17.0 BREAST CANCER, STAGE 1, ESTROGEN RECEPTOR POSITIVE, RIGHT (HCC): ICD-10-CM

## 2022-07-28 DIAGNOSIS — C50.911 BREAST CANCER, STAGE 1, ESTROGEN RECEPTOR POSITIVE, RIGHT (HCC): ICD-10-CM

## 2022-07-28 LAB
ALBUMIN SERPL-MCNC: 3.6 G/DL (ref 3.4–5)
ALBUMIN/GLOB SERPL: 1.2 {RATIO} (ref 1–2)
ALP LIVER SERPL-CCNC: 42 U/L
ALT SERPL-CCNC: 22 U/L
ANION GAP SERPL CALC-SCNC: 9 MMOL/L (ref 0–18)
AST SERPL-CCNC: 23 U/L (ref 15–37)
BASOPHILS # BLD AUTO: 0.03 X10(3) UL (ref 0–0.2)
BASOPHILS NFR BLD AUTO: 0.3 %
BILIRUB SERPL-MCNC: 0.2 MG/DL (ref 0.1–2)
BUN BLD-MCNC: 9 MG/DL (ref 7–18)
BUN/CREAT SERPL: 15.3 (ref 10–20)
CALCIUM BLD-MCNC: 9 MG/DL (ref 8.5–10.1)
CHLORIDE SERPL-SCNC: 110 MMOL/L (ref 98–112)
CO2 SERPL-SCNC: 22 MMOL/L (ref 21–32)
CREAT BLD-MCNC: 0.59 MG/DL
DEPRECATED RDW RBC AUTO: 46.5 FL (ref 35.1–46.3)
EOSINOPHIL # BLD AUTO: 0.08 X10(3) UL (ref 0–0.7)
EOSINOPHIL NFR BLD AUTO: 0.9 %
ERYTHROCYTE [DISTWIDTH] IN BLOOD BY AUTOMATED COUNT: 13 % (ref 11–15)
FASTING STATUS PATIENT QL REPORTED: NO
GLOBULIN PLAS-MCNC: 3.1 G/DL (ref 2.8–4.4)
GLUCOSE BLD-MCNC: 100 MG/DL (ref 70–99)
HCT VFR BLD AUTO: 39.3 %
HGB BLD-MCNC: 12.7 G/DL
IMM GRANULOCYTES # BLD AUTO: 0.02 X10(3) UL (ref 0–1)
IMM GRANULOCYTES NFR BLD: 0.2 %
LYMPHOCYTES # BLD AUTO: 3.78 X10(3) UL (ref 1–4)
LYMPHOCYTES NFR BLD AUTO: 43.9 %
MCH RBC QN AUTO: 31.3 PG (ref 26–34)
MCHC RBC AUTO-ENTMCNC: 32.3 G/DL (ref 31–37)
MCV RBC AUTO: 96.8 FL
MONOCYTES # BLD AUTO: 0.46 X10(3) UL (ref 0.1–1)
MONOCYTES NFR BLD AUTO: 5.3 %
NEUTROPHILS # BLD AUTO: 4.25 X10 (3) UL (ref 1.5–7.7)
NEUTROPHILS # BLD AUTO: 4.25 X10(3) UL (ref 1.5–7.7)
NEUTROPHILS NFR BLD AUTO: 49.4 %
OSMOLALITY SERPL CALC.SUM OF ELEC: 291 MOSM/KG (ref 275–295)
PLATELET # BLD AUTO: 272 10(3)UL (ref 150–450)
POTASSIUM SERPL-SCNC: 3.9 MMOL/L (ref 3.5–5.1)
PROT SERPL-MCNC: 6.7 G/DL (ref 6.4–8.2)
RBC # BLD AUTO: 4.06 X10(6)UL
SODIUM SERPL-SCNC: 141 MMOL/L (ref 136–145)
WBC # BLD AUTO: 8.6 X10(3) UL (ref 4–11)

## 2022-07-28 PROCEDURE — 96377 APPLICATON ON-BODY INJECTOR: CPT

## 2022-07-28 PROCEDURE — 80053 COMPREHEN METABOLIC PANEL: CPT

## 2022-07-28 PROCEDURE — 96413 CHEMO IV INFUSION 1 HR: CPT

## 2022-07-28 PROCEDURE — 96415 CHEMO IV INFUSION ADDL HR: CPT

## 2022-07-28 PROCEDURE — 96417 CHEMO IV INFUS EACH ADDL SEQ: CPT

## 2022-07-28 PROCEDURE — 85025 COMPLETE CBC W/AUTO DIFF WBC: CPT

## 2022-07-28 PROCEDURE — 99215 OFFICE O/P EST HI 40 MIN: CPT | Performed by: INTERNAL MEDICINE

## 2022-07-28 PROCEDURE — 96367 TX/PROPH/DG ADDL SEQ IV INF: CPT

## 2022-07-28 RX ORDER — HEPARIN SODIUM (PORCINE) LOCK FLUSH IV SOLN 100 UNIT/ML 100 UNIT/ML
5 SOLUTION INTRAVENOUS ONCE
OUTPATIENT
Start: 2022-07-28

## 2022-07-28 RX ORDER — DIPHENHYDRAMINE HCL 25 MG
25 CAPSULE ORAL ONCE
Status: CANCELLED | OUTPATIENT
Start: 2022-07-28

## 2022-07-28 RX ORDER — ACETAMINOPHEN 325 MG/1
650 TABLET ORAL ONCE
Status: COMPLETED | OUTPATIENT
Start: 2022-07-28 | End: 2022-07-28

## 2022-07-28 RX ORDER — DIPHENHYDRAMINE HCL 25 MG
CAPSULE ORAL
Status: DISPENSED
Start: 2022-07-28 | End: 2022-07-28

## 2022-07-28 RX ORDER — HEPARIN SODIUM (PORCINE) LOCK FLUSH IV SOLN 100 UNIT/ML 100 UNIT/ML
5 SOLUTION INTRAVENOUS ONCE
Status: COMPLETED | OUTPATIENT
Start: 2022-07-28 | End: 2022-07-28

## 2022-07-28 RX ORDER — HEPARIN SODIUM (PORCINE) LOCK FLUSH IV SOLN 100 UNIT/ML 100 UNIT/ML
SOLUTION INTRAVENOUS
Status: DISPENSED
Start: 2022-07-28 | End: 2022-07-28

## 2022-07-28 RX ORDER — DIPHENHYDRAMINE HCL 25 MG
25 CAPSULE ORAL ONCE
Status: COMPLETED | OUTPATIENT
Start: 2022-07-28 | End: 2022-07-28

## 2022-07-28 RX ORDER — SODIUM CHLORIDE 9 MG/ML
10 INJECTION INTRAVENOUS ONCE
Status: DISCONTINUED | OUTPATIENT
Start: 2022-07-28 | End: 2022-07-28

## 2022-07-28 RX ORDER — SODIUM CHLORIDE 9 MG/ML
10 INJECTION INTRAVENOUS ONCE
OUTPATIENT
Start: 2022-07-28

## 2022-07-28 RX ORDER — ACETAMINOPHEN 325 MG/1
650 TABLET ORAL ONCE
Status: CANCELLED | OUTPATIENT
Start: 2022-07-28

## 2022-07-28 RX ORDER — ACETAMINOPHEN 325 MG/1
TABLET ORAL
Status: DISPENSED
Start: 2022-07-28 | End: 2022-07-28

## 2022-07-28 RX ADMIN — DIPHENHYDRAMINE HCL 25 MG: 25 MG CAPSULE ORAL at 10:19:00

## 2022-07-28 RX ADMIN — HEPARIN SODIUM (PORCINE) LOCK FLUSH IV SOLN 100 UNIT/ML 500 UNITS: 100 SOLUTION INTRAVENOUS at 16:30:00

## 2022-07-28 RX ADMIN — ACETAMINOPHEN 650 MG: 325 TABLET ORAL at 10:19:00

## 2022-07-28 NOTE — PATIENT INSTRUCTIONS
On-body Injector for Neulasta Patient Instructions       Your On-Body Injection device was applied on this day:  7/28 at this time 4:30pm    Your dose of medication will start on this day: 7/29 at this time 8pm    Safe time to remove this device will be on this day: 7/29 at this time 10 pm      Important information to remember about the Neulasta Injector:     1. The On-Body Neulasta Injector begins to deliver the dose of Neulasta 27 hours after it is activated at the cancer center. Beeping at that time indicates  that the dose will be delivered in 2 minutes. It takes 45 minutes for the dose to be completely delivered. DO NOT REMOVE during this time. 2. Check the light periodically for a slow flashing GREEN light, this is normal.     3. If the light is flashing RED or comes off prior to the delivery time, during regular business hours 8-4:30pm, please call [phone] and ask for the charge nurse. If this is after hours or a holiday, please contact the [email or web address] @ [phone], a support person is available 24/7.      4. Please keep the device at least 4 inches away from electrical equipment, such as CELL PHONES, microwaves, cordless phones. Do NOT have Xrays, MRI, CT without informing the technician. 5. If you are traveling, needing to go through airport security, please notify the TSA. You may still travel, just avoid the xray security. 6. Avoid bumping or sleeping directly on the injector. 7. Avoid hot tubs, saunas and direct sunlight. Keep the injector dry 3 hours prior to the dose delivery time. 8. Remove the injector device at the directed time above and place in a hard container for disposal and place in trash.

## 2022-07-28 NOTE — PROGRESS NOTES
Pt here for C1D1 TCHP. Arrives Ambulating independently, accompanied by Family member mom      Pregnancy screening: Denies possibility of pregnancy    Modifications in dose or schedule: No    Drugs/infusions dual verified for appearance and physical integrity. IV pump settings were dual verified: yes     Neulasta on pro applied to right upper arm. Instructions given to patient on AVS.     Frequency of blood return and site check throughout administration: Prior to administration, Prior to each drug and At completion of therapy   Discharged to Home, Ambulating independently, accompanied by:Family member    Outpatient Oncology Care Plan  Problem list:  anemia  diarrhea  dry mouth  hair loss  loss of appetite  neutropenia  thrombocytopenia  knowledge deficit  nausea and vomiting  Problems related to:  chemotherapy  disease/disease progression  side effect of treatment  therapeutic effects  Interventions:  administered blood products  emotional support given  monitor for signs/symptoms of infection  nausea/vomiting teaching  patient/family supported  chemotherapy teaching  educate regarding self care  monitor effect of therapy  monitor effect of nausea/vomiting management  provided general teaching  provided nutrition education  Expected outcomes:  able to maintain oral integrity  patient supported/coping well  safe in environment  symptoms relieved/minimized  understands plan of care  Progress towards outcome:  making progress    Education Record    Learner:  Patient and Family Member  Barriers / Limitations:  Fatigue  Method:  Brief focused, Discussion and Printed material  Outcome:  Needs reinforcement  Comments:  Reinforced chemo education. When to call MD. Importance for staying hydrated.

## 2022-07-29 ENCOUNTER — TELEPHONE (OUTPATIENT)
Dept: HEMATOLOGY/ONCOLOGY | Facility: HOSPITAL | Age: 37
End: 2022-07-29

## 2022-07-29 ENCOUNTER — TELEPHONE (OUTPATIENT)
Dept: INTERNAL MEDICINE CLINIC | Facility: CLINIC | Age: 37
End: 2022-07-29

## 2022-07-29 ENCOUNTER — APPOINTMENT (OUTPATIENT)
Dept: HEMATOLOGY/ONCOLOGY | Facility: HOSPITAL | Age: 37
End: 2022-07-29
Attending: NURSE PRACTITIONER
Payer: COMMERCIAL

## 2022-07-29 RX ORDER — HYDROCODONE BITARTRATE AND ACETAMINOPHEN 10; 325 MG/1; MG/1
TABLET ORAL
Qty: 140 TABLET | Refills: 0 | Status: SHIPPED | OUTPATIENT
Start: 2022-07-29

## 2022-07-29 NOTE — TELEPHONE ENCOUNTER
Pt going thru chemo/neulasta given bone/joint pains. Was told by [facility] clinic will need more pain med while on neulasta so increased to max 10/day; ilpmp reviewed.

## 2022-07-29 NOTE — TELEPHONE ENCOUNTER
Toxicities: C1 D1 Docetaxel/Carboplatin/Trastuzumab-qyyp/Pertuzumab on 7/28/2022    Americo Griggs said she \"feels fantastic! \" She is eating and drinking without difficulty. She is out running errands. Both her and her mother believe her lump in her breast is smaller today. I told Americo Griggs to please call the office if she is not feeling well or she has any questions or concerns. She thanked me for checking on her.

## 2022-08-02 ENCOUNTER — SOCIAL WORK SERVICES (OUTPATIENT)
Dept: HEMATOLOGY/ONCOLOGY | Facility: HOSPITAL | Age: 37
End: 2022-08-02

## 2022-08-02 NOTE — PROGRESS NOTES
CALLIE received a request for additional information from [facility] for patient's short term disability claim. Mady Mobley has requested copies of office visits notes from July 28, 2022 to the present. CALLIE faxed clinical progress note from [address] [place] [address] dated 7/28/2022 to [facility] to [phone]. CALLIE received fax confirmation that fax went through with success. Social Work will remain available for assessment, education and discharge planning as needed.

## 2022-08-03 ENCOUNTER — TELEPHONE (OUTPATIENT)
Dept: HEMATOLOGY/ONCOLOGY | Facility: HOSPITAL | Age: 37
End: 2022-08-03

## 2022-08-03 NOTE — TELEPHONE ENCOUNTER
Called patient with instructions to use Mouth Cocktail of  1 teaspoon of Lidocaine, mylanta/Maalox and Childrens cherry benadryl - swish and spit or if having discomfort with swallowing may swish and swallow. If not helping may discontinue and call office for further evaluation. She verbalizes understanding.

## 2022-08-03 NOTE — TELEPHONE ENCOUNTER
7/28/22 - C1D1 - Carbo/Taxotere/Perjeta/Trazimera/Neulasta    Patient called she is having some peeling of skin in mouth, no redness, not painful to eat and drink but use of Biotene will burn. Denies fevers, no sob or chest pain, denies n/v/d, is eating and drinking okay, no swallowing issues or discomfort, tongue pink, denies lightheadedness or dizziness, no urinary or bowel complaints. Please advise trying to stay ahead of worse issues.

## 2022-08-05 ENCOUNTER — TELEPHONE (OUTPATIENT)
Dept: HEMATOLOGY/ONCOLOGY | Facility: HOSPITAL | Age: 37
End: 2022-08-05

## 2022-08-05 NOTE — TELEPHONE ENCOUNTER
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 Denies fevers, no sob or chest pain, no nausea or vomiting, will have one loose stool in am and she take imodium which controls it. Is eating and drinking well, denies lightheadedness. No urinary complaints. Mouth much improved with Lidocaine, tongue pink, no pain. Inside nose raw and voice is horse. She is currently not at home and requesting script sent to [facility] in [facility], to [facility]. Please advise.     7/28/22 - C1D1 - Carbo/Taxotere/Perjeta/Trazimera/Neulasta

## 2022-08-05 NOTE — TELEPHONE ENCOUNTER
Called patient that script sent and to call for any worsening symptoms for assessment, she verbalizes understanding.

## 2022-08-05 NOTE — TELEPHONE ENCOUNTER
Yaz Rivera calling says she has a yeast infection she found last night when taking a shower.  She has a little bit of diarrhea inside of nose is raw and voice is horse. ebony

## 2022-08-09 ENCOUNTER — TELEPHONE (OUTPATIENT)
Dept: INTERNAL MEDICINE CLINIC | Facility: CLINIC | Age: 37
End: 2022-08-09

## 2022-08-09 RX ORDER — HYDROCODONE BITARTRATE AND ACETAMINOPHEN 10; 325 MG/1; MG/1
TABLET ORAL
Qty: 126 TABLET | Refills: 0 | Status: SHIPPED | OUTPATIENT
Start: 2022-08-09

## 2022-08-15 ENCOUNTER — TELEPHONE (OUTPATIENT)
Dept: HEMATOLOGY/ONCOLOGY | Facility: HOSPITAL | Age: 37
End: 2022-08-15

## 2022-08-15 NOTE — TELEPHONE ENCOUNTER
Called patient with instructions to present to local Urgent Care for U/A and C/S, also Cipro script sent. She verbalizes understanding.

## 2022-08-15 NOTE — TELEPHONE ENCOUNTER
Ruthann Laureano calling says she has a raging UTI. She has taken peridum the last few days. No other symptoms.   reji

## 2022-08-15 NOTE — TELEPHONE ENCOUNTER
Returned call to Yakelin Washburn, she is in [place] at this time. She is having a UTI again, urgency, burning on urination, also has period. Had been using some peridium with only slight improvement. She get these frequently and usually takes Keflex but not as helpful with resolving completely from last UTI. No other symptoms. Requesting a script be sent to [facility] in [place]. Please advise.

## 2022-08-18 ENCOUNTER — TELEPHONE (OUTPATIENT)
Dept: HEMATOLOGY/ONCOLOGY | Facility: HOSPITAL | Age: 37
End: 2022-08-18

## 2022-08-18 ENCOUNTER — TELEPHONE (OUTPATIENT)
Dept: INTERNAL MEDICINE CLINIC | Facility: CLINIC | Age: 37
End: 2022-08-18

## 2022-08-18 NOTE — TELEPHONE ENCOUNTER
Spoke with Otis Romero. Got the lab results from her urinalysis. She is on Cipro which covers the infection. She feels a ton better. Follow up on Monday.

## 2022-08-22 ENCOUNTER — TELEPHONE (OUTPATIENT)
Dept: INTERNAL MEDICINE CLINIC | Facility: CLINIC | Age: 37
End: 2022-08-22

## 2022-08-22 ENCOUNTER — OFFICE VISIT (OUTPATIENT)
Dept: HEMATOLOGY/ONCOLOGY | Facility: HOSPITAL | Age: 37
End: 2022-08-22
Attending: INTERNAL MEDICINE
Payer: COMMERCIAL

## 2022-08-22 ENCOUNTER — OFFICE VISIT (OUTPATIENT)
Dept: HEMATOLOGY/ONCOLOGY | Facility: HOSPITAL | Age: 37
End: 2022-08-22
Attending: NURSE PRACTITIONER
Payer: COMMERCIAL

## 2022-08-22 VITALS
RESPIRATION RATE: 16 BRPM | WEIGHT: 188 LBS | OXYGEN SATURATION: 100 % | HEIGHT: 66 IN | SYSTOLIC BLOOD PRESSURE: 146 MMHG | HEART RATE: 87 BPM | TEMPERATURE: 99 F | BODY MASS INDEX: 30.22 KG/M2 | DIASTOLIC BLOOD PRESSURE: 92 MMHG

## 2022-08-22 DIAGNOSIS — C50.911 BREAST CANCER, STAGE 1, ESTROGEN RECEPTOR POSITIVE, RIGHT (HCC): Primary | ICD-10-CM

## 2022-08-22 DIAGNOSIS — C50.911 BREAST CANCER, STAGE 1, ESTROGEN RECEPTOR POSITIVE, RIGHT (HCC): ICD-10-CM

## 2022-08-22 DIAGNOSIS — Z17.0 BREAST CANCER, STAGE 1, ESTROGEN RECEPTOR POSITIVE, RIGHT (HCC): ICD-10-CM

## 2022-08-22 DIAGNOSIS — Z17.0 BREAST CANCER, STAGE 1, ESTROGEN RECEPTOR POSITIVE, RIGHT (HCC): Primary | ICD-10-CM

## 2022-08-22 DIAGNOSIS — Z51.11 CHEMOTHERAPY MANAGEMENT, ENCOUNTER FOR: ICD-10-CM

## 2022-08-22 DIAGNOSIS — Z51.11 CHEMOTHERAPY MANAGEMENT, ENCOUNTER FOR: Primary | ICD-10-CM

## 2022-08-22 LAB
ALBUMIN SERPL-MCNC: 4 G/DL (ref 3.4–5)
ALBUMIN/GLOB SERPL: 1.3 {RATIO} (ref 1–2)
ALP LIVER SERPL-CCNC: 45 U/L
ALT SERPL-CCNC: 25 U/L
ANION GAP SERPL CALC-SCNC: 8 MMOL/L (ref 0–18)
AST SERPL-CCNC: 11 U/L (ref 15–37)
BASOPHILS # BLD AUTO: 0.04 X10(3) UL (ref 0–0.2)
BASOPHILS NFR BLD AUTO: 0.6 %
BILIRUB SERPL-MCNC: 0.5 MG/DL (ref 0.1–2)
BUN BLD-MCNC: 12 MG/DL (ref 7–18)
BUN/CREAT SERPL: 19.7 (ref 10–20)
CALCIUM BLD-MCNC: 9.6 MG/DL (ref 8.5–10.1)
CHLORIDE SERPL-SCNC: 111 MMOL/L (ref 98–112)
CO2 SERPL-SCNC: 24 MMOL/L (ref 21–32)
CREAT BLD-MCNC: 0.61 MG/DL
DEPRECATED RDW RBC AUTO: 49.5 FL (ref 35.1–46.3)
EOSINOPHIL # BLD AUTO: 0.01 X10(3) UL (ref 0–0.7)
EOSINOPHIL NFR BLD AUTO: 0.2 %
ERYTHROCYTE [DISTWIDTH] IN BLOOD BY AUTOMATED COUNT: 14 % (ref 11–15)
FASTING STATUS PATIENT QL REPORTED: NO
GFR SERPLBLD BASED ON 1.73 SQ M-ARVRAT: 117 ML/MIN/1.73M2 (ref 60–?)
GLOBULIN PLAS-MCNC: 3 G/DL (ref 2.8–4.4)
GLUCOSE BLD-MCNC: 111 MG/DL (ref 70–99)
HCT VFR BLD AUTO: 36.8 %
HGB BLD-MCNC: 12.1 G/DL
IMM GRANULOCYTES # BLD AUTO: 0.01 X10(3) UL (ref 0–1)
IMM GRANULOCYTES NFR BLD: 0.2 %
LYMPHOCYTES # BLD AUTO: 1.99 X10(3) UL (ref 1–4)
LYMPHOCYTES NFR BLD AUTO: 32.2 %
MCH RBC QN AUTO: 31.5 PG (ref 26–34)
MCHC RBC AUTO-ENTMCNC: 32.9 G/DL (ref 31–37)
MCV RBC AUTO: 95.8 FL
MONOCYTES # BLD AUTO: 0.3 X10(3) UL (ref 0.1–1)
MONOCYTES NFR BLD AUTO: 4.9 %
NEUTROPHILS # BLD AUTO: 3.83 X10 (3) UL (ref 1.5–7.7)
NEUTROPHILS # BLD AUTO: 3.83 X10(3) UL (ref 1.5–7.7)
NEUTROPHILS NFR BLD AUTO: 61.9 %
OSMOLALITY SERPL CALC.SUM OF ELEC: 296 MOSM/KG (ref 275–295)
PLATELET # BLD AUTO: 194 10(3)UL (ref 150–450)
POTASSIUM SERPL-SCNC: 3.7 MMOL/L (ref 3.5–5.1)
PROT SERPL-MCNC: 7 G/DL (ref 6.4–8.2)
RBC # BLD AUTO: 3.84 X10(6)UL
SODIUM SERPL-SCNC: 143 MMOL/L (ref 136–145)
WBC # BLD AUTO: 6.2 X10(3) UL (ref 4–11)

## 2022-08-22 PROCEDURE — 96417 CHEMO IV INFUS EACH ADDL SEQ: CPT

## 2022-08-22 PROCEDURE — 99215 OFFICE O/P EST HI 40 MIN: CPT | Performed by: INTERNAL MEDICINE

## 2022-08-22 PROCEDURE — 96377 APPLICATON ON-BODY INJECTOR: CPT

## 2022-08-22 PROCEDURE — 80053 COMPREHEN METABOLIC PANEL: CPT

## 2022-08-22 PROCEDURE — 96367 TX/PROPH/DG ADDL SEQ IV INF: CPT

## 2022-08-22 PROCEDURE — 96375 TX/PRO/DX INJ NEW DRUG ADDON: CPT

## 2022-08-22 PROCEDURE — 85025 COMPLETE CBC W/AUTO DIFF WBC: CPT

## 2022-08-22 PROCEDURE — 96413 CHEMO IV INFUSION 1 HR: CPT

## 2022-08-22 RX ORDER — HEPARIN SODIUM (PORCINE) LOCK FLUSH IV SOLN 100 UNIT/ML 100 UNIT/ML
5 SOLUTION INTRAVENOUS ONCE
Status: COMPLETED | OUTPATIENT
Start: 2022-08-22 | End: 2022-08-22

## 2022-08-22 RX ORDER — FLUCONAZOLE 150 MG/1
150 TABLET ORAL ONCE
Qty: 10 TABLET | Refills: 0 | Status: SHIPPED | OUTPATIENT
Start: 2022-08-22 | End: 2022-08-22

## 2022-08-22 RX ORDER — SODIUM CHLORIDE 9 MG/ML
10 INJECTION INTRAVENOUS ONCE
OUTPATIENT
Start: 2022-08-22

## 2022-08-22 RX ORDER — HYDROCODONE BITARTRATE AND ACETAMINOPHEN 10; 325 MG/1; MG/1
TABLET ORAL
Qty: 126 TABLET | Refills: 0 | Status: SHIPPED | OUTPATIENT
Start: 2022-08-22 | End: 2022-08-22

## 2022-08-22 RX ORDER — HYDROCODONE BITARTRATE AND ACETAMINOPHEN 10; 325 MG/1; MG/1
TABLET ORAL
Qty: 126 TABLET | Refills: 0 | Status: SHIPPED | OUTPATIENT
Start: 2022-08-22

## 2022-08-22 RX ORDER — HEPARIN SODIUM (PORCINE) LOCK FLUSH IV SOLN 100 UNIT/ML 100 UNIT/ML
5 SOLUTION INTRAVENOUS ONCE
OUTPATIENT
Start: 2022-08-22

## 2022-08-22 RX ORDER — HEPARIN SODIUM (PORCINE) LOCK FLUSH IV SOLN 100 UNIT/ML 100 UNIT/ML
SOLUTION INTRAVENOUS
Status: COMPLETED
Start: 2022-08-22 | End: 2022-08-22

## 2022-08-22 RX ADMIN — HEPARIN SODIUM (PORCINE) LOCK FLUSH IV SOLN 100 UNIT/ML 500 UNITS: 100 SOLUTION INTRAVENOUS at 14:54:00

## 2022-08-22 NOTE — PROGRESS NOTES
Pt here for C2D1 TCHP. Arrives Ambulating independently, accompanied by Family member mom        Pregnancy screening: Denies possibility of pregnancy    Modifications in dose or schedule: No    Patient with no major complaints since last infusion - discussed that patient would get trastuzumab/perjeta at faster rate today and observation is only 30 min after Perjeta - verbalized understanding. Drugs/infusions dual verified for appearance and physical integrity. IV pump settings were dual verified: yes    Neulasta on pro applied on R upper arm - discussed administration time tomorrow - patient verbalized understanding. Frequency of blood return and site check throughout administration: Prior to administration, Prior to each drug and At completion of therapy     Discharged to Home, Ambulating independently, accompanied by:Family member    Outpatient Oncology Care Plan  Problem list:  anemia  diarrhea  dry mouth  hair loss  loss of appetite  neutropenia  thrombocytopenia  knowledge deficit  nausea and vomiting  Problems related to:  chemotherapy  disease/disease progression  side effect of treatment  therapeutic effects  Interventions:  administered blood products  emotional support given  monitor for signs/symptoms of infection  nausea/vomiting teaching  patient/family supported  chemotherapy teaching  educate regarding self care  monitor effect of therapy  monitor effect of nausea/vomiting management  provided general teaching  provided nutrition education  Expected outcomes:  able to maintain oral integrity  patient supported/coping well  safe in environment  symptoms relieved/minimized  understands plan of care  Progress towards outcome:  making progress    Education Record    Learner:  Patient and Family Member  Barriers / Limitations:  Fatigue  Method:  Brief focused, Discussion and Printed material  Outcome:  Needs reinforcement  Comments:  Reinforced chemo education.  When to call MD. Importance for staying hydrated.

## 2022-08-23 ENCOUNTER — APPOINTMENT (OUTPATIENT)
Dept: HEMATOLOGY/ONCOLOGY | Facility: HOSPITAL | Age: 37
End: 2022-08-23
Attending: NURSE PRACTITIONER
Payer: COMMERCIAL

## 2022-08-23 ENCOUNTER — TELEPHONE (OUTPATIENT)
Dept: HEMATOLOGY/ONCOLOGY | Facility: HOSPITAL | Age: 37
End: 2022-08-23

## 2022-08-23 NOTE — TELEPHONE ENCOUNTER
Checked faxes, no results received as of yet.
No results received as of 8/23/22. S/W patient who states she called the facility that did the urine test and they have spoken with patient's oncologist. Patient also, wants Dr. Ivy Marrufo to know that after one round of chemo yesterday, the tumor is gone and she has no side effects from the treatment.  Advised patient I will forward this information to Dr. Nicol Ford.
Patient called and states that she did her urine Culture at [place] [address] in [facility][phone] on 8/15/22. States that she is on antibiotic due to UTI  and she needs to give the test results to her oncology, she gave Dr Jesus Barnhart informations. Placed on hold while doing chart review. Nothing under MEDIA tab or CARE EVERYWHERE. Informed that RN will send the message to the office to check their fax but also instructed to call the lab in [place] for follow up, offered   office fax number but per patient she has it on her phone. Zari Hayes
noted
Satisfactory

## 2022-08-23 NOTE — TELEPHONE ENCOUNTER
Called [facility] the Urea 10% is over the counter of which they do not have any, wondering if MD wanted to change to 20% of which they have, need new script if okay. Other wise they also have Aqua Care available. Please advise.

## 2022-08-29 ENCOUNTER — OFFICE VISIT (OUTPATIENT)
Dept: HEMATOLOGY/ONCOLOGY | Facility: HOSPITAL | Age: 37
End: 2022-08-29
Attending: INTERNAL MEDICINE
Payer: COMMERCIAL

## 2022-08-29 ENCOUNTER — APPOINTMENT (OUTPATIENT)
Dept: HEMATOLOGY/ONCOLOGY | Facility: HOSPITAL | Age: 37
End: 2022-08-29
Attending: NURSE PRACTITIONER
Payer: COMMERCIAL

## 2022-08-29 VITALS
DIASTOLIC BLOOD PRESSURE: 79 MMHG | BODY MASS INDEX: 30.22 KG/M2 | TEMPERATURE: 98 F | OXYGEN SATURATION: 100 % | SYSTOLIC BLOOD PRESSURE: 137 MMHG | RESPIRATION RATE: 16 BRPM | WEIGHT: 188 LBS | HEIGHT: 66 IN | HEART RATE: 93 BPM

## 2022-08-29 DIAGNOSIS — C50.911 BREAST CANCER, STAGE 1, ESTROGEN RECEPTOR POSITIVE, RIGHT (HCC): Primary | ICD-10-CM

## 2022-08-29 DIAGNOSIS — Z17.0 BREAST CANCER, STAGE 1, ESTROGEN RECEPTOR POSITIVE, RIGHT (HCC): ICD-10-CM

## 2022-08-29 DIAGNOSIS — Z51.11 CHEMOTHERAPY MANAGEMENT, ENCOUNTER FOR: ICD-10-CM

## 2022-08-29 DIAGNOSIS — C50.911 BREAST CANCER, STAGE 1, ESTROGEN RECEPTOR POSITIVE, RIGHT (HCC): ICD-10-CM

## 2022-08-29 DIAGNOSIS — Z17.0 BREAST CANCER, STAGE 1, ESTROGEN RECEPTOR POSITIVE, RIGHT (HCC): Primary | ICD-10-CM

## 2022-08-29 DIAGNOSIS — L27.0: ICD-10-CM

## 2022-08-29 LAB
ALBUMIN SERPL-MCNC: 3.8 G/DL (ref 3.4–5)
ALBUMIN/GLOB SERPL: 1.2 {RATIO} (ref 1–2)
ALP LIVER SERPL-CCNC: 89 U/L
ALT SERPL-CCNC: 32 U/L
ANION GAP SERPL CALC-SCNC: 6 MMOL/L (ref 0–18)
AST SERPL-CCNC: 18 U/L (ref 15–37)
BASOPHILS # BLD AUTO: 0.09 X10(3) UL (ref 0–0.2)
BASOPHILS NFR BLD AUTO: 0.9 %
BILIRUB SERPL-MCNC: 0.2 MG/DL (ref 0.1–2)
BUN BLD-MCNC: 10 MG/DL (ref 7–18)
BUN/CREAT SERPL: 17.2 (ref 10–20)
CALCIUM BLD-MCNC: 9 MG/DL (ref 8.5–10.1)
CHLORIDE SERPL-SCNC: 108 MMOL/L (ref 98–112)
CO2 SERPL-SCNC: 25 MMOL/L (ref 21–32)
CREAT BLD-MCNC: 0.58 MG/DL
DEPRECATED RDW RBC AUTO: 49.6 FL (ref 35.1–46.3)
EOSINOPHIL # BLD AUTO: 0.07 X10(3) UL (ref 0–0.7)
EOSINOPHIL NFR BLD AUTO: 0.7 %
ERYTHROCYTE [DISTWIDTH] IN BLOOD BY AUTOMATED COUNT: 13.8 % (ref 11–15)
GFR SERPLBLD BASED ON 1.73 SQ M-ARVRAT: 118 ML/MIN/1.73M2 (ref 60–?)
GLOBULIN PLAS-MCNC: 3.1 G/DL (ref 2.8–4.4)
GLUCOSE BLD-MCNC: 107 MG/DL (ref 70–99)
HCT VFR BLD AUTO: 37.5 %
HGB BLD-MCNC: 12.2 G/DL
IMM GRANULOCYTES # BLD AUTO: 0.13 X10(3) UL (ref 0–1)
IMM GRANULOCYTES NFR BLD: 1.4 %
LYMPHOCYTES # BLD AUTO: 3.58 X10(3) UL (ref 1–4)
LYMPHOCYTES NFR BLD AUTO: 37.4 %
MCH RBC QN AUTO: 31.6 PG (ref 26–34)
MCHC RBC AUTO-ENTMCNC: 32.5 G/DL (ref 31–37)
MCV RBC AUTO: 97.2 FL
MONOCYTES # BLD AUTO: 0.83 X10(3) UL (ref 0.1–1)
MONOCYTES NFR BLD AUTO: 8.7 %
NEUTROPHILS # BLD AUTO: 4.88 X10 (3) UL (ref 1.5–7.7)
NEUTROPHILS # BLD AUTO: 4.88 X10(3) UL (ref 1.5–7.7)
NEUTROPHILS NFR BLD AUTO: 50.9 %
OSMOLALITY SERPL CALC.SUM OF ELEC: 288 MOSM/KG (ref 275–295)
PLATELET # BLD AUTO: 145 10(3)UL (ref 150–450)
POTASSIUM SERPL-SCNC: 3.9 MMOL/L (ref 3.5–5.1)
PROT SERPL-MCNC: 6.9 G/DL (ref 6.4–8.2)
RBC # BLD AUTO: 3.86 X10(6)UL
SODIUM SERPL-SCNC: 139 MMOL/L (ref 136–145)
WBC # BLD AUTO: 9.6 X10(3) UL (ref 4–11)

## 2022-08-29 PROCEDURE — 85025 COMPLETE CBC W/AUTO DIFF WBC: CPT

## 2022-08-29 PROCEDURE — 80053 COMPREHEN METABOLIC PANEL: CPT

## 2022-08-29 PROCEDURE — 99213 OFFICE O/P EST LOW 20 MIN: CPT | Performed by: INTERNAL MEDICINE

## 2022-08-29 PROCEDURE — 36415 COLL VENOUS BLD VENIPUNCTURE: CPT

## 2022-09-02 RX ORDER — HYDROCODONE BITARTRATE AND ACETAMINOPHEN 10; 325 MG/1; MG/1
TABLET ORAL
Qty: 126 TABLET | Refills: 0 | Status: SHIPPED | OUTPATIENT
Start: 2022-09-05

## 2022-09-02 NOTE — TELEPHONE ENCOUNTER
Dr Marcie Umaña:   Patient needs to  paper RX by tomorrow. (not sure if anyone working in [address] [place] [address] Saturday?) would like RX start date 9/5/22. She is getting chemo and usually leaves out of home while she is dealing with chemo.

## 2022-09-02 NOTE — TELEPHONE ENCOUNTER
Pt states she usually comes in to  her prescription but \"my kids are sick and I have chemo\"    Pt requests Rx sent to pharmacy with start date of 9/5/22 instead of 9/6/22. Please advise Rx pended.   Please changed date on Rx if not approved for 9/5/22

## 2022-09-12 ENCOUNTER — GENETICS ENCOUNTER (OUTPATIENT)
Dept: GENETICS | Facility: HOSPITAL | Age: 37
End: 2022-09-12
Attending: GENETIC COUNSELOR, MS
Payer: COMMERCIAL

## 2022-09-12 ENCOUNTER — NURSE ONLY (OUTPATIENT)
Dept: HEMATOLOGY/ONCOLOGY | Facility: HOSPITAL | Age: 37
End: 2022-09-12
Attending: INTERNAL MEDICINE
Payer: COMMERCIAL

## 2022-09-12 VITALS
WEIGHT: 190 LBS | BODY MASS INDEX: 30.53 KG/M2 | HEART RATE: 102 BPM | DIASTOLIC BLOOD PRESSURE: 83 MMHG | TEMPERATURE: 98 F | HEIGHT: 66 IN | SYSTOLIC BLOOD PRESSURE: 146 MMHG | RESPIRATION RATE: 16 BRPM | OXYGEN SATURATION: 98 %

## 2022-09-12 VITALS
HEART RATE: 102 BPM | WEIGHT: 190 LBS | RESPIRATION RATE: 16 BRPM | OXYGEN SATURATION: 98 % | DIASTOLIC BLOOD PRESSURE: 83 MMHG | SYSTOLIC BLOOD PRESSURE: 146 MMHG | HEIGHT: 66 IN | TEMPERATURE: 98 F | BODY MASS INDEX: 30.53 KG/M2

## 2022-09-12 DIAGNOSIS — Z17.0 BREAST CANCER, STAGE 1, ESTROGEN RECEPTOR POSITIVE, RIGHT (HCC): Primary | ICD-10-CM

## 2022-09-12 DIAGNOSIS — Z17.0 BREAST CANCER, STAGE 1, ESTROGEN RECEPTOR POSITIVE, RIGHT (HCC): ICD-10-CM

## 2022-09-12 DIAGNOSIS — Z51.11 CHEMOTHERAPY MANAGEMENT, ENCOUNTER FOR: Primary | ICD-10-CM

## 2022-09-12 DIAGNOSIS — C50.911 BREAST CANCER, STAGE 1, ESTROGEN RECEPTOR POSITIVE, RIGHT (HCC): ICD-10-CM

## 2022-09-12 DIAGNOSIS — C50.911 BREAST CANCER, STAGE 1, ESTROGEN RECEPTOR POSITIVE, RIGHT (HCC): Primary | ICD-10-CM

## 2022-09-12 DIAGNOSIS — Z51.11 CHEMOTHERAPY MANAGEMENT, ENCOUNTER FOR: ICD-10-CM

## 2022-09-12 DIAGNOSIS — Z80.3 FAMILY HISTORY OF MALIGNANT NEOPLASM OF BREAST: ICD-10-CM

## 2022-09-12 LAB
ALBUMIN SERPL-MCNC: 3.8 G/DL (ref 3.4–5)
ALBUMIN/GLOB SERPL: 1.2 {RATIO} (ref 1–2)
ALP LIVER SERPL-CCNC: 56 U/L
ALT SERPL-CCNC: 26 U/L
ANION GAP SERPL CALC-SCNC: 8 MMOL/L (ref 0–18)
AST SERPL-CCNC: 14 U/L (ref 15–37)
BASOPHILS # BLD AUTO: 0.01 X10(3) UL (ref 0–0.2)
BASOPHILS NFR BLD AUTO: 0.1 %
BILIRUB SERPL-MCNC: 0.3 MG/DL (ref 0.1–2)
BUN BLD-MCNC: 16 MG/DL (ref 7–18)
BUN/CREAT SERPL: 25 (ref 10–20)
CALCIUM BLD-MCNC: 9.1 MG/DL (ref 8.5–10.1)
CHLORIDE SERPL-SCNC: 110 MMOL/L (ref 98–112)
CO2 SERPL-SCNC: 24 MMOL/L (ref 21–32)
CREAT BLD-MCNC: 0.64 MG/DL
DEPRECATED RDW RBC AUTO: 59.3 FL (ref 35.1–46.3)
EOSINOPHIL # BLD AUTO: 0 X10(3) UL (ref 0–0.7)
EOSINOPHIL NFR BLD AUTO: 0 %
ERYTHROCYTE [DISTWIDTH] IN BLOOD BY AUTOMATED COUNT: 15.8 % (ref 11–15)
GFR SERPLBLD BASED ON 1.73 SQ M-ARVRAT: 115 ML/MIN/1.73M2 (ref 60–?)
GLOBULIN PLAS-MCNC: 3.1 G/DL (ref 2.8–4.4)
GLUCOSE BLD-MCNC: 105 MG/DL (ref 70–99)
HCT VFR BLD AUTO: 37.1 %
HGB BLD-MCNC: 11.9 G/DL
IMM GRANULOCYTES # BLD AUTO: 0.02 X10(3) UL (ref 0–1)
IMM GRANULOCYTES NFR BLD: 0.3 %
LYMPHOCYTES # BLD AUTO: 1.72 X10(3) UL (ref 1–4)
LYMPHOCYTES NFR BLD AUTO: 24.4 %
MCH RBC QN AUTO: 32.6 PG (ref 26–34)
MCHC RBC AUTO-ENTMCNC: 32.1 G/DL (ref 31–37)
MCV RBC AUTO: 101.6 FL
MONOCYTES # BLD AUTO: 0.26 X10(3) UL (ref 0.1–1)
MONOCYTES NFR BLD AUTO: 3.7 %
NEUTROPHILS # BLD AUTO: 5.05 X10 (3) UL (ref 1.5–7.7)
NEUTROPHILS # BLD AUTO: 5.05 X10(3) UL (ref 1.5–7.7)
NEUTROPHILS NFR BLD AUTO: 71.5 %
OSMOLALITY SERPL CALC.SUM OF ELEC: 296 MOSM/KG (ref 275–295)
PLATELET # BLD AUTO: 330 10(3)UL (ref 150–450)
POTASSIUM SERPL-SCNC: 4.3 MMOL/L (ref 3.5–5.1)
PROT SERPL-MCNC: 6.9 G/DL (ref 6.4–8.2)
RBC # BLD AUTO: 3.65 X10(6)UL
SODIUM SERPL-SCNC: 142 MMOL/L (ref 136–145)
VIT B12 SERPL-MCNC: 1726 PG/ML (ref 193–986)
WBC # BLD AUTO: 7.1 X10(3) UL (ref 4–11)

## 2022-09-12 PROCEDURE — 96375 TX/PRO/DX INJ NEW DRUG ADDON: CPT

## 2022-09-12 PROCEDURE — 85025 COMPLETE CBC W/AUTO DIFF WBC: CPT

## 2022-09-12 PROCEDURE — 96413 CHEMO IV INFUSION 1 HR: CPT

## 2022-09-12 PROCEDURE — 82607 VITAMIN B-12: CPT

## 2022-09-12 PROCEDURE — 96377 APPLICATON ON-BODY INJECTOR: CPT

## 2022-09-12 PROCEDURE — 96417 CHEMO IV INFUS EACH ADDL SEQ: CPT

## 2022-09-12 PROCEDURE — 96040 HC GENETIC COUNSELING EA 30 MIN: CPT | Performed by: GENETIC COUNSELOR, MS

## 2022-09-12 PROCEDURE — 80053 COMPREHEN METABOLIC PANEL: CPT

## 2022-09-12 PROCEDURE — 96367 TX/PROPH/DG ADDL SEQ IV INF: CPT

## 2022-09-12 RX ORDER — SODIUM CHLORIDE 9 MG/ML
10 INJECTION INTRAVENOUS ONCE
OUTPATIENT
Start: 2022-09-12

## 2022-09-12 RX ORDER — CYCLOBENZAPRINE HCL 10 MG
10 TABLET ORAL 3 TIMES DAILY
Qty: 90 TABLET | Refills: 0 | Status: SHIPPED | OUTPATIENT
Start: 2022-09-12

## 2022-09-12 RX ORDER — HEPARIN SODIUM (PORCINE) LOCK FLUSH IV SOLN 100 UNIT/ML 100 UNIT/ML
5 SOLUTION INTRAVENOUS ONCE
OUTPATIENT
Start: 2022-09-12

## 2022-09-12 RX ORDER — HEPARIN SODIUM (PORCINE) LOCK FLUSH IV SOLN 100 UNIT/ML 100 UNIT/ML
SOLUTION INTRAVENOUS
Status: COMPLETED
Start: 2022-09-12 | End: 2022-09-12

## 2022-09-12 RX ORDER — HEPARIN SODIUM (PORCINE) LOCK FLUSH IV SOLN 100 UNIT/ML 100 UNIT/ML
5 SOLUTION INTRAVENOUS ONCE
Status: COMPLETED | OUTPATIENT
Start: 2022-09-12 | End: 2022-09-12

## 2022-09-12 RX ADMIN — HEPARIN SODIUM (PORCINE) LOCK FLUSH IV SOLN 100 UNIT/ML 500 UNITS: 100 SOLUTION INTRAVENOUS at 15:12:00

## 2022-09-12 NOTE — PATIENT INSTRUCTIONS
On-body Injector for Neulasta Patient Instructions       Your On-Body Injection device was applied on this day:  9/12/22 at this time 3:10 pm    Your dose of medication will start on this day: 9/13/22 at this time 6:10 pm    Safe time to remove this device will be on this day: 9/13/22 at this time 8 pm      Important information to remember about the Neulasta Injector:     1. The On-Body Neulasta Injector begins to deliver the dose of Neulasta 27 hours after it is activated at the cancer center. Beeping at that time indicates  that the dose will be delivered in 2 minutes. It takes 45 minutes for the dose to be completely delivered. DO NOT REMOVE during this time. 2. Check the light periodically for a slow flashing GREEN light, this is normal.     3. If the light is flashing RED or comes off prior to the delivery time, during regular business hours 8-4:30pm, please call [phone] and ask for the charge nurse. If this is after hours or a holiday, please contact the [employer] @ [phone], a support person is available 24/7.      4. Please keep the device at least 4 inches away from electrical equipment, such as CELL PHONES, microwaves, cordless phones. Do NOT have Xrays, MRI, CT without informing the technician. 5. If you are traveling, needing to go through airport security, please notify the TSA. You may still travel, just avoid the xray security. 6. Avoid bumping or sleeping directly on the injector. 7. Avoid hot tubs, saunas and direct sunlight. Keep the injector dry 3 hours prior to the dose delivery time. 8. Remove the injector device at the directed time above and place in a hard container for disposal and place in trash.

## 2022-09-12 NOTE — TELEPHONE ENCOUNTER
Please review refill protocol failed/ no protocol  Requested Prescriptions   Pending Prescriptions Disp Refills    cyclobenzaprine 10 MG Oral Tab 90 tablet 0     Sig: Take 1 tablet (10 mg total) by mouth 3 (three) times daily.         There is no refill protocol information for this order

## 2022-09-12 NOTE — PROGRESS NOTES
Pt here for C3D1 TCHP. Arrives Ambulating independently, accompanied by Family member mom        Pregnancy screening: Denies possibility of pregnancy    Modifications in dose or schedule: No    No complaints today. States she tolerated the last infusion well. Denies any problems with nausea. Drugs/infusions dual verified for appearance and physical integrity. IV pump settings were dual verified: yes     Tolerated treatment well. Neulasta on pro applied on R upper arm - printout given on when to remove the device.     Frequency of blood return and site check throughout administration: Prior to administration, Prior to each drug and At completion of therapy     Discharged to Home, Ambulating independently, accompanied by:Family member    Outpatient Oncology Care Plan  Problem list:  anemia  dry mouth  hair loss  loss of appetite  neutropenia  knowledge deficit  nausea and vomiting  Problems related to:  chemotherapy  disease/disease progression  side effect of treatment  therapeutic effects  Interventions:  administered blood products  emotional support given  monitor for signs/symptoms of infection  nausea/vomiting teaching  patient/family supported  chemotherapy teaching  educate regarding self care  monitor effect of therapy  monitor effect of nausea/vomiting management  provided general teaching  provided nutrition education  Expected outcomes:  able to maintain oral integrity  patient supported/coping well  safe in environment  symptoms relieved/minimized  understands plan of care  Progress towards outcome:  making progress    Education Record    Learner:  Patient and Family Member  Barriers / Limitations:  Fatigue  Method:  Brief focused, Discussion and Printed material  Outcome:  Needs reinforcement  Comments:

## 2022-09-19 ENCOUNTER — TELEPHONE (OUTPATIENT)
Dept: INTERNAL MEDICINE CLINIC | Facility: CLINIC | Age: 37
End: 2022-09-19

## 2022-09-19 RX ORDER — HYDROCODONE BITARTRATE AND ACETAMINOPHEN 10; 325 MG/1; MG/1
TABLET ORAL
Qty: 126 TABLET | Refills: 0 | Status: SHIPPED | OUTPATIENT
Start: 2022-09-19

## 2022-09-19 NOTE — TELEPHONE ENCOUNTER
ilpmp reviewed; going thru chemotx but will be able to plan to cut down on next refill after chemo.  Pt agreed

## 2022-09-20 ENCOUNTER — HOSPITAL ENCOUNTER (OUTPATIENT)
Dept: CV DIAGNOSTICS | Facility: HOSPITAL | Age: 37
Discharge: HOME | End: 2022-09-20
Attending: INTERNAL MEDICINE

## 2022-09-20 DIAGNOSIS — Z51.11 CHEMOTHERAPY MANAGEMENT, ENCOUNTER FOR: ICD-10-CM

## 2022-09-20 DIAGNOSIS — Z17.0 BREAST CANCER, STAGE 1, ESTROGEN RECEPTOR POSITIVE, RIGHT (HCC): ICD-10-CM

## 2022-09-20 DIAGNOSIS — C50.911 BREAST CANCER, STAGE 1, ESTROGEN RECEPTOR POSITIVE, RIGHT (HCC): ICD-10-CM

## 2022-09-20 PROCEDURE — 93306 TTE W/DOPPLER COMPLETE: CPT | Performed by: INTERNAL MEDICINE

## 2022-09-22 ENCOUNTER — TELEPHONE (OUTPATIENT)
Dept: GENETICS | Facility: HOSPITAL | Age: 37
End: 2022-09-22

## 2022-09-22 NOTE — TELEPHONE ENCOUNTER
Shared NEGATIVE genetic testing results via voicemail message. Kenyetta Ricci chose testing for an 80 gene panel through [facility]) and all genes yielded negative results. Released these results to her through lab's portal and will mail her a copy. Encouraged her to contact me with any questions.

## 2022-09-30 ENCOUNTER — TELEPHONE (OUTPATIENT)
Dept: INTERNAL MEDICINE CLINIC | Facility: CLINIC | Age: 37
End: 2022-09-30

## 2022-09-30 RX ORDER — HYDROCODONE BITARTRATE AND ACETAMINOPHEN 10; 325 MG/1; MG/1
TABLET ORAL
Qty: 140 TABLET | Refills: 0 | Status: SHIPPED | OUTPATIENT
Start: 2022-09-30

## 2022-09-30 NOTE — TELEPHONE ENCOUNTER
ILPMP reviewed; pt going thru chemo and having bone pains. Told by onco may go up max dose for her norco only short duration at last 2 weeks and go back down again. Pt given max 10/day.

## 2022-10-03 ENCOUNTER — OFFICE VISIT (OUTPATIENT)
Dept: HEMATOLOGY/ONCOLOGY | Facility: HOSPITAL | Age: 37
End: 2022-10-03
Attending: INTERNAL MEDICINE
Payer: COMMERCIAL

## 2022-10-03 ENCOUNTER — NURSE ONLY (OUTPATIENT)
Dept: HEMATOLOGY/ONCOLOGY | Facility: HOSPITAL | Age: 37
End: 2022-10-03
Attending: GENETIC COUNSELOR, MS
Payer: COMMERCIAL

## 2022-10-03 VITALS
OXYGEN SATURATION: 100 % | BODY MASS INDEX: 30.34 KG/M2 | HEART RATE: 81 BPM | SYSTOLIC BLOOD PRESSURE: 113 MMHG | HEIGHT: 66 IN | WEIGHT: 188.81 LBS | TEMPERATURE: 98 F | RESPIRATION RATE: 18 BRPM | DIASTOLIC BLOOD PRESSURE: 73 MMHG

## 2022-10-03 DIAGNOSIS — Z51.11 CHEMOTHERAPY MANAGEMENT, ENCOUNTER FOR: Primary | ICD-10-CM

## 2022-10-03 DIAGNOSIS — Z17.0 BREAST CANCER, STAGE 1, ESTROGEN RECEPTOR POSITIVE, RIGHT (HCC): ICD-10-CM

## 2022-10-03 DIAGNOSIS — K52.1 CHEMOTHERAPY INDUCED DIARRHEA: ICD-10-CM

## 2022-10-03 DIAGNOSIS — R11.0 CHEMOTHERAPY-INDUCED NAUSEA: ICD-10-CM

## 2022-10-03 DIAGNOSIS — D64.81 ANEMIA ASSOCIATED WITH CHEMOTHERAPY: ICD-10-CM

## 2022-10-03 DIAGNOSIS — C50.911 BREAST CANCER, STAGE 1, ESTROGEN RECEPTOR POSITIVE, RIGHT (HCC): ICD-10-CM

## 2022-10-03 DIAGNOSIS — T45.1X5A CHEMOTHERAPY-INDUCED NAUSEA: ICD-10-CM

## 2022-10-03 DIAGNOSIS — T45.1X5A CHEMOTHERAPY INDUCED DIARRHEA: ICD-10-CM

## 2022-10-03 DIAGNOSIS — T45.1X5A ANEMIA ASSOCIATED WITH CHEMOTHERAPY: ICD-10-CM

## 2022-10-03 LAB
ALBUMIN SERPL-MCNC: 3.8 G/DL (ref 3.4–5)
ALBUMIN/GLOB SERPL: 1.3 {RATIO} (ref 1–2)
ALP LIVER SERPL-CCNC: 58 U/L
ALT SERPL-CCNC: 28 U/L
ANION GAP SERPL CALC-SCNC: 5 MMOL/L (ref 0–18)
AST SERPL-CCNC: 15 U/L (ref 15–37)
BASOPHILS # BLD AUTO: 0.02 X10(3) UL (ref 0–0.2)
BASOPHILS NFR BLD AUTO: 0.3 %
BILIRUB SERPL-MCNC: 0.3 MG/DL (ref 0.1–2)
BUN BLD-MCNC: 17 MG/DL (ref 7–18)
BUN/CREAT SERPL: 28.3 (ref 10–20)
CALCIUM BLD-MCNC: 8.8 MG/DL (ref 8.5–10.1)
CHLORIDE SERPL-SCNC: 110 MMOL/L (ref 98–112)
CO2 SERPL-SCNC: 26 MMOL/L (ref 21–32)
CREAT BLD-MCNC: 0.6 MG/DL
DEPRECATED HBV CORE AB SER IA-ACNC: 168.5 NG/ML
DEPRECATED RDW RBC AUTO: 62 FL (ref 35.1–46.3)
EOSINOPHIL # BLD AUTO: 0.12 X10(3) UL (ref 0–0.7)
EOSINOPHIL NFR BLD AUTO: 2 %
ERYTHROCYTE [DISTWIDTH] IN BLOOD BY AUTOMATED COUNT: 16.8 % (ref 11–15)
GFR SERPLBLD BASED ON 1.73 SQ M-ARVRAT: 117 ML/MIN/1.73M2 (ref 60–?)
GLOBULIN PLAS-MCNC: 2.9 G/DL (ref 2.8–4.4)
GLUCOSE BLD-MCNC: 127 MG/DL (ref 70–99)
HCT VFR BLD AUTO: 34 %
HGB BLD-MCNC: 11.2 G/DL
IMM GRANULOCYTES # BLD AUTO: 0.02 X10(3) UL (ref 0–1)
IMM GRANULOCYTES NFR BLD: 0.3 %
IRON SATN MFR SERPL: 15 %
IRON SERPL-MCNC: 58 UG/DL
LYMPHOCYTES # BLD AUTO: 2.27 X10(3) UL (ref 1–4)
LYMPHOCYTES NFR BLD AUTO: 38.2 %
MCH RBC QN AUTO: 33.5 PG (ref 26–34)
MCHC RBC AUTO-ENTMCNC: 32.9 G/DL (ref 31–37)
MCV RBC AUTO: 101.8 FL
MONOCYTES # BLD AUTO: 0.39 X10(3) UL (ref 0.1–1)
MONOCYTES NFR BLD AUTO: 6.6 %
NEUTROPHILS # BLD AUTO: 3.12 X10 (3) UL (ref 1.5–7.7)
NEUTROPHILS # BLD AUTO: 3.12 X10(3) UL (ref 1.5–7.7)
NEUTROPHILS NFR BLD AUTO: 52.6 %
OSMOLALITY SERPL CALC.SUM OF ELEC: 295 MOSM/KG (ref 275–295)
PLATELET # BLD AUTO: 136 10(3)UL (ref 150–450)
POTASSIUM SERPL-SCNC: 3.8 MMOL/L (ref 3.5–5.1)
PROT SERPL-MCNC: 6.7 G/DL (ref 6.4–8.2)
RBC # BLD AUTO: 3.34 X10(6)UL
SODIUM SERPL-SCNC: 141 MMOL/L (ref 136–145)
TIBC SERPL-MCNC: 393 UG/DL (ref 240–450)
TRANSFERRIN SERPL-MCNC: 264 MG/DL (ref 200–360)
WBC # BLD AUTO: 5.9 X10(3) UL (ref 4–11)

## 2022-10-03 PROCEDURE — 96417 CHEMO IV INFUS EACH ADDL SEQ: CPT

## 2022-10-03 PROCEDURE — 84466 ASSAY OF TRANSFERRIN: CPT

## 2022-10-03 PROCEDURE — 85025 COMPLETE CBC W/AUTO DIFF WBC: CPT

## 2022-10-03 PROCEDURE — 96413 CHEMO IV INFUSION 1 HR: CPT

## 2022-10-03 PROCEDURE — 99215 OFFICE O/P EST HI 40 MIN: CPT | Performed by: INTERNAL MEDICINE

## 2022-10-03 PROCEDURE — 83540 ASSAY OF IRON: CPT

## 2022-10-03 PROCEDURE — 82728 ASSAY OF FERRITIN: CPT

## 2022-10-03 PROCEDURE — 80053 COMPREHEN METABOLIC PANEL: CPT

## 2022-10-03 PROCEDURE — 96367 TX/PROPH/DG ADDL SEQ IV INF: CPT

## 2022-10-03 RX ORDER — HEPARIN SODIUM (PORCINE) LOCK FLUSH IV SOLN 100 UNIT/ML 100 UNIT/ML
SOLUTION INTRAVENOUS
Status: DISPENSED
Start: 2022-10-03 | End: 2022-10-04

## 2022-10-03 RX ORDER — DEXAMETHASONE 4 MG/1
4 TABLET ORAL
Qty: 3 TABLET | Refills: 3 | Status: SHIPPED | OUTPATIENT
Start: 2022-10-03 | End: 2022-10-06

## 2022-10-03 RX ORDER — HEPARIN SODIUM (PORCINE) LOCK FLUSH IV SOLN 100 UNIT/ML 100 UNIT/ML
5 SOLUTION INTRAVENOUS ONCE
Status: COMPLETED | OUTPATIENT
Start: 2022-10-03 | End: 2022-10-03

## 2022-10-03 RX ORDER — SODIUM CHLORIDE 9 MG/ML
10 INJECTION INTRAVENOUS ONCE
OUTPATIENT
Start: 2022-10-03

## 2022-10-03 RX ORDER — HEPARIN SODIUM (PORCINE) LOCK FLUSH IV SOLN 100 UNIT/ML 100 UNIT/ML
5 SOLUTION INTRAVENOUS ONCE
OUTPATIENT
Start: 2022-10-03

## 2022-10-03 RX ORDER — DIPHENOXYLATE HYDROCHLORIDE AND ATROPINE SULFATE 2.5; .025 MG/1; MG/1
1-2 TABLET ORAL 4 TIMES DAILY PRN
Qty: 30 TABLET | Refills: 0 | Status: SHIPPED | OUTPATIENT
Start: 2022-10-03

## 2022-10-03 RX ADMIN — HEPARIN SODIUM (PORCINE) LOCK FLUSH IV SOLN 100 UNIT/ML 500 UNITS: 100 SOLUTION INTRAVENOUS at 14:32:00

## 2022-10-03 NOTE — PROGRESS NOTES
Pt here for C4D1 TCHP. Arrives Ambulating independently, accompanied by Self and Family member from [facility]. Pregnancy screening: Denies possibility of pregnancy    Modifications in dose or schedule: No    No complaints today. States she tolerated the last infusion well. Denies any problems with nausea. Drugs/infusions dual verified for appearance and physical integrity. IV pump settings were dual verified: yes     Tolerated treatment well. Neulasta on pro applied on R upper arm - printout given on when to remove the device. Frequency of blood return and site check throughout administration: Prior to administration, Prior to each drug and At completion of therapy     Discharged to Home, Ambulating independently, accompanied by:Family member    Outpatient Oncology Care Plan  Problem list:  anemia  dry mouth  hair loss  loss of appetite  neutropenia  knowledge deficit  nausea and vomiting  Problems related to:  chemotherapy  disease/disease progression  side effect of treatment  therapeutic effects  Interventions:  administered blood products  emotional support given  monitor for signs/symptoms of infection  nausea/vomiting teaching  patient/family supported  chemotherapy teaching  educate regarding self care  monitor effect of therapy  monitor effect of nausea/vomiting management  provided general teaching  provided nutrition education  Expected outcomes:  able to maintain oral integrity  patient supported/coping well  safe in environment  symptoms relieved/minimized  understands plan of care  Progress towards outcome:  making progress    Education Record    Learner:  Patient and Family Member  Barriers / Limitations:  Fatigue  Method:  Brief focused, Discussion and Printed material  Outcome:  Needs reinforcement  Comments: AVS printed with appointment' scheduled through [facility].

## 2022-10-07 ENCOUNTER — TELEPHONE (OUTPATIENT)
Dept: HEMATOLOGY/ONCOLOGY | Facility: HOSPITAL | Age: 37
End: 2022-10-07

## 2022-10-07 DIAGNOSIS — Z51.11 CHEMOTHERAPY MANAGEMENT, ENCOUNTER FOR: ICD-10-CM

## 2022-10-07 DIAGNOSIS — R21 RASH: ICD-10-CM

## 2022-10-07 RX ORDER — NYSTATIN 100000 [USP'U]/G
1 POWDER TOPICAL 4 TIMES DAILY
Qty: 30 G | Refills: 1 | Status: SHIPPED | OUTPATIENT
Start: 2022-10-07

## 2022-10-07 NOTE — TELEPHONE ENCOUNTER
Patient calling requesting to speak with Melva Stubbs. Has questions for her. Please return call to patient.

## 2022-10-07 NOTE — TELEPHONE ENCOUNTER
Returned call to [employer]. She broke off a piece of her tooth while eating a tortilla chip. It was just the tip of the tooth. Wants to know if she can see a dentist. Recommended that if the dentist wants to do anything invasive, such as remove the tooth, to do it as close to her next chemotherapy as possible. She verbalized understanding.

## 2022-10-10 ENCOUNTER — TELEPHONE (OUTPATIENT)
Dept: INTERNAL MEDICINE CLINIC | Facility: CLINIC | Age: 37
End: 2022-10-10

## 2022-10-10 RX ORDER — HYDROCODONE BITARTRATE AND ACETAMINOPHEN 10; 325 MG/1; MG/1
TABLET ORAL
Qty: 140 TABLET | Refills: 0 | Status: SHIPPED | OUTPATIENT
Start: 2022-10-10

## 2022-10-11 RX ORDER — DIPHENOXYLATE HYDROCHLORIDE AND ATROPINE SULFATE 2.5; .025 MG/1; MG/1
1-2 TABLET ORAL 4 TIMES DAILY PRN
Qty: 30 TABLET | Refills: 0 | Status: SHIPPED | OUTPATIENT
Start: 2022-10-11 | End: 2022-11-22

## 2022-10-11 NOTE — TELEPHONE ENCOUNTER
Patient calling requesting a refill on medication       She is in [address] care of family member.    Requesting medication to be set to gabriel Pavon

## 2022-10-14 ENCOUNTER — NURSE TRIAGE (OUTPATIENT)
Dept: INTERNAL MEDICINE CLINIC | Facility: CLINIC | Age: 37
End: 2022-10-14

## 2022-10-14 NOTE — TELEPHONE ENCOUNTER
She may need to have cautery of bleeder in her nose and I dont do that so I agree going to IC or ER or if we can get her in to see any of ENT doctors today for treatment.

## 2022-10-14 NOTE — TELEPHONE ENCOUNTER
Pt was called and informed of Dr. Cody Oleary message below. Pt stated that its not actively bleeding and she does not want to go to ER as she is having chemo done. Pt was inform to try to get in to see ENT. She stated that it would take weeks. She stated that she will just deal with it. I did call ENT and they had 1 open slot at opo for today. Pt declined appt.

## 2022-10-15 ENCOUNTER — PATIENT MESSAGE (OUTPATIENT)
Dept: INTERNAL MEDICINE CLINIC | Facility: CLINIC | Age: 37
End: 2022-10-15

## 2022-10-15 NOTE — TELEPHONE ENCOUNTER
Please see follow-up message from Nurse Triage 10/14/22    Denies active epistaxis, did not feel ENT consult was needed    Asking for prescription for sore nose.   Norco prescribed on 10/10/22 by  Bhupendra Esparza sent

## 2022-10-16 NOTE — TELEPHONE ENCOUNTER
I saw pt this am in the office and did looked inside her nose; no active bleeding and no lesions noted. I told her to try neosporin oitment on the site she was noticing some bleeding before. If bleeding continues then I told her to call us back and will get her to see ENT.

## 2022-10-18 ENCOUNTER — PATIENT MESSAGE (OUTPATIENT)
Dept: INTERNAL MEDICINE CLINIC | Facility: CLINIC | Age: 37
End: 2022-10-18

## 2022-10-19 ENCOUNTER — NURSE TRIAGE (OUTPATIENT)
Dept: INTERNAL MEDICINE CLINIC | Facility: CLINIC | Age: 37
End: 2022-10-19

## 2022-10-19 RX ORDER — HYDROCODONE BITARTRATE AND HOMATROPINE METHYLBROMIDE ORAL SOLUTION 5; 1.5 MG/5ML; MG/5ML
5 LIQUID ORAL EVERY 6 HOURS PRN
Qty: 240 ML | Refills: 0 | Status: SHIPPED | OUTPATIENT
Start: 2022-10-19

## 2022-10-19 RX ORDER — CYCLOBENZAPRINE HCL 10 MG
10 TABLET ORAL 3 TIMES DAILY
Qty: 90 TABLET | Refills: 0 | Status: SHIPPED | OUTPATIENT
Start: 2022-10-19

## 2022-10-19 RX ORDER — HYDROCODONE BITARTRATE AND HOMATROPINE METHYLBROMIDE ORAL SOLUTION 5; 1.5 MG/5ML; MG/5ML
5 LIQUID ORAL EVERY 6 HOURS PRN
Qty: 240 ML | Refills: 0 | Status: SHIPPED | OUTPATIENT
Start: 2022-10-19 | End: 2022-10-19

## 2022-10-19 NOTE — TELEPHONE ENCOUNTER
Will send erx for hydromet cough med. She needs to cut down on dose of her norco while taking hydromet( which also contains hydrocodone) whch she knows already from before whenever I prescribe hydromet. Would expect next refill for norco would be more than 14 days from last one due to above.

## 2022-10-19 NOTE — TELEPHONE ENCOUNTER
Please review. Protocol Failed or has no Protocol  Requested Prescriptions   Pending Prescriptions Disp Refills    cyclobenzaprine 10 MG Oral Tab 90 tablet 0     Sig: Take 1 tablet (10 mg total) by mouth 3 (three) times daily.         There is no refill protocol information for this order         Future Appointments         Provider Department Appt Notes    Tomorrow Rosa Maria Castillo MD [address], [address], [facility] visit    In 5 days EM [address] PORT-MYJ    In 5 days Suresh Mosher Hematology Oncology PRE CHEMO    In 5 days EM CC INFRN [address] - Infusion SD C5 D1 TCHP-Neulasta on Pro-PORT-MYJ    In 3 weeks EM [address] - Infusion SD CBC CMP PORT-MYJ    In 3 weeks Suresh Mosher 19 Hematology Oncology PRE CHEMO    In 3 weeks EM CC INFRN [address] - Infusion SD C6 D1 TCHP-Neulasta on Pro-PORT-MYJ    In 1 month EM [address] PORT-MYJ    In 1 month Suersh Mosher Hematology Oncology PRE CHEMO    In 1 month EM CC INFRN [address] - Infusion SD C7 D1 TRASTUZUMAB-PERTUZUMAB-PORT-MYJ          Recent Outpatient Visits              2 weeks ago Chemotherapy management, encounter for    [address] Visit    2 weeks ago Chemotherapy management, encounter for    [facility] Hematology Oncology Benito Huizar MD    Office Visit    2 weeks ago Chemotherapy management, encounter for    [address] [place] [address] 15    Nurse Only    1 month ago Chemotherapy management, encounter for    [address] Visit    1 month ago Breast cancer, stage 1, estrogen receptor positive, right [facility])    [facility] Hematology Oncology Benito Huizar MD    Office Visit

## 2022-10-19 NOTE — TELEPHONE ENCOUNTER
Dr Pamella Kim: can you re-send Hyrdomet to [facility] on [address]. Patient called and went over your recommendations. She was aware Hydromet was sent into [facility] on [address]. However they do not have it in stock. I called the other [facility] at [address], and they DO have it [address]. They would not take a verbal from RN. Provider will need to send e-script. Pended RX with correct [facility].

## 2022-10-20 ENCOUNTER — OFFICE VISIT (OUTPATIENT)
Dept: INTERNAL MEDICINE CLINIC | Facility: CLINIC | Age: 37
End: 2022-10-20
Payer: COMMERCIAL

## 2022-10-20 VITALS
HEART RATE: 103 BPM | BODY MASS INDEX: 30.53 KG/M2 | WEIGHT: 190 LBS | SYSTOLIC BLOOD PRESSURE: 124 MMHG | TEMPERATURE: 98 F | HEIGHT: 66 IN | DIASTOLIC BLOOD PRESSURE: 76 MMHG | OXYGEN SATURATION: 98 %

## 2022-10-20 DIAGNOSIS — H92.01 RIGHT EAR PAIN: Primary | ICD-10-CM

## 2022-10-20 DIAGNOSIS — G89.29 CHRONIC BILATERAL LOW BACK PAIN, UNSPECIFIED WHETHER SCIATICA PRESENT: ICD-10-CM

## 2022-10-20 DIAGNOSIS — M54.50 CHRONIC BILATERAL LOW BACK PAIN, UNSPECIFIED WHETHER SCIATICA PRESENT: ICD-10-CM

## 2022-10-20 PROCEDURE — 3008F BODY MASS INDEX DOCD: CPT | Performed by: INTERNAL MEDICINE

## 2022-10-20 PROCEDURE — 3078F DIAST BP <80 MM HG: CPT | Performed by: INTERNAL MEDICINE

## 2022-10-20 PROCEDURE — 3074F SYST BP LT 130 MM HG: CPT | Performed by: INTERNAL MEDICINE

## 2022-10-20 PROCEDURE — 99213 OFFICE O/P EST LOW 20 MIN: CPT | Performed by: INTERNAL MEDICINE

## 2022-10-20 RX ORDER — HYDROCODONE BITARTRATE AND ACETAMINOPHEN 10; 325 MG/1; MG/1
TABLET ORAL
Qty: 98 TABLET | Refills: 0 | Status: SHIPPED | OUTPATIENT
Start: 2022-10-20

## 2022-10-20 RX ORDER — DEXAMETHASONE 4 MG/1
TABLET ORAL
COMMUNITY
Start: 2022-10-09

## 2022-10-20 RX ORDER — UREA 20 %
1 CREAM (GRAM) TOPICAL AS DIRECTED
COMMUNITY
Start: 2022-10-09

## 2022-10-20 RX ORDER — OLANZAPINE 5 MG/1
TABLET ORAL
COMMUNITY
Start: 2022-10-09

## 2022-10-24 ENCOUNTER — NURSE ONLY (OUTPATIENT)
Dept: HEMATOLOGY/ONCOLOGY | Facility: HOSPITAL | Age: 37
End: 2022-10-24
Attending: GENETIC COUNSELOR, MS
Payer: COMMERCIAL

## 2022-10-24 ENCOUNTER — OFFICE VISIT (OUTPATIENT)
Dept: HEMATOLOGY/ONCOLOGY | Facility: HOSPITAL | Age: 37
End: 2022-10-24
Attending: INTERNAL MEDICINE
Payer: COMMERCIAL

## 2022-10-24 VITALS
HEIGHT: 66 IN | WEIGHT: 189 LBS | SYSTOLIC BLOOD PRESSURE: 130 MMHG | DIASTOLIC BLOOD PRESSURE: 84 MMHG | RESPIRATION RATE: 16 BRPM | BODY MASS INDEX: 30.37 KG/M2 | OXYGEN SATURATION: 100 % | HEART RATE: 79 BPM | TEMPERATURE: 98 F

## 2022-10-24 DIAGNOSIS — Z17.0 BREAST CANCER, STAGE 1, ESTROGEN RECEPTOR POSITIVE, RIGHT (HCC): ICD-10-CM

## 2022-10-24 DIAGNOSIS — Z51.11 CHEMOTHERAPY MANAGEMENT, ENCOUNTER FOR: Primary | ICD-10-CM

## 2022-10-24 DIAGNOSIS — C50.911 BREAST CANCER, STAGE 1, ESTROGEN RECEPTOR POSITIVE, RIGHT (HCC): ICD-10-CM

## 2022-10-24 DIAGNOSIS — L27.0: ICD-10-CM

## 2022-10-24 PROBLEM — T45.1X5A ANEMIA DUE TO ANTINEOPLASTIC CHEMOTHERAPY: Status: ACTIVE | Noted: 2022-10-24

## 2022-10-24 PROBLEM — K90.9 MALABSORPTION OF IRON (HCC): Status: ACTIVE | Noted: 2022-10-24

## 2022-10-24 PROBLEM — D64.81 ANEMIA DUE TO ANTINEOPLASTIC CHEMOTHERAPY: Status: ACTIVE | Noted: 2022-10-24

## 2022-10-24 PROBLEM — K90.9 MALABSORPTION OF IRON: Status: ACTIVE | Noted: 2022-10-24

## 2022-10-24 LAB
ALBUMIN SERPL-MCNC: 3.8 G/DL (ref 3.4–5)
ALBUMIN/GLOB SERPL: 1.3 {RATIO} (ref 1–2)
ALP LIVER SERPL-CCNC: 58 U/L
ALT SERPL-CCNC: 48 U/L
ANION GAP SERPL CALC-SCNC: 6 MMOL/L (ref 0–18)
AST SERPL-CCNC: 19 U/L (ref 15–37)
BASOPHILS # BLD AUTO: 0.01 X10(3) UL (ref 0–0.2)
BASOPHILS NFR BLD AUTO: 0.2 %
BILIRUB SERPL-MCNC: 0.3 MG/DL (ref 0.1–2)
BUN BLD-MCNC: 11 MG/DL (ref 7–18)
BUN/CREAT SERPL: 16.2 (ref 10–20)
CALCIUM BLD-MCNC: 8.6 MG/DL (ref 8.5–10.1)
CHLORIDE SERPL-SCNC: 111 MMOL/L (ref 98–112)
CO2 SERPL-SCNC: 24 MMOL/L (ref 21–32)
CREAT BLD-MCNC: 0.68 MG/DL
DEPRECATED RDW RBC AUTO: 68 FL (ref 35.1–46.3)
EOSINOPHIL # BLD AUTO: 0 X10(3) UL (ref 0–0.7)
EOSINOPHIL NFR BLD AUTO: 0 %
ERYTHROCYTE [DISTWIDTH] IN BLOOD BY AUTOMATED COUNT: 17.3 % (ref 11–15)
GFR SERPLBLD BASED ON 1.73 SQ M-ARVRAT: 114 ML/MIN/1.73M2 (ref 60–?)
GLOBULIN PLAS-MCNC: 2.9 G/DL (ref 2.8–4.4)
GLUCOSE BLD-MCNC: 108 MG/DL (ref 70–99)
HCT VFR BLD AUTO: 30.4 %
HGB BLD-MCNC: 9.9 G/DL
IMM GRANULOCYTES # BLD AUTO: 0.01 X10(3) UL (ref 0–1)
IMM GRANULOCYTES NFR BLD: 0.2 %
LYMPHOCYTES # BLD AUTO: 2.35 X10(3) UL (ref 1–4)
LYMPHOCYTES NFR BLD AUTO: 46.7 %
MCH RBC QN AUTO: 34.7 PG (ref 26–34)
MCHC RBC AUTO-ENTMCNC: 32.6 G/DL (ref 31–37)
MCV RBC AUTO: 106.7 FL
MONOCYTES # BLD AUTO: 0.25 X10(3) UL (ref 0.1–1)
MONOCYTES NFR BLD AUTO: 5 %
NEUTROPHILS # BLD AUTO: 2.41 X10 (3) UL (ref 1.5–7.7)
NEUTROPHILS # BLD AUTO: 2.41 X10(3) UL (ref 1.5–7.7)
NEUTROPHILS NFR BLD AUTO: 47.9 %
OSMOLALITY SERPL CALC.SUM OF ELEC: 292 MOSM/KG (ref 275–295)
PLATELET # BLD AUTO: 139 10(3)UL (ref 150–450)
PLATELET MORPHOLOGY: NORMAL
POTASSIUM SERPL-SCNC: 3.6 MMOL/L (ref 3.5–5.1)
PROT SERPL-MCNC: 6.7 G/DL (ref 6.4–8.2)
RBC # BLD AUTO: 2.85 X10(6)UL
SODIUM SERPL-SCNC: 141 MMOL/L (ref 136–145)
WBC # BLD AUTO: 5 X10(3) UL (ref 4–11)

## 2022-10-24 PROCEDURE — 99215 OFFICE O/P EST HI 40 MIN: CPT | Performed by: INTERNAL MEDICINE

## 2022-10-24 PROCEDURE — 96377 APPLICATON ON-BODY INJECTOR: CPT

## 2022-10-24 PROCEDURE — 96417 CHEMO IV INFUS EACH ADDL SEQ: CPT

## 2022-10-24 PROCEDURE — 80053 COMPREHEN METABOLIC PANEL: CPT

## 2022-10-24 PROCEDURE — 96375 TX/PRO/DX INJ NEW DRUG ADDON: CPT

## 2022-10-24 PROCEDURE — 96413 CHEMO IV INFUSION 1 HR: CPT

## 2022-10-24 PROCEDURE — 85025 COMPLETE CBC W/AUTO DIFF WBC: CPT

## 2022-10-24 PROCEDURE — 96367 TX/PROPH/DG ADDL SEQ IV INF: CPT

## 2022-10-24 RX ORDER — HEPARIN SODIUM (PORCINE) LOCK FLUSH IV SOLN 100 UNIT/ML 100 UNIT/ML
SOLUTION INTRAVENOUS
Status: COMPLETED
Start: 2022-10-24 | End: 2022-10-24

## 2022-10-24 RX ADMIN — HEPARIN SODIUM (PORCINE) LOCK FLUSH IV SOLN 100 UNIT/ML 500 UNITS: 100 SOLUTION INTRAVENOUS at 14:30:00

## 2022-10-24 NOTE — PROGRESS NOTES
Pt here for C5 TCHP + on pro neulasta        Modifications in dose or schedule: No    Drugs/infusion dual verified for appearance and physical integrity: yes    Patient denies possible pregnancy since last therapy cycle: no      Port accessed in lab with positive blood return noted.  Frequency of blood return and site check throughout administration: Prior to administration, Prior to each drug and At completion of therapy   Discharged with future appointments scheduled, Ambulating independently, accompanied by:Self    Outpatient Oncology Care Plan  Problem list:  Potential rash on abdomen  Dry skin to feet  Problems related to:    chemotherapy  side effect of treatment  Interventions:  maintain skin integrity  monitor effect of therapy  monitor lab values  Expected outcomes:  optimal lab values  symptoms relieved/minimized  verbalize how to care for self  Progress towards outcome:  unchanged    Education Record    Learner:  Patient  Barriers / Limitations:  None  Method:  Discussion  Outcome:  Shows understanding

## 2022-10-24 NOTE — TELEPHONE ENCOUNTER
Please review. Protocol failed/ No protocol      Requested Prescriptions   Pending Prescriptions Disp Refills    silver sulfADIAZINE 1 % External Cream 30 g 0     Sig: Apply 1 Application topically 2 (two) times daily.         There is no refill protocol information for this order           Future Appointments         Provider Department Appt Notes    In 3 weeks EM [address] PORT-MYJ    In 3 weeks Suresh Soto 19 Hematology Oncology PRE CHEMO    In 3 weeks EM CC INFRN [address] - Infusion SD C6 D1 TCHP-Neulasta on Pro-PORT-MYJ* Auth'd until 1-18-23*    In 1 month EM [address] PORT-MYJ    In 1 month Suresh Soto 19 Hematology Oncology PRE CHEMO    In 1 month EM CC INFRN [address] - Infusion SD C7 D1 TRASTUZUMAB-PERTUZUMAB-PORT-MYJ* Auth'd until 1-18-23*             Recent Outpatient Visits              4 days ago Right ear pain    [address] MD    Office Visit    3 weeks ago Chemotherapy management, encounter for    [address] [place] [address] Road Visit    3 weeks ago Chemotherapy management, encounter for    [facility] Hematology Oncology Patti Ralph MD    Office Visit    3 weeks ago Chemotherapy management, encounter for    [address] [place] [address]    Nurse Only    1 month ago Chemotherapy management, encounter for    [address] [place] [address]    Office Visit

## 2022-11-03 ENCOUNTER — TELEPHONE (OUTPATIENT)
Dept: INTERNAL MEDICINE CLINIC | Facility: CLINIC | Age: 37
End: 2022-11-03

## 2022-11-03 RX ORDER — HYDROCODONE BITARTRATE AND ACETAMINOPHEN 10; 325 MG/1; MG/1
TABLET ORAL
Qty: 112 TABLET | Refills: 0 | Status: SHIPPED | OUTPATIENT
Start: 2022-11-03

## 2022-11-03 NOTE — TELEPHONE ENCOUNTER
[place]; pt still going thru her chemo and needing more pain med; will be done by next month with chemo and plan to get down on her dose

## 2022-11-08 ENCOUNTER — TELEPHONE (OUTPATIENT)
Dept: INTERNAL MEDICINE CLINIC | Facility: CLINIC | Age: 37
End: 2022-11-08

## 2022-11-08 ENCOUNTER — OFFICE VISIT (OUTPATIENT)
Dept: INTERNAL MEDICINE CLINIC | Facility: CLINIC | Age: 37
End: 2022-11-08
Payer: COMMERCIAL

## 2022-11-08 VITALS
TEMPERATURE: 99 F | SYSTOLIC BLOOD PRESSURE: 124 MMHG | DIASTOLIC BLOOD PRESSURE: 80 MMHG | OXYGEN SATURATION: 99 % | BODY MASS INDEX: 30.7 KG/M2 | HEIGHT: 66 IN | HEART RATE: 98 BPM | WEIGHT: 191 LBS

## 2022-11-08 DIAGNOSIS — N30.00 ACUTE CYSTITIS WITHOUT HEMATURIA: Primary | ICD-10-CM

## 2022-11-08 PROCEDURE — 3079F DIAST BP 80-89 MM HG: CPT | Performed by: INTERNAL MEDICINE

## 2022-11-08 PROCEDURE — 3008F BODY MASS INDEX DOCD: CPT | Performed by: INTERNAL MEDICINE

## 2022-11-08 PROCEDURE — 3074F SYST BP LT 130 MM HG: CPT | Performed by: INTERNAL MEDICINE

## 2022-11-08 PROCEDURE — 99213 OFFICE O/P EST LOW 20 MIN: CPT | Performed by: INTERNAL MEDICINE

## 2022-11-08 RX ORDER — CIPROFLOXACIN 500 MG/1
500 TABLET, FILM COATED ORAL 2 TIMES DAILY
Qty: 14 TABLET | Refills: 0 | Status: SHIPPED | OUTPATIENT
Start: 2022-11-08

## 2022-11-08 NOTE — TELEPHONE ENCOUNTER
Patient feels she has a UTI; she coming gets UTI while on chemotherapy. appt made today 6pm with PCP.

## 2022-11-12 RX ORDER — HYDROCODONE BITARTRATE AND HOMATROPINE METHYLBROMIDE ORAL SOLUTION 5; 1.5 MG/5ML; MG/5ML
2.5 LIQUID ORAL 4 TIMES DAILY PRN
Qty: 120 ML | Refills: 0 | Status: SHIPPED | OUTPATIENT
Start: 2022-11-12

## 2022-11-12 NOTE — PROGRESS NOTES
Pt very sick this morning, URI symptoms. Asking for cough medicine to be sent to [place]. Will attempt to send hydromet to [place]. Will alert Dr. Rosy Brown patient may not be feeling well enough for chemotherapy Monday morning.      Alana Galarza, [address] [facility]

## 2022-11-14 ENCOUNTER — APPOINTMENT (OUTPATIENT)
Dept: HEMATOLOGY/ONCOLOGY | Facility: HOSPITAL | Age: 37
End: 2022-11-14
Attending: INTERNAL MEDICINE
Payer: COMMERCIAL

## 2022-11-14 ENCOUNTER — TELEPHONE (OUTPATIENT)
Dept: INTERNAL MEDICINE CLINIC | Facility: CLINIC | Age: 37
End: 2022-11-14

## 2022-11-14 RX ORDER — HYDROCODONE BITARTRATE AND ACETAMINOPHEN 10; 325 MG/1; MG/1
TABLET ORAL
Qty: 140 TABLET | Refills: 0 | Status: SHIPPED | OUTPATIENT
Start: 2022-11-14 | End: 2022-11-14

## 2022-11-14 NOTE — TELEPHONE ENCOUNTER
[place] reviewed; pt though has been coughing and not tolerating hydromet due to dryness on her nose together with her chemo. She had increased dose of norco since it helps her cough and takes delsym.   Refill given todahy

## 2022-11-21 ENCOUNTER — OFFICE VISIT (OUTPATIENT)
Dept: HEMATOLOGY/ONCOLOGY | Facility: HOSPITAL | Age: 37
End: 2022-11-21
Attending: INTERNAL MEDICINE
Payer: COMMERCIAL

## 2022-11-21 ENCOUNTER — TELEPHONE (OUTPATIENT)
Dept: HEMATOLOGY/ONCOLOGY | Facility: HOSPITAL | Age: 37
End: 2022-11-21

## 2022-11-21 VITALS
DIASTOLIC BLOOD PRESSURE: 95 MMHG | WEIGHT: 190 LBS | HEART RATE: 85 BPM | HEIGHT: 66 IN | TEMPERATURE: 98 F | RESPIRATION RATE: 16 BRPM | SYSTOLIC BLOOD PRESSURE: 137 MMHG | BODY MASS INDEX: 30.53 KG/M2 | OXYGEN SATURATION: 100 %

## 2022-11-21 DIAGNOSIS — Z51.11 CHEMOTHERAPY MANAGEMENT, ENCOUNTER FOR: Primary | ICD-10-CM

## 2022-11-21 DIAGNOSIS — C50.911 BREAST CANCER, STAGE 1, ESTROGEN RECEPTOR POSITIVE, RIGHT (HCC): ICD-10-CM

## 2022-11-21 DIAGNOSIS — K90.9 MALABSORPTION OF IRON: ICD-10-CM

## 2022-11-21 DIAGNOSIS — Z17.0 BREAST CANCER, STAGE 1, ESTROGEN RECEPTOR POSITIVE, RIGHT (HCC): ICD-10-CM

## 2022-11-21 DIAGNOSIS — T45.1X5A ANEMIA ASSOCIATED WITH CHEMOTHERAPY: ICD-10-CM

## 2022-11-21 DIAGNOSIS — D64.81 ANEMIA DUE TO ANTINEOPLASTIC CHEMOTHERAPY: ICD-10-CM

## 2022-11-21 DIAGNOSIS — R21 RASH: ICD-10-CM

## 2022-11-21 DIAGNOSIS — D64.81 ANEMIA ASSOCIATED WITH CHEMOTHERAPY: ICD-10-CM

## 2022-11-21 DIAGNOSIS — T45.1X5A ANEMIA DUE TO ANTINEOPLASTIC CHEMOTHERAPY: ICD-10-CM

## 2022-11-21 LAB
ALBUMIN SERPL-MCNC: 3.7 G/DL (ref 3.4–5)
ALBUMIN/GLOB SERPL: 1.2 {RATIO} (ref 1–2)
ALP LIVER SERPL-CCNC: 47 U/L
ALT SERPL-CCNC: 25 U/L
ANION GAP SERPL CALC-SCNC: 8 MMOL/L (ref 0–18)
AST SERPL-CCNC: 9 U/L (ref 15–37)
BASOPHILS # BLD AUTO: 0.02 X10(3) UL (ref 0–0.2)
BASOPHILS NFR BLD AUTO: 0.4 %
BILIRUB SERPL-MCNC: 0.3 MG/DL (ref 0.1–2)
BUN BLD-MCNC: 12 MG/DL (ref 7–18)
BUN/CREAT SERPL: 18.8 (ref 10–20)
CALCIUM BLD-MCNC: 9.2 MG/DL (ref 8.5–10.1)
CHLORIDE SERPL-SCNC: 109 MMOL/L (ref 98–112)
CO2 SERPL-SCNC: 25 MMOL/L (ref 21–32)
CREAT BLD-MCNC: 0.64 MG/DL
DEPRECATED RDW RBC AUTO: 61 FL (ref 35.1–46.3)
EOSINOPHIL # BLD AUTO: 0 X10(3) UL (ref 0–0.7)
EOSINOPHIL NFR BLD AUTO: 0 %
ERYTHROCYTE [DISTWIDTH] IN BLOOD BY AUTOMATED COUNT: 15 % (ref 11–15)
GFR SERPLBLD BASED ON 1.73 SQ M-ARVRAT: 115 ML/MIN/1.73M2 (ref 60–?)
GLOBULIN PLAS-MCNC: 3.2 G/DL (ref 2.8–4.4)
GLUCOSE BLD-MCNC: 109 MG/DL (ref 70–99)
HCT VFR BLD AUTO: 32.1 %
HGB BLD-MCNC: 10.7 G/DL
IMM GRANULOCYTES # BLD AUTO: 0.02 X10(3) UL (ref 0–1)
IMM GRANULOCYTES NFR BLD: 0.4 %
LYMPHOCYTES # BLD AUTO: 1.44 X10(3) UL (ref 1–4)
LYMPHOCYTES NFR BLD AUTO: 27.6 %
MCH RBC QN AUTO: 36.5 PG (ref 26–34)
MCHC RBC AUTO-ENTMCNC: 33.3 G/DL (ref 31–37)
MCV RBC AUTO: 109.6 FL
MONOCYTES # BLD AUTO: 0.2 X10(3) UL (ref 0.1–1)
MONOCYTES NFR BLD AUTO: 3.8 %
NEUTROPHILS # BLD AUTO: 3.54 X10 (3) UL (ref 1.5–7.7)
NEUTROPHILS # BLD AUTO: 3.54 X10(3) UL (ref 1.5–7.7)
NEUTROPHILS NFR BLD AUTO: 67.8 %
OSMOLALITY SERPL CALC.SUM OF ELEC: 294 MOSM/KG (ref 275–295)
PLATELET # BLD AUTO: 102 10(3)UL (ref 150–450)
POTASSIUM SERPL-SCNC: 3.7 MMOL/L (ref 3.5–5.1)
PROT SERPL-MCNC: 6.9 G/DL (ref 6.4–8.2)
RBC # BLD AUTO: 2.93 X10(6)UL
SODIUM SERPL-SCNC: 142 MMOL/L (ref 136–145)
WBC # BLD AUTO: 5.2 X10(3) UL (ref 4–11)

## 2022-11-21 PROCEDURE — 96377 APPLICATON ON-BODY INJECTOR: CPT

## 2022-11-21 PROCEDURE — 99215 OFFICE O/P EST HI 40 MIN: CPT | Performed by: INTERNAL MEDICINE

## 2022-11-21 PROCEDURE — 96413 CHEMO IV INFUSION 1 HR: CPT

## 2022-11-21 PROCEDURE — 96417 CHEMO IV INFUS EACH ADDL SEQ: CPT

## 2022-11-21 PROCEDURE — 85025 COMPLETE CBC W/AUTO DIFF WBC: CPT

## 2022-11-21 PROCEDURE — 96367 TX/PROPH/DG ADDL SEQ IV INF: CPT

## 2022-11-21 PROCEDURE — 96375 TX/PRO/DX INJ NEW DRUG ADDON: CPT

## 2022-11-21 PROCEDURE — 80053 COMPREHEN METABOLIC PANEL: CPT

## 2022-11-21 RX ORDER — SODIUM CHLORIDE 9 MG/ML
10 INJECTION INTRAVENOUS ONCE
OUTPATIENT
Start: 2022-12-05

## 2022-11-21 RX ORDER — HEPARIN SODIUM (PORCINE) LOCK FLUSH IV SOLN 100 UNIT/ML 100 UNIT/ML
5 SOLUTION INTRAVENOUS ONCE
OUTPATIENT
Start: 2022-12-05

## 2022-11-21 RX ORDER — HEPARIN SODIUM (PORCINE) LOCK FLUSH IV SOLN 100 UNIT/ML 100 UNIT/ML
5 SOLUTION INTRAVENOUS ONCE
Status: COMPLETED | OUTPATIENT
Start: 2022-11-21 | End: 2022-11-21

## 2022-11-21 RX ORDER — HEPARIN SODIUM (PORCINE) LOCK FLUSH IV SOLN 100 UNIT/ML 100 UNIT/ML
SOLUTION INTRAVENOUS
Status: COMPLETED
Start: 2022-11-21 | End: 2022-11-21

## 2022-11-21 RX ADMIN — HEPARIN SODIUM (PORCINE) LOCK FLUSH IV SOLN 100 UNIT/ML 500 UNITS: 100 SOLUTION INTRAVENOUS at 14:58:00

## 2022-11-21 NOTE — PROGRESS NOTES
Pt here for C6 TCHP + on pro neulasta in ambulatory condition from provider visit. Per appt notes to start venofer, patient requesting to not get venofer at this time. Discussed with DIMPLE Ruff, and okay to hold off at this time. Patient asymptomatic overall. Modifications in dose or schedule: No - last dose carbo/docetaxel per treatment plan    Drugs/infusions dual verified for appearance and physical integrity. IV pump settings were dual verified: yes    Patient denies possible pregnancy since last therapy cycle: no      Port accessed in lab with positive blood return noted. Frequency of blood return and site check throughout administration: Prior to administration, Prior to each drug and At completion of therapy     OBI neulasta applied to R arm - activated and confirmed green light prior to discharge. Patient aware of removal timing.      Discharged with future appointments scheduled, Ambulating independently, accompanied by:Self     Outpatient Oncology Care Plan  Problem list:  Potential rash on abdomen  Dry skin to feet  Problems related to:    chemotherapy  side effect of treatment  Interventions:  maintain skin integrity  monitor effect of therapy  monitor lab values  Expected outcomes:  optimal lab values  symptoms relieved/minimized  verbalize how to care for self  Progress towards outcome:  unchanged    Education Record    Learner:  Patient  Barriers / Limitations:  None  Method:  Discussion  Outcome:  Shows understanding

## 2022-11-22 ENCOUNTER — SOCIAL WORK SERVICES (OUTPATIENT)
Dept: HEMATOLOGY/ONCOLOGY | Facility: HOSPITAL | Age: 37
End: 2022-11-22

## 2022-11-22 NOTE — PROGRESS NOTES
SO assisted patient with disability paperwork. SW spoke to patient via phone call to discuss the terms of her leave. Patient indicated that she may need surgery but will not know if or when until she has imaging post chemo. CALLIE faxed completed disability paperwork along with supporting documents to [employer] at [phone]. CALLIE provided patient with a copy via [employer].

## 2022-11-22 NOTE — TELEPHONE ENCOUNTER
Protocol failed or has No Protocol, please review  Requested Prescriptions   Pending Prescriptions Disp Refills    silver sulfADIAZINE 1 % External Cream 30 g 0     Sig: Apply 1 Application topically 2 (two) times daily.        There is no refill protocol information for this order        Future Appointments         Provider Department Appt Notes    In 2 weeks EM Suresh [address] - Infusion SD CBC CMP PORT-SL    In 2 weeks [facility] Hematology Oncology PRE CHEMO    In 2 weeks EM CC INFRN [address] - Infusion SD C7 D1 TRASTUZUMAB-PERTUZUMAB-PORT-SL* Auth'd until 1-18-23*    In 1 month EM Suresh [address] - Infusion SD CBC CMP PORT-MYJ    In 1 month [facility] Hematology Oncology PRE CHEMO    In 1 month EM CC INFRN 2 [address] - Infusion SD C8 D1 TRASTUZUMAB-PERTUZUMAB-PORT-SL* Auth'd until 1-18-23*    In 2 months EM Suresh [address] - Infusion SD CBC CMP PORT-MYJ    In 2 months Suresh Leonard 19 Hematology Oncology PRE CHEMO    In 2 months EM CC INFRN [address] - Infusion SD C9 D1 TRASTUZUMAB-PERTUZUMAB-PORT-SL* Auth'd until 1-18-23*          Recent Outpatient Visits              Yesterday Chemotherapy management, encounter for    [address] Visit    Yesterday Chemotherapy management, encounter for    [facility] Hematology Oncology Paresh Oscar MD    Office Visit    Yesterday Chemotherapy management, encounter for    [address] - Infusion    Nurse Only    2 weeks ago Acute cystitis without hematuria    Sarthak Rivas MD    Office Visit    4 weeks ago Chemotherapy management, encounter for    [address] [place] [address]    Office Visit

## 2022-11-25 ENCOUNTER — TELEPHONE (OUTPATIENT)
Dept: HEMATOLOGY/ONCOLOGY | Facility: HOSPITAL | Age: 37
End: 2022-11-25

## 2022-11-25 ENCOUNTER — LAB ENCOUNTER (OUTPATIENT)
Dept: LAB | Age: 37
End: 2022-11-25
Attending: INTERNAL MEDICINE
Payer: COMMERCIAL

## 2022-11-25 ENCOUNTER — TELEPHONE (OUTPATIENT)
Dept: INTERNAL MEDICINE CLINIC | Facility: CLINIC | Age: 37
End: 2022-11-25

## 2022-11-25 DIAGNOSIS — Z17.0 BREAST CANCER, STAGE 1, ESTROGEN RECEPTOR POSITIVE, RIGHT (HCC): Primary | ICD-10-CM

## 2022-11-25 DIAGNOSIS — C50.911 BREAST CANCER, STAGE 1, ESTROGEN RECEPTOR POSITIVE, RIGHT (HCC): Primary | ICD-10-CM

## 2022-11-25 DIAGNOSIS — R30.0 DYSURIA: Primary | ICD-10-CM

## 2022-11-25 DIAGNOSIS — R30.0 DYSURIA: ICD-10-CM

## 2022-11-25 DIAGNOSIS — Z92.21 S/P CHEMOTHERAPY, TIME SINCE LESS THAN 4 WEEKS: ICD-10-CM

## 2022-11-25 LAB
BILIRUB UR QL: NEGATIVE
COLOR UR: YELLOW
GLUCOSE UR-MCNC: NEGATIVE MG/DL
NITRITE UR QL STRIP.AUTO: NEGATIVE
PH UR: 5.5 [PH] (ref 5–8)
RBC #/AREA URNS AUTO: >10 /HPF
RBC #/AREA URNS AUTO: >10 /HPF
SP GR UR STRIP: 1.02 (ref 1–1.03)
UROBILINOGEN UR STRIP-ACNC: 0.2
WBC #/AREA URNS AUTO: >50 /HPF
WBC #/AREA URNS AUTO: >50 /HPF

## 2022-11-25 PROCEDURE — 87086 URINE CULTURE/COLONY COUNT: CPT

## 2022-11-25 PROCEDURE — 81001 URINALYSIS AUTO W/SCOPE: CPT

## 2022-11-25 PROCEDURE — 81015 MICROSCOPIC EXAM OF URINE: CPT

## 2022-11-25 RX ORDER — CEPHALEXIN 500 MG/1
500 CAPSULE ORAL 2 TIMES DAILY
Qty: 14 CAPSULE | Refills: 0 | Status: SHIPPED | OUTPATIENT
Start: 2022-11-25

## 2022-11-25 RX ORDER — HYDROCODONE BITARTRATE AND ACETAMINOPHEN 10; 325 MG/1; MG/1
TABLET ORAL
Qty: 140 TABLET | Refills: 0 | Status: SHIPPED | OUTPATIENT
Start: 2022-11-25

## 2022-11-25 NOTE — TELEPHONE ENCOUNTER
[place]; Pt also complained of dysuria; no fevers/vomiting/flank pain. Will do ua and cuoltuere. Start emperic abx for now since pt post chemo.  Pt told to go to ER if any worsening

## 2022-11-25 NOTE — TELEPHONE ENCOUNTER
Phoned patient for care coordination. Patient completed neoadjuvant chemotherapy with Dr. Keysha Martinez on 11/21/22. Discussed post treatment breast MRI, f/u with Dr. Juanjose Rodrigez, as well as a Plastics consult. Breast MRI scheduled for Monday 11/28/22 at noon. Office follow up with Dr. Juanjose Rodrigez scheduled for 11/30/22 at 8:30am.  Patient aware she will receive a call for scheduling of Plastics consult. All appointment information provided to patient. She acknowledged and denied questions.

## 2022-11-26 NOTE — TELEPHONE ENCOUNTER
Please review; Protocol Failed / No protocol. Requested Prescriptions   Pending Prescriptions Disp Refills    butalbital-acetaminophen-caffeine -40 MG Oral Tab 30 tablet 0     Sig: Take 1 tablet by mouth every 8 (eight) hours as needed for Headaches. TAKE ONE TABLET BY MOUTH THREE TIMES DAILY AS NEEDED FOR HEADACHE       There is no refill protocol information for this order       cyclobenzaprine 10 MG Oral Tab 90 tablet 0     Sig: Take 1 tablet (10 mg total) by mouth 3 (three) times daily.        There is no refill protocol information for this order           Recent Outpatient Visits              5 days ago Chemotherapy management, encounter for    [address] Visit    5 days ago Chemotherapy management, encounter for    [facility] Hematology Oncology Reyna Tellez MD    Office Visit    5 days ago Chemotherapy management, encounter for    [address] - Infusion    Nurse Only    2 weeks ago Acute cystitis without hematuria    [address] MD    Office Visit    1 month ago Chemotherapy management, encounter for    [address] Visit             Future Appointments         Provider Department Appt Notes    In 2 days [address] [facility] MRI RM1 (1.5T WIDE) [facility]     In 4 days Delores Fletcher  [facility] per Cecy hCurchill S/IB    In 6 days Chaz Donnelly MD [facility] Surgical Oncology Group NP Ref by cornelius Wilkins per Burak Uriostegui    In 2 weeks EM Suresh [address] - Infusion SD CBC CMP PORT-SL    In 2 weeks [facility] Hematology Oncology PRE CHEMO    In 2 weeks EM CC INFRN [address] - Infusion SD C7 D1 TRASTUZUMAB-PERTUZUMAB-PORT-SL* Auth'd until 1-18-23*    In 1 month EM Suresh [address] - Infusion SD CBC CMP PORT-MYJ    In 1 month Mann June, [facility] Hematology Oncology PRE CHEMO    In 1 month EM CC INFRN [address] - Infusion SD C8 D1 TRASTUZUMAB-PERTUZUMAB-PORT-SL* Auth'd until 1-18-23*    In 1 month WILFREDO Olson. CBC CMP PORT-MYJ    In 1 month Suresh Mandel 19 Hematology Oncology PRE CHEMO    In 1 month EM CC INFRN [address] - Infusion SD C9 D1 TRASTUZUMAB-PERTUZUMAB-PORT-SL* Auth'd until 1-18-23*

## 2022-11-27 ENCOUNTER — TELEPHONE (OUTPATIENT)
Dept: INTERNAL MEDICINE CLINIC | Facility: CLINIC | Age: 37
End: 2022-11-27

## 2022-11-27 DIAGNOSIS — R30.0 DYSURIA: Primary | ICD-10-CM

## 2022-11-27 RX ORDER — BUTALBITAL, ACETAMINOPHEN AND CAFFEINE 50; 325; 40 MG/1; MG/1; MG/1
1 TABLET ORAL EVERY 8 HOURS PRN
Qty: 30 TABLET | Refills: 0 | Status: SHIPPED | OUTPATIENT
Start: 2022-11-27

## 2022-11-27 RX ORDER — CYCLOBENZAPRINE HCL 10 MG
10 TABLET ORAL 3 TIMES DAILY
Qty: 90 TABLET | Refills: 0 | Status: SHIPPED | OUTPATIENT
Start: 2022-11-27

## 2022-11-28 ENCOUNTER — HOSPITAL ENCOUNTER (OUTPATIENT)
Dept: MRI IMAGING | Facility: HOSPITAL | Age: 37
Discharge: HOME | End: 2022-11-28
Attending: INTERNAL MEDICINE
Payer: COMMERCIAL

## 2022-11-28 DIAGNOSIS — C50.911 BREAST CANCER, STAGE 1, ESTROGEN RECEPTOR POSITIVE, RIGHT (HCC): ICD-10-CM

## 2022-11-28 DIAGNOSIS — Z92.21 S/P CHEMOTHERAPY, TIME SINCE LESS THAN 4 WEEKS: ICD-10-CM

## 2022-11-28 DIAGNOSIS — Z17.0 BREAST CANCER, STAGE 1, ESTROGEN RECEPTOR POSITIVE, RIGHT (HCC): ICD-10-CM

## 2022-11-28 PROCEDURE — A9575 INJ GADOTERATE MEGLUMI 0.1ML: HCPCS | Performed by: INTERNAL MEDICINE

## 2022-11-28 PROCEDURE — 77049 MRI BREAST C-+ W/CAD BI: CPT | Performed by: INTERNAL MEDICINE

## 2022-11-28 RX ORDER — GADOTERATE MEGLUMINE 376.9 MG/ML
20 INJECTION INTRAVENOUS
Status: COMPLETED | OUTPATIENT
Start: 2022-11-28 | End: 2022-11-28

## 2022-11-28 RX ADMIN — GADOTERATE MEGLUMINE 18 ML: 376.9 INJECTION INTRAVENOUS at 12:48:00

## 2022-11-30 ENCOUNTER — NURSE NAVIGATOR ENCOUNTER (OUTPATIENT)
Dept: HEMATOLOGY/ONCOLOGY | Facility: HOSPITAL | Age: 37
End: 2022-11-30

## 2022-11-30 ENCOUNTER — OFFICE VISIT (OUTPATIENT)
Dept: SURGERY | Facility: CLINIC | Age: 37
End: 2022-11-30
Payer: COMMERCIAL

## 2022-11-30 VITALS
RESPIRATION RATE: 16 BRPM | HEIGHT: 66 IN | HEART RATE: 92 BPM | OXYGEN SATURATION: 99 % | WEIGHT: 187.81 LBS | TEMPERATURE: 98 F | DIASTOLIC BLOOD PRESSURE: 93 MMHG | SYSTOLIC BLOOD PRESSURE: 148 MMHG | BODY MASS INDEX: 30.18 KG/M2

## 2022-11-30 DIAGNOSIS — C50.911 INVASIVE DUCTAL CARCINOMA OF RIGHT BREAST (HCC): Primary | ICD-10-CM

## 2022-11-30 DIAGNOSIS — Z17.0 BREAST CANCER, STAGE 1, ESTROGEN RECEPTOR POSITIVE, RIGHT (HCC): Primary | ICD-10-CM

## 2022-11-30 DIAGNOSIS — C50.911 BREAST CANCER, STAGE 1, ESTROGEN RECEPTOR POSITIVE, RIGHT (HCC): Primary | ICD-10-CM

## 2022-11-30 PROCEDURE — 3080F DIAST BP >= 90 MM HG: CPT | Performed by: SURGERY

## 2022-11-30 PROCEDURE — 3077F SYST BP >= 140 MM HG: CPT | Performed by: SURGERY

## 2022-11-30 PROCEDURE — 3008F BODY MASS INDEX DOCD: CPT | Performed by: SURGERY

## 2022-11-30 PROCEDURE — 99215 OFFICE O/P EST HI 40 MIN: CPT | Performed by: SURGERY

## 2022-11-30 NOTE — PROGRESS NOTES
Patient presents to the [facility] for post neoadjuvant surgical follow up with Dr. Hugo Bermudez. Plan to proceed with bilateral mastectomies with immediate reconstruction. Patient is scheduled with Dr. Davide Guallpa for consultation. Discussed pre operative education appointment with this writer in addition to pre operative PT assessment. Patient agreeable to these appointments. Anticipate surgery in January 2023. Encouraged patient to call with any questions, concerns, or needs.

## 2022-11-30 NOTE — PATIENT INSTRUCTIONS
Dr. Portia Orellana  Tel: [phone]  Fax: [phone]  [address] [place], [place] [address] [phone]     Surgery/Procedure: Bilateral breast skin sparing mastectomies, right lymphoscintigraphy, right sentinel lymph node biopsy, possible right axillary lymph node dissection Thomas Pretarnold), with reconstruction (Ollie). Anesthesia:   Gen + request pre op pec block from anesthesia Surgery Length:   2.5 hours CPT:  44237, 24594, 26866   Wire LOC:   No   Nuc Med:   Yes Jany Seed:  No       Dx & ICD-10: Invasive ductal carcinoma of right breast ([facility] [address] (C50.911)   Radiology Instructions: N/A   _______________________________________________________________________________    Someone must accompany you the day of the procedure to drive you home safely, because of anesthesia. You will need an adult  to stay with you the first night following your surgery. You must remove any kind of makeup, acrylic nails, lotions, powders, creams or deodorant. [place] ONLY: Pre-admission will give instruct you on when to take Gatorade and Tylenol/acetaminophen prior to your surgery, purchase 2 - 12oz bottles of regular Gatorade (NOT RED/SUGAR FREE). Otherwise, you may not eat or drink anything else after 11PM the night before surgery. [place] ONLY: You may not eat or drink anything after midnight the day of your surgery. Wear comfortable clothing that can be easily removed. If you wear dentures, contacts lenses, or any prosthesis, you will be asked to remove them. Do not drink alcohol or smoke 24 hours prior to your procedure. Bring a picture ID and your insurance card. GENERAL ANESTHESIA ONLY: You will be contacted by the hospital for Pre-Admission Covid-19 testing (regardless of vaccination status) to be scheduled as an appointment prior to surgery. They will call closer to the surgery date to set this up, because the earliest this can be done is 72 hours prior to surgery.   The Pre-Admission Testing Department will call the day before to confirm your procedure, give you the time you need to arrive by and directions on where to go. They begin making calls after 2pm, if you are not contacted by 4pm, please call the surgeon's office listed above. Do not take any blood thinners at least one week prior to the procedure/surgery. This includes aspirin, baby aspirin, Ibuprofen products, herbal supplements, diet medications, vitamin E, fish oil and green tea supplements. Please check other supplements for these ingredients. *TYLENOL or acetaminophen is acceptable*  If you take Coumadin, Plavix, Xarelto, or Eliquis, please contact your prescribing physician for special instructions on how long to hold. If you take insulin contact your primary care physician for special instructions. Our surgery scheduler, Walt Vallejo, will be contacting you to discuss surgery dates. If you have any questions related to scheduling your surgery, please reach out to her at [phone].  _____________________________________________________________________  PRE-OPERATIVE TESTING IF INDICATED BELOW  PLEASE COMPLETE ASAP (AT LEAST 14-21 DAYS PRIOR TO SURGERY)  [] CBC [] BMP [] CMP [] EKG    [] PT, PTT, INR [] Cardiac Clearance  [] H&P Medical Clearance [] Chest X-ray     Please call Central Scheduling to schedule an appointment for pre-operative labs/tests @ [phone]    Does the patient have a pacemaker or ICD?      [] Yes   [x] No

## 2022-12-02 ENCOUNTER — OFFICE VISIT (OUTPATIENT)
Dept: SURGERY | Facility: CLINIC | Age: 37
End: 2022-12-02
Payer: COMMERCIAL

## 2022-12-02 VITALS
WEIGHT: 192.19 LBS | DIASTOLIC BLOOD PRESSURE: 91 MMHG | SYSTOLIC BLOOD PRESSURE: 155 MMHG | HEART RATE: 84 BPM | BODY MASS INDEX: 32.02 KG/M2 | OXYGEN SATURATION: 100 % | RESPIRATION RATE: 16 BRPM | TEMPERATURE: 98 F | HEIGHT: 64.8 IN

## 2022-12-02 DIAGNOSIS — C50.911 INVASIVE DUCTAL CARCINOMA OF RIGHT BREAST (HCC): Primary | ICD-10-CM

## 2022-12-02 PROCEDURE — 3080F DIAST BP >= 90 MM HG: CPT | Performed by: SURGERY

## 2022-12-02 PROCEDURE — 99243 OFF/OP CNSLTJ NEW/EST LOW 30: CPT | Performed by: SURGERY

## 2022-12-02 PROCEDURE — 3077F SYST BP >= 140 MM HG: CPT | Performed by: SURGERY

## 2022-12-02 PROCEDURE — 3008F BODY MASS INDEX DOCD: CPT | Performed by: SURGERY

## 2022-12-02 NOTE — PATIENT INSTRUCTIONS
Surgeon:              Dr. Jadon Man. Carloz Leonardo, PhD                                          Tel:         [phone]                                  Fax:        [phone]    Surgery/Procedure: Immediate bilateral breast reconstruction with placement of tissue expanders and acellular dermal matrix, local tissue rearrangement, WISE pattern, joint with [facility], 3 hours for Christina's portion, general anesthesia, pre op pec block, outpatient                                       Hospital:  [facility]: [address] [place] [address], [place], [address]           [phone]  [facility]: [address], [facility], [facility]               [phone]    1. Someone will need to drive you to and from the hospital if your procedure is outpatient. 2.Do not drink alcohol or smoke 24 hours prior to your procedure. 3. Bring a picture ID and your insurance card. 4. You will be contacted by the hospital the day before to confirm the procedure time and location. 5. The hospital will also contact you approximately one week before surgery to schedule your COVID test.     6. Do not take any herbal supplements or blood thinners at least one week before your procedure/surgery. This includes NSAID's (aspirin, baby aspirin, Motrin, Ibuprofen, Aleve, Advil, Naproxen, etc), Plavix, fish oil, vitamin E, turmeric, CoQ10, or green tea supplements, etc. *TYLENOL or acetaminophen is ok to take*    7. PRE-OPERATIVE TESTING: History and physical with medical clearance is REQUIRED within 30 days of the surgery date and is mandatory per Dr. Carloz Leonardo. *If this is not done, your surgery will be postponed*  [address]  [facility]    8. Please inform us if you develop any Covid-19 like symptoms, test positive or have been exposed for Covid- 19 prior to surgery.      Consent obtained 12/2/2022  Photos taken on 12/2/2022

## 2022-12-05 ENCOUNTER — APPOINTMENT (OUTPATIENT)
Dept: HEMATOLOGY/ONCOLOGY | Facility: HOSPITAL | Age: 37
End: 2022-12-05
Attending: INTERNAL MEDICINE
Payer: COMMERCIAL

## 2022-12-05 ENCOUNTER — TELEPHONE (OUTPATIENT)
Dept: SURGERY | Facility: CLINIC | Age: 37
End: 2022-12-05

## 2022-12-05 ENCOUNTER — TELEPHONE (OUTPATIENT)
Dept: HEMATOLOGY/ONCOLOGY | Facility: HOSPITAL | Age: 37
End: 2022-12-05

## 2022-12-05 NOTE — TELEPHONE ENCOUNTER
Call received from patient requesting assistance coordinating an appointment with an additional Plastic Surgeon. Patient shares she was seen in consult with Dr. Ayana Narvaez on 12/2/22, however wishes to have opportunity to meet with another if possible. Shared with patient I will make this inquiry. Patient acknowledged and was appreciative.

## 2022-12-05 NOTE — TELEPHONE ENCOUNTER
Called patient to schedule surgery with Dr. Светлана Kearney patient stated she is going to wait and possibly see a new plastic doctor and will call me back

## 2022-12-06 ENCOUNTER — OFFICE VISIT (OUTPATIENT)
Dept: SURGERY | Facility: CLINIC | Age: 37
End: 2022-12-06
Payer: COMMERCIAL

## 2022-12-06 VITALS
SYSTOLIC BLOOD PRESSURE: 151 MMHG | HEIGHT: 64.96 IN | RESPIRATION RATE: 16 BRPM | TEMPERATURE: 98 F | WEIGHT: 190.38 LBS | BODY MASS INDEX: 31.72 KG/M2 | OXYGEN SATURATION: 99 % | DIASTOLIC BLOOD PRESSURE: 83 MMHG | HEART RATE: 94 BPM

## 2022-12-06 PROCEDURE — 99243 OFF/OP CNSLTJ NEW/EST LOW 30: CPT | Performed by: SURGERY

## 2022-12-06 PROCEDURE — 3079F DIAST BP 80-89 MM HG: CPT | Performed by: SURGERY

## 2022-12-06 PROCEDURE — 3008F BODY MASS INDEX DOCD: CPT | Performed by: SURGERY

## 2022-12-06 PROCEDURE — 3077F SYST BP >= 140 MM HG: CPT | Performed by: SURGERY

## 2022-12-06 NOTE — PATIENT INSTRUCTIONS
Surgeon:         Dr. Amber Jones                                        Tel:         [phone]                                  Fax:        [phone]    Surgery/Procedure:   Immediate breast reconstruction with placement of bilateral breast tissue expanders and acellular dermal matrix. 2 hours, general anesthesia, outpatient. Joint with Dr. Miranda Share:  [facility]: [address], [place], [address]           [phone]  [facility] CL[facility]: [address], [address], [facility]               [phone]    1. Someone will need to drive you to and from the hospital if your procedure is outpatient. 2.Do not drink alcohol or smoke 24 hours prior to your procedure. 3. Bring a picture ID and your insurance card. 4. You will be contacted by the hospital the day before to confirm the procedure time and location. 5. The hospital will also contact you approximately one week before surgery to schedule your COVID test.     6. Do not take any herbal supplements or blood thinners at least one week before your procedure/surgery. This includes NSAID's (aspirin, baby aspirin, Motrin, Ibuprofen, Aleve, Advil, Naproxen, etc), Plavix, fish oil, vitamin E, turmeric, CoQ10, or green tea supplements, etc. *TYLENOL or acetaminophen is ok to take*    7. PRE-OPERATIVE TESTING: History and physical with medical clearance is REQUIRED within 30 days of the surgery date and is mandatory per Dr. Destin Kevin. *If this is not done, your surgery will be postponed*  MEDICAL CLEARANCE WITH DR. Sona Bradshaw  CBC  CMP  EKG    8. Please inform us if you develop any Covid-19 like symptoms, test positive or have been exposed for Covid- 19 prior to surgery.      Consent obtained   Photos taken

## 2022-12-07 ENCOUNTER — TELEPHONE (OUTPATIENT)
Dept: SURGERY | Facility: CLINIC | Age: 37
End: 2022-12-07

## 2022-12-07 ENCOUNTER — TELEPHONE (OUTPATIENT)
Dept: INTERNAL MEDICINE CLINIC | Facility: CLINIC | Age: 37
End: 2022-12-07

## 2022-12-07 DIAGNOSIS — G89.3 CANCER ASSOCIATED PAIN: Primary | ICD-10-CM

## 2022-12-07 DIAGNOSIS — C50.911 INVASIVE DUCTAL CARCINOMA OF RIGHT BREAST (HCC): Primary | ICD-10-CM

## 2022-12-07 RX ORDER — HYDROCODONE BITARTRATE AND ACETAMINOPHEN 10; 325 MG/1; MG/1
1-2 TABLET ORAL EVERY 6 HOURS PRN
Qty: 70 TABLET | Refills: 0 | Status: SHIPPED | OUTPATIENT
Start: 2022-12-07

## 2022-12-07 NOTE — TELEPHONE ENCOUNTER
Chart and PCP notes reviewed, on-call MD refill given, limited dose given pending follow-up with PCP.

## 2022-12-07 NOTE — TELEPHONE ENCOUNTER
Calling pt in regards to scheduling surgery. Informed pt that I have 01/10/2023 available at [facility] with Dr. Cruz Carreon. Patient will have same insurance in 2023  Pt verbalized understanding and in agreement with date and location. All questions answered. Encouraged pt to call or [employer] message office with any other questions or concerns.

## 2022-12-07 NOTE — TELEPHONE ENCOUNTER
Pt came in for refill prescription for [facility], Dr. Shae Talavera is on vacation and Dr. Yves Fairchild agreed to renew, will forward message to Dr. Yves Fairchild.

## 2022-12-08 ENCOUNTER — TELEPHONE (OUTPATIENT)
Dept: MAMMOGRAPHY | Facility: HOSPITAL | Age: 37
End: 2022-12-08

## 2022-12-08 NOTE — TELEPHONE ENCOUNTER
Spoke with Dayana Montes regarding [place] Lymph Node mapping which will be done in nuclear medicine department before mastectomy surgery scheduled for 1-10-22. Procedure explained and all questions answered. Breast Care Coordinator to provide written information regarding mastectomy surgery, breast cancer and lymphedema. Pt verbalized understanding and had no further questions at this time.

## 2022-12-08 NOTE — TELEPHONE ENCOUNTER
Attempted to call patient re: sentinel lymph node mapping procedure education. Message left for patient to call back.

## 2022-12-12 ENCOUNTER — SOCIAL WORK SERVICES (OUTPATIENT)
Dept: HEMATOLOGY/ONCOLOGY | Facility: HOSPITAL | Age: 37
End: 2022-12-12

## 2022-12-12 ENCOUNTER — HOSPITAL ENCOUNTER (OUTPATIENT)
Dept: GENERAL RADIOLOGY | Age: 37
Discharge: HOME | End: 2022-12-12
Attending: INTERNAL MEDICINE
Payer: COMMERCIAL

## 2022-12-12 ENCOUNTER — OFFICE VISIT (OUTPATIENT)
Dept: HEMATOLOGY/ONCOLOGY | Facility: HOSPITAL | Age: 37
End: 2022-12-12
Attending: INTERNAL MEDICINE
Payer: COMMERCIAL

## 2022-12-12 ENCOUNTER — TELEPHONE (OUTPATIENT)
Dept: INTERNAL MEDICINE CLINIC | Facility: CLINIC | Age: 37
End: 2022-12-12

## 2022-12-12 VITALS
RESPIRATION RATE: 18 BRPM | OXYGEN SATURATION: 99 % | HEIGHT: 66 IN | TEMPERATURE: 99 F | SYSTOLIC BLOOD PRESSURE: 130 MMHG | HEART RATE: 85 BPM | DIASTOLIC BLOOD PRESSURE: 89 MMHG | WEIGHT: 189 LBS | BODY MASS INDEX: 30.37 KG/M2

## 2022-12-12 DIAGNOSIS — T45.1X5A ANEMIA DUE TO ANTINEOPLASTIC CHEMOTHERAPY: Primary | ICD-10-CM

## 2022-12-12 DIAGNOSIS — J06.9 UPPER RESPIRATORY TRACT INFECTION, UNSPECIFIED TYPE: ICD-10-CM

## 2022-12-12 DIAGNOSIS — D64.81 ANEMIA DUE TO ANTINEOPLASTIC CHEMOTHERAPY: ICD-10-CM

## 2022-12-12 DIAGNOSIS — C50.911 BREAST CANCER, STAGE 1, ESTROGEN RECEPTOR POSITIVE, RIGHT (HCC): ICD-10-CM

## 2022-12-12 DIAGNOSIS — T45.1X5A ANEMIA DUE TO ANTINEOPLASTIC CHEMOTHERAPY: ICD-10-CM

## 2022-12-12 DIAGNOSIS — Z51.11 CHEMOTHERAPY MANAGEMENT, ENCOUNTER FOR: Primary | ICD-10-CM

## 2022-12-12 DIAGNOSIS — R05.1 ACUTE COUGH: ICD-10-CM

## 2022-12-12 DIAGNOSIS — D64.81 ANEMIA DUE TO ANTINEOPLASTIC CHEMOTHERAPY: Primary | ICD-10-CM

## 2022-12-12 DIAGNOSIS — Z17.0 BREAST CANCER, STAGE 1, ESTROGEN RECEPTOR POSITIVE, RIGHT (HCC): ICD-10-CM

## 2022-12-12 DIAGNOSIS — Z51.11 CHEMOTHERAPY MANAGEMENT, ENCOUNTER FOR: ICD-10-CM

## 2022-12-12 DIAGNOSIS — K90.9 MALABSORPTION OF IRON: ICD-10-CM

## 2022-12-12 DIAGNOSIS — R05.1 ACUTE COUGH: Primary | ICD-10-CM

## 2022-12-12 LAB
ALBUMIN SERPL-MCNC: 3.9 G/DL (ref 3.4–5)
ALBUMIN/GLOB SERPL: 1.2 {RATIO} (ref 1–2)
ALP LIVER SERPL-CCNC: 58 U/L
ALT SERPL-CCNC: 35 U/L
ANION GAP SERPL CALC-SCNC: 6 MMOL/L (ref 0–18)
AST SERPL-CCNC: 10 U/L (ref 15–37)
BASOPHILS # BLD AUTO: 0.01 X10(3) UL (ref 0–0.2)
BASOPHILS NFR BLD AUTO: 0.1 %
BILIRUB SERPL-MCNC: 0.2 MG/DL (ref 0.1–2)
BUN BLD-MCNC: 13 MG/DL (ref 7–18)
BUN/CREAT SERPL: 16.7 (ref 10–20)
CALCIUM BLD-MCNC: 9.2 MG/DL (ref 8.5–10.1)
CHLORIDE SERPL-SCNC: 109 MMOL/L (ref 98–112)
CO2 SERPL-SCNC: 24 MMOL/L (ref 21–32)
CREAT BLD-MCNC: 0.78 MG/DL
DEPRECATED RDW RBC AUTO: 60.7 FL (ref 35.1–46.3)
EOSINOPHIL # BLD AUTO: 0 X10(3) UL (ref 0–0.7)
EOSINOPHIL NFR BLD AUTO: 0 %
ERYTHROCYTE [DISTWIDTH] IN BLOOD BY AUTOMATED COUNT: 14.5 % (ref 11–15)
GFR SERPLBLD BASED ON 1.73 SQ M-ARVRAT: 99 ML/MIN/1.73M2 (ref 60–?)
GLOBULIN PLAS-MCNC: 3.2 G/DL (ref 2.8–4.4)
GLUCOSE BLD-MCNC: 136 MG/DL (ref 70–99)
HCT VFR BLD AUTO: 33.2 %
HGB BLD-MCNC: 10.9 G/DL
IMM GRANULOCYTES # BLD AUTO: 0.02 X10(3) UL (ref 0–1)
IMM GRANULOCYTES NFR BLD: 0.3 %
LYMPHOCYTES # BLD AUTO: 1.87 X10(3) UL (ref 1–4)
LYMPHOCYTES NFR BLD AUTO: 27.1 %
MCH RBC QN AUTO: 36.9 PG (ref 26–34)
MCHC RBC AUTO-ENTMCNC: 32.8 G/DL (ref 31–37)
MCV RBC AUTO: 112.5 FL
MONOCYTES # BLD AUTO: 0.52 X10(3) UL (ref 0.1–1)
MONOCYTES NFR BLD AUTO: 7.5 %
NEUTROPHILS # BLD AUTO: 4.47 X10 (3) UL (ref 1.5–7.7)
NEUTROPHILS # BLD AUTO: 4.47 X10(3) UL (ref 1.5–7.7)
NEUTROPHILS NFR BLD AUTO: 65 %
OSMOLALITY SERPL CALC.SUM OF ELEC: 290 MOSM/KG (ref 275–295)
PLATELET # BLD AUTO: 187 10(3)UL (ref 150–450)
POTASSIUM SERPL-SCNC: 3.5 MMOL/L (ref 3.5–5.1)
PROT SERPL-MCNC: 7.1 G/DL (ref 6.4–8.2)
RBC # BLD AUTO: 2.95 X10(6)UL
SODIUM SERPL-SCNC: 139 MMOL/L (ref 136–145)
WBC # BLD AUTO: 6.9 X10(3) UL (ref 4–11)

## 2022-12-12 PROCEDURE — 85060 BLOOD SMEAR INTERPRETATION: CPT

## 2022-12-12 PROCEDURE — 36591 DRAW BLOOD OFF VENOUS DEVICE: CPT

## 2022-12-12 PROCEDURE — 71046 X-RAY EXAM CHEST 2 VIEWS: CPT | Performed by: INTERNAL MEDICINE

## 2022-12-12 PROCEDURE — 85025 COMPLETE CBC W/AUTO DIFF WBC: CPT

## 2022-12-12 PROCEDURE — 80053 COMPREHEN METABOLIC PANEL: CPT

## 2022-12-12 RX ORDER — HYDROCODONE BITARTRATE AND HOMATROPINE METHYLBROMIDE ORAL SOLUTION 5; 1.5 MG/5ML; MG/5ML
5 LIQUID ORAL EVERY 6 HOURS PRN
Qty: 240 ML | Refills: 0 | Status: SHIPPED | OUTPATIENT
Start: 2022-12-12

## 2022-12-12 RX ORDER — HEPARIN SODIUM (PORCINE) LOCK FLUSH IV SOLN 100 UNIT/ML 100 UNIT/ML
SOLUTION INTRAVENOUS
Status: DISCONTINUED
Start: 2022-12-12 | End: 2022-12-12

## 2022-12-12 RX ORDER — HEPARIN SODIUM (PORCINE) LOCK FLUSH IV SOLN 100 UNIT/ML 100 UNIT/ML
5 SOLUTION INTRAVENOUS ONCE
Status: CANCELLED | OUTPATIENT
Start: 2023-01-02

## 2022-12-12 RX ORDER — SODIUM CHLORIDE 9 MG/ML
10 INJECTION INTRAVENOUS ONCE
OUTPATIENT
Start: 2023-01-02

## 2022-12-12 RX ORDER — HEPARIN SODIUM (PORCINE) LOCK FLUSH IV SOLN 100 UNIT/ML 100 UNIT/ML
5 SOLUTION INTRAVENOUS ONCE
Status: COMPLETED | OUTPATIENT
Start: 2022-12-12 | End: 2022-12-12

## 2022-12-12 RX ORDER — HYDROCODONE BITARTRATE AND ACETAMINOPHEN 10; 325 MG/1; MG/1
TABLET ORAL
Qty: 126 TABLET | Refills: 0 | Status: SHIPPED | OUTPATIENT
Start: 2022-12-12

## 2022-12-12 RX ORDER — HEPARIN SODIUM (PORCINE) LOCK FLUSH IV SOLN 100 UNIT/ML 100 UNIT/ML
5 SOLUTION INTRAVENOUS ONCE
OUTPATIENT
Start: 2023-01-02

## 2022-12-12 RX ORDER — HEPARIN SODIUM (PORCINE) LOCK FLUSH IV SOLN 100 UNIT/ML 100 UNIT/ML
5 SOLUTION INTRAVENOUS ONCE
Status: DISCONTINUED | OUTPATIENT
Start: 2022-12-12 | End: 2022-12-12

## 2022-12-12 RX ADMIN — HEPARIN SODIUM (PORCINE) LOCK FLUSH IV SOLN 100 UNIT/ML 500 UNITS: 100 SOLUTION INTRAVENOUS at 09:39:00

## 2022-12-12 NOTE — PROGRESS NOTES
Michael received a correspondence from [employer] regarding patient's disability claim with a request for additional information to support claim. MICHAEL faxed clinical documentation from 11/21/22 to present to [employer] at [phone].

## 2022-12-12 NOTE — TELEPHONE ENCOUNTER
Patient initially came in just to get a refill for her Norco but that she started to complain of having some mild sore throat and coughing which started last Thursday. She been no fevers she denies having had any shortness of breath. She has been taking cough medicine. She went to a local school clinic and was tested for flu as well as COVID and was negative late last week. I did examine her and that she did not look to be in any distress her pulse ox was 99% room air. Her lungs were clear to auscultation. No wheezes. We but agreed to get at least a chest x-ray which she will do today. I gave her her cough medicine Hydromet and then also refill for her Norco.  She was instructed to go to ER if there is any worsening of her symptoms. Patient agreed.

## 2022-12-14 ENCOUNTER — TELEPHONE (OUTPATIENT)
Dept: INTERNAL MEDICINE CLINIC | Facility: CLINIC | Age: 37
End: 2022-12-14

## 2022-12-14 DIAGNOSIS — G89.3 CANCER ASSOCIATED PAIN: ICD-10-CM

## 2022-12-14 RX ORDER — HYDROCODONE BITARTRATE AND ACETAMINOPHEN 10; 325 MG/1; MG/1
2 TABLET ORAL
Qty: 126 TABLET | Refills: 0 | Status: SHIPPED | OUTPATIENT
Start: 2022-12-14

## 2022-12-20 ENCOUNTER — OFFICE VISIT (OUTPATIENT)
Dept: HEMATOLOGY/ONCOLOGY | Facility: HOSPITAL | Age: 37
End: 2022-12-20
Attending: INTERNAL MEDICINE
Payer: COMMERCIAL

## 2022-12-20 VITALS
SYSTOLIC BLOOD PRESSURE: 142 MMHG | HEART RATE: 90 BPM | RESPIRATION RATE: 18 BRPM | DIASTOLIC BLOOD PRESSURE: 79 MMHG | TEMPERATURE: 98 F | OXYGEN SATURATION: 99 % | HEIGHT: 66 IN | WEIGHT: 192.81 LBS | BODY MASS INDEX: 30.99 KG/M2

## 2022-12-20 DIAGNOSIS — T45.1X5A ANEMIA DUE TO ANTINEOPLASTIC CHEMOTHERAPY: ICD-10-CM

## 2022-12-20 DIAGNOSIS — Z17.0 BREAST CANCER, STAGE 1, ESTROGEN RECEPTOR POSITIVE, RIGHT (HCC): ICD-10-CM

## 2022-12-20 DIAGNOSIS — K90.9 MALABSORPTION OF IRON: ICD-10-CM

## 2022-12-20 DIAGNOSIS — Z51.11 CHEMOTHERAPY MANAGEMENT, ENCOUNTER FOR: Primary | ICD-10-CM

## 2022-12-20 DIAGNOSIS — D64.81 ANEMIA DUE TO ANTINEOPLASTIC CHEMOTHERAPY: ICD-10-CM

## 2022-12-20 DIAGNOSIS — C50.911 BREAST CANCER, STAGE 1, ESTROGEN RECEPTOR POSITIVE, RIGHT (HCC): ICD-10-CM

## 2022-12-20 PROCEDURE — 96367 TX/PROPH/DG ADDL SEQ IV INF: CPT

## 2022-12-20 PROCEDURE — 96417 CHEMO IV INFUS EACH ADDL SEQ: CPT

## 2022-12-20 PROCEDURE — 96413 CHEMO IV INFUSION 1 HR: CPT

## 2022-12-20 RX ORDER — HEPARIN SODIUM (PORCINE) LOCK FLUSH IV SOLN 100 UNIT/ML 100 UNIT/ML
5 SOLUTION INTRAVENOUS ONCE
Status: COMPLETED | OUTPATIENT
Start: 2022-12-20 | End: 2022-12-20

## 2022-12-20 RX ORDER — SODIUM CHLORIDE 9 MG/ML
10 INJECTION INTRAVENOUS ONCE
OUTPATIENT
Start: 2023-01-02

## 2022-12-20 RX ORDER — HEPARIN SODIUM (PORCINE) LOCK FLUSH IV SOLN 100 UNIT/ML 100 UNIT/ML
5 SOLUTION INTRAVENOUS ONCE
OUTPATIENT
Start: 2023-01-02

## 2022-12-20 RX ORDER — HEPARIN SODIUM (PORCINE) LOCK FLUSH IV SOLN 100 UNIT/ML 100 UNIT/ML
SOLUTION INTRAVENOUS
Status: COMPLETED
Start: 2022-12-20 | End: 2022-12-20

## 2022-12-20 RX ADMIN — HEPARIN SODIUM (PORCINE) LOCK FLUSH IV SOLN 100 UNIT/ML 500 UNITS: 100 SOLUTION INTRAVENOUS at 17:05:00

## 2022-12-20 NOTE — PROGRESS NOTES
Pt here for C7D1 TRASTUZUMAB + PERTUZUMAB. Arrives Ambulating independently, accompanied by Self           Pregnancy screening: Denies possibility of pregnancy    Modifications in dose or schedule: Yes. Treatment delayed 1 week d/t illness. Patient refused IV IRON, spoke with Elsa and OK to hold. Plan to restart after surgery. Drugs/infusions dual verified for appearance and physical integrity.  IV pump settings were dual verified: yes     Frequency of blood return and site check throughout administration: Prior to administration, Prior to each drug and At completion of therapy   Discharged to Home, Ambulating independently, accompanied by:Self    Outpatient Oncology Care Plan  Problem list:  diarrhea  nausea and vomiting  Problems related to:  chemotherapy  side effect of treatment  Interventions:  monitor effect of nausea/vomiting management  promoted rest  provided general teaching  Expected outcomes:  safe in environment  symptoms relieved/minimized  understands plan of care  Progress towards outcome:  making progress    Education Record    Learner:  Patient  Barriers / Limitations:  None  Method:  Discussion  Outcome:  Shows understanding  Comments:

## 2022-12-27 ENCOUNTER — OFFICE VISIT (OUTPATIENT)
Dept: INTERNAL MEDICINE CLINIC | Facility: CLINIC | Age: 37
End: 2022-12-27
Payer: COMMERCIAL

## 2022-12-27 VITALS
BODY MASS INDEX: 31.21 KG/M2 | HEIGHT: 66 IN | DIASTOLIC BLOOD PRESSURE: 76 MMHG | OXYGEN SATURATION: 99 % | TEMPERATURE: 99 F | HEART RATE: 96 BPM | WEIGHT: 194.19 LBS | SYSTOLIC BLOOD PRESSURE: 126 MMHG

## 2022-12-27 DIAGNOSIS — Z17.0 BREAST CANCER, STAGE 1, ESTROGEN RECEPTOR POSITIVE, RIGHT (HCC): ICD-10-CM

## 2022-12-27 DIAGNOSIS — Z01.818 PREOP GENERAL PHYSICAL EXAM: Primary | ICD-10-CM

## 2022-12-27 DIAGNOSIS — C50.911 BREAST CANCER, STAGE 1, ESTROGEN RECEPTOR POSITIVE, RIGHT (HCC): ICD-10-CM

## 2022-12-27 PROCEDURE — 99214 OFFICE O/P EST MOD 30 MIN: CPT | Performed by: INTERNAL MEDICINE

## 2022-12-27 PROCEDURE — 3074F SYST BP LT 130 MM HG: CPT | Performed by: INTERNAL MEDICINE

## 2022-12-27 PROCEDURE — 3008F BODY MASS INDEX DOCD: CPT | Performed by: INTERNAL MEDICINE

## 2022-12-27 PROCEDURE — 3078F DIAST BP <80 MM HG: CPT | Performed by: INTERNAL MEDICINE

## 2022-12-27 RX ORDER — HYDROCODONE BITARTRATE AND ACETAMINOPHEN 10; 325 MG/1; MG/1
TABLET ORAL
Qty: 140 TABLET | Refills: 0 | Status: SHIPPED | OUTPATIENT
Start: 2022-12-27

## 2022-12-28 ENCOUNTER — TELEPHONE (OUTPATIENT)
Dept: INTERNAL MEDICINE CLINIC | Facility: CLINIC | Age: 37
End: 2022-12-28

## 2022-12-28 ENCOUNTER — LAB ENCOUNTER (OUTPATIENT)
Dept: LAB | Facility: HOSPITAL | Age: 37
End: 2022-12-28
Attending: INTERNAL MEDICINE
Payer: COMMERCIAL

## 2022-12-28 DIAGNOSIS — Z01.818 PREOP GENERAL PHYSICAL EXAM: ICD-10-CM

## 2022-12-28 LAB
ALBUMIN SERPL-MCNC: 3.9 G/DL (ref 3.4–5)
ALBUMIN/GLOB SERPL: 1.3 {RATIO} (ref 1–2)
ALP LIVER SERPL-CCNC: 45 U/L
ALT SERPL-CCNC: 24 U/L
ANION GAP SERPL CALC-SCNC: 8 MMOL/L (ref 0–18)
AST SERPL-CCNC: 9 U/L (ref 15–37)
ATRIAL RATE: 84 BPM
BASOPHILS # BLD AUTO: 0.02 X10(3) UL (ref 0–0.2)
BASOPHILS NFR BLD AUTO: 0.2 %
BILIRUB SERPL-MCNC: 0.4 MG/DL (ref 0.1–2)
BUN BLD-MCNC: 14 MG/DL (ref 7–18)
BUN/CREAT SERPL: 19.4 (ref 10–20)
CALCIUM BLD-MCNC: 9 MG/DL (ref 8.5–10.1)
CHLORIDE SERPL-SCNC: 108 MMOL/L (ref 98–112)
CO2 SERPL-SCNC: 26 MMOL/L (ref 21–32)
CREAT BLD-MCNC: 0.72 MG/DL
DEPRECATED RDW RBC AUTO: 55.3 FL (ref 35.1–46.3)
EOSINOPHIL # BLD AUTO: 0.01 X10(3) UL (ref 0–0.7)
EOSINOPHIL NFR BLD AUTO: 0.1 %
ERYTHROCYTE [DISTWIDTH] IN BLOOD BY AUTOMATED COUNT: 13.3 % (ref 11–15)
FASTING STATUS PATIENT QL REPORTED: YES
GFR SERPLBLD BASED ON 1.73 SQ M-ARVRAT: 109 ML/MIN/1.73M2 (ref 60–?)
GLOBULIN PLAS-MCNC: 3.1 G/DL (ref 2.8–4.4)
GLUCOSE BLD-MCNC: 96 MG/DL (ref 70–99)
HCT VFR BLD AUTO: 35.6 %
HGB BLD-MCNC: 11.6 G/DL
IMM GRANULOCYTES # BLD AUTO: 0.02 X10(3) UL (ref 0–1)
IMM GRANULOCYTES NFR BLD: 0.2 %
LYMPHOCYTES # BLD AUTO: 3.94 X10(3) UL (ref 1–4)
LYMPHOCYTES NFR BLD AUTO: 38.8 %
MCH RBC QN AUTO: 36.1 PG (ref 26–34)
MCHC RBC AUTO-ENTMCNC: 32.6 G/DL (ref 31–37)
MCV RBC AUTO: 110.9 FL
MONOCYTES # BLD AUTO: 0.53 X10(3) UL (ref 0.1–1)
MONOCYTES NFR BLD AUTO: 5.2 %
NEUTROPHILS # BLD AUTO: 5.64 X10 (3) UL (ref 1.5–7.7)
NEUTROPHILS # BLD AUTO: 5.64 X10(3) UL (ref 1.5–7.7)
NEUTROPHILS NFR BLD AUTO: 55.5 %
OSMOLALITY SERPL CALC.SUM OF ELEC: 294 MOSM/KG (ref 275–295)
P AXIS: 53 DEGREES
P-R INTERVAL: 126 MS
PLATELET # BLD AUTO: 326 10(3)UL (ref 150–450)
POTASSIUM SERPL-SCNC: 3.9 MMOL/L (ref 3.5–5.1)
PROT SERPL-MCNC: 7 G/DL (ref 6.4–8.2)
Q-T INTERVAL: 356 MS
QRS DURATION: 84 MS
QTC CALCULATION (BEZET): 420 MS
R AXIS: 35 DEGREES
RBC # BLD AUTO: 3.21 X10(6)UL
SODIUM SERPL-SCNC: 142 MMOL/L (ref 136–145)
T AXIS: 30 DEGREES
VENTRICULAR RATE: 84 BPM
WBC # BLD AUTO: 10.2 X10(3) UL (ref 4–11)

## 2022-12-28 PROCEDURE — 36415 COLL VENOUS BLD VENIPUNCTURE: CPT

## 2022-12-28 PROCEDURE — 93005 ELECTROCARDIOGRAM TRACING: CPT

## 2022-12-28 PROCEDURE — 93010 ELECTROCARDIOGRAM REPORT: CPT | Performed by: STUDENT IN AN ORGANIZED HEALTH CARE EDUCATION/TRAINING PROGRAM

## 2022-12-28 PROCEDURE — 80053 COMPREHEN METABOLIC PANEL: CPT

## 2022-12-28 PROCEDURE — 85025 COMPLETE CBC W/AUTO DIFF WBC: CPT

## 2022-12-28 NOTE — TELEPHONE ENCOUNTER
Prior authorization for hydrocodone was done through [employer].  It can take 1-5 business days for a decision to come back

## 2023-01-03 ENCOUNTER — APPOINTMENT (OUTPATIENT)
Dept: HEMATOLOGY/ONCOLOGY | Facility: HOSPITAL | Age: 38
End: 2023-01-03
Attending: INTERNAL MEDICINE
Payer: COMMERCIAL

## 2023-01-03 ENCOUNTER — APPOINTMENT (OUTPATIENT)
Dept: HEMATOLOGY/ONCOLOGY | Facility: HOSPITAL | Age: 38
End: 2023-01-03
Attending: PHYSICIAN ASSISTANT
Payer: COMMERCIAL

## 2023-01-03 ENCOUNTER — TELEPHONE (OUTPATIENT)
Dept: HEMATOLOGY/ONCOLOGY | Facility: HOSPITAL | Age: 38
End: 2023-01-03

## 2023-01-03 NOTE — TELEPHONE ENCOUNTER
Phoned patient for care coordination. Surgery scheduled for 1/10/23. Discussed pre operative PT assessment and drain education. Appointments scheduled for Friday 1/6/23 at 9am and 10am.  Patient acknowledged and denied questions.

## 2023-01-04 RX ORDER — SCOLOPAMINE TRANSDERMAL SYSTEM 1 MG/1
1 PATCH, EXTENDED RELEASE TRANSDERMAL ONCE
Status: CANCELLED | OUTPATIENT
Start: 2023-01-04 | End: 2023-01-04

## 2023-01-05 ENCOUNTER — OFFICE VISIT (OUTPATIENT)
Dept: INTERNAL MEDICINE CLINIC | Facility: CLINIC | Age: 38
End: 2023-01-05
Payer: COMMERCIAL

## 2023-01-05 ENCOUNTER — SOCIAL WORK SERVICES (OUTPATIENT)
Dept: HEMATOLOGY/ONCOLOGY | Facility: HOSPITAL | Age: 38
End: 2023-01-05

## 2023-01-05 ENCOUNTER — TELEPHONE (OUTPATIENT)
Dept: INTERNAL MEDICINE CLINIC | Facility: CLINIC | Age: 38
End: 2023-01-05

## 2023-01-05 VITALS
HEART RATE: 108 BPM | SYSTOLIC BLOOD PRESSURE: 118 MMHG | DIASTOLIC BLOOD PRESSURE: 80 MMHG | WEIGHT: 190.31 LBS | BODY MASS INDEX: 30.58 KG/M2 | OXYGEN SATURATION: 99 % | TEMPERATURE: 98 F | HEIGHT: 66 IN

## 2023-01-05 DIAGNOSIS — J34.89 NASAL SORE: Primary | ICD-10-CM

## 2023-01-05 PROCEDURE — 99213 OFFICE O/P EST LOW 20 MIN: CPT | Performed by: INTERNAL MEDICINE

## 2023-01-05 PROCEDURE — 3008F BODY MASS INDEX DOCD: CPT | Performed by: INTERNAL MEDICINE

## 2023-01-05 PROCEDURE — 3074F SYST BP LT 130 MM HG: CPT | Performed by: INTERNAL MEDICINE

## 2023-01-05 PROCEDURE — 3079F DIAST BP 80-89 MM HG: CPT | Performed by: INTERNAL MEDICINE

## 2023-01-05 RX ORDER — HYDROCODONE BITARTRATE AND ACETAMINOPHEN 10; 325 MG/1; MG/1
TABLET ORAL
Qty: 140 TABLET | Refills: 0 | Status: SHIPPED | OUTPATIENT
Start: 2023-01-05

## 2023-01-05 NOTE — PROGRESS NOTES
CALLIE received a correspondence from  requesting additional information in order to review patient's LTD claim. CALLIE faxed clinical dated back from 07/15/22-present to  at [phone].

## 2023-01-05 NOTE — TELEPHONE ENCOUNTER
Pt stated her Nurse Navigator requesting Pt bottom right nare be exam before surgery Tuesday - s/s very sore from chemo infusion, had soreness on LOV 12/ 27th, not improving - requesting to be seen today- no appt's available at any location, stated only want Dr Bing Mccarthy  Today only, tomorrow have many appts , Monday infusion for 6 hrs     Has double mastectomy on Tuesday

## 2023-01-06 ENCOUNTER — OFFICE VISIT (OUTPATIENT)
Dept: PHYSICAL THERAPY | Facility: HOSPITAL | Age: 38
End: 2023-01-06
Attending: INTERNAL MEDICINE
Payer: COMMERCIAL

## 2023-01-06 ENCOUNTER — OFFICE VISIT (OUTPATIENT)
Dept: HEMATOLOGY/ONCOLOGY | Facility: HOSPITAL | Age: 38
End: 2023-01-06
Attending: INTERNAL MEDICINE
Payer: COMMERCIAL

## 2023-01-07 ENCOUNTER — LAB ENCOUNTER (OUTPATIENT)
Dept: LAB | Age: 38
End: 2023-01-07
Attending: SURGERY
Payer: COMMERCIAL

## 2023-01-07 DIAGNOSIS — Z01.818 PRE-OP TESTING: ICD-10-CM

## 2023-01-08 LAB — SARS-COV-2 RNA RESP QL NAA+PROBE: NOT DETECTED

## 2023-01-09 ENCOUNTER — NURSE ONLY (OUTPATIENT)
Dept: HEMATOLOGY/ONCOLOGY | Facility: HOSPITAL | Age: 38
End: 2023-01-09
Attending: INTERNAL MEDICINE
Payer: COMMERCIAL

## 2023-01-09 ENCOUNTER — OFFICE VISIT (OUTPATIENT)
Dept: HEMATOLOGY/ONCOLOGY | Facility: HOSPITAL | Age: 38
End: 2023-01-09
Attending: PHYSICIAN ASSISTANT
Payer: COMMERCIAL

## 2023-01-09 VITALS
HEIGHT: 65.5 IN | TEMPERATURE: 99 F | BODY MASS INDEX: 31.8 KG/M2 | WEIGHT: 193.19 LBS | DIASTOLIC BLOOD PRESSURE: 79 MMHG | RESPIRATION RATE: 16 BRPM | OXYGEN SATURATION: 98 % | HEART RATE: 84 BPM | SYSTOLIC BLOOD PRESSURE: 125 MMHG

## 2023-01-09 DIAGNOSIS — Z17.0 BREAST CANCER, STAGE 1, ESTROGEN RECEPTOR POSITIVE, RIGHT (HCC): ICD-10-CM

## 2023-01-09 DIAGNOSIS — C50.911 BREAST CANCER, STAGE 1, ESTROGEN RECEPTOR POSITIVE, RIGHT (HCC): ICD-10-CM

## 2023-01-09 DIAGNOSIS — K90.9 MALABSORPTION OF IRON: ICD-10-CM

## 2023-01-09 DIAGNOSIS — Z51.11 CHEMOTHERAPY MANAGEMENT, ENCOUNTER FOR: Primary | ICD-10-CM

## 2023-01-09 DIAGNOSIS — T45.1X5A ANEMIA DUE TO ANTINEOPLASTIC CHEMOTHERAPY: Primary | ICD-10-CM

## 2023-01-09 DIAGNOSIS — T45.1X5A ANEMIA DUE TO ANTINEOPLASTIC CHEMOTHERAPY: ICD-10-CM

## 2023-01-09 DIAGNOSIS — D64.81 ANEMIA DUE TO ANTINEOPLASTIC CHEMOTHERAPY: Primary | ICD-10-CM

## 2023-01-09 DIAGNOSIS — D64.81 ANEMIA DUE TO ANTINEOPLASTIC CHEMOTHERAPY: ICD-10-CM

## 2023-01-09 DIAGNOSIS — Z51.11 CHEMOTHERAPY MANAGEMENT, ENCOUNTER FOR: ICD-10-CM

## 2023-01-09 LAB
ALBUMIN SERPL-MCNC: 3.8 G/DL (ref 3.4–5)
ALBUMIN/GLOB SERPL: 1.3 {RATIO} (ref 1–2)
ALP LIVER SERPL-CCNC: 44 U/L
ALT SERPL-CCNC: 20 U/L
ANION GAP SERPL CALC-SCNC: 7 MMOL/L (ref 0–18)
AST SERPL-CCNC: 10 U/L (ref 15–37)
BASOPHILS # BLD AUTO: 0.03 X10(3) UL (ref 0–0.2)
BASOPHILS NFR BLD AUTO: 0.4 %
BILIRUB SERPL-MCNC: 0.2 MG/DL (ref 0.1–2)
BUN BLD-MCNC: 10 MG/DL (ref 7–18)
BUN/CREAT SERPL: 15.6 (ref 10–20)
CALCIUM BLD-MCNC: 9.3 MG/DL (ref 8.5–10.1)
CHLORIDE SERPL-SCNC: 109 MMOL/L (ref 98–112)
CO2 SERPL-SCNC: 25 MMOL/L (ref 21–32)
CREAT BLD-MCNC: 0.64 MG/DL
DEPRECATED RDW RBC AUTO: 50.6 FL (ref 35.1–46.3)
EOSINOPHIL # BLD AUTO: 0.04 X10(3) UL (ref 0–0.7)
EOSINOPHIL NFR BLD AUTO: 0.5 %
ERYTHROCYTE [DISTWIDTH] IN BLOOD BY AUTOMATED COUNT: 12.2 % (ref 11–15)
GFR SERPLBLD BASED ON 1.73 SQ M-ARVRAT: 115 ML/MIN/1.73M2 (ref 60–?)
GLOBULIN PLAS-MCNC: 2.9 G/DL (ref 2.8–4.4)
GLUCOSE BLD-MCNC: 119 MG/DL (ref 70–99)
HCT VFR BLD AUTO: 38.3 %
HGB BLD-MCNC: 12.2 G/DL
IMM GRANULOCYTES # BLD AUTO: 0.01 X10(3) UL (ref 0–1)
IMM GRANULOCYTES NFR BLD: 0.1 %
LYMPHOCYTES # BLD AUTO: 3.14 X10(3) UL (ref 1–4)
LYMPHOCYTES NFR BLD AUTO: 40.2 %
MCH RBC QN AUTO: 35.7 PG (ref 26–34)
MCHC RBC AUTO-ENTMCNC: 31.9 G/DL (ref 31–37)
MCV RBC AUTO: 112 FL
MONOCYTES # BLD AUTO: 0.27 X10(3) UL (ref 0.1–1)
MONOCYTES NFR BLD AUTO: 3.5 %
NEUTROPHILS # BLD AUTO: 4.32 X10 (3) UL (ref 1.5–7.7)
NEUTROPHILS # BLD AUTO: 4.32 X10(3) UL (ref 1.5–7.7)
NEUTROPHILS NFR BLD AUTO: 55.3 %
OSMOLALITY SERPL CALC.SUM OF ELEC: 292 MOSM/KG (ref 275–295)
PLATELET # BLD AUTO: 287 10(3)UL (ref 150–450)
POTASSIUM SERPL-SCNC: 4.4 MMOL/L (ref 3.5–5.1)
PROT SERPL-MCNC: 6.7 G/DL (ref 6.4–8.2)
RBC # BLD AUTO: 3.42 X10(6)UL
SODIUM SERPL-SCNC: 141 MMOL/L (ref 136–145)
WBC # BLD AUTO: 7.8 X10(3) UL (ref 4–11)

## 2023-01-09 PROCEDURE — 36593 DECLOT VASCULAR DEVICE: CPT

## 2023-01-09 PROCEDURE — 36415 COLL VENOUS BLD VENIPUNCTURE: CPT

## 2023-01-09 PROCEDURE — 85025 COMPLETE CBC W/AUTO DIFF WBC: CPT

## 2023-01-09 PROCEDURE — 99214 OFFICE O/P EST MOD 30 MIN: CPT | Performed by: PHYSICIAN ASSISTANT

## 2023-01-09 PROCEDURE — 80053 COMPREHEN METABOLIC PANEL: CPT

## 2023-01-09 RX ORDER — SODIUM CHLORIDE 9 MG/ML
10 INJECTION INTRAVENOUS ONCE
OUTPATIENT
Start: 2023-01-23

## 2023-01-09 RX ORDER — HEPARIN SODIUM (PORCINE) LOCK FLUSH IV SOLN 100 UNIT/ML 100 UNIT/ML
5 SOLUTION INTRAVENOUS ONCE
OUTPATIENT
Start: 2023-01-23

## 2023-01-09 RX ORDER — WATER 1000 ML/1000ML
INJECTION, SOLUTION INTRAVENOUS
Status: DISCONTINUED
Start: 2023-01-09 | End: 2023-01-09

## 2023-01-09 RX ORDER — HEPARIN SODIUM (PORCINE) LOCK FLUSH IV SOLN 100 UNIT/ML 100 UNIT/ML
5 SOLUTION INTRAVENOUS ONCE
Status: COMPLETED | OUTPATIENT
Start: 2023-01-09 | End: 2023-01-09

## 2023-01-09 RX ORDER — HEPARIN SODIUM (PORCINE) LOCK FLUSH IV SOLN 100 UNIT/ML 100 UNIT/ML
5 SOLUTION INTRAVENOUS ONCE
Status: CANCELLED | OUTPATIENT
Start: 2023-01-23

## 2023-01-09 RX ORDER — HEPARIN SODIUM (PORCINE) LOCK FLUSH IV SOLN 100 UNIT/ML 100 UNIT/ML
SOLUTION INTRAVENOUS
Status: COMPLETED
Start: 2023-01-09 | End: 2023-01-09

## 2023-01-09 RX ADMIN — HEPARIN SODIUM (PORCINE) LOCK FLUSH IV SOLN 100 UNIT/ML 500 UNITS: 100 SOLUTION INTRAVENOUS at 11:35:00

## 2023-01-09 NOTE — PROGRESS NOTES
Patient being deferred until 1/30/2023 for C8D1 Trastuzumab/Perjeta. Scheduled for double mastectomy surgery tomorrow. Port with TPA administered today - + blood return after 1 hr. Heparin administered and deaccessed. Patient discharged in stable condition.

## 2023-01-10 ENCOUNTER — ANESTHESIA EVENT (OUTPATIENT)
Dept: SURGERY | Facility: HOSPITAL | Age: 38
End: 2023-01-10
Payer: COMMERCIAL

## 2023-01-10 ENCOUNTER — HOSPITAL ENCOUNTER (OUTPATIENT)
Dept: NUCLEAR MEDICINE | Facility: HOSPITAL | Age: 38
Discharge: HOME | End: 2023-01-10
Attending: SURGERY
Payer: COMMERCIAL

## 2023-01-10 ENCOUNTER — HOSPITAL ENCOUNTER (OUTPATIENT)
Facility: HOSPITAL | Age: 38
Setting detail: HOSPITAL OUTPATIENT SURGERY
Discharge: HOME | End: 2023-01-10
Attending: SURGERY | Admitting: SURGERY
Payer: COMMERCIAL

## 2023-01-10 ENCOUNTER — ANESTHESIA (OUTPATIENT)
Dept: SURGERY | Facility: HOSPITAL | Age: 38
End: 2023-01-10
Payer: COMMERCIAL

## 2023-01-10 VITALS
HEART RATE: 82 BPM | SYSTOLIC BLOOD PRESSURE: 145 MMHG | WEIGHT: 191 LBS | RESPIRATION RATE: 18 BRPM | OXYGEN SATURATION: 91 % | HEIGHT: 65.5 IN | BODY MASS INDEX: 31.44 KG/M2 | TEMPERATURE: 98 F | DIASTOLIC BLOOD PRESSURE: 92 MMHG

## 2023-01-10 DIAGNOSIS — Z01.818 PRE-OP TESTING: Primary | ICD-10-CM

## 2023-01-10 DIAGNOSIS — C50.911 INVASIVE DUCTAL CARCINOMA OF RIGHT BREAST (HCC): ICD-10-CM

## 2023-01-10 LAB — B-HCG UR QL: NEGATIVE

## 2023-01-10 PROCEDURE — 88332 PATH CONSLTJ SURG EA ADD BLK: CPT | Performed by: SURGERY

## 2023-01-10 PROCEDURE — 88331 PATH CONSLTJ SURG 1 BLK 1SPC: CPT | Performed by: SURGERY

## 2023-01-10 PROCEDURE — 88377 M/PHMTRC ALYS ISHQUANT/SEMIQ: CPT | Performed by: SURGERY

## 2023-01-10 PROCEDURE — 0HTV0ZZ RESECTION OF BILATERAL BREAST, OPEN APPROACH: ICD-10-PCS | Performed by: SURGERY

## 2023-01-10 PROCEDURE — 0HHV0NZ INSERTION OF TISSUE EXPANDER INTO BILATERAL BREAST, OPEN APPROACH: ICD-10-PCS | Performed by: SURGERY

## 2023-01-10 PROCEDURE — 78195 LYMPH SYSTEM IMAGING: CPT | Performed by: SURGERY

## 2023-01-10 PROCEDURE — 88342 IMHCHEM/IMCYTCHM 1ST ANTB: CPT | Performed by: SURGERY

## 2023-01-10 PROCEDURE — 88341 IMHCHEM/IMCYTCHM EA ADD ANTB: CPT | Performed by: SURGERY

## 2023-01-10 PROCEDURE — 3E013KZ INTRODUCTION OF OTHER DIAGNOSTIC SUBSTANCE INTO SUBCUTANEOUS TISSUE, PERCUTANEOUS APPROACH: ICD-10-PCS | Performed by: SURGERY

## 2023-01-10 PROCEDURE — 88360 TUMOR IMMUNOHISTOCHEM/MANUAL: CPT | Performed by: SURGERY

## 2023-01-10 PROCEDURE — 81025 URINE PREGNANCY TEST: CPT

## 2023-01-10 PROCEDURE — 76942 ECHO GUIDE FOR BIOPSY: CPT | Performed by: STUDENT IN AN ORGANIZED HEALTH CARE EDUCATION/TRAINING PROGRAM

## 2023-01-10 PROCEDURE — 88344 IMHCHEM/IMCYTCHM EA MLT ANTB: CPT | Performed by: SURGERY

## 2023-01-10 PROCEDURE — 07B50ZX EXCISION OF RIGHT AXILLARY LYMPHATIC, OPEN APPROACH, DIAGNOSTIC: ICD-10-PCS | Performed by: SURGERY

## 2023-01-10 PROCEDURE — 88307 TISSUE EXAM BY PATHOLOGIST: CPT | Performed by: SURGERY

## 2023-01-10 DEVICE — MATRIX ALLODERM SELECT: Type: IMPLANTABLE DEVICE | Site: BREAST | Status: FUNCTIONAL

## 2023-01-10 DEVICE — BREAST TISSUE EXPANDER, SUTURE TABS, INTEGRAL INJECTION DOME, 650CC
Type: IMPLANTABLE DEVICE | Site: BREAST | Status: FUNCTIONAL
Brand: MENTOR CPX4 PLUS, SMOOTH, MEDIUM HEIGHT

## 2023-01-10 RX ORDER — LABETALOL HYDROCHLORIDE 5 MG/ML
INJECTION, SOLUTION INTRAVENOUS AS NEEDED
Status: DISCONTINUED | OUTPATIENT
Start: 2023-01-10 | End: 2023-01-10 | Stop reason: SURG

## 2023-01-10 RX ORDER — ACETAMINOPHEN 500 MG
1000 TABLET ORAL ONCE AS NEEDED
Status: COMPLETED | OUTPATIENT
Start: 2023-01-10 | End: 2023-01-10

## 2023-01-10 RX ORDER — DIAZEPAM 5 MG/1
5 TABLET ORAL AS NEEDED
Status: DISCONTINUED | OUTPATIENT
Start: 2023-01-10 | End: 2023-01-10 | Stop reason: HOSPADM

## 2023-01-10 RX ORDER — HYDROMORPHONE HYDROCHLORIDE 1 MG/ML
0.4 INJECTION, SOLUTION INTRAMUSCULAR; INTRAVENOUS; SUBCUTANEOUS EVERY 5 MIN PRN
Status: DISCONTINUED | OUTPATIENT
Start: 2023-01-10 | End: 2023-01-10

## 2023-01-10 RX ORDER — ONDANSETRON 4 MG/1
4 TABLET, FILM COATED ORAL EVERY 8 HOURS PRN
Qty: 12 TABLET | Refills: 0 | Status: SHIPPED | OUTPATIENT
Start: 2023-01-10

## 2023-01-10 RX ORDER — ROCURONIUM BROMIDE 10 MG/ML
INJECTION, SOLUTION INTRAVENOUS AS NEEDED
Status: DISCONTINUED | OUTPATIENT
Start: 2023-01-10 | End: 2023-01-10 | Stop reason: SURG

## 2023-01-10 RX ORDER — MEPERIDINE HYDROCHLORIDE 25 MG/ML
12.5 INJECTION INTRAMUSCULAR; INTRAVENOUS; SUBCUTANEOUS AS NEEDED
Status: DISCONTINUED | OUTPATIENT
Start: 2023-01-10 | End: 2023-01-10

## 2023-01-10 RX ORDER — MIDAZOLAM HYDROCHLORIDE 1 MG/ML
INJECTION INTRAMUSCULAR; INTRAVENOUS
Status: COMPLETED
Start: 2023-01-10 | End: 2023-01-10

## 2023-01-10 RX ORDER — SODIUM CHLORIDE, SODIUM LACTATE, POTASSIUM CHLORIDE, CALCIUM CHLORIDE 600; 310; 30; 20 MG/100ML; MG/100ML; MG/100ML; MG/100ML
INJECTION, SOLUTION INTRAVENOUS CONTINUOUS
Status: DISCONTINUED | OUTPATIENT
Start: 2023-01-10 | End: 2023-01-10

## 2023-01-10 RX ORDER — ONDANSETRON 2 MG/ML
4 INJECTION INTRAMUSCULAR; INTRAVENOUS EVERY 6 HOURS PRN
Status: DISCONTINUED | OUTPATIENT
Start: 2023-01-10 | End: 2023-01-10

## 2023-01-10 RX ORDER — HYDROMORPHONE HYDROCHLORIDE 1 MG/ML
0.2 INJECTION, SOLUTION INTRAMUSCULAR; INTRAVENOUS; SUBCUTANEOUS EVERY 5 MIN PRN
Status: DISCONTINUED | OUTPATIENT
Start: 2023-01-10 | End: 2023-01-10

## 2023-01-10 RX ORDER — PROCHLORPERAZINE EDISYLATE 5 MG/ML
5 INJECTION INTRAMUSCULAR; INTRAVENOUS EVERY 8 HOURS PRN
Status: DISCONTINUED | OUTPATIENT
Start: 2023-01-10 | End: 2023-01-10

## 2023-01-10 RX ORDER — MIDAZOLAM HYDROCHLORIDE 1 MG/ML
1 INJECTION INTRAMUSCULAR; INTRAVENOUS EVERY 5 MIN PRN
Status: DISCONTINUED | OUTPATIENT
Start: 2023-01-10 | End: 2023-01-10

## 2023-01-10 RX ORDER — ACETAMINOPHEN 500 MG
1000 TABLET ORAL ONCE
Status: DISCONTINUED | OUTPATIENT
Start: 2023-01-10 | End: 2023-01-10 | Stop reason: HOSPADM

## 2023-01-10 RX ORDER — LIDOCAINE HYDROCHLORIDE 10 MG/ML
INJECTION, SOLUTION EPIDURAL; INFILTRATION; INTRACAUDAL; PERINEURAL AS NEEDED
Status: DISCONTINUED | OUTPATIENT
Start: 2023-01-10 | End: 2023-01-10 | Stop reason: SURG

## 2023-01-10 RX ORDER — HYDROCODONE BITARTRATE AND ACETAMINOPHEN 5; 325 MG/1; MG/1
1 TABLET ORAL ONCE AS NEEDED
Status: COMPLETED | OUTPATIENT
Start: 2023-01-10 | End: 2023-01-10

## 2023-01-10 RX ORDER — ONDANSETRON 2 MG/ML
INJECTION INTRAMUSCULAR; INTRAVENOUS AS NEEDED
Status: DISCONTINUED | OUTPATIENT
Start: 2023-01-10 | End: 2023-01-10 | Stop reason: SURG

## 2023-01-10 RX ORDER — HYDROCODONE BITARTRATE AND ACETAMINOPHEN 5; 325 MG/1; MG/1
2 TABLET ORAL ONCE AS NEEDED
Status: COMPLETED | OUTPATIENT
Start: 2023-01-10 | End: 2023-01-10

## 2023-01-10 RX ORDER — DEXAMETHASONE SODIUM PHOSPHATE 4 MG/ML
VIAL (ML) INJECTION AS NEEDED
Status: DISCONTINUED | OUTPATIENT
Start: 2023-01-10 | End: 2023-01-10 | Stop reason: SURG

## 2023-01-10 RX ORDER — DIPHENHYDRAMINE HYDROCHLORIDE 50 MG/ML
12.5 INJECTION INTRAMUSCULAR; INTRAVENOUS AS NEEDED
Status: DISCONTINUED | OUTPATIENT
Start: 2023-01-10 | End: 2023-01-10

## 2023-01-10 RX ORDER — DIPHENHYDRAMINE HYDROCHLORIDE 50 MG/ML
INJECTION INTRAMUSCULAR; INTRAVENOUS AS NEEDED
Status: DISCONTINUED | OUTPATIENT
Start: 2023-01-10 | End: 2023-01-10 | Stop reason: SURG

## 2023-01-10 RX ORDER — LIDOCAINE AND PRILOCAINE 25; 25 MG/G; MG/G
CREAM TOPICAL ONCE
Status: COMPLETED | OUTPATIENT
Start: 2023-01-10 | End: 2023-01-10

## 2023-01-10 RX ORDER — NALOXONE HYDROCHLORIDE 0.4 MG/ML
80 INJECTION, SOLUTION INTRAMUSCULAR; INTRAVENOUS; SUBCUTANEOUS AS NEEDED
Status: DISCONTINUED | OUTPATIENT
Start: 2023-01-10 | End: 2023-01-10

## 2023-01-10 RX ORDER — CEFAZOLIN SODIUM/WATER 2 G/20 ML
2 SYRINGE (ML) INTRAVENOUS ONCE
Status: COMPLETED | OUTPATIENT
Start: 2023-01-10 | End: 2023-01-10

## 2023-01-10 RX ORDER — CEFAZOLIN SODIUM/WATER 2 G/20 ML
SYRINGE (ML) INTRAVENOUS
Status: DISCONTINUED
Start: 2023-01-10 | End: 2023-01-10

## 2023-01-10 RX ORDER — HYDROMORPHONE HYDROCHLORIDE 1 MG/ML
0.6 INJECTION, SOLUTION INTRAMUSCULAR; INTRAVENOUS; SUBCUTANEOUS EVERY 5 MIN PRN
Status: DISCONTINUED | OUTPATIENT
Start: 2023-01-10 | End: 2023-01-10

## 2023-01-10 RX ORDER — HYDROMORPHONE HYDROCHLORIDE 1 MG/ML
INJECTION, SOLUTION INTRAMUSCULAR; INTRAVENOUS; SUBCUTANEOUS
Status: COMPLETED
Start: 2023-01-10 | End: 2023-01-10

## 2023-01-10 RX ORDER — CEPHALEXIN 500 MG/1
500 CAPSULE ORAL 4 TIMES DAILY
Qty: 40 CAPSULE | Refills: 1 | Status: SHIPPED | OUTPATIENT
Start: 2023-01-10

## 2023-01-10 RX ORDER — DOCUSATE SODIUM 100 MG/1
100 CAPSULE, LIQUID FILLED ORAL 2 TIMES DAILY
Qty: 30 CAPSULE | Refills: 1 | Status: SHIPPED | OUTPATIENT
Start: 2023-01-10

## 2023-01-10 RX ADMIN — ONDANSETRON 4 MG: 2 INJECTION INTRAMUSCULAR; INTRAVENOUS at 17:21:00

## 2023-01-10 RX ADMIN — LIDOCAINE HYDROCHLORIDE 50 MG: 10 INJECTION, SOLUTION EPIDURAL; INFILTRATION; INTRACAUDAL; PERINEURAL at 14:58:00

## 2023-01-10 RX ADMIN — ROCURONIUM BROMIDE 50 MG: 10 INJECTION, SOLUTION INTRAVENOUS at 14:58:00

## 2023-01-10 RX ADMIN — LABETALOL HYDROCHLORIDE 10 MG: 5 INJECTION, SOLUTION INTRAVENOUS at 15:47:00

## 2023-01-10 RX ADMIN — LABETALOL HYDROCHLORIDE 10 MG: 5 INJECTION, SOLUTION INTRAVENOUS at 16:01:00

## 2023-01-10 RX ADMIN — DIPHENHYDRAMINE HYDROCHLORIDE 12.5 MG: 50 INJECTION INTRAMUSCULAR; INTRAVENOUS at 14:58:00

## 2023-01-10 RX ADMIN — DEXAMETHASONE SODIUM PHOSPHATE 4 MG: 4 MG/ML VIAL (ML) INJECTION at 14:58:00

## 2023-01-10 RX ADMIN — CEFAZOLIN SODIUM/WATER 2 G: 2 G/20 ML SYRINGE (ML) INTRAVENOUS at 15:14:00

## 2023-01-10 NOTE — PROGRESS NOTES
Plan for bilateral SS-mastectomy by Dr. Brandon Amezcua and immediate reconstruction with tissue expander and acellular dermal matrix reviewed with patient. The risks of surgery including but not limited to bleeding, infection, scarring, delayed wound healing, seroma, asymmetry, implant infection/extrusion requiring removal, expander deflation, injury to adjacent structures, capsular contracture, ALCL, and need for further surgery were reviewed. The expected post-operative course was discussed. Questions were answered to the patient's satisfaction. No guarantees as to outcome were offered. The patient expresses understanding and wishes to proceed.

## 2023-01-10 NOTE — OPERATIVE REPORT
PREOPERATIVE DIAGNOSIS: Right breast cancer with acquired absence of bilateral breasts. POSTOPERATIVE DIAGNOSIS: Right breast cancer with acquired absence of bilateral breasts. PROCEDURE PERFORMED: Immediate bilateral breast reconstruction with tissue expander and acellular dermal matrix. ASSISTANT: Carlota Martin SA  ANESTHESIA: General with endotracheal intubation. ESTIMATED BLOOD LOSS: 10ml  DRAINS: Pollyann Mercury x 4  SPECIMENS: None  COMPLICATIONS: None. FINDINGS: Bilateral breasts reconstructed with Porter CPX4 medium height, smooth tissue expander, 650 mL. On the right, reference SCPX-146MH,  serial [id number]. On the left, reference SCPX-146MH , serial [id number]. The tissue expanders were placed in the prepectoral position with anterior coverage of Alloderm and filled intraoperatively with 400 mL of air. INDICATIONS: The patient is a 40-year-old female with a history of right breast cancer. The patient was evaluated by Dr. Jennifer Sanchez and elected to undergo bilateral skin-sparing mastectomy. She was referred preoperatively to discuss reconstructive options and opted for immediate reconstruction with tissue expanders and acellular dermal matrix. PROCEDURE: Informed consent was obtained from the patient. The risks, benefits, and alternatives were reviewed with the patient preoperatively. She expressed understanding and wished to proceed. The patient was marked in the preoperative holding area in the upright position. The midline, inframammary fold, lateral fold of the breasts were marked. Vertical periareolar incisions were marked in conjunction with Dr. Jennifer Sanchez. The patient was then taken to the operating room by Dr. Franklin Hamman team where she underwent bilateral skin sparing mastectomy. Once the mastectomies were completed, I entered the room and the plastic surgery portion of the procedure began. The surgical site was re-draped sterilely.   The mastectomy skin flaps were examined and appeared of suitable thickness of viability to facilitate immediate reconstruction. The pockets were irrigated with warm saline irrigation until all particulate fat was evacuated. Hemostasis was then secured with electrocautery. Next, the pockets were irrigated with Betadine irrigation and then with antibiotic irrigation until clear. Next gloves were changed and 4 sheets of large contour perforated AlloDerm were brought on the field and prepared in saline. Two sheets of AlloDerm were secured along the long axis the running 2-0 PDS suture. The tissue expanders were then brought on the field and immediately bathed in  antibiotic irrigation. They were checked for integrity and noted to be intact. The AlloDerm was draped over the anterior surface of the tissue expander. Fenestrations were then created to allow passage of the suture tabs which were secured to the  AlloDerm with interrupted 2-0 PDS sutures. A posterior pursestring suture of 2-0 PDS was then placed. An identical construct was created for both sides. The expanders were then accessed and all air was evacuated. Gloves were then changed and the surgical sites were isolated with Levada Anni. The tissue expander/AlloDerm construct were then delivered via the incisions and sutured to the chest wall with interrupted 2-0 PDS sutures. The AlloDerm along the inferior gutter was secured along the inframammary fold with interrupted 2-0 Vicryl sutures. Superiorly the AlloDerm was secured to the pectoralis muscle with interrupted 2-0 Vicryl sutures. Laterally, the AlloDerm was secured to the serratus fascia with interrupted 2-0 Vicryl sutures. The mastectomy margins were inspected and appeared well-perfused. Two 15-Angolan Paul drains per side were exited through a lateral stab incision and sutured to the skin with 3-0 nylon suture. The expander was then accessed with a butterfly needle and filled with 400mL of air.  This permitted tension-free closure of the skin edges. The skin was then closed with interrupted 3-0 Vicryl deep dermal suture and running 4-0 Monocryl subcuticular suture. The drain sites were dressed with BioPatch and Tegaderm. The incisions dressed with Exofin, steristrips, Fluff gauze, and a surgical bra. The patient was awakened, extubated, and taken to the recovery area in stable condition. There were no operative complications. All needle, sponge, and instrument counts were correct at the end of the procedure.

## 2023-01-10 NOTE — BRIEF OP NOTE
Pre-Operative Diagnosis: Invasive ductal carcinoma of right breast (HCC) [C50.911]     Post-Operative Diagnosis: Invasive ductal carcinoma of right breast ([address] [C50.911]      Procedure Performed:   Bilateral breast skin sparing mastectomies, right lymphoscintigraphy, right sentinel lymph node biopsy    Surgeon(s) and Role:  Panel 1:     Garcia Weems MD - Primary    Assistant(s):  Surgical Assistant.: Memory Wilder, CSA     Surgical Findings: R SLN x3 negative on frozen section     Specimen: R SLNx3, Bilateral breasts     Estimated Blood Loss: Elenita Villa MD  1/10/2023  4:43 PM

## 2023-01-10 NOTE — ANESTHESIA PROCEDURE NOTES
Regional Block    Date/Time: 1/10/2023 3:02 PM  Performed by: Sai Bland MD  Authorized by: Sai Bland MD       General Information and Staff    Start Time:  1/10/2023 3:02 PM  End Time:  1/10/2023 3:11 PM  Anesthesiologist:  Sai Bland MD  Performed by: Anesthesiologist  Patient Location:  OR      Site Identification: real time ultrasound guided and image stored and retrievable    Block site/laterality marked before start: site marked  Reason for Block: at surgeon's request and post-op pain management    Preanesthetic Checklist: 2 patient identifers, IV checked, risks and benefits discussed, monitors and equipment checked, pre-op evaluation, timeout performed, anesthesia consent, sterile technique used, no prohibitive neurological deficits and no local skin infection at insertion site      Procedure Details    Patient Position:  Supine  Prep: ChloraPrep    Monitoring:  Cardiac monitor, continuous pulse ox and blood pressure cuff  Block Type:  PEC1 and PEC2  Laterality:  Bilateral  Injection Technique:  Single-shot    Needle    Needle Type:  Short-bevel and echogenic  Needle Gauge:  22 G  Needle Length:  50 mm  Needle Localization:  Ultrasound guidance  Reason for Ultrasound Use: appropriate spread of the medication was noted in real time and no ultrasound evidence of intravascular and/or intraneural injection            Assessment    Injection Assessment:  Good spread noted, negative resistance, negative aspiration for heme, incremental injection and low pressure  Heart Rate Change: No    - Patient tolerated block procedure well without evidence of immediate block related complications.      Medications  1/10/2023 3:02 PM      Additional Comments    Medication:  Ropivacaine 0.25% 30mL per side, 60cc total

## 2023-01-10 NOTE — ANESTHESIA PROCEDURE NOTES
Airway  Date/Time: 1/10/2023 3:01 PM  Urgency: elective      General Information and Staff    Patient location during procedure: OR  Anesthesiologist: Dawn Guzman MD  Performed: anesthesiologist     Indications and Patient Condition  Indications for airway management: anesthesia  Sedation level: deep  Preoxygenated: yes  Patient position: sniffing  Mask difficulty assessment: 1 - vent by mask    Final Airway Details  Final airway type: endotracheal airway      Successful airway: ETT  Cuffed: yes   Successful intubation technique: direct laryngoscopy  Endotracheal tube insertion site: oral  Blade: Silva  Blade size: #3  ETT size (mm): 7.0    Cormack-Lehane Classification: grade I - full view of glottis  Placement verified by: chest auscultation and capnometry   Measured from: lips  ETT to lips (cm): 21  Number of attempts at approach: 1

## 2023-01-12 NOTE — OPERATIVE REPORT
[address]    PATIENT'S NAME: Mary Ellen Harkins   ATTENDING PHYSICIAN: Navin Fay M.D. OPERATING PHYSICIAN: Navin Fay M.D. PATIENT ACCOUNT#:   [de-identified]    [facility]  MEDICAL RECORD #:   [id number]       YOB: 1985  ADMISSION DATE:       01/10/2023      OPERATION DATE:  01/10/2023    OPERATIVE REPORT      PREOPERATIVE DIAGNOSIS:  Right breast cancer. POSTOPERATIVE DIAGNOSIS:  Right breast cancer. PROCEDURE:  Bilateral skin-sparing mastectomies with right breast injection of blue dye for sentinel lymph node identification and right sentinel lymph node biopsy. Her reconstruction will be dictated separately by Dr. Gilmer Alfaro. ASSISTANT:  Silver Muir CSA. ESTIMATED BLOOD LOSS:  For my portion of procedure was 50 mL. DRAINS:  Placed per Plastic Surgery. COMPLICATIONS:  No immediate complications. DISPOSITION:  Patient remains in OR for reconstruction. INDICATIONS:  The patient is a 68-year-old female who presented with a self-detected mass of her right breast.  She was found to have a corresponding finding on imaging and biopsy confirmed invasive high-grade carcinoma that was HER2 positive. She was found to have another focus in the same breast of atypical ductal hyperplasia. She was found to be a good candidate for neoadjuvant chemotherapy, which she has completed. She has had a post treatment MRI that demonstrates no residual enhancement. She is electing for bilateral mastectomies for maximal risk reduction. The approach to cornelius staging was described, including technique of sentinel node biopsy, possible need for axillary dissection, and the long-term sequelae of the procedure. Risks and possible complications were discussed with the patient including, but not limited to, infection, bleeding, injury to surrounding structures, possible need for reoperation.   Because of her desire for immediate reconstruction as well as skin preservation, the unusual procedure of skin-sparing mastectomy instead of the usual procedure of simple mastectomy was necessary. This required surgical assistance in order to allow for adequate exposure to complete this operation. This also required additional time and effort due to increased complexity in order to accommodate adequate skin perfusion to accommodate the immediate reconstruction. OPERATIVE TECHNIQUE:  Patient had undergone injection of radioisotope as well as lymphoscintigraphy for sentinel lymph node identification preoperatively to the right breast in the nuclear medicine department. She was then brought to the OR, placed in supine position, properly padded and secured. She was given a dose of IV antibiotics, and sequential compression devices were applied to her legs for DVT prophylaxis. General anesthesia was induced. Bilateral pectoral nerve blocks were placed per Anesthesia. A diluted methylene blue dye was injected in a periareolar location to the right breast, massaged approximately 5 minutes, and bilateral breasts and arms were then prepped and draped in usual sterile fashion. Attention was first taken toward the right breast.  A transverse incision was made with a 15-blade knife at the inferior aspect of the right axillary hairline after confirming uptake with a hand-held gamma counter. Sharp dissection and electrocautery were used to dissect the various layers axilla, including dissection through the clavipectoral fascia into the deep axilla where her sentinel node was identified and harvested. A total of 3 nodes removed as sentinel nodes. These were sent for frozen section and the results of which were negative for the presence of metastatic disease, and therefore, no completion axillary lymph node dissection was performed. The wound was irrigated.   Hemostasis was assured with electrocautery and hemoclips, and it was closed with an interrupted 3-0 Vicryl for deep layer and running 4-0 subcuticular Monocryl for skin, and sterile dressing was applied. Attention was taken toward the breast.  Dr. Mandi Giron had marked a circumareolar incision on the breast.  This was incised with a 15-blade knife for the skin. Using sharp dissection and electrocautery, mastectomy flaps were raised to the borders of the clavicle, sternum, inframammary fold, and latissimus tendon laterally. Her right breast was then dissected off the underlying muscle with electrocautery including en bloc excision of the pectoralis fascia, oriented with a stitch at the axillary tail and sent for permanent pathologic evaluation. Wound was irrigated. Hemostasis was assured with electrocautery and hemoclips, and a moist laparotomy pad was left in place for the reconstruction. Attention was taken toward the contralateral left breast.  Circumareolar incision in a symmetrical fashion was incised with a 15-blade knife for the skin. Electrocautery was used for hemostasis. The mastectomy flaps were raised to the borders of the clavicle, sternum, inframammary fold, and latissimus tendon laterally. The left breast was then dissected off the underlying muscle with electrocautery including en bloc excision of the pectoralis fascia, oriented with a stitch at the axillary tail and sent for permanent pathologic evaluation. Wound was irrigated. Hemostasis was assured with electrocautery and hemoclips, and a moist laparotomy pad was left in place for the remainder of surgery which will be dictated separately by Dr. Umu Barragan. All counts were correct at the conclusion of my portion of the procedure. Patient was hemodynamically stable upon my exit from the OR. Estimated blood loss for my portion was approximately 50 mL. Dictated By Saumya Wahl M.D.  d: 01/11/2023 08:09:42  t: 01/11/2023 19:28:04  Job [id number]  [facility] [place] [facility]/    cc: Dr. Romana Hurt MD    [place] Node Biopsy for Breast Cancer - Right  Operation performed with curative intent Yes   Tracer(s) used to identify sentinel nodes in the upfront surgery (non-neoadjuvant) setting N/A   Tracer(s) used to identify sentinel nodes in the neoadjuvant setting Dye and Radioactive tracer   All nodes (colored or non-colored) present at the end of a dye-filled lymphatic channel were removed Yes   All significantly radioactive nodes were removed Yes   All palpably suspicious nodes were removed Yes   Biopsy-proven positive nodes marked with clips prior to chemotherapy were identified and removed N/A ,[email or web address]

## 2023-01-16 ENCOUNTER — TELEPHONE (OUTPATIENT)
Dept: INTERNAL MEDICINE CLINIC | Facility: CLINIC | Age: 38
End: 2023-01-16

## 2023-01-16 RX ORDER — HYDROCODONE BITARTRATE AND ACETAMINOPHEN 10; 325 MG/1; MG/1
TABLET ORAL
Qty: 182 TABLET | Refills: 0 | Status: SHIPPED | OUTPATIENT
Start: 2023-01-16

## 2023-01-16 NOTE — TELEPHONE ENCOUNTER
Pt had bilateral mastectomylast week and needed pain control; was told by her surgeon/onco can take pain med 2 tabs q 4 hr prn temporarily; [place]x [place]

## 2023-01-18 ENCOUNTER — OFFICE VISIT (OUTPATIENT)
Dept: SURGERY | Facility: CLINIC | Age: 38
End: 2023-01-18
Payer: COMMERCIAL

## 2023-01-18 ENCOUNTER — NURSE ONLY (OUTPATIENT)
Dept: SURGERY | Facility: CLINIC | Age: 38
End: 2023-01-18
Payer: COMMERCIAL

## 2023-01-18 VITALS
HEIGHT: 65.5 IN | HEART RATE: 84 BPM | TEMPERATURE: 99 F | WEIGHT: 191 LBS | DIASTOLIC BLOOD PRESSURE: 72 MMHG | RESPIRATION RATE: 18 BRPM | OXYGEN SATURATION: 97 % | BODY MASS INDEX: 31.44 KG/M2 | SYSTOLIC BLOOD PRESSURE: 122 MMHG

## 2023-01-18 DIAGNOSIS — C50.911 INVASIVE DUCTAL CARCINOMA OF RIGHT BREAST (HCC): Primary | ICD-10-CM

## 2023-01-18 NOTE — PROGRESS NOTES
Kay Evans is a 42year old female who presents today for a follow-up after bilateral skin-sparing mastectomies with right breast sentinel lymph node biopsy by Dr. Shea Loza and immediate bilateral breast reconstruction with tissue expander (650mL expander filled with 400mL of air intraoperatively)  and acellular dermal matrix by Dr. Sadie Pantoja on 01/10/2023. She denies fever and chills. She denies nausea, vomiting, diarrhea or constipation. Her pain is controlled. Patient is on long term Norco for back issues and it is treating her post operative pain as well. Physical Exam     Steri-Strips in place at the bilateral breast incisions. No erythema, no wound drainage. Breasts: bilateral breasts are without ecchymosis or hematoma. Bilateral breast drains are intact with slightly cloudy drainage. Patient having skin reaction to adhesives. There were no vitals filed for this visit. Assessment and Plan     Kay Evans is doing well s/p  immediate bilateral breast reconstruction with tissue expander (650mL expander filled with 400mL of air intraoperatively)  and acellular dermal matrix by Dr. Sadie Pantoja on 01/10/2023. Patient presents today after seeing Dr. Bridger Cummings team for a post operative wound check. The bilateral breast steri-strips were removed. The bilateral breast incisions are dusky with moderate epidermolysis. Antibiotic ointment, xeroform and gauze was placed over the incisions. Patient was instructed to change this dressing daily. The bilateral drain sites were stripped and cleaned with alcohol. One drain on each breast was ready for removal. Drains #2 and #3 were removed. Patient tolerated this well. The sites were cleaned and a dressing of antibiotic ointment and gauze was placed and secured with paper tape. New drain dressings of Biopatch and gauze were placed and secured with paper tape. The remaining drain bulbs were replaced.     There was a small healing blister at the lateral side of the left breast drain. Patient reports it was caused by the Tegaderm. antibiotic ointment and gauze was placed. Patient will continue taking antibiotic until all drains are removed. Patient relays that she has been on Keflex in the past for chronic UTI's and is concerned that the antibiotic is not working well enough. Patient denies fever, no sign of erythema or swelling. The drainage is cloudy. Dr. Alix Campos was notified of the wound healing delays and the cloudy drainage. Dr. Alix Campos advised to continue Keflex and to follow up in office next week. Patient will follow up next week for wound check with Dr. Alix Campos on Tuesday. Questions were answered. Patient understands.      Johnna Vann RN  1/18/2023  10:45 AM

## 2023-01-20 ENCOUNTER — TELEPHONE (OUTPATIENT)
Dept: SURGERY | Facility: CLINIC | Age: 38
End: 2023-01-20

## 2023-01-20 NOTE — TELEPHONE ENCOUNTER
Patient called stating she will run out of wound care supplied prior to appt on Tuesday.  [facility] will [facility] to  supplies

## 2023-01-23 ENCOUNTER — APPOINTMENT (OUTPATIENT)
Dept: HEMATOLOGY/ONCOLOGY | Facility: HOSPITAL | Age: 38
End: 2023-01-23
Attending: INTERNAL MEDICINE
Payer: COMMERCIAL

## 2023-01-24 ENCOUNTER — OFFICE VISIT (OUTPATIENT)
Dept: SURGERY | Facility: CLINIC | Age: 38
End: 2023-01-24
Payer: COMMERCIAL

## 2023-01-24 DIAGNOSIS — Z90.13 ABSENCE OF BREAST, ACQUIRED, BILATERAL: ICD-10-CM

## 2023-01-24 DIAGNOSIS — Z90.13 S/P MASTECTOMY, BILATERAL: Primary | ICD-10-CM

## 2023-01-24 PROCEDURE — 88304 TISSUE EXAM BY PATHOLOGIST: CPT | Performed by: SURGERY

## 2023-01-24 PROCEDURE — 99024 POSTOP FOLLOW-UP VISIT: CPT | Performed by: SURGERY

## 2023-01-24 NOTE — PROGRESS NOTES
Jacky Sosa is a 42year old female who presents today for a follow-up after undergoing bilateral mastectomy and tissue expander reconstruction 2 weeks ago. She denies fever and chills. She denies nausea, vomiting, diarrhea or constipation. Physical Examination:  Breasts: Bilateral breast show marginal full-thickness necrosis measuring approximately 1 cm in greatest diameter. There is no purulence, malodor, or surrounding erythema noted. Assessment and Plan:  Bilateral mastectomy skin flap necrosis. We discussed debridement and reclosure under local anesthesia. The nature procedure was reviewed with the patient. The risk, benefits, and alternatives were reviewed. The patient expresses understanding and wishes to proceed.

## 2023-01-24 NOTE — PROCEDURES
Debridement of bilateral mastectomy skin flap necrosis. The areas of necrosis were encompassed in a vertically oriented ellipse. The eschars were excised and sent for permanent pathologic analysis. The wounds were repaired in a layered fashion with 3-0 Vicryl deep sutures and 5-0 Prolene simple suture. Bacitracin, Xeroform, and gauze were applied. The patient tolerated procedure well. There were no complications.

## 2023-01-26 ENCOUNTER — TELEPHONE (OUTPATIENT)
Dept: INTERNAL MEDICINE CLINIC | Facility: CLINIC | Age: 38
End: 2023-01-26

## 2023-01-26 ENCOUNTER — MED REC SCAN ONLY (OUTPATIENT)
Dept: INTERNAL MEDICINE CLINIC | Facility: CLINIC | Age: 38
End: 2023-01-26

## 2023-01-26 RX ORDER — HYDROCODONE BITARTRATE AND ACETAMINOPHEN 10; 325 MG/1; MG/1
TABLET ORAL
Qty: 140 TABLET | Refills: 0 | Status: SHIPPED | OUTPATIENT
Start: 2023-01-26

## 2023-01-27 ENCOUNTER — TELEPHONE (OUTPATIENT)
Dept: HEMATOLOGY/ONCOLOGY | Facility: HOSPITAL | Age: 38
End: 2023-01-27

## 2023-01-27 NOTE — TELEPHONE ENCOUNTER
Patient have some appointments on Monday 1/30/23 and she have started her period and it is heavy and she have questions that she need to have answered before her appointments. She stated that she might have to get a Lupron Injection.

## 2023-01-27 NOTE — TELEPHONE ENCOUNTER
I called the patient back in regards to menstrual cycle. Reports heavy menses - similar to prior to starting chemotherapy. Inquiring about ovarian suppression. Will discuss in more detail at [facility] appt. Will get zoladex approved w insurance.

## 2023-01-30 ENCOUNTER — OFFICE VISIT (OUTPATIENT)
Dept: HEMATOLOGY/ONCOLOGY | Facility: HOSPITAL | Age: 38
End: 2023-01-30
Attending: INTERNAL MEDICINE
Payer: COMMERCIAL

## 2023-01-30 VITALS
TEMPERATURE: 98 F | DIASTOLIC BLOOD PRESSURE: 80 MMHG | WEIGHT: 188 LBS | RESPIRATION RATE: 16 BRPM | OXYGEN SATURATION: 100 % | HEIGHT: 65.5 IN | SYSTOLIC BLOOD PRESSURE: 158 MMHG | HEART RATE: 91 BPM | BODY MASS INDEX: 30.95 KG/M2

## 2023-01-30 DIAGNOSIS — C50.911 BREAST CANCER, STAGE 1, ESTROGEN RECEPTOR POSITIVE, RIGHT (HCC): Primary | ICD-10-CM

## 2023-01-30 DIAGNOSIS — T45.1X5A ANEMIA DUE TO ANTINEOPLASTIC CHEMOTHERAPY: ICD-10-CM

## 2023-01-30 DIAGNOSIS — K90.9 MALABSORPTION OF IRON: ICD-10-CM

## 2023-01-30 DIAGNOSIS — Z51.11 CHEMOTHERAPY MANAGEMENT, ENCOUNTER FOR: ICD-10-CM

## 2023-01-30 DIAGNOSIS — C50.911 BREAST CANCER, STAGE 1, ESTROGEN RECEPTOR POSITIVE, RIGHT (HCC): ICD-10-CM

## 2023-01-30 DIAGNOSIS — Z17.0 BREAST CANCER, STAGE 1, ESTROGEN RECEPTOR POSITIVE, RIGHT (HCC): Primary | ICD-10-CM

## 2023-01-30 DIAGNOSIS — D64.81 ANEMIA DUE TO ANTINEOPLASTIC CHEMOTHERAPY: ICD-10-CM

## 2023-01-30 DIAGNOSIS — Z17.0 BREAST CANCER, STAGE 1, ESTROGEN RECEPTOR POSITIVE, RIGHT (HCC): ICD-10-CM

## 2023-01-30 DIAGNOSIS — Z51.11 CHEMOTHERAPY MANAGEMENT, ENCOUNTER FOR: Primary | ICD-10-CM

## 2023-01-30 LAB
ALBUMIN SERPL-MCNC: 3.5 G/DL (ref 3.4–5)
ALBUMIN/GLOB SERPL: 0.9 {RATIO} (ref 1–2)
ALP LIVER SERPL-CCNC: 46 U/L
ALT SERPL-CCNC: 16 U/L
ANION GAP SERPL CALC-SCNC: 3 MMOL/L (ref 0–18)
AST SERPL-CCNC: 7 U/L (ref 15–37)
BASOPHILS # BLD AUTO: 0.03 X10(3) UL (ref 0–0.2)
BASOPHILS NFR BLD AUTO: 0.4 %
BILIRUB SERPL-MCNC: 0.3 MG/DL (ref 0.1–2)
BUN BLD-MCNC: 16 MG/DL (ref 7–18)
BUN/CREAT SERPL: 20.5 (ref 10–20)
CALCIUM BLD-MCNC: 9.6 MG/DL (ref 8.5–10.1)
CHLORIDE SERPL-SCNC: 109 MMOL/L (ref 98–112)
CO2 SERPL-SCNC: 26 MMOL/L (ref 21–32)
CREAT BLD-MCNC: 0.78 MG/DL
DEPRECATED RDW RBC AUTO: 50 FL (ref 35.1–46.3)
EOSINOPHIL # BLD AUTO: 0.03 X10(3) UL (ref 0–0.7)
EOSINOPHIL NFR BLD AUTO: 0.4 %
ERYTHROCYTE [DISTWIDTH] IN BLOOD BY AUTOMATED COUNT: 12.8 % (ref 11–15)
GFR SERPLBLD BASED ON 1.73 SQ M-ARVRAT: 99 ML/MIN/1.73M2 (ref 60–?)
GLOBULIN PLAS-MCNC: 3.7 G/DL (ref 2.8–4.4)
GLUCOSE BLD-MCNC: 123 MG/DL (ref 70–99)
HCT VFR BLD AUTO: 38.6 %
HGB BLD-MCNC: 12.3 G/DL
IMM GRANULOCYTES # BLD AUTO: 0.01 X10(3) UL (ref 0–1)
IMM GRANULOCYTES NFR BLD: 0.1 %
LYMPHOCYTES # BLD AUTO: 2.52 X10(3) UL (ref 1–4)
LYMPHOCYTES NFR BLD AUTO: 29.9 %
MCH RBC QN AUTO: 33.7 PG (ref 26–34)
MCHC RBC AUTO-ENTMCNC: 31.9 G/DL (ref 31–37)
MCV RBC AUTO: 105.8 FL
MONOCYTES # BLD AUTO: 0.21 X10(3) UL (ref 0.1–1)
MONOCYTES NFR BLD AUTO: 2.5 %
NEUTROPHILS # BLD AUTO: 5.64 X10 (3) UL (ref 1.5–7.7)
NEUTROPHILS # BLD AUTO: 5.64 X10(3) UL (ref 1.5–7.7)
NEUTROPHILS NFR BLD AUTO: 66.7 %
OSMOLALITY SERPL CALC.SUM OF ELEC: 289 MOSM/KG (ref 275–295)
PLATELET # BLD AUTO: 346 10(3)UL (ref 150–450)
POTASSIUM SERPL-SCNC: 4.4 MMOL/L (ref 3.5–5.1)
PROT SERPL-MCNC: 7.2 G/DL (ref 6.4–8.2)
RBC # BLD AUTO: 3.65 X10(6)UL
SODIUM SERPL-SCNC: 138 MMOL/L (ref 136–145)
WBC # BLD AUTO: 8.4 X10(3) UL (ref 4–11)

## 2023-01-30 PROCEDURE — 96417 CHEMO IV INFUS EACH ADDL SEQ: CPT

## 2023-01-30 PROCEDURE — 85025 COMPLETE CBC W/AUTO DIFF WBC: CPT

## 2023-01-30 PROCEDURE — 96413 CHEMO IV INFUSION 1 HR: CPT

## 2023-01-30 PROCEDURE — 80053 COMPREHEN METABOLIC PANEL: CPT

## 2023-01-30 PROCEDURE — 36592 COLLECT BLOOD FROM PICC: CPT

## 2023-01-30 PROCEDURE — 99215 OFFICE O/P EST HI 40 MIN: CPT | Performed by: INTERNAL MEDICINE

## 2023-01-30 RX ORDER — HEPARIN SODIUM (PORCINE) LOCK FLUSH IV SOLN 100 UNIT/ML 100 UNIT/ML
SOLUTION INTRAVENOUS
Status: COMPLETED
Start: 2023-01-30 | End: 2023-01-30

## 2023-01-30 RX ORDER — SODIUM CHLORIDE 9 MG/ML
10 INJECTION INTRAVENOUS ONCE
OUTPATIENT
Start: 2023-02-13

## 2023-01-30 RX ORDER — HEPARIN SODIUM (PORCINE) LOCK FLUSH IV SOLN 100 UNIT/ML 100 UNIT/ML
5 SOLUTION INTRAVENOUS ONCE
Status: COMPLETED | OUTPATIENT
Start: 2023-01-30 | End: 2023-01-30

## 2023-01-30 RX ORDER — WATER 1000 ML/1000ML
INJECTION, SOLUTION INTRAVENOUS
Status: DISCONTINUED
Start: 2023-01-30 | End: 2023-01-30

## 2023-01-30 RX ORDER — HEPARIN SODIUM (PORCINE) LOCK FLUSH IV SOLN 100 UNIT/ML 100 UNIT/ML
5 SOLUTION INTRAVENOUS ONCE
Status: CANCELLED | OUTPATIENT
Start: 2023-02-13

## 2023-01-30 RX ORDER — HEPARIN SODIUM (PORCINE) LOCK FLUSH IV SOLN 100 UNIT/ML 100 UNIT/ML
5 SOLUTION INTRAVENOUS ONCE
OUTPATIENT
Start: 2023-02-13

## 2023-01-30 RX ADMIN — HEPARIN SODIUM (PORCINE) LOCK FLUSH IV SOLN 100 UNIT/ML 500 UNITS: 100 SOLUTION INTRAVENOUS at 14:08:00

## 2023-01-30 NOTE — PROGRESS NOTES
Archana Parada to infusion today for C8 D1 TRASTUZUMAB + PERTUZUMAB. Arrives Ambulating independently, accompanied by Self from MD exam. No complaints today. States doing well after surgery earlier this month. Pregnancy screening: Denies possibility of pregnancy    Lab Results   Component Value Date    WBC 8.4 01/30/2023    HGB 12.3 01/30/2023    HCT 38.6 01/30/2023    .0 01/30/2023    NE 5.64 01/30/2023     Note: for a comprehensive list of the patient's lab results, access the Results Review. Modifications in dose or schedule: Yes. Treatment delayed prior to surgery and then for surgery. Continue to hold Venofer at this time. Emend removed from treatment plan. Drugs/infusions dual verified for appearance and physical integrity. IV pump settings were dual verified: yes     Port accessed already from lab appointment. TPA had been instilled at 1110 in lab. Port eventually gave blood return at 1230, TPA removed, port flushed. Frequency of blood return and site check throughout administration: Prior to administration, Prior to each drug and At completion of therapy. Patient tolerated treatment well today, no s/s of adverse reaction noted or reported. Port flushed per protocol with NS and 500 units Heparin. Port de-cannulated, applied 2x2 gauze and paper tape. Discharged to Home, Ambulating independently, accompanied by:Self.     Outpatient Oncology Care Plan  Problem list:  diarrhea  nausea and vomiting  Problems related to:  chemotherapy  side effect of treatment  Interventions:  monitor effect of nausea/vomiting management  promoted rest  provided general teaching  Expected outcomes:  safe in environment  symptoms relieved/minimized  understands plan of care  Progress towards outcome:  making progress    Education Record    Learner:  Patient  Barriers / Limitations:  None  Method:  Discussion  Outcome:  Shows understanding  Comments:

## 2023-02-01 ENCOUNTER — OFFICE VISIT (OUTPATIENT)
Dept: INTERNAL MEDICINE CLINIC | Facility: CLINIC | Age: 38
End: 2023-02-01

## 2023-02-01 ENCOUNTER — NURSE TRIAGE (OUTPATIENT)
Dept: INTERNAL MEDICINE CLINIC | Facility: CLINIC | Age: 38
End: 2023-02-01

## 2023-02-01 VITALS
HEIGHT: 65.5 IN | BODY MASS INDEX: 30.67 KG/M2 | WEIGHT: 186.31 LBS | TEMPERATURE: 98 F | SYSTOLIC BLOOD PRESSURE: 118 MMHG | DIASTOLIC BLOOD PRESSURE: 80 MMHG | OXYGEN SATURATION: 100 % | HEART RATE: 103 BPM

## 2023-02-01 DIAGNOSIS — J06.9 VIRAL URI WITH COUGH: Primary | ICD-10-CM

## 2023-02-01 PROCEDURE — 3079F DIAST BP 80-89 MM HG: CPT | Performed by: INTERNAL MEDICINE

## 2023-02-01 PROCEDURE — 3074F SYST BP LT 130 MM HG: CPT | Performed by: INTERNAL MEDICINE

## 2023-02-01 PROCEDURE — 99213 OFFICE O/P EST LOW 20 MIN: CPT | Performed by: INTERNAL MEDICINE

## 2023-02-01 PROCEDURE — 3008F BODY MASS INDEX DOCD: CPT | Performed by: INTERNAL MEDICINE

## 2023-02-07 ENCOUNTER — OFFICE VISIT (OUTPATIENT)
Dept: SURGERY | Facility: CLINIC | Age: 38
End: 2023-02-07
Payer: COMMERCIAL

## 2023-02-07 DIAGNOSIS — Z90.13 ABSENCE OF BREAST, ACQUIRED, BILATERAL: Primary | ICD-10-CM

## 2023-02-07 PROCEDURE — 99024 POSTOP FOLLOW-UP VISIT: CPT | Performed by: SURGERY

## 2023-02-07 NOTE — PROGRESS NOTES
Martin English is a 42year old female who presents today for a follow-up. She denies fever and chills. She denies nausea, vomiting, diarrhea or constipation. Physical Examination:  Breasts: Bilateral breast incisions are clean dry and intact. Procedure: Bilateral tissue expanders were sterilely accessed and all air was aspirated. Expanders were filled with 350 cc of saline to a total of 350 cc.  20 cc of seroma fluid aspirated from the right breast, 5 cc were aspirated from the left breast.    Assessment and Plan:  Patient doing well. She may slowly increase activity with compression in place. She will follow-up in 1 week for further expansion. The plan was reviewed with the patient and questions were answered.

## 2023-02-08 ENCOUNTER — OFFICE VISIT (OUTPATIENT)
Dept: PHYSICAL THERAPY | Facility: HOSPITAL | Age: 38
End: 2023-02-08
Attending: SURGERY
Payer: COMMERCIAL

## 2023-02-08 ENCOUNTER — HOSPITAL ENCOUNTER (OUTPATIENT)
Dept: CV DIAGNOSTICS | Facility: HOSPITAL | Age: 38
Discharge: HOME | End: 2023-02-08
Attending: INTERNAL MEDICINE
Payer: COMMERCIAL

## 2023-02-08 DIAGNOSIS — C50.911 BREAST CANCER, STAGE 1, ESTROGEN RECEPTOR POSITIVE, RIGHT (HCC): ICD-10-CM

## 2023-02-08 DIAGNOSIS — Z17.0 BREAST CANCER, STAGE 1, ESTROGEN RECEPTOR POSITIVE, RIGHT (HCC): ICD-10-CM

## 2023-02-08 PROCEDURE — 93306 TTE W/DOPPLER COMPLETE: CPT | Performed by: INTERNAL MEDICINE

## 2023-02-13 ENCOUNTER — TELEPHONE (OUTPATIENT)
Dept: INTERNAL MEDICINE CLINIC | Facility: CLINIC | Age: 38
End: 2023-02-13

## 2023-02-13 RX ORDER — HYDROCODONE BITARTRATE AND ACETAMINOPHEN 10; 325 MG/1; MG/1
TABLET ORAL
Qty: 84 TABLET | Refills: 0 | Status: SHIPPED | OUTPATIENT
Start: 2023-02-13

## 2023-02-13 NOTE — TELEPHONE ENCOUNTER
Pt here to pickup her script for norco; had been doing well since her bilatearl mastectomy and pain less, still having her chrnic low back pain. She is weaning down her dose of her [place]co and [place] 6 [place]/day[place]

## 2023-02-14 ENCOUNTER — NURSE ONLY (OUTPATIENT)
Dept: SURGERY | Facility: CLINIC | Age: 38
End: 2023-02-14
Payer: COMMERCIAL

## 2023-02-14 PROCEDURE — 99024 POSTOP FOLLOW-UP VISIT: CPT | Performed by: SURGERY

## 2023-02-14 NOTE — PROGRESS NOTES
Nickolas Muhammad is a 42year old female who presents today for a follow-up after bilateral skin-sparing mastectomies with right breast sentinel lymph node biopsy by Dr. Efrain Shoemaker and immediate bilateral breast reconstruction with tissue expander (650mL expander filled with 400mL of air intraoperatively)  and acellular dermal matrix by Dr. Elliot Jo on 01/10/2023. She denies fever and chills. She denies nausea, vomiting, diarrhea or constipation. Her pain is controlled. Physical Exam     Breasts:   Surgical incisions are clean, dry, and intact with prolene sutures in place. No erythema, no wound drainage. There were no vitals filed for this visit. Assessment and Plan     Nickolas Muhammad is doing well. Patient is here for continued expansion and is at a current total of 350mL. The bilateral breast ports were identified with an external magnet and prepped with betadine solution. A sterile butterfly needle was sterilely introduced. 60mL of saline was added. Patient tolerated this well. This brings the patient to a bilateral total of 410mL. 25mL of clear serosanguineous seroma fluid was encountered on the right side and less than 5mL of serosanguinous fluid was encountered on the left. Bacitracin and band-aids placed. Bilateral breast prolene sutures were removed today. Patient tolerated this well. Patient was encouraged to continue compression and apply antibiotic ointment as much as tolerated to bilateral breast incisions. Patient will follow up in 1-2 weeks with RN or PA for continued expansion. Questions were answered. Patient understands.      Dayanna Rhodes RN  2/14/2023  9:38 AM

## 2023-02-20 ENCOUNTER — OFFICE VISIT (OUTPATIENT)
Dept: HEMATOLOGY/ONCOLOGY | Facility: HOSPITAL | Age: 38
End: 2023-02-20
Attending: INTERNAL MEDICINE
Payer: COMMERCIAL

## 2023-02-20 VITALS
TEMPERATURE: 98 F | HEIGHT: 65.5 IN | WEIGHT: 190 LBS | BODY MASS INDEX: 31.27 KG/M2 | RESPIRATION RATE: 16 BRPM | OXYGEN SATURATION: 99 % | DIASTOLIC BLOOD PRESSURE: 91 MMHG | SYSTOLIC BLOOD PRESSURE: 145 MMHG | HEART RATE: 81 BPM

## 2023-02-20 DIAGNOSIS — D64.81 ANEMIA DUE TO ANTINEOPLASTIC CHEMOTHERAPY: ICD-10-CM

## 2023-02-20 DIAGNOSIS — C50.911 BREAST CANCER, STAGE 1, ESTROGEN RECEPTOR POSITIVE, RIGHT (HCC): Primary | ICD-10-CM

## 2023-02-20 DIAGNOSIS — T45.1X5A ANEMIA DUE TO ANTINEOPLASTIC CHEMOTHERAPY: ICD-10-CM

## 2023-02-20 DIAGNOSIS — Z17.0 BREAST CANCER, STAGE 1, ESTROGEN RECEPTOR POSITIVE, RIGHT (HCC): Primary | ICD-10-CM

## 2023-02-20 DIAGNOSIS — C50.911 BREAST CANCER, STAGE 1, ESTROGEN RECEPTOR POSITIVE, RIGHT (HCC): ICD-10-CM

## 2023-02-20 DIAGNOSIS — Z17.0 BREAST CANCER, STAGE 1, ESTROGEN RECEPTOR POSITIVE, RIGHT (HCC): ICD-10-CM

## 2023-02-20 DIAGNOSIS — K90.9 MALABSORPTION OF IRON: ICD-10-CM

## 2023-02-20 DIAGNOSIS — Z51.11 CHEMOTHERAPY MANAGEMENT, ENCOUNTER FOR: Primary | ICD-10-CM

## 2023-02-20 LAB
ALBUMIN SERPL-MCNC: 3.5 G/DL (ref 3.4–5)
ALBUMIN/GLOB SERPL: 1.2 {RATIO} (ref 1–2)
ALP LIVER SERPL-CCNC: 45 U/L
ALT SERPL-CCNC: 19 U/L
ANION GAP SERPL CALC-SCNC: 8 MMOL/L (ref 0–18)
AST SERPL-CCNC: 10 U/L (ref 15–37)
BASOPHILS # BLD AUTO: 0.03 X10(3) UL (ref 0–0.2)
BASOPHILS NFR BLD AUTO: 0.4 %
BILIRUB SERPL-MCNC: 0.3 MG/DL (ref 0.1–2)
BUN BLD-MCNC: 9 MG/DL (ref 7–18)
BUN/CREAT SERPL: 13.4 (ref 10–20)
CALCIUM BLD-MCNC: 9 MG/DL (ref 8.5–10.1)
CHLORIDE SERPL-SCNC: 110 MMOL/L (ref 98–112)
CO2 SERPL-SCNC: 25 MMOL/L (ref 21–32)
CREAT BLD-MCNC: 0.67 MG/DL
DEPRECATED RDW RBC AUTO: 47.9 FL (ref 35.1–46.3)
EOSINOPHIL # BLD AUTO: 0.05 X10(3) UL (ref 0–0.7)
EOSINOPHIL NFR BLD AUTO: 0.7 %
ERYTHROCYTE [DISTWIDTH] IN BLOOD BY AUTOMATED COUNT: 13 % (ref 11–15)
GFR SERPLBLD BASED ON 1.73 SQ M-ARVRAT: 114 ML/MIN/1.73M2 (ref 60–?)
GLOBULIN PLAS-MCNC: 2.9 G/DL (ref 2.8–4.4)
GLUCOSE BLD-MCNC: 120 MG/DL (ref 70–99)
HCT VFR BLD AUTO: 35.8 %
HGB BLD-MCNC: 11.5 G/DL
IMM GRANULOCYTES # BLD AUTO: 0.01 X10(3) UL (ref 0–1)
IMM GRANULOCYTES NFR BLD: 0.1 %
LYMPHOCYTES # BLD AUTO: 3.08 X10(3) UL (ref 1–4)
LYMPHOCYTES NFR BLD AUTO: 45.8 %
MCH RBC QN AUTO: 32.8 PG (ref 26–34)
MCHC RBC AUTO-ENTMCNC: 32.1 G/DL (ref 31–37)
MCV RBC AUTO: 102 FL
MONOCYTES # BLD AUTO: 0.25 X10(3) UL (ref 0.1–1)
MONOCYTES NFR BLD AUTO: 3.7 %
NEUTROPHILS # BLD AUTO: 3.3 X10 (3) UL (ref 1.5–7.7)
NEUTROPHILS # BLD AUTO: 3.3 X10(3) UL (ref 1.5–7.7)
NEUTROPHILS NFR BLD AUTO: 49.3 %
OSMOLALITY SERPL CALC.SUM OF ELEC: 296 MOSM/KG (ref 275–295)
PLATELET # BLD AUTO: 257 10(3)UL (ref 150–450)
POTASSIUM SERPL-SCNC: 3.5 MMOL/L (ref 3.5–5.1)
PROT SERPL-MCNC: 6.4 G/DL (ref 6.4–8.2)
RBC # BLD AUTO: 3.51 X10(6)UL
SODIUM SERPL-SCNC: 143 MMOL/L (ref 136–145)
WBC # BLD AUTO: 6.7 X10(3) UL (ref 4–11)

## 2023-02-20 PROCEDURE — 96402 CHEMO HORMON ANTINEOPL SQ/IM: CPT

## 2023-02-20 PROCEDURE — 99215 OFFICE O/P EST HI 40 MIN: CPT | Performed by: INTERNAL MEDICINE

## 2023-02-20 PROCEDURE — 85025 COMPLETE CBC W/AUTO DIFF WBC: CPT

## 2023-02-20 PROCEDURE — 96413 CHEMO IV INFUSION 1 HR: CPT

## 2023-02-20 PROCEDURE — 80053 COMPREHEN METABOLIC PANEL: CPT

## 2023-02-20 PROCEDURE — 96367 TX/PROPH/DG ADDL SEQ IV INF: CPT

## 2023-02-20 PROCEDURE — 96415 CHEMO IV INFUSION ADDL HR: CPT

## 2023-02-20 RX ORDER — SODIUM CHLORIDE 9 MG/ML
10 INJECTION INTRAVENOUS ONCE
OUTPATIENT
Start: 2023-03-06

## 2023-02-20 RX ORDER — HEPARIN SODIUM (PORCINE) LOCK FLUSH IV SOLN 100 UNIT/ML 100 UNIT/ML
5 SOLUTION INTRAVENOUS ONCE
OUTPATIENT
Start: 2023-03-06

## 2023-02-20 RX ORDER — HEPARIN SODIUM (PORCINE) LOCK FLUSH IV SOLN 100 UNIT/ML 100 UNIT/ML
SOLUTION INTRAVENOUS
Status: COMPLETED
Start: 2023-02-20 | End: 2023-02-20

## 2023-02-20 RX ADMIN — HEPARIN SODIUM (PORCINE) LOCK FLUSH IV SOLN 100 UNIT/ML: 100 SOLUTION INTRAVENOUS at 13:55:00

## 2023-02-20 NOTE — PROGRESS NOTES
Patient here for C1D1 Kadcyla and Zoladex. Arrives Ambulating independently, accompanied by Family member  Denies complaints or concerns today. Pregnancy screening: Denies possibility of pregnancy    Modifications in dose or schedule: Yes, patient transitioned to Kadcyla and Zoladex, verified signed consent in chart. Drugs/infusions dual verified for appearance and physical integrity.  IV pump settings were dual verified: yes     Frequency of blood return and site check throughout administration: Prior to administration, Prior to each drug and At completion of therapy     Discharged to Home, Ambulating independently, accompanied by:Family member    Outpatient Oncology Care Plan  Problem list:  knowledge deficit  Problems related to:  chemotherapy  Interventions:  maintain safe environment  educate regarding self care  monitor lab values  provided general teaching  Expected outcomes:  free of infection  optimal lab values  safe in environment  understands plan of care  verbalize how to care for self  Progress towards outcome:  making progress    Education Record    Learner:  Patient and Family Member  Barriers / Limitations:  None  Method:  Discussion  Outcome:  Shows understanding  Comments:

## 2023-02-21 ENCOUNTER — TELEPHONE (OUTPATIENT)
Dept: HEMATOLOGY/ONCOLOGY | Facility: HOSPITAL | Age: 38
End: 2023-02-21

## 2023-02-21 NOTE — TELEPHONE ENCOUNTER
Toxicities: C1 D1 Ado-trastuzumab emtansine on 2/20/2023    Carmela Salazar said she feels \"fine. \" No side effects at this time. I encouraged her to please call the office if she is not feeling well or has any questions or concerns. She agreed and thanked me for checking on her.

## 2023-02-23 ENCOUNTER — TELEPHONE (OUTPATIENT)
Dept: INTERNAL MEDICINE CLINIC | Facility: CLINIC | Age: 38
End: 2023-02-23

## 2023-02-23 RX ORDER — HYDROCODONE BITARTRATE AND ACETAMINOPHEN 10; 325 MG/1; MG/1
TABLET ORAL
Qty: 84 TABLET | Refills: 0 | Status: SHIPPED | OUTPATIENT
Start: 2023-02-26

## 2023-02-23 NOTE — TELEPHONE ENCOUNTER
Pt here to pickup MDL[facility] refill script. She now had started seeing chiropractor for her low back pain. [place] for now[place]

## 2023-02-28 ENCOUNTER — NURSE ONLY (OUTPATIENT)
Dept: SURGERY | Facility: CLINIC | Age: 38
End: 2023-02-28
Payer: COMMERCIAL

## 2023-02-28 PROCEDURE — 99024 POSTOP FOLLOW-UP VISIT: CPT | Performed by: SURGERY

## 2023-03-07 ENCOUNTER — TELEPHONE (OUTPATIENT)
Dept: HEMATOLOGY/ONCOLOGY | Facility: HOSPITAL | Age: 38
End: 2023-03-07

## 2023-03-07 NOTE — TELEPHONE ENCOUNTER
Spoke with Kenyetta Ricci per her [place] request. Her mother is currently in ICU, 2 hours away from her home. She is unable to leave her and is asking to skip this cycle of chemo on 3/13. I offered to delay 1 week, pt feels that this will not be resolved in a week. Will speak with Dr Michelle Moseley and change her appointments.

## 2023-03-10 ENCOUNTER — TELEPHONE (OUTPATIENT)
Dept: INTERNAL MEDICINE CLINIC | Facility: CLINIC | Age: 38
End: 2023-03-10

## 2023-03-10 RX ORDER — AMOXICILLIN AND CLAVULANATE POTASSIUM 875; 125 MG/1; MG/1
1 TABLET, FILM COATED ORAL 2 TIMES DAILY
Qty: 20 TABLET | Refills: 0 | Status: SHIPPED | OUTPATIENT
Start: 2023-03-10 | End: 2023-03-20

## 2023-03-10 RX ORDER — HYDROCODONE BITARTRATE AND ACETAMINOPHEN 10; 325 MG/1; MG/1
TABLET ORAL
Qty: 84 TABLET | Refills: 0 | Status: SHIPPED | OUTPATIENT
Start: 2023-03-10

## 2023-03-10 NOTE — TELEPHONE ENCOUNTER
Pt here to pickup her [place]co refill[place]. Also complained of sinuspain/congestion/green nasal discharge for a week now and not improving; pt given augmentin for sinusitis. Call back if persist/worsen .

## 2023-03-13 ENCOUNTER — APPOINTMENT (OUTPATIENT)
Dept: HEMATOLOGY/ONCOLOGY | Facility: HOSPITAL | Age: 38
End: 2023-03-13
Attending: INTERNAL MEDICINE
Payer: COMMERCIAL

## 2023-03-20 ENCOUNTER — OFFICE VISIT (OUTPATIENT)
Dept: HEMATOLOGY/ONCOLOGY | Facility: HOSPITAL | Age: 38
End: 2023-03-20
Attending: INTERNAL MEDICINE
Payer: COMMERCIAL

## 2023-03-20 VITALS
HEIGHT: 65.5 IN | RESPIRATION RATE: 16 BRPM | OXYGEN SATURATION: 100 % | TEMPERATURE: 99 F | HEART RATE: 96 BPM | DIASTOLIC BLOOD PRESSURE: 103 MMHG | WEIGHT: 189.38 LBS | SYSTOLIC BLOOD PRESSURE: 154 MMHG | BODY MASS INDEX: 31.17 KG/M2

## 2023-03-20 DIAGNOSIS — D64.81 ANEMIA DUE TO ANTINEOPLASTIC CHEMOTHERAPY: ICD-10-CM

## 2023-03-20 DIAGNOSIS — G89.3 CANCER ASSOCIATED PAIN: ICD-10-CM

## 2023-03-20 DIAGNOSIS — T45.1X5A ANEMIA DUE TO ANTINEOPLASTIC CHEMOTHERAPY: ICD-10-CM

## 2023-03-20 DIAGNOSIS — Z51.11 CHEMOTHERAPY MANAGEMENT, ENCOUNTER FOR: ICD-10-CM

## 2023-03-20 DIAGNOSIS — Z17.0 BREAST CANCER, STAGE 1, ESTROGEN RECEPTOR POSITIVE, RIGHT (HCC): Primary | ICD-10-CM

## 2023-03-20 DIAGNOSIS — C50.911 BREAST CANCER, STAGE 1, ESTROGEN RECEPTOR POSITIVE, RIGHT (HCC): Primary | ICD-10-CM

## 2023-03-20 DIAGNOSIS — K90.9 MALABSORPTION OF IRON: ICD-10-CM

## 2023-03-20 LAB
ALBUMIN SERPL-MCNC: 3.8 G/DL (ref 3.4–5)
ALBUMIN/GLOB SERPL: 1.1 {RATIO} (ref 1–2)
ALP LIVER SERPL-CCNC: 55 U/L
ALT SERPL-CCNC: 18 U/L
ANION GAP SERPL CALC-SCNC: 7 MMOL/L (ref 0–18)
AST SERPL-CCNC: 14 U/L (ref 15–37)
BASOPHILS # BLD AUTO: 0.03 X10(3) UL (ref 0–0.2)
BASOPHILS NFR BLD AUTO: 0.4 %
BILIRUB SERPL-MCNC: 0.2 MG/DL (ref 0.1–2)
BUN BLD-MCNC: 13 MG/DL (ref 7–18)
BUN/CREAT SERPL: 18.6 (ref 10–20)
CALCIUM BLD-MCNC: 9.2 MG/DL (ref 8.5–10.1)
CHLORIDE SERPL-SCNC: 107 MMOL/L (ref 98–112)
CO2 SERPL-SCNC: 26 MMOL/L (ref 21–32)
CREAT BLD-MCNC: 0.7 MG/DL
DEPRECATED RDW RBC AUTO: 43.8 FL (ref 35.1–46.3)
EOSINOPHIL # BLD AUTO: 0.09 X10(3) UL (ref 0–0.7)
EOSINOPHIL NFR BLD AUTO: 1.3 %
ERYTHROCYTE [DISTWIDTH] IN BLOOD BY AUTOMATED COUNT: 12.4 % (ref 11–15)
GFR SERPLBLD BASED ON 1.73 SQ M-ARVRAT: 113 ML/MIN/1.73M2 (ref 60–?)
GLOBULIN PLAS-MCNC: 3.5 G/DL (ref 2.8–4.4)
GLUCOSE BLD-MCNC: 104 MG/DL (ref 70–99)
HCT VFR BLD AUTO: 38.6 %
HGB BLD-MCNC: 12.9 G/DL
IMM GRANULOCYTES # BLD AUTO: 0.01 X10(3) UL (ref 0–1)
IMM GRANULOCYTES NFR BLD: 0.1 %
LYMPHOCYTES # BLD AUTO: 3.74 X10(3) UL (ref 1–4)
LYMPHOCYTES NFR BLD AUTO: 52.2 %
MCH RBC QN AUTO: 32.1 PG (ref 26–34)
MCHC RBC AUTO-ENTMCNC: 33.4 G/DL (ref 31–37)
MCV RBC AUTO: 96 FL
MONOCYTES # BLD AUTO: 0.3 X10(3) UL (ref 0.1–1)
MONOCYTES NFR BLD AUTO: 4.2 %
NEUTROPHILS # BLD AUTO: 3 X10 (3) UL (ref 1.5–7.7)
NEUTROPHILS # BLD AUTO: 3 X10(3) UL (ref 1.5–7.7)
NEUTROPHILS NFR BLD AUTO: 41.8 %
OSMOLALITY SERPL CALC.SUM OF ELEC: 290 MOSM/KG (ref 275–295)
PLATELET # BLD AUTO: 285 10(3)UL (ref 150–450)
POTASSIUM SERPL-SCNC: 3.9 MMOL/L (ref 3.5–5.1)
PROT SERPL-MCNC: 7.3 G/DL (ref 6.4–8.2)
RBC # BLD AUTO: 4.02 X10(6)UL
SODIUM SERPL-SCNC: 140 MMOL/L (ref 136–145)
WBC # BLD AUTO: 7.2 X10(3) UL (ref 4–11)

## 2023-03-20 PROCEDURE — 96375 TX/PRO/DX INJ NEW DRUG ADDON: CPT

## 2023-03-20 PROCEDURE — 85025 COMPLETE CBC W/AUTO DIFF WBC: CPT

## 2023-03-20 PROCEDURE — 80053 COMPREHEN METABOLIC PANEL: CPT

## 2023-03-20 PROCEDURE — 99215 OFFICE O/P EST HI 40 MIN: CPT | Performed by: INTERNAL MEDICINE

## 2023-03-20 PROCEDURE — 96402 CHEMO HORMON ANTINEOPL SQ/IM: CPT

## 2023-03-20 PROCEDURE — 96413 CHEMO IV INFUSION 1 HR: CPT

## 2023-03-20 RX ORDER — DIPHENOXYLATE HYDROCHLORIDE AND ATROPINE SULFATE 2.5; .025 MG/1; MG/1
1-2 TABLET ORAL 4 TIMES DAILY PRN
Qty: 120 TABLET | Refills: 3 | Status: SHIPPED | OUTPATIENT
Start: 2023-03-20

## 2023-03-20 RX ORDER — SODIUM CHLORIDE 9 MG/ML
10 INJECTION INTRAVENOUS ONCE
OUTPATIENT
Start: 2023-03-27

## 2023-03-20 RX ORDER — HEPARIN SODIUM (PORCINE) LOCK FLUSH IV SOLN 100 UNIT/ML 100 UNIT/ML
5 SOLUTION INTRAVENOUS ONCE
OUTPATIENT
Start: 2023-03-27

## 2023-03-20 RX ORDER — HEPARIN SODIUM (PORCINE) LOCK FLUSH IV SOLN 100 UNIT/ML 100 UNIT/ML
5 SOLUTION INTRAVENOUS ONCE
Status: COMPLETED | OUTPATIENT
Start: 2023-03-20 | End: 2023-03-20

## 2023-03-20 RX ORDER — HEPARIN SODIUM (PORCINE) LOCK FLUSH IV SOLN 100 UNIT/ML 100 UNIT/ML
SOLUTION INTRAVENOUS
Status: COMPLETED
Start: 2023-03-20 | End: 2023-03-20

## 2023-03-20 RX ORDER — HEPARIN SODIUM (PORCINE) LOCK FLUSH IV SOLN 100 UNIT/ML 100 UNIT/ML
5 SOLUTION INTRAVENOUS ONCE
Status: CANCELLED | OUTPATIENT
Start: 2023-03-27

## 2023-03-20 RX ADMIN — HEPARIN SODIUM (PORCINE) LOCK FLUSH IV SOLN 100 UNIT/ML 500 UNITS: 100 SOLUTION INTRAVENOUS at 13:28:00

## 2023-03-20 NOTE — PROGRESS NOTES
Zoledex given in Left lower abdomen. Patient referred to stand for Zoladex injection. Patient tolerated injection well. No bleeding noted. Site covered with gauze and band-aide. Discharged back to [facility]. Will see Elsa UMSA/ followed by Chemotherapy.

## 2023-03-20 NOTE — PROGRESS NOTES
Pt here for C2 Kadcyla    (zoladex given in the lab today)      Pregnancy screening: Denies possibility of pregnancy    Modifications in dose or schedule: Yes, had been delayed previously for personal reasons by the patient    Drugs/infusions dual verified for appearance and physical integrity.  IV pump settings were dual verified: yes     Frequency of blood return and site check throughout administration: Prior to administration, Prior to each drug and At completion of therapy   Discharged to Other stable from infusion, Ambulating independently, accompanied by:Self    Outpatient Oncology Care Plan  Problem list:  Potential side effects of  Problems related to:  side effect of treatment  Interventions:  monitor effect of therapy  monitor lab values  Expected outcomes:  symptoms relieved/minimized  understands plan of care  verbalize how to care for self  Progress towards outcome:  making progress    Education Record    Learner:  Patient  Barriers / Limitations:  None  Method:  Discussion  Outcome:  Shows understanding

## 2023-03-21 ENCOUNTER — TELEPHONE (OUTPATIENT)
Dept: INTERNAL MEDICINE CLINIC | Facility: CLINIC | Age: 38
End: 2023-03-21

## 2023-03-21 RX ORDER — BUTALBITAL, ACETAMINOPHEN AND CAFFEINE 50; 325; 40 MG/1; MG/1; MG/1
1 TABLET ORAL EVERY 8 HOURS PRN
Qty: 30 TABLET | Refills: 0 | Status: SHIPPED | OUTPATIENT
Start: 2023-03-21

## 2023-03-21 RX ORDER — HYDROCODONE BITARTRATE AND ACETAMINOPHEN 10; 325 MG/1; MG/1
TABLET ORAL
Qty: 84 TABLET | Refills: 0 | Status: SHIPPED | OUTPATIENT
Start: 2023-03-21

## 2023-03-21 RX ORDER — CYCLOBENZAPRINE HCL 10 MG
10 TABLET ORAL 3 TIMES DAILY
Qty: 90 TABLET | Refills: 0 | Status: SHIPPED | OUTPATIENT
Start: 2023-03-21

## 2023-03-21 NOTE — TELEPHONE ENCOUNTER
Pt here to pickup script for norco and refill for flexeril and her butalbutal.   [place]; just finished a course of chemo per pt and asking to keep norco on same dose for now.

## 2023-03-24 ENCOUNTER — OFFICE VISIT (OUTPATIENT)
Dept: SURGERY | Facility: CLINIC | Age: 38
End: 2023-03-24
Payer: COMMERCIAL

## 2023-03-24 DIAGNOSIS — Z90.13 ABSENCE OF BOTH BREASTS: Primary | ICD-10-CM

## 2023-03-24 PROCEDURE — 99024 POSTOP FOLLOW-UP VISIT: CPT | Performed by: PHYSICIAN ASSISTANT

## 2023-03-27 PROBLEM — Z90.13 ABSENCE OF BOTH BREASTS: Status: ACTIVE | Noted: 2023-03-27

## 2023-03-28 ENCOUNTER — OFFICE VISIT (OUTPATIENT)
Dept: SURGERY | Facility: CLINIC | Age: 38
End: 2023-03-28
Payer: COMMERCIAL

## 2023-03-28 DIAGNOSIS — Z90.13 ABSENCE OF BOTH BREASTS: Primary | ICD-10-CM

## 2023-03-28 PROCEDURE — 88304 TISSUE EXAM BY PATHOLOGIST: CPT | Performed by: SURGERY

## 2023-03-28 PROCEDURE — 88305 TISSUE EXAM BY PATHOLOGIST: CPT | Performed by: SURGERY

## 2023-03-28 PROCEDURE — 99024 POSTOP FOLLOW-UP VISIT: CPT | Performed by: SURGERY

## 2023-03-28 NOTE — PROGRESS NOTES
Fernando Goodpasture is a 42year old female who presents today for a follow-up. She is current undergoing chemotherapy. She was noted to have a superficial ulceration right breast wound last week. She now presents for evaluation. She denies fever and chills. Physical Examination:  Breasts: The right breast is noted to have superficial ulceration of the right lateral mastectomy flap along the incision line measuring proxy 1 cm in diameter. There is no purulence, malodor, or surrounding erythema noted. Assessment and Plan:  Right breast wound. We discussed debridement and reclosure under local anesthesia. The nature procedure was reviewed the patient. The risk, benefits, and alternatives were reviewed. The patient expresses understanding and wishes to proceed.

## 2023-03-28 NOTE — PROCEDURES
Debridement and closure of right breast wound. The wound was encompassed in a vertically oriented ellipse. It was infiltrated with 3 cc of 1% lidocaine with epinephrine. The ellipse was excised and sent for permanent pathologic analysis. It was noted to extend to the underlying capsule which was excised. The capsular defect was repaired with a running 3-0 Vicryl suture. The wound was repaired in layered fashion with 3-0 Vicryl deep sutures and 5-0 Prolene simple suture. Bacitracin, Xeroform, gauze, and Tegaderm were applied. The patient tolerated the procedure well. There were no complications.

## 2023-04-03 ENCOUNTER — APPOINTMENT (OUTPATIENT)
Dept: HEMATOLOGY/ONCOLOGY | Facility: HOSPITAL | Age: 38
End: 2023-04-03
Attending: INTERNAL MEDICINE
Payer: COMMERCIAL

## 2023-04-04 ENCOUNTER — TELEPHONE (OUTPATIENT)
Dept: INTERNAL MEDICINE CLINIC | Facility: CLINIC | Age: 38
End: 2023-04-04

## 2023-04-04 RX ORDER — HYDROCODONE BITARTRATE AND ACETAMINOPHEN 10; 325 MG/1; MG/1
TABLET ORAL
Qty: 84 TABLET | Refills: 0 | Status: SHIPPED | OUTPATIENT
Start: 2023-04-04

## 2023-04-10 ENCOUNTER — OFFICE VISIT (OUTPATIENT)
Dept: HEMATOLOGY/ONCOLOGY | Facility: HOSPITAL | Age: 38
End: 2023-04-10
Attending: INTERNAL MEDICINE
Payer: COMMERCIAL

## 2023-04-10 VITALS
SYSTOLIC BLOOD PRESSURE: 159 MMHG | TEMPERATURE: 98 F | BODY MASS INDEX: 31.11 KG/M2 | WEIGHT: 189 LBS | HEIGHT: 65.5 IN | OXYGEN SATURATION: 100 % | DIASTOLIC BLOOD PRESSURE: 108 MMHG | HEART RATE: 87 BPM | RESPIRATION RATE: 16 BRPM

## 2023-04-10 DIAGNOSIS — D64.81 ANEMIA DUE TO ANTINEOPLASTIC CHEMOTHERAPY: Primary | ICD-10-CM

## 2023-04-10 DIAGNOSIS — C50.911 BREAST CANCER, STAGE 1, ESTROGEN RECEPTOR POSITIVE, RIGHT (HCC): Primary | ICD-10-CM

## 2023-04-10 DIAGNOSIS — T45.1X5A ANEMIA DUE TO ANTINEOPLASTIC CHEMOTHERAPY: Primary | ICD-10-CM

## 2023-04-10 DIAGNOSIS — Z51.11 CHEMOTHERAPY MANAGEMENT, ENCOUNTER FOR: ICD-10-CM

## 2023-04-10 DIAGNOSIS — K90.9 MALABSORPTION OF IRON: ICD-10-CM

## 2023-04-10 DIAGNOSIS — Z17.0 BREAST CANCER, STAGE 1, ESTROGEN RECEPTOR POSITIVE, RIGHT (HCC): Primary | ICD-10-CM

## 2023-04-10 LAB
ALBUMIN SERPL-MCNC: 3.7 G/DL (ref 3.4–5)
ALBUMIN/GLOB SERPL: 1 {RATIO} (ref 1–2)
ALP LIVER SERPL-CCNC: 44 U/L
ALT SERPL-CCNC: 24 U/L
ANION GAP SERPL CALC-SCNC: 6 MMOL/L (ref 0–18)
AST SERPL-CCNC: 15 U/L (ref 15–37)
BASOPHILS # BLD AUTO: 0.04 X10(3) UL (ref 0–0.2)
BASOPHILS NFR BLD AUTO: 0.4 %
BILIRUB SERPL-MCNC: 0.2 MG/DL (ref 0.1–2)
BUN BLD-MCNC: 13 MG/DL (ref 7–18)
BUN/CREAT SERPL: 19.1 (ref 10–20)
CALCIUM BLD-MCNC: 9.1 MG/DL (ref 8.5–10.1)
CHLORIDE SERPL-SCNC: 107 MMOL/L (ref 98–112)
CO2 SERPL-SCNC: 25 MMOL/L (ref 21–32)
CREAT BLD-MCNC: 0.68 MG/DL
DEPRECATED RDW RBC AUTO: 45.2 FL (ref 35.1–46.3)
EOSINOPHIL # BLD AUTO: 0.02 X10(3) UL (ref 0–0.7)
EOSINOPHIL NFR BLD AUTO: 0.2 %
ERYTHROCYTE [DISTWIDTH] IN BLOOD BY AUTOMATED COUNT: 13 % (ref 11–15)
GFR SERPLBLD BASED ON 1.73 SQ M-ARVRAT: 114 ML/MIN/1.73M2 (ref 60–?)
GLOBULIN PLAS-MCNC: 3.6 G/DL (ref 2.8–4.4)
GLUCOSE BLD-MCNC: 99 MG/DL (ref 70–99)
HCT VFR BLD AUTO: 38.9 %
HGB BLD-MCNC: 12.5 G/DL
IMM GRANULOCYTES # BLD AUTO: 0.02 X10(3) UL (ref 0–1)
IMM GRANULOCYTES NFR BLD: 0.2 %
LYMPHOCYTES # BLD AUTO: 5.39 X10(3) UL (ref 1–4)
LYMPHOCYTES NFR BLD AUTO: 55.9 %
MCH RBC QN AUTO: 30.6 PG (ref 26–34)
MCHC RBC AUTO-ENTMCNC: 32.1 G/DL (ref 31–37)
MCV RBC AUTO: 95.3 FL
MONOCYTES # BLD AUTO: 0.37 X10(3) UL (ref 0.1–1)
MONOCYTES NFR BLD AUTO: 3.8 %
NEUTROPHILS # BLD AUTO: 3.81 X10 (3) UL (ref 1.5–7.7)
NEUTROPHILS # BLD AUTO: 3.81 X10(3) UL (ref 1.5–7.7)
NEUTROPHILS NFR BLD AUTO: 39.5 %
OSMOLALITY SERPL CALC.SUM OF ELEC: 286 MOSM/KG (ref 275–295)
PLATELET # BLD AUTO: 320 10(3)UL (ref 150–450)
POTASSIUM SERPL-SCNC: 3.4 MMOL/L (ref 3.5–5.1)
PROT SERPL-MCNC: 7.3 G/DL (ref 6.4–8.2)
RBC # BLD AUTO: 4.08 X10(6)UL
SODIUM SERPL-SCNC: 138 MMOL/L (ref 136–145)
WBC # BLD AUTO: 9.7 X10(3) UL (ref 4–11)

## 2023-04-10 PROCEDURE — 36591 DRAW BLOOD OFF VENOUS DEVICE: CPT

## 2023-04-10 PROCEDURE — 85060 BLOOD SMEAR INTERPRETATION: CPT

## 2023-04-10 PROCEDURE — 99215 OFFICE O/P EST HI 40 MIN: CPT | Performed by: INTERNAL MEDICINE

## 2023-04-10 PROCEDURE — 85025 COMPLETE CBC W/AUTO DIFF WBC: CPT

## 2023-04-10 PROCEDURE — 80053 COMPREHEN METABOLIC PANEL: CPT

## 2023-04-10 RX ORDER — HEPARIN SODIUM (PORCINE) LOCK FLUSH IV SOLN 100 UNIT/ML 100 UNIT/ML
5 SOLUTION INTRAVENOUS ONCE
OUTPATIENT
Start: 2023-04-17

## 2023-04-10 RX ORDER — SODIUM CHLORIDE 9 MG/ML
10 INJECTION INTRAVENOUS ONCE
OUTPATIENT
Start: 2023-04-17

## 2023-04-10 RX ORDER — HEPARIN SODIUM (PORCINE) LOCK FLUSH IV SOLN 100 UNIT/ML 100 UNIT/ML
5 SOLUTION INTRAVENOUS ONCE
Status: COMPLETED | OUTPATIENT
Start: 2023-04-10 | End: 2023-04-10

## 2023-04-10 RX ORDER — HEPARIN SODIUM (PORCINE) LOCK FLUSH IV SOLN 100 UNIT/ML 100 UNIT/ML
SOLUTION INTRAVENOUS
Status: COMPLETED
Start: 2023-04-10 | End: 2023-04-10

## 2023-04-10 RX ADMIN — HEPARIN SODIUM (PORCINE) LOCK FLUSH IV SOLN 100 UNIT/ML 500 UNITS: 100 SOLUTION INTRAVENOUS at 12:00:00

## 2023-04-10 NOTE — PROGRESS NOTES
Patient arrives for C3 D1 KADCYLA. Unable to receive treatment. Patient to defer treatment x1 week. Infusion schedulers aware, appointments to be adjusted accordingly.

## 2023-04-14 ENCOUNTER — OFFICE VISIT (OUTPATIENT)
Dept: INTERNAL MEDICINE CLINIC | Facility: CLINIC | Age: 38
End: 2023-04-14

## 2023-04-14 ENCOUNTER — NURSE TRIAGE (OUTPATIENT)
Dept: INTERNAL MEDICINE CLINIC | Facility: CLINIC | Age: 38
End: 2023-04-14

## 2023-04-14 VITALS
TEMPERATURE: 98 F | SYSTOLIC BLOOD PRESSURE: 126 MMHG | DIASTOLIC BLOOD PRESSURE: 78 MMHG | BODY MASS INDEX: 31 KG/M2 | HEART RATE: 98 BPM | WEIGHT: 189 LBS

## 2023-04-14 DIAGNOSIS — N30.00 ACUTE CYSTITIS WITHOUT HEMATURIA: Primary | ICD-10-CM

## 2023-04-14 LAB
APPEARANCE: YELLOW
BILIRUBIN: NEGATIVE
GLUCOSE (URINE DIPSTICK): NEGATIVE MG/DL
KETONES (URINE DIPSTICK): NEGATIVE MG/DL
MULTISTIX LOT#: ABNORMAL NUMERIC
NITRITE, URINE: NEGATIVE
OCCULT BLOOD: NEGATIVE
PH, URINE: 5.5 (ref 4.5–8)
PROTEIN (URINE DIPSTICK): NEGATIVE MG/DL
SPECIFIC GRAVITY: 1.01 (ref 1–1.03)
URINE-COLOR: CLEAR
UROBILINOGEN,SEMI-QN: 0.2 MG/DL (ref 0–1.9)

## 2023-04-14 PROCEDURE — 99213 OFFICE O/P EST LOW 20 MIN: CPT | Performed by: INTERNAL MEDICINE

## 2023-04-14 PROCEDURE — 3078F DIAST BP <80 MM HG: CPT | Performed by: INTERNAL MEDICINE

## 2023-04-14 PROCEDURE — 3074F SYST BP LT 130 MM HG: CPT | Performed by: INTERNAL MEDICINE

## 2023-04-14 PROCEDURE — 81002 URINALYSIS NONAUTO W/O SCOPE: CPT | Performed by: INTERNAL MEDICINE

## 2023-04-14 RX ORDER — HYDROCODONE BITARTRATE AND ACETAMINOPHEN 10; 325 MG/1; MG/1
TABLET ORAL
Qty: 84 TABLET | Refills: 0 | Status: SHIPPED | OUTPATIENT
Start: 2023-04-14

## 2023-04-14 RX ORDER — NITROFURANTOIN 25; 75 MG/1; MG/1
100 CAPSULE ORAL 2 TIMES DAILY
Qty: 14 CAPSULE | Refills: 0 | Status: SHIPPED | OUTPATIENT
Start: 2023-04-14

## 2023-04-14 NOTE — TELEPHONE ENCOUNTER
Pt states she received a call from our office, however, unsure if it was for her or for her mother. Pt states her voicemailbox is full. Informed her of Dr. Pablito Zarate message and pt confirmed appointment. Appointment scheduled today.   Future Appointments   Date Time Provider Simone Alejandro   4/14/2023  3:30 PM Antonio Chen MD [facility] ADO   4/17/2023  1:00 PM [facility] INFRN 7 [facility] CHEMO EMO   4/18/2023  9:00 AM Chelly Taveras MD EMGSURONCELM [facility] Surg ELM

## 2023-04-14 NOTE — TELEPHONE ENCOUNTER
Please Review. Protocol Failed or has no protocol. Requested Prescriptions   Pending Prescriptions Disp Refills    HYDROcodone-acetaminophen (NORCO)  MG Oral Tab 84 tablet 0     Sig: Take 1 to 2 tabs po q 4 to  6hr prn for pain       There is no refill protocol information for this order        Recent Outpatient Visits              4 days ago Anemia due to antineoplastic chemotherapy    [address] Visit    4 days ago Breast cancer, stage 1, estrogen receptor positive, right [facility] Hematology Oncology Reyna Tellez MD    Office Visit    4 days ago Breast cancer, stage 1, estrogen receptor positive, right ([facility] [address]    [address] - Infusion    Nurse Only    2 weeks ago Absence of both breasts    Eleonora James MD    Office Visit    3 weeks ago Absence of both breasts    [facility], [address], [place], [place]    Office Visit          Future Appointments         Provider Department Appt Notes    In 3 days EM CC INFRN [address] - Infusion C3 D1 KADCYLA-PORT-SL* Auth'd until 1-30-24*    In 4 days Wilmar Braswell MD [address] [facility] [facility] post op/suture removal/cont expansion  +bilateral total of 470mL  s/p Debridement and closure of right breast wound 3/28/23  s/p bilateral skin-sparing mastectomies with right breast sentinel lymph node biopsy by Dr. Dung Sarabia and immediate bilateral breast reconstruction with tissue expander (650mL expander filled with 400mL of air intraoperatively)  and acellular dermal matrix by Dr. Tarik Saldana on 01/10/2023.    s/p debridement of bilateral mastectomy skin flap necrosis on 1/24/2023.

## 2023-04-16 ENCOUNTER — PATIENT MESSAGE (OUTPATIENT)
Dept: INTERNAL MEDICINE CLINIC | Facility: CLINIC | Age: 38
End: 2023-04-16

## 2023-04-17 ENCOUNTER — APPOINTMENT (OUTPATIENT)
Dept: HEMATOLOGY/ONCOLOGY | Facility: HOSPITAL | Age: 38
End: 2023-04-17
Attending: INTERNAL MEDICINE
Payer: COMMERCIAL

## 2023-04-17 VITALS
RESPIRATION RATE: 20 BRPM | DIASTOLIC BLOOD PRESSURE: 98 MMHG | OXYGEN SATURATION: 100 % | TEMPERATURE: 98 F | SYSTOLIC BLOOD PRESSURE: 165 MMHG | HEART RATE: 87 BPM

## 2023-04-17 DIAGNOSIS — Z17.0 BREAST CANCER, STAGE 1, ESTROGEN RECEPTOR POSITIVE, RIGHT (HCC): Primary | ICD-10-CM

## 2023-04-17 DIAGNOSIS — C50.911 BREAST CANCER, STAGE 1, ESTROGEN RECEPTOR POSITIVE, RIGHT (HCC): Primary | ICD-10-CM

## 2023-04-17 PROCEDURE — 96413 CHEMO IV INFUSION 1 HR: CPT

## 2023-04-17 PROCEDURE — 96375 TX/PRO/DX INJ NEW DRUG ADDON: CPT

## 2023-04-17 RX ORDER — HEPARIN SODIUM (PORCINE) LOCK FLUSH IV SOLN 100 UNIT/ML 100 UNIT/ML
SOLUTION INTRAVENOUS
Status: COMPLETED
Start: 2023-04-17 | End: 2023-04-17

## 2023-04-17 RX ADMIN — HEPARIN SODIUM (PORCINE) LOCK FLUSH IV SOLN 100 UNIT/ML 500 UNITS: 100 SOLUTION INTRAVENOUS at 15:00:00

## 2023-04-17 NOTE — PROGRESS NOTES
Pt here for C3 Kadcyla. Arrives Ambulating independently, accompanied by Self. Oral medications included in this regimen:  no    Patient confirms comprehension of cancer treatment schedule:  yes    Pregnancy screening:  Denies possibility of pregnancy    Modifications in dose or schedule:  Yes, delayed 1 week per pt, on antibiotics for UTI    Medications appearance and physical integrity checked by RN. Chemotherapy IV pump settings verified by 2 RNs:  yes     Frequency of blood return and site check throughout administration: Prior to administration and At completion of therapy     Infusion/treatment outcome:  patient tolerated treatment without incident    Education Record    Learner:  Patient  Barriers / Limitations:  None  Method:  Reinforcement  Education / instructions given:  Plan of care   Outcome:  Shows understanding    Discharged Home, Ambulating independently.   Patient/family verbalized understanding of future appointments: by [facility] [employer]

## 2023-04-18 ENCOUNTER — OFFICE VISIT (OUTPATIENT)
Dept: SURGERY | Facility: CLINIC | Age: 38
End: 2023-04-18
Payer: COMMERCIAL

## 2023-04-18 DIAGNOSIS — Z90.13 ABSENCE OF BOTH BREASTS: Primary | ICD-10-CM

## 2023-04-18 PROCEDURE — 99212 OFFICE O/P EST SF 10 MIN: CPT | Performed by: SURGERY

## 2023-04-18 NOTE — PROGRESS NOTES
Lord Moran is a 42year old female who presents today for a follow-up. She currently has 14.6 cm base diameter tissue expanders filled to 410 cc. She denies fever and chills. She denies nausea, vomiting, diarrhea or constipation. Physical Examination:  Breasts: Right breast incision is clean dry intact. Assessment and Plan:  Patient doing well. Sutures removed and Steri-Strips were applied. We will proceed with a CT angiogram of the abdominal wall given the patient's previous appendectomy. The patient return for reevaluation in 4 to 6 weeks. The plan reviewed with the patient and questions were answered.

## 2023-04-21 RX ORDER — CYCLOBENZAPRINE HCL 10 MG
10 TABLET ORAL 3 TIMES DAILY
Qty: 90 TABLET | Refills: 0 | Status: SHIPPED | OUTPATIENT
Start: 2023-04-21

## 2023-04-24 ENCOUNTER — TELEPHONE (OUTPATIENT)
Dept: INTERNAL MEDICINE CLINIC | Facility: CLINIC | Age: 38
End: 2023-04-24

## 2023-04-24 NOTE — TELEPHONE ENCOUNTER
Pt is status post double mastectomy. States that she is having left leg pain, numbness and some sciatica. She tried to get an appt with the pain specialist but was unable to get anything until mid May. The Oncologist is asking for the pt to see her PCP to address this so that if an MRI is needed there is some documentation of seeing the PCP. Can she be added sometime this week?

## 2023-04-24 NOTE — TELEPHONE ENCOUNTER
Future Appointments   Date Time Provider Simone Berryi   4/25/2023  9:30 AM Selena Molina MD [facility] ADO   4/28/2023  9:00  [facility] CT [address] [facility] [address]  [address] [place] [address]   5/8/2023 12:45 PM EM [facility] LAB1 [facility] CHEMO EMO   5/8/2023  1:30 PM Estefany June PA-C [facility] HEM ONC EMO   5/8/2023  2:00 PM [facility] INFRN 5 [facility] CHEMO EMO   5/30/2023 10:30 AM Lauren Dobson MD [facility]ONCEL[facility] Surg [facility]   5/30/2023 12:45 PM EM [facility] LAB1 [facility] CHEMO EMO   5/30/2023  1:30 PM Elsa June PA-C [facility] HEM ONC EMO   5/30/2023  2:00 PM EM CC INFRN 1 [facility] CHEMO EMO

## 2023-04-25 ENCOUNTER — OFFICE VISIT (OUTPATIENT)
Dept: INTERNAL MEDICINE CLINIC | Facility: CLINIC | Age: 38
End: 2023-04-25

## 2023-04-25 ENCOUNTER — HOSPITAL ENCOUNTER (OUTPATIENT)
Dept: GENERAL RADIOLOGY | Age: 38
Discharge: HOME | End: 2023-04-25
Attending: INTERNAL MEDICINE
Payer: COMMERCIAL

## 2023-04-25 VITALS
HEART RATE: 110 BPM | TEMPERATURE: 98 F | BODY MASS INDEX: 31.66 KG/M2 | HEIGHT: 65.5 IN | DIASTOLIC BLOOD PRESSURE: 80 MMHG | OXYGEN SATURATION: 99 % | SYSTOLIC BLOOD PRESSURE: 122 MMHG | WEIGHT: 192.31 LBS

## 2023-04-25 DIAGNOSIS — M54.42 ACUTE LEFT-SIDED LOW BACK PAIN WITH LEFT-SIDED SCIATICA: ICD-10-CM

## 2023-04-25 DIAGNOSIS — M54.42 ACUTE LEFT-SIDED LOW BACK PAIN WITH LEFT-SIDED SCIATICA: Primary | ICD-10-CM

## 2023-04-25 PROCEDURE — 3079F DIAST BP 80-89 MM HG: CPT | Performed by: INTERNAL MEDICINE

## 2023-04-25 PROCEDURE — 3074F SYST BP LT 130 MM HG: CPT | Performed by: INTERNAL MEDICINE

## 2023-04-25 PROCEDURE — 72220 X-RAY EXAM SACRUM TAILBONE: CPT | Performed by: INTERNAL MEDICINE

## 2023-04-25 PROCEDURE — 99213 OFFICE O/P EST LOW 20 MIN: CPT | Performed by: INTERNAL MEDICINE

## 2023-04-25 PROCEDURE — 3008F BODY MASS INDEX DOCD: CPT | Performed by: INTERNAL MEDICINE

## 2023-04-25 RX ORDER — HYDROCODONE BITARTRATE AND ACETAMINOPHEN 10; 325 MG/1; MG/1
TABLET ORAL
Qty: 126 TABLET | Refills: 0 | Status: SHIPPED | OUTPATIENT
Start: 2023-04-25

## 2023-04-28 ENCOUNTER — HOSPITAL ENCOUNTER (OUTPATIENT)
Dept: CT IMAGING | Facility: HOSPITAL | Age: 38
Discharge: HOME | End: 2023-04-28
Attending: SURGERY
Payer: COMMERCIAL

## 2023-04-28 DIAGNOSIS — Z90.13 ABSENCE OF BOTH BREASTS: ICD-10-CM

## 2023-04-28 PROCEDURE — 74174 CTA ABD&PLVS W/CONTRAST: CPT | Performed by: SURGERY

## 2023-04-28 RX ORDER — HEPARIN SODIUM (PORCINE) LOCK FLUSH IV SOLN 100 UNIT/ML 100 UNIT/ML
500 SOLUTION INTRAVENOUS ONCE
Status: COMPLETED | OUTPATIENT
Start: 2023-04-28 | End: 2023-04-28

## 2023-04-28 RX ORDER — HEPARIN SODIUM (PORCINE) LOCK FLUSH IV SOLN 100 UNIT/ML 100 UNIT/ML
SOLUTION INTRAVENOUS
Status: DISPENSED
Start: 2023-04-28 | End: 2023-04-28

## 2023-04-28 RX ADMIN — HEPARIN SODIUM (PORCINE) LOCK FLUSH IV SOLN 100 UNIT/ML 500 UNITS: 100 SOLUTION INTRAVENOUS at 09:25:00

## 2023-04-28 NOTE — IMAGING NOTE
Attempt x1 to access patient's port for CT contrast injection. Accessed port with ease but no blood return. Patient unable to taste NS either. Patient stated \"just start IV, it's okay\". Port heparin flushed per protocol and de accessed. IV started; 1 attempt.

## 2023-05-04 ENCOUNTER — TELEPHONE (OUTPATIENT)
Dept: INTERNAL MEDICINE CLINIC | Facility: CLINIC | Age: 38
End: 2023-05-04

## 2023-05-04 RX ORDER — HYDROCODONE BITARTRATE AND ACETAMINOPHEN 10; 325 MG/1; MG/1
TABLET ORAL
Qty: 98 TABLET | Refills: 0 | Status: SHIPPED | OUTPATIENT
Start: 2023-05-04

## 2023-05-04 NOTE — TELEPHONE ENCOUNTER
Pt here to pickup her script for her norco; states back pain inproving, cut down to 7 /day; she will be picking up med by Monday when due since she has her chemo that day too and not able to come in. [place]

## 2023-05-08 ENCOUNTER — NURSE ONLY (OUTPATIENT)
Dept: HEMATOLOGY/ONCOLOGY | Facility: HOSPITAL | Age: 38
End: 2023-05-08
Attending: INTERNAL MEDICINE
Payer: COMMERCIAL

## 2023-05-08 VITALS
HEIGHT: 65.5 IN | SYSTOLIC BLOOD PRESSURE: 141 MMHG | OXYGEN SATURATION: 99 % | DIASTOLIC BLOOD PRESSURE: 78 MMHG | HEART RATE: 93 BPM | TEMPERATURE: 99 F | RESPIRATION RATE: 18 BRPM | WEIGHT: 197 LBS | BODY MASS INDEX: 32.43 KG/M2

## 2023-05-08 DIAGNOSIS — C50.911 BREAST CANCER, STAGE 1, ESTROGEN RECEPTOR POSITIVE, RIGHT (HCC): Primary | ICD-10-CM

## 2023-05-08 DIAGNOSIS — Z51.11 CHEMOTHERAPY MANAGEMENT, ENCOUNTER FOR: ICD-10-CM

## 2023-05-08 DIAGNOSIS — D64.81 ANEMIA DUE TO ANTINEOPLASTIC CHEMOTHERAPY: ICD-10-CM

## 2023-05-08 DIAGNOSIS — Z17.0 BREAST CANCER, STAGE 1, ESTROGEN RECEPTOR POSITIVE, RIGHT (HCC): Primary | ICD-10-CM

## 2023-05-08 DIAGNOSIS — T45.1X5A ANEMIA DUE TO ANTINEOPLASTIC CHEMOTHERAPY: ICD-10-CM

## 2023-05-08 DIAGNOSIS — K90.9 MALABSORPTION OF IRON: ICD-10-CM

## 2023-05-08 DIAGNOSIS — T45.1X5A CHEMOTHERAPY-INDUCED NAUSEA: ICD-10-CM

## 2023-05-08 DIAGNOSIS — R11.0 CHEMOTHERAPY-INDUCED NAUSEA: ICD-10-CM

## 2023-05-08 LAB
ALBUMIN SERPL-MCNC: 3.3 G/DL (ref 3.4–5)
ALBUMIN/GLOB SERPL: 0.9 {RATIO} (ref 1–2)
ALP LIVER SERPL-CCNC: 42 U/L
ALT SERPL-CCNC: 23 U/L
ANION GAP SERPL CALC-SCNC: 7 MMOL/L (ref 0–18)
AST SERPL-CCNC: 15 U/L (ref 15–37)
BASOPHILS # BLD AUTO: 0.04 X10(3) UL (ref 0–0.2)
BASOPHILS NFR BLD AUTO: 0.4 %
BILIRUB SERPL-MCNC: 0.2 MG/DL (ref 0.1–2)
BUN BLD-MCNC: 11 MG/DL (ref 7–18)
BUN/CREAT SERPL: 16.7 (ref 10–20)
CALCIUM BLD-MCNC: 8.6 MG/DL (ref 8.5–10.1)
CHLORIDE SERPL-SCNC: 107 MMOL/L (ref 98–112)
CO2 SERPL-SCNC: 23 MMOL/L (ref 21–32)
CREAT BLD-MCNC: 0.66 MG/DL
DEPRECATED RDW RBC AUTO: 48.5 FL (ref 35.1–46.3)
EOSINOPHIL # BLD AUTO: 0.05 X10(3) UL (ref 0–0.7)
EOSINOPHIL NFR BLD AUTO: 0.5 %
ERYTHROCYTE [DISTWIDTH] IN BLOOD BY AUTOMATED COUNT: 13.8 % (ref 11–15)
GFR SERPLBLD BASED ON 1.73 SQ M-ARVRAT: 114 ML/MIN/1.73M2 (ref 60–?)
GLOBULIN PLAS-MCNC: 3.6 G/DL (ref 2.8–4.4)
GLUCOSE BLD-MCNC: 116 MG/DL (ref 70–99)
HCT VFR BLD AUTO: 35.5 %
HGB BLD-MCNC: 11.4 G/DL
IMM GRANULOCYTES # BLD AUTO: 0.02 X10(3) UL (ref 0–1)
IMM GRANULOCYTES NFR BLD: 0.2 %
LYMPHOCYTES # BLD AUTO: 4.86 X10(3) UL (ref 1–4)
LYMPHOCYTES NFR BLD AUTO: 44.7 %
MCH RBC QN AUTO: 30.7 PG (ref 26–34)
MCHC RBC AUTO-ENTMCNC: 32.1 G/DL (ref 31–37)
MCV RBC AUTO: 95.7 FL
MONOCYTES # BLD AUTO: 0.43 X10(3) UL (ref 0.1–1)
MONOCYTES NFR BLD AUTO: 4 %
NEUTROPHILS # BLD AUTO: 5.47 X10 (3) UL (ref 1.5–7.7)
NEUTROPHILS # BLD AUTO: 5.47 X10(3) UL (ref 1.5–7.7)
NEUTROPHILS NFR BLD AUTO: 50.2 %
OSMOLALITY SERPL CALC.SUM OF ELEC: 284 MOSM/KG (ref 275–295)
PLATELET # BLD AUTO: 278 10(3)UL (ref 150–450)
POTASSIUM SERPL-SCNC: 3.6 MMOL/L (ref 3.5–5.1)
PROT SERPL-MCNC: 6.9 G/DL (ref 6.4–8.2)
RBC # BLD AUTO: 3.71 X10(6)UL
SODIUM SERPL-SCNC: 137 MMOL/L (ref 136–145)
WBC # BLD AUTO: 10.9 X10(3) UL (ref 4–11)

## 2023-05-08 PROCEDURE — 80053 COMPREHEN METABOLIC PANEL: CPT

## 2023-05-08 PROCEDURE — 96375 TX/PRO/DX INJ NEW DRUG ADDON: CPT

## 2023-05-08 PROCEDURE — 99215 OFFICE O/P EST HI 40 MIN: CPT | Performed by: INTERNAL MEDICINE

## 2023-05-08 PROCEDURE — 85025 COMPLETE CBC W/AUTO DIFF WBC: CPT

## 2023-05-08 PROCEDURE — 96413 CHEMO IV INFUSION 1 HR: CPT

## 2023-05-08 RX ORDER — TAMOXIFEN CITRATE 20 MG/1
20 TABLET ORAL DAILY
Qty: 90 TABLET | Refills: 3 | Status: SHIPPED | OUTPATIENT
Start: 2023-05-08

## 2023-05-08 RX ORDER — TAMOXIFEN CITRATE 20 MG/1
20 TABLET ORAL DAILY
Qty: 90 TABLET | Refills: 3 | Status: SHIPPED | OUTPATIENT
Start: 2023-05-08 | End: 2023-05-08

## 2023-05-08 RX ORDER — SODIUM CHLORIDE 9 MG/ML
10 INJECTION INTRAVENOUS ONCE
OUTPATIENT
Start: 2023-05-29

## 2023-05-08 RX ORDER — HEPARIN SODIUM (PORCINE) LOCK FLUSH IV SOLN 100 UNIT/ML 100 UNIT/ML
SOLUTION INTRAVENOUS
Status: DISPENSED
Start: 2023-05-08 | End: 2023-05-09

## 2023-05-08 RX ORDER — HEPARIN SODIUM (PORCINE) LOCK FLUSH IV SOLN 100 UNIT/ML 100 UNIT/ML
5 SOLUTION INTRAVENOUS ONCE
Status: COMPLETED | OUTPATIENT
Start: 2023-05-08 | End: 2023-05-08

## 2023-05-08 RX ORDER — HEPARIN SODIUM (PORCINE) LOCK FLUSH IV SOLN 100 UNIT/ML 100 UNIT/ML
5 SOLUTION INTRAVENOUS ONCE
OUTPATIENT
Start: 2023-05-29

## 2023-05-08 RX ADMIN — HEPARIN SODIUM (PORCINE) LOCK FLUSH IV SOLN 100 UNIT/ML 500 UNITS: 100 SOLUTION INTRAVENOUS at 15:39:00

## 2023-05-08 NOTE — PROGRESS NOTES
Pt here for [place]. Arrives Ambulating independently, accompanied by Self       Oral medications included in this regimen:  no    Patient confirms comprehension of cancer treatment schedule:  yes    Pregnancy screening:  Denies possibility of pregnancy    Modifications in dose or schedule:  No    Medications appearance and physical integrity checked by RN.  Chemotherapy IV pump settings verified by 2 RNs:  yes     Frequency of blood return and site check throughout administration: Prior to administration and At completion of therapy     Infusion/treatment outcome:  patient tolerated treatment without incident    Education Record    Learner:  Patient  Barriers / Limitations:  None  Method:  Discussion  Education / instructions given:  Plan of care  Outcome:  Shows understanding    Discharged Home, Ambulating independently, accompanied by:Self    Patient/family verbalized understanding of future appointments: by [facility] [employer]

## 2023-05-16 ENCOUNTER — TELEPHONE (OUTPATIENT)
Dept: INTERNAL MEDICINE CLINIC | Facility: CLINIC | Age: 38
End: 2023-05-16

## 2023-05-16 RX ORDER — HYDROCODONE BITARTRATE AND ACETAMINOPHEN 10; 325 MG/1; MG/1
TABLET ORAL
Qty: 98 TABLET | Refills: 0 | Status: SHIPPED | OUTPATIENT
Start: 2023-05-16

## 2023-05-19 ENCOUNTER — TELEPHONE (OUTPATIENT)
Dept: HEMATOLOGY/ONCOLOGY | Facility: HOSPITAL | Age: 38
End: 2023-05-19

## 2023-05-19 NOTE — TELEPHONE ENCOUNTER
Spoke with Ofelia Bañuelos. She would like to change her chemo day from Tuesday the 30th to any other day that week. Will message scheduling.

## 2023-05-23 ENCOUNTER — TELEPHONE (OUTPATIENT)
Dept: INTERNAL MEDICINE CLINIC | Facility: CLINIC | Age: 38
End: 2023-05-23

## 2023-05-23 RX ORDER — CYCLOBENZAPRINE HCL 10 MG
10 TABLET ORAL 3 TIMES DAILY
Qty: 90 TABLET | Refills: 0 | Status: SHIPPED | OUTPATIENT
Start: 2023-05-23

## 2023-05-23 RX ORDER — HYDROCODONE BITARTRATE AND ACETAMINOPHEN 10; 325 MG/1; MG/1
TABLET ORAL
Qty: 84 TABLET | Refills: 0 | Status: SHIPPED | OUTPATIENT
Start: 2023-05-23

## 2023-05-23 NOTE — TELEPHONE ENCOUNTER
Pt requested flexeril. Also refll of norco but wont pickup til due next week; dose cut dose to 6/day.

## 2023-05-25 ENCOUNTER — OFFICE VISIT (OUTPATIENT)
Dept: INTERNAL MEDICINE CLINIC | Facility: CLINIC | Age: 38
End: 2023-05-25

## 2023-05-25 ENCOUNTER — NURSE TRIAGE (OUTPATIENT)
Dept: INTERNAL MEDICINE CLINIC | Facility: CLINIC | Age: 38
End: 2023-05-25

## 2023-05-25 VITALS
TEMPERATURE: 97 F | WEIGHT: 189.19 LBS | HEIGHT: 65.5 IN | SYSTOLIC BLOOD PRESSURE: 124 MMHG | DIASTOLIC BLOOD PRESSURE: 80 MMHG | HEART RATE: 105 BPM | OXYGEN SATURATION: 96 % | BODY MASS INDEX: 31.14 KG/M2

## 2023-05-25 DIAGNOSIS — J34.89 NASAL SORE: Primary | ICD-10-CM

## 2023-05-25 PROCEDURE — 3008F BODY MASS INDEX DOCD: CPT | Performed by: INTERNAL MEDICINE

## 2023-05-25 PROCEDURE — 3074F SYST BP LT 130 MM HG: CPT | Performed by: INTERNAL MEDICINE

## 2023-05-25 PROCEDURE — 99213 OFFICE O/P EST LOW 20 MIN: CPT | Performed by: INTERNAL MEDICINE

## 2023-05-25 PROCEDURE — 3079F DIAST BP 80-89 MM HG: CPT | Performed by: INTERNAL MEDICINE

## 2023-05-30 ENCOUNTER — APPOINTMENT (OUTPATIENT)
Dept: HEMATOLOGY/ONCOLOGY | Facility: HOSPITAL | Age: 38
End: 2023-05-30
Attending: INTERNAL MEDICINE
Payer: COMMERCIAL

## 2023-05-30 ENCOUNTER — APPOINTMENT (OUTPATIENT)
Dept: HEMATOLOGY/ONCOLOGY | Facility: HOSPITAL | Age: 38
End: 2023-05-30
Attending: PHYSICIAN ASSISTANT
Payer: COMMERCIAL

## 2023-06-01 ENCOUNTER — OFFICE VISIT (OUTPATIENT)
Dept: HEMATOLOGY/ONCOLOGY | Facility: HOSPITAL | Age: 38
End: 2023-06-01
Attending: INTERNAL MEDICINE
Payer: COMMERCIAL

## 2023-06-01 ENCOUNTER — HOSPITAL ENCOUNTER (OUTPATIENT)
Dept: CV DIAGNOSTICS | Facility: HOSPITAL | Age: 38
Discharge: HOME | End: 2023-06-01
Attending: INTERNAL MEDICINE
Payer: COMMERCIAL

## 2023-06-01 VITALS
WEIGHT: 192 LBS | BODY MASS INDEX: 31.6 KG/M2 | TEMPERATURE: 98 F | HEIGHT: 65.5 IN | OXYGEN SATURATION: 100 % | DIASTOLIC BLOOD PRESSURE: 90 MMHG | SYSTOLIC BLOOD PRESSURE: 150 MMHG | RESPIRATION RATE: 18 BRPM | HEART RATE: 82 BPM

## 2023-06-01 DIAGNOSIS — T45.1X5A ANEMIA DUE TO ANTINEOPLASTIC CHEMOTHERAPY: ICD-10-CM

## 2023-06-01 DIAGNOSIS — Z17.0 BREAST CANCER, STAGE 1, ESTROGEN RECEPTOR POSITIVE, RIGHT (HCC): Primary | ICD-10-CM

## 2023-06-01 DIAGNOSIS — D64.81 ANEMIA DUE TO ANTINEOPLASTIC CHEMOTHERAPY: ICD-10-CM

## 2023-06-01 DIAGNOSIS — C50.911 BREAST CANCER, STAGE 1, ESTROGEN RECEPTOR POSITIVE, RIGHT (HCC): Primary | ICD-10-CM

## 2023-06-01 DIAGNOSIS — Z51.11 CHEMOTHERAPY MANAGEMENT, ENCOUNTER FOR: ICD-10-CM

## 2023-06-01 DIAGNOSIS — C50.911 BREAST CANCER, STAGE 1, ESTROGEN RECEPTOR POSITIVE, RIGHT (HCC): ICD-10-CM

## 2023-06-01 DIAGNOSIS — Z17.0 BREAST CANCER, STAGE 1, ESTROGEN RECEPTOR POSITIVE, RIGHT (HCC): ICD-10-CM

## 2023-06-01 DIAGNOSIS — K90.9 MALABSORPTION OF IRON: ICD-10-CM

## 2023-06-01 LAB
ALBUMIN SERPL-MCNC: 3.5 G/DL (ref 3.4–5)
ALBUMIN/GLOB SERPL: 1.1 {RATIO} (ref 1–2)
ALP LIVER SERPL-CCNC: 43 U/L
ALT SERPL-CCNC: 21 U/L
ANION GAP SERPL CALC-SCNC: 2 MMOL/L (ref 0–18)
AST SERPL-CCNC: 17 U/L (ref 15–37)
BASOPHILS # BLD AUTO: 0.04 X10(3) UL (ref 0–0.2)
BASOPHILS NFR BLD AUTO: 0.4 %
BILIRUB SERPL-MCNC: 0.2 MG/DL (ref 0.1–2)
BUN BLD-MCNC: 8 MG/DL (ref 7–18)
BUN/CREAT SERPL: 14.5 (ref 10–20)
CALCIUM BLD-MCNC: 9 MG/DL (ref 8.5–10.1)
CHLORIDE SERPL-SCNC: 109 MMOL/L (ref 98–112)
CO2 SERPL-SCNC: 27 MMOL/L (ref 21–32)
CREAT BLD-MCNC: 0.55 MG/DL
DEPRECATED RDW RBC AUTO: 49.6 FL (ref 35.1–46.3)
EOSINOPHIL # BLD AUTO: 0.05 X10(3) UL (ref 0–0.7)
EOSINOPHIL NFR BLD AUTO: 0.5 %
ERYTHROCYTE [DISTWIDTH] IN BLOOD BY AUTOMATED COUNT: 14.2 % (ref 11–15)
GFR SERPLBLD BASED ON 1.73 SQ M-ARVRAT: 120 ML/MIN/1.73M2 (ref 60–?)
GLOBULIN PLAS-MCNC: 3.2 G/DL (ref 2.8–4.4)
GLUCOSE BLD-MCNC: 113 MG/DL (ref 70–99)
HCT VFR BLD AUTO: 34.8 %
HGB BLD-MCNC: 11.7 G/DL
IMM GRANULOCYTES # BLD AUTO: 0.02 X10(3) UL (ref 0–1)
IMM GRANULOCYTES NFR BLD: 0.2 %
LYMPHOCYTES # BLD AUTO: 4.49 X10(3) UL (ref 1–4)
LYMPHOCYTES NFR BLD AUTO: 44.5 %
MCH RBC QN AUTO: 31.9 PG (ref 26–34)
MCHC RBC AUTO-ENTMCNC: 33.6 G/DL (ref 31–37)
MCV RBC AUTO: 94.8 FL
MONOCYTES # BLD AUTO: 0.34 X10(3) UL (ref 0.1–1)
MONOCYTES NFR BLD AUTO: 3.4 %
NEUTROPHILS # BLD AUTO: 5.15 X10 (3) UL (ref 1.5–7.7)
NEUTROPHILS # BLD AUTO: 5.15 X10(3) UL (ref 1.5–7.7)
NEUTROPHILS NFR BLD AUTO: 51 %
OSMOLALITY SERPL CALC.SUM OF ELEC: 285 MOSM/KG (ref 275–295)
PLATELET # BLD AUTO: 284 10(3)UL (ref 150–450)
POTASSIUM SERPL-SCNC: 3.6 MMOL/L (ref 3.5–5.1)
PROT SERPL-MCNC: 6.7 G/DL (ref 6.4–8.2)
RBC # BLD AUTO: 3.67 X10(6)UL
SODIUM SERPL-SCNC: 138 MMOL/L (ref 136–145)
WBC # BLD AUTO: 10.1 X10(3) UL (ref 4–11)

## 2023-06-01 PROCEDURE — 93306 TTE W/DOPPLER COMPLETE: CPT | Performed by: INTERNAL MEDICINE

## 2023-06-01 PROCEDURE — 80053 COMPREHEN METABOLIC PANEL: CPT

## 2023-06-01 PROCEDURE — 85025 COMPLETE CBC W/AUTO DIFF WBC: CPT

## 2023-06-01 PROCEDURE — 96413 CHEMO IV INFUSION 1 HR: CPT

## 2023-06-01 PROCEDURE — 99215 OFFICE O/P EST HI 40 MIN: CPT | Performed by: INTERNAL MEDICINE

## 2023-06-01 PROCEDURE — 96375 TX/PRO/DX INJ NEW DRUG ADDON: CPT

## 2023-06-01 RX ORDER — SODIUM CHLORIDE 9 MG/ML
10 INJECTION INTRAVENOUS ONCE
OUTPATIENT
Start: 2023-06-19

## 2023-06-01 RX ORDER — HEPARIN SODIUM (PORCINE) LOCK FLUSH IV SOLN 100 UNIT/ML 100 UNIT/ML
5 SOLUTION INTRAVENOUS ONCE
Status: COMPLETED | OUTPATIENT
Start: 2023-06-01 | End: 2023-06-01

## 2023-06-01 RX ORDER — HEPARIN SODIUM (PORCINE) LOCK FLUSH IV SOLN 100 UNIT/ML 100 UNIT/ML
5 SOLUTION INTRAVENOUS ONCE
OUTPATIENT
Start: 2023-06-19

## 2023-06-01 RX ORDER — HEPARIN SODIUM (PORCINE) LOCK FLUSH IV SOLN 100 UNIT/ML 100 UNIT/ML
SOLUTION INTRAVENOUS
Status: COMPLETED
Start: 2023-06-01 | End: 2023-06-01

## 2023-06-01 RX ADMIN — HEPARIN SODIUM (PORCINE) LOCK FLUSH IV SOLN 100 UNIT/ML 500 UNITS: 100 SOLUTION INTRAVENOUS at 16:15:00

## 2023-06-01 NOTE — PROGRESS NOTES
Pt here for [facility]. Arrives Ambulating independently, accompanied by Self       Oral medications included in this regimen:  no    Patient confirms comprehension of cancer treatment schedule:  yes    Pregnancy screening:  Denies possibility of pregnancy    Modifications in dose or schedule:  No    Medications appearance and physical integrity checked by RN.  Chemotherapy IV pump settings verified by 2 RNs:  yes     Frequency of blood return and site check throughout administration: Prior to administration and At completion of therapy     Infusion/treatment outcome:  patient tolerated treatment without incident    Education Record    Learner:  Patient  Barriers / Limitations:  None  Method:  Brief focused and Discussion  Education / instructions given:  Plan of care for treatment today reviewed  Outcome:  Shows understanding    Discharged Home, Ambulating independently, accompanied by:Self    Patient/family verbalized understanding of future appointments: by [facility] [employer]

## 2023-06-08 ENCOUNTER — TELEPHONE (OUTPATIENT)
Dept: INTERNAL MEDICINE CLINIC | Facility: CLINIC | Age: 38
End: 2023-06-08

## 2023-06-08 RX ORDER — HYDROCODONE BITARTRATE AND ACETAMINOPHEN 10; 325 MG/1; MG/1
TABLET ORAL
Qty: 105 TABLET | Refills: 0 | Status: SHIPPED | OUTPATIENT
Start: 2023-06-08

## 2023-06-08 NOTE — TELEPHONE ENCOUNTER
Pt here to refill her norco; had to increase dose to 7 1/2 tabs daily due to her chemotx cahsing her diffuse athralgias; Will be finishing chemotx soon. Plan then to reduce dose next time as d/w pt[place]

## 2023-06-19 ENCOUNTER — TELEPHONE (OUTPATIENT)
Dept: INTERNAL MEDICINE CLINIC | Facility: CLINIC | Age: 38
End: 2023-06-19

## 2023-06-19 RX ORDER — HYDROCODONE BITARTRATE AND ACETAMINOPHEN 10; 325 MG/1; MG/1
TABLET ORAL
Qty: 84 TABLET | Refills: 0 | Status: SHIPPED | OUTPATIENT
Start: 2023-06-19 | End: 2023-06-19

## 2023-06-19 RX ORDER — HYDROCODONE BITARTRATE AND ACETAMINOPHEN 10; 325 MG/1; MG/1
TABLET ORAL
Qty: 84 TABLET | Refills: 0 | Status: SHIPPED | OUTPATIENT
Start: 2023-06-19

## 2023-06-19 NOTE — TELEPHONE ENCOUNTER
Pt wil back, norco not available from her pharmacy.  [facility]

## 2023-06-20 ENCOUNTER — OFFICE VISIT (OUTPATIENT)
Dept: SURGERY | Facility: CLINIC | Age: 38
End: 2023-06-20
Payer: COMMERCIAL

## 2023-06-20 VITALS — WEIGHT: 192.19 LBS | BODY MASS INDEX: 32.02 KG/M2 | HEIGHT: 64.8 IN

## 2023-06-20 DIAGNOSIS — Z90.13 ABSENCE OF BOTH BREASTS: Primary | ICD-10-CM

## 2023-06-20 PROCEDURE — 3008F BODY MASS INDEX DOCD: CPT | Performed by: SURGERY

## 2023-06-20 PROCEDURE — 99213 OFFICE O/P EST LOW 20 MIN: CPT | Performed by: SURGERY

## 2023-06-20 NOTE — PROGRESS NOTES
Tiff Modi is a 42year old female who presents today for a follow-up. Since her last visit, the patient underwent CT angiogram of the abdominal wall which showed patency of the deep inferior gastric system and suitable perforators. She would like to proceed with flap based reconstruction after the completion of her chemotherapy. Physical Examination:  Breasts: Bilateral breast incisions are clean dry intact. Abdomen: Moderate lower abdominal pannus striations is noted. Right lower quadrant scar is noted. No palpable hernias detected. Assessment and Plan: We reviewed the plan for removal of bilateral breast tissue expanders and delayed reconstruction with abdominal free flap. The risks of surgery including but not limited to bleeding, infection, scarring, seroma, delayed wound healing, partial/total flap loss, fat necrosis, asymmetry, injury to adjacent structures, abdominal hernia/weakness/bulge, need for further surgery, and venous thromboembolism were reviewed. The expected post-operative course was discussed including the need for activity limitation, drains, and suture removal. Finally, we discussed the possible need for revisions as well as the process of nipple areolar reconstruction. Multiple questions were answered to the patient's satisfaction. No guarantees as to outcome were offered. The patient expresses understanding and wishes to proceed. A total of 20 minutes was spent reviewing the patient's history and diagnostic testing, examining the patient, reviewing reconstructive options, and coordinating the patient's care.

## 2023-06-20 NOTE — PATIENT INSTRUCTIONS
Surgeon:         Dr. Ja Sosa                                        Tel:         [phone]                                  Fax:        [phone]    Surgery/Procedure:    Delayed bilateral breast reconstruction with removal of bilateral tissue expanders, abdominal free flap. Admit to ICU, 10 hours                                      Chemo therapy to be completed November 2023    Hospital:  [facility]: [address] [place] [address] [address]vd, [place], [address]           [phone]  [facility]: [address], [facility]               [phone]    1. Someone will need to drive you to and from the hospital if your procedure is outpatient. 2.Do not drink alcohol or smoke 24 hours prior to your procedure. 3. Bring a picture ID and your insurance card. 4. You will be contacted by the hospital the day before to confirm the procedure time and location. 5. The hospital will also contact you approximately one week before surgery to schedule your COVID test (only if surgery is inpatient/overnight stay)     6. Do not take any herbal supplements or blood thinners at least one week before your procedure/surgery. This includes NSAID's (aspirin, baby aspirin, Motrin, Ibuprofen, Aleve, Advil, Naproxen, etc), Plavix, fish oil, vitamin E, turmeric, CoQ10, or green tea supplements, etc. *TYLENOL or acetaminophen is ok to take*    7. PRE-OPERATIVE TESTING: History and physical with medical clearance is REQUIRED within 30 days of the surgery date and is mandatory per Dr. Tawana Mendoza. *If this is not done, your surgery will be postponed*  MEDICAL CLEARANCE WITH DR. Emily Alonso  CBC  CMP  EKG    Oncology Clearance with Dr. Feliberto Lilly Tamoxifen 2 weeks prior to and after surgery     8. Please inform us if you develop any Covid-19 like symptoms, test positive or have been exposed for Covid- 19 prior to surgery.      Consent obtained  Photos taken on 6/20/23

## 2023-06-21 ENCOUNTER — TELEPHONE (OUTPATIENT)
Dept: HEMATOLOGY/ONCOLOGY | Facility: HOSPITAL | Age: 38
End: 2023-06-21

## 2023-06-21 ENCOUNTER — TELEPHONE (OUTPATIENT)
Dept: SURGERY | Facility: CLINIC | Age: 38
End: 2023-06-21

## 2023-06-21 DIAGNOSIS — Z90.13 ABSENCE OF BOTH BREASTS: Primary | ICD-10-CM

## 2023-06-21 NOTE — TELEPHONE ENCOUNTER
Calling pt in regards to scheduling surgery. Informed pt that I have 03/04/2024 available at [facility] with Dr. Alma Mays. Pt verbalized understanding and in agreement with date and location. All questions answered. Encouraged pt to call or [employer] message office with any other questions or concerns.

## 2023-06-22 ENCOUNTER — OFFICE VISIT (OUTPATIENT)
Dept: HEMATOLOGY/ONCOLOGY | Facility: HOSPITAL | Age: 38
End: 2023-06-22
Attending: INTERNAL MEDICINE
Payer: COMMERCIAL

## 2023-06-22 VITALS
SYSTOLIC BLOOD PRESSURE: 128 MMHG | WEIGHT: 188 LBS | HEART RATE: 85 BPM | HEIGHT: 65.5 IN | RESPIRATION RATE: 18 BRPM | OXYGEN SATURATION: 99 % | DIASTOLIC BLOOD PRESSURE: 87 MMHG | BODY MASS INDEX: 30.95 KG/M2 | TEMPERATURE: 98 F

## 2023-06-22 DIAGNOSIS — Z51.11 CHEMOTHERAPY MANAGEMENT, ENCOUNTER FOR: ICD-10-CM

## 2023-06-22 DIAGNOSIS — C50.911 BREAST CANCER, STAGE 1, ESTROGEN RECEPTOR POSITIVE, RIGHT (HCC): Primary | ICD-10-CM

## 2023-06-22 DIAGNOSIS — Z17.0 BREAST CANCER, STAGE 1, ESTROGEN RECEPTOR POSITIVE, RIGHT (HCC): Primary | ICD-10-CM

## 2023-06-22 LAB
ALBUMIN SERPL-MCNC: 3.9 G/DL (ref 3.4–5)
ALBUMIN/GLOB SERPL: 1 {RATIO} (ref 1–2)
ALP LIVER SERPL-CCNC: 48 U/L
ALT SERPL-CCNC: 29 U/L
ANION GAP SERPL CALC-SCNC: 6 MMOL/L (ref 0–18)
AST SERPL-CCNC: 22 U/L (ref 15–37)
BASOPHILS # BLD AUTO: 0.03 X10(3) UL (ref 0–0.2)
BASOPHILS NFR BLD AUTO: 0.3 %
BILIRUB SERPL-MCNC: 0.3 MG/DL (ref 0.1–2)
BUN BLD-MCNC: 10 MG/DL (ref 7–18)
BUN/CREAT SERPL: 13.2 (ref 10–20)
CALCIUM BLD-MCNC: 9.2 MG/DL (ref 8.5–10.1)
CHLORIDE SERPL-SCNC: 110 MMOL/L (ref 98–112)
CO2 SERPL-SCNC: 23 MMOL/L (ref 21–32)
CREAT BLD-MCNC: 0.76 MG/DL
DEPRECATED RDW RBC AUTO: 53.4 FL (ref 35.1–46.3)
EOSINOPHIL # BLD AUTO: 0.04 X10(3) UL (ref 0–0.7)
EOSINOPHIL NFR BLD AUTO: 0.4 %
ERYTHROCYTE [DISTWIDTH] IN BLOOD BY AUTOMATED COUNT: 14.8 % (ref 11–15)
GFR SERPLBLD BASED ON 1.73 SQ M-ARVRAT: 102 ML/MIN/1.73M2 (ref 60–?)
GLOBULIN PLAS-MCNC: 3.8 G/DL (ref 2.8–4.4)
GLUCOSE BLD-MCNC: 126 MG/DL (ref 70–99)
HCT VFR BLD AUTO: 40.3 %
HGB BLD-MCNC: 13 G/DL
IMM GRANULOCYTES # BLD AUTO: 0.02 X10(3) UL (ref 0–1)
IMM GRANULOCYTES NFR BLD: 0.2 %
LYMPHOCYTES # BLD AUTO: 3.2 X10(3) UL (ref 1–4)
LYMPHOCYTES NFR BLD AUTO: 35.2 %
MCH RBC QN AUTO: 31.6 PG (ref 26–34)
MCHC RBC AUTO-ENTMCNC: 32.3 G/DL (ref 31–37)
MCV RBC AUTO: 98.1 FL
MONOCYTES # BLD AUTO: 0.4 X10(3) UL (ref 0.1–1)
MONOCYTES NFR BLD AUTO: 4.4 %
NEUTROPHILS # BLD AUTO: 5.4 X10 (3) UL (ref 1.5–7.7)
NEUTROPHILS # BLD AUTO: 5.4 X10(3) UL (ref 1.5–7.7)
NEUTROPHILS NFR BLD AUTO: 59.5 %
OSMOLALITY SERPL CALC.SUM OF ELEC: 289 MOSM/KG (ref 275–295)
PLATELET # BLD AUTO: 356 10(3)UL (ref 150–450)
POTASSIUM SERPL-SCNC: 4.2 MMOL/L (ref 3.5–5.1)
PROT SERPL-MCNC: 7.7 G/DL (ref 6.4–8.2)
RBC # BLD AUTO: 4.11 X10(6)UL
SODIUM SERPL-SCNC: 139 MMOL/L (ref 136–145)
WBC # BLD AUTO: 9.1 X10(3) UL (ref 4–11)

## 2023-06-22 PROCEDURE — 36415 COLL VENOUS BLD VENIPUNCTURE: CPT

## 2023-06-22 PROCEDURE — 99211 OFF/OP EST MAY X REQ PHY/QHP: CPT

## 2023-06-22 PROCEDURE — 85025 COMPLETE CBC W/AUTO DIFF WBC: CPT

## 2023-06-22 PROCEDURE — 80053 COMPREHEN METABOLIC PANEL: CPT

## 2023-06-22 NOTE — PROGRESS NOTES
Patient arrives for C6 D1 KADCYLA. Patient did not receive treatment per patient request.   Patient rescheduled x1 week - no MD or labs needed.

## 2023-06-28 ENCOUNTER — TELEPHONE (OUTPATIENT)
Dept: INTERNAL MEDICINE CLINIC | Facility: CLINIC | Age: 38
End: 2023-06-28

## 2023-06-28 RX ORDER — HYDROCODONE BITARTRATE AND ACETAMINOPHEN 10; 325 MG/1; MG/1
TABLET ORAL
Qty: 84 TABLET | Refills: 0 | Status: SHIPPED | OUTPATIENT
Start: 2023-06-28

## 2023-06-29 ENCOUNTER — OFFICE VISIT (OUTPATIENT)
Dept: HEMATOLOGY/ONCOLOGY | Facility: HOSPITAL | Age: 38
End: 2023-06-29
Attending: INTERNAL MEDICINE
Payer: COMMERCIAL

## 2023-06-29 VITALS
RESPIRATION RATE: 18 BRPM | WEIGHT: 190.38 LBS | SYSTOLIC BLOOD PRESSURE: 157 MMHG | OXYGEN SATURATION: 99 % | DIASTOLIC BLOOD PRESSURE: 90 MMHG | BODY MASS INDEX: 31 KG/M2 | HEART RATE: 86 BPM | TEMPERATURE: 98 F

## 2023-06-29 DIAGNOSIS — Z17.0 BREAST CANCER, STAGE 1, ESTROGEN RECEPTOR POSITIVE, RIGHT (HCC): Primary | ICD-10-CM

## 2023-06-29 DIAGNOSIS — T45.1X5A ANEMIA DUE TO ANTINEOPLASTIC CHEMOTHERAPY: ICD-10-CM

## 2023-06-29 DIAGNOSIS — C50.911 BREAST CANCER, STAGE 1, ESTROGEN RECEPTOR POSITIVE, RIGHT (HCC): Primary | ICD-10-CM

## 2023-06-29 DIAGNOSIS — Z51.11 CHEMOTHERAPY MANAGEMENT, ENCOUNTER FOR: ICD-10-CM

## 2023-06-29 DIAGNOSIS — K90.9 MALABSORPTION OF IRON: ICD-10-CM

## 2023-06-29 DIAGNOSIS — D64.81 ANEMIA DUE TO ANTINEOPLASTIC CHEMOTHERAPY: ICD-10-CM

## 2023-06-29 PROCEDURE — 96413 CHEMO IV INFUSION 1 HR: CPT

## 2023-06-29 PROCEDURE — 96375 TX/PRO/DX INJ NEW DRUG ADDON: CPT

## 2023-06-29 RX ORDER — HEPARIN SODIUM (PORCINE) LOCK FLUSH IV SOLN 100 UNIT/ML 100 UNIT/ML
SOLUTION INTRAVENOUS
Status: COMPLETED
Start: 2023-06-29 | End: 2023-06-29

## 2023-06-29 RX ORDER — HEPARIN SODIUM (PORCINE) LOCK FLUSH IV SOLN 100 UNIT/ML 100 UNIT/ML
5 SOLUTION INTRAVENOUS ONCE
Status: COMPLETED | OUTPATIENT
Start: 2023-06-29 | End: 2023-06-29

## 2023-06-29 RX ORDER — SODIUM CHLORIDE 9 MG/ML
10 INJECTION INTRAVENOUS ONCE
OUTPATIENT
Start: 2023-07-13

## 2023-06-29 RX ORDER — HEPARIN SODIUM (PORCINE) LOCK FLUSH IV SOLN 100 UNIT/ML 100 UNIT/ML
5 SOLUTION INTRAVENOUS ONCE
OUTPATIENT
Start: 2023-07-13

## 2023-06-29 RX ADMIN — HEPARIN SODIUM (PORCINE) LOCK FLUSH IV SOLN 100 UNIT/ML 500 UNITS: 100 SOLUTION INTRAVENOUS at 12:00:00

## 2023-06-30 RX ORDER — CYCLOBENZAPRINE HCL 10 MG
10 TABLET ORAL 3 TIMES DAILY
Qty: 90 TABLET | Refills: 0 | Status: SHIPPED | OUTPATIENT
Start: 2023-06-30

## 2023-07-05 RX ORDER — BUTALBITAL, ACETAMINOPHEN AND CAFFEINE 50; 325; 40 MG/1; MG/1; MG/1
1 TABLET ORAL EVERY 8 HOURS PRN
Qty: 30 TABLET | Refills: 2 | Status: SHIPPED | OUTPATIENT
Start: 2023-07-05

## 2023-07-05 NOTE — TELEPHONE ENCOUNTER
Please review. Protocol failed or has no protocol.     Requested Prescriptions   Pending Prescriptions Disp Refills    BUTALBITAL-ACETAMINOPHEN-CAFFEINE -40 MG Oral Tab [Pharmacy Med Name: EIOXOB-[id number]-NDUD -40] 30 tablet 0     Sig: TAKE 1 TABLET BY MOUTH EVERY 8 HOURS AS NEEDED FOR HEADACHE       There is no refill protocol information for this order          Recent Outpatient Visits              6 days ago Breast cancer, stage 1, estrogen receptor positive, right ([facility] [address]    [address] Visit    1 week ago     [address] Visit    1 week ago Breast cancer, stage 1, estrogen receptor positive, right [facility] Hematology Oncology Debbie Romano MD    Office Visit    1 week ago Breast cancer, stage 1, estrogen receptor positive, right ([facility] [address]    [address] - Infusion    Nurse Only    2 weeks ago Absence of both breasts    Connor Posada MD    Office Visit            Future Appointments         Provider Department Appt Notes    In 2 weeks EM [address] - Infusion SD CBC CMP PORT-MYJ    In 2 weeks Patricia Knowles [facility] Hematology Oncology PRE CHEMO VISIT    In 2 weeks EM CC INFRN [address] - Infusion SD C7 D1 Kadcyla-PORT-MYJ* Auth'd until 1-30-24*    In 1 month EM [address]. CBC CMP PORT-MYJ    In 1 month Cynthia Tuttle MD [facility] Hematology Oncology PRE CHEMO VISITtransfer from [place]    In 1 month [facility]fusion SD C8 D1 Kadcyla-PORT-MYJ* Auth'd until 1-30-24*

## 2023-07-13 ENCOUNTER — APPOINTMENT (OUTPATIENT)
Dept: HEMATOLOGY/ONCOLOGY | Facility: HOSPITAL | Age: 38
End: 2023-07-13
Attending: INTERNAL MEDICINE
Payer: COMMERCIAL

## 2023-07-13 ENCOUNTER — APPOINTMENT (OUTPATIENT)
Dept: HEMATOLOGY/ONCOLOGY | Facility: HOSPITAL | Age: 38
End: 2023-07-13
Attending: PHYSICIAN ASSISTANT
Payer: COMMERCIAL

## 2023-07-14 ENCOUNTER — TELEPHONE (OUTPATIENT)
Dept: INTERNAL MEDICINE CLINIC | Facility: CLINIC | Age: 38
End: 2023-07-14

## 2023-07-14 RX ORDER — HYDROCODONE BITARTRATE AND ACETAMINOPHEN 10; 325 MG/1; MG/1
TABLET ORAL
Qty: 84 TABLET | Refills: 0 | Status: SHIPPED | OUTPATIENT
Start: 2023-07-14

## 2023-07-20 ENCOUNTER — TELEPHONE (OUTPATIENT)
Dept: INTERNAL MEDICINE CLINIC | Facility: CLINIC | Age: 38
End: 2023-07-20

## 2023-07-20 ENCOUNTER — OFFICE VISIT (OUTPATIENT)
Dept: HEMATOLOGY/ONCOLOGY | Facility: HOSPITAL | Age: 38
End: 2023-07-20
Attending: PHYSICIAN ASSISTANT
Payer: COMMERCIAL

## 2023-07-20 ENCOUNTER — NURSE ONLY (OUTPATIENT)
Dept: HEMATOLOGY/ONCOLOGY | Facility: HOSPITAL | Age: 38
End: 2023-07-20
Attending: INTERNAL MEDICINE
Payer: COMMERCIAL

## 2023-07-20 VITALS
HEART RATE: 99 BPM | DIASTOLIC BLOOD PRESSURE: 90 MMHG | RESPIRATION RATE: 18 BRPM | HEIGHT: 65.5 IN | BODY MASS INDEX: 31.14 KG/M2 | WEIGHT: 189.19 LBS | SYSTOLIC BLOOD PRESSURE: 135 MMHG | TEMPERATURE: 98 F | OXYGEN SATURATION: 100 %

## 2023-07-20 DIAGNOSIS — Z17.0 BREAST CANCER, STAGE 1, ESTROGEN RECEPTOR POSITIVE, RIGHT: Primary | ICD-10-CM

## 2023-07-20 DIAGNOSIS — D64.81 ANEMIA DUE TO ANTINEOPLASTIC CHEMOTHERAPY: ICD-10-CM

## 2023-07-20 DIAGNOSIS — C50.911 BREAST CANCER, STAGE 1, ESTROGEN RECEPTOR POSITIVE, RIGHT: Primary | ICD-10-CM

## 2023-07-20 DIAGNOSIS — Z51.11 CHEMOTHERAPY MANAGEMENT, ENCOUNTER FOR: ICD-10-CM

## 2023-07-20 DIAGNOSIS — Z51.11 CHEMOTHERAPY MANAGEMENT, ENCOUNTER FOR: Primary | ICD-10-CM

## 2023-07-20 DIAGNOSIS — T45.1X5A ANEMIA DUE TO ANTINEOPLASTIC CHEMOTHERAPY: ICD-10-CM

## 2023-07-20 DIAGNOSIS — E87.6 HYPOKALEMIA: ICD-10-CM

## 2023-07-20 LAB
ALBUMIN SERPL-MCNC: 3.6 G/DL (ref 3.4–5)
ALBUMIN/GLOB SERPL: 1 {RATIO} (ref 1–2)
ALP LIVER SERPL-CCNC: 41 U/L
ALT SERPL-CCNC: 18 U/L
ANION GAP SERPL CALC-SCNC: 9 MMOL/L (ref 0–18)
AST SERPL-CCNC: 10 U/L (ref 15–37)
BASOPHILS # BLD AUTO: 0.02 X10(3) UL (ref 0–0.2)
BASOPHILS NFR BLD AUTO: 0.2 %
BILIRUB SERPL-MCNC: 0.2 MG/DL (ref 0.1–2)
BUN BLD-MCNC: 20 MG/DL (ref 7–18)
BUN/CREAT SERPL: 24.4 (ref 10–20)
CALCIUM BLD-MCNC: 9.1 MG/DL (ref 8.5–10.1)
CHLORIDE SERPL-SCNC: 108 MMOL/L (ref 98–112)
CO2 SERPL-SCNC: 25 MMOL/L (ref 21–32)
CREAT BLD-MCNC: 0.82 MG/DL
DEPRECATED RDW RBC AUTO: 48.5 FL (ref 35.1–46.3)
EOSINOPHIL # BLD AUTO: 0 X10(3) UL (ref 0–0.7)
EOSINOPHIL NFR BLD AUTO: 0 %
ERYTHROCYTE [DISTWIDTH] IN BLOOD BY AUTOMATED COUNT: 13.5 % (ref 11–15)
GFR SERPLBLD BASED ON 1.73 SQ M-ARVRAT: 93 ML/MIN/1.73M2 (ref 60–?)
GLOBULIN PLAS-MCNC: 3.6 G/DL (ref 2.8–4.4)
GLUCOSE BLD-MCNC: 102 MG/DL (ref 70–99)
HCT VFR BLD AUTO: 36.2 %
HGB BLD-MCNC: 11.9 G/DL
IMM GRANULOCYTES # BLD AUTO: 0.03 X10(3) UL (ref 0–1)
IMM GRANULOCYTES NFR BLD: 0.3 %
LYMPHOCYTES # BLD AUTO: 3.19 X10(3) UL (ref 1–4)
LYMPHOCYTES NFR BLD AUTO: 30.6 %
MCH RBC QN AUTO: 31.8 PG (ref 26–34)
MCHC RBC AUTO-ENTMCNC: 32.9 G/DL (ref 31–37)
MCV RBC AUTO: 96.8 FL
MONOCYTES # BLD AUTO: 0.43 X10(3) UL (ref 0.1–1)
MONOCYTES NFR BLD AUTO: 4.1 %
NEUTROPHILS # BLD AUTO: 6.75 X10 (3) UL (ref 1.5–7.7)
NEUTROPHILS # BLD AUTO: 6.75 X10(3) UL (ref 1.5–7.7)
NEUTROPHILS NFR BLD AUTO: 64.8 %
OSMOLALITY SERPL CALC.SUM OF ELEC: 297 MOSM/KG (ref 275–295)
PLATELET # BLD AUTO: 242 10(3)UL (ref 150–450)
POTASSIUM SERPL-SCNC: 3.2 MMOL/L (ref 3.5–5.1)
PROT SERPL-MCNC: 7.2 G/DL (ref 6.4–8.2)
RBC # BLD AUTO: 3.74 X10(6)UL
SODIUM SERPL-SCNC: 142 MMOL/L (ref 136–145)
WBC # BLD AUTO: 10.4 X10(3) UL (ref 4–11)

## 2023-07-20 PROCEDURE — 85025 COMPLETE CBC W/AUTO DIFF WBC: CPT

## 2023-07-20 PROCEDURE — 36591 DRAW BLOOD OFF VENOUS DEVICE: CPT

## 2023-07-20 PROCEDURE — 80053 COMPREHEN METABOLIC PANEL: CPT

## 2023-07-20 PROCEDURE — 99214 OFFICE O/P EST MOD 30 MIN: CPT | Performed by: PHYSICIAN ASSISTANT

## 2023-07-20 RX ORDER — HEPARIN SODIUM (PORCINE) LOCK FLUSH IV SOLN 100 UNIT/ML 100 UNIT/ML
SOLUTION INTRAVENOUS
Status: COMPLETED
Start: 2023-07-20 | End: 2023-07-20

## 2023-07-20 RX ORDER — HYDROCODONE BITARTRATE AND ACETAMINOPHEN 10; 325 MG/1; MG/1
TABLET ORAL
Qty: 126 TABLET | Refills: 0 | Status: SHIPPED | OUTPATIENT
Start: 2023-07-20

## 2023-07-20 RX ORDER — HEPARIN SODIUM (PORCINE) LOCK FLUSH IV SOLN 100 UNIT/ML 100 UNIT/ML
5 SOLUTION INTRAVENOUS ONCE
Status: COMPLETED | OUTPATIENT
Start: 2023-07-20 | End: 2023-07-20

## 2023-07-20 RX ORDER — HEPARIN SODIUM (PORCINE) LOCK FLUSH IV SOLN 100 UNIT/ML 100 UNIT/ML
5 SOLUTION INTRAVENOUS ONCE
OUTPATIENT
Start: 2023-08-03

## 2023-07-20 RX ADMIN — HEPARIN SODIUM (PORCINE) LOCK FLUSH IV SOLN 100 UNIT/ML 500 UNITS: 100 SOLUTION INTRAVENOUS at 11:01:00

## 2023-07-20 NOTE — PROGRESS NOTES
Cancer Center Progress Note    Patient Name: Scotty Vega   YOB: 1985   Medical Record Number: [id number]     Chief Complaint:  Breast cancer  Chemotherapy    Oncology History:  34year old premenopausal with palpable right breast mass T2(2.3cm) N0 12:00 4cm nipple, bx: IDC, ER/NC>90% Her 3+, Ki 46%, g3. MRI with 2 areas of focal non mass enhancement bx, benign and concordant. Genetics negative. S/p 6C TCHP with dose reduced T (7/28/22-11/21/22) now s/p bilateral mastectomies: [id number]. Currently on Kadcyla    7/20/23  Interim HPI:  Here for C7 labs/Kadcyla. Reports to be having flu like sxs the last few days. Dtr's friend had the flu. Body aches. Bp up but took cold meds. Abd discomfort with food. Mild nausea. Still w/ loose stools - usually controlled w/ imodium. Going out of town tomorrow - would like to defer tx x 1 week. Having menstrual cycle - about 3C so far. PMH/PSH: herniated disc with back pain, urethral stricture, migraine, left forarm lipoma  Family History: Pcousin with breast ca age 25- was neg for genetics  Social History: works for [employer], non smoker. Current Outpatient Medications:     HYDROcodone-acetaminophen (NORCO)  MG Oral Tab, Take 1 to 2 tabs po q 4 to  6hr prn for pain, Disp: 126 tablet, Rfl: 0    butalbital-acetaminophen-caffeine -40 MG Oral Tab, Take 1 tablet by mouth every 8 (eight) hours as needed for Headaches., Disp: 30 tablet, Rfl: 2    cyclobenzaprine 10 MG Oral Tab, Take 1 tablet (10 mg total) by mouth 3 (three) times daily. , Disp: 90 tablet, Rfl: 0    tobramycin 0.3 % Ophthalmic Ointment, Apply to nasal lesion daily, Disp: 3.5 g, Rfl: 0    tamoxifen 20 MG Oral Tab, Take 1 tablet (20 mg total) by mouth daily. , Disp: 90 tablet, Rfl: 3    nitrofurantoin monohydrate macro 100 MG Oral Cap, Take 1 capsule (100 mg total) by mouth 2 (two) times daily.  (Patient not taking: Reported on 5/25/2023), Disp: 14 capsule, Rfl: 0    mupirocin 2 % External Ointment, Apply 1 Application topically 2 (two) times daily. , Disp: 30 g, Rfl: 3    diphenoxylate-atropine 2.5-0.025 MG Oral Tab, Take 1-2 tablets by mouth 4 (four) times daily as needed for Diarrhea., Disp: 120 tablet, Rfl: 3    docusate sodium 100 MG Oral Cap, Take 1 capsule (100 mg total) by mouth 2 (two) times daily. (Patient not taking: Reported on 5/25/2023), Disp: 30 capsule, Rfl: 1    ondansetron (ZOFRAN) 4 mg tablet, Take 1 tablet (4 mg total) by mouth every 8 (eight) hours as needed for Nausea., Disp: 12 tablet, Rfl: 0    lidocaine-prilocaine 2.5-2.5 % External Cream, Apply to site 1 hour prior to port a cath needle insertion (Patient not taking: Reported on 5/25/2023), Disp: 1 each, Rfl: 3      Codeine                 SWELLING    Comment:Other reaction(s): Swelling  Dust Mite Extract       HIVES    Comment:Nasal congestion  Grass                   HIVES    Comment:Nasal congestion  Aspirin                 SWELLING    Comment:Other reaction(s): ASPIRIN  Adhesive Tape           RASH     Review of Systems: Oncology specific ROS negative except as per HPI    LMP 05/23/2023 (Exact Date)   Wt Readings from Last 6 Encounters:  06/29/23 : 86.4 kg (190 lb 6.4 oz)  06/22/23 : 85.3 kg (188 lb)  06/20/23 : 87.2 kg (192 lb 3.2 oz)  06/01/23 : 87.1 kg (192 lb)  05/25/23 : 85.8 kg (189 lb 3.2 oz)  05/08/23 : 89.4 kg (197 lb)  General: Patient is alert and oriented x 3, not in acute distress. HEENT: non icterus  Breast: b/l mastectomy with expander, normal exam otherwise  Ext: no edema.   Neurological: motor strength grossly intact, MA4E    Laboratory assessed and reviewed today    Impression and Plan:   Cancer Staging   Breast cancer, stage 1, estrogen receptor positive, right   Staging form: Breast, AJCC 8th Edition  - Clinical: Stage IB (cT2, cN0, cM0, G3, ER+, KS+, HER2+) - Signed by Seema Madden MD on 7/28/2022  [id number] S/p b/l mastectomy ,no XRT indication  - C7/14 Kadcyla - defer today, administer next week.   - Echo in 3 mo (due 8/2023)  ovarian suppression not tolerated, to start tamoxifen after completion of kadcyla  She is aware of utilizing other forms of birthcontrol while on chemo  Defer tx today - reattempt next week. Hypokalemia - 2/2 decrease in appetite from flu/loose stools. Encouraged potassium rich foods. Left IT band numbness as above. CT A/P completed - L5/S1 grade 1 t. Diarrhea - on lomotil, taking 2 tabs w/ good relief   Chronic joint aches/pains, on norco (refill per primary)    MDM: moderate. Lab review. New and ongoing issues.      Ema Nevarez PA-C

## 2023-07-20 NOTE — TELEPHONE ENCOUNTER
Pt with increassing joint pains; having chemo soon but having breakthrough menstrual bleeding which pt should not have. She will see onco today; pt had increased dose ; max 9 tabs/day  [place]  Next [place] 8/4

## 2023-07-27 ENCOUNTER — OFFICE VISIT (OUTPATIENT)
Dept: HEMATOLOGY/ONCOLOGY | Facility: HOSPITAL | Age: 38
End: 2023-07-27
Attending: INTERNAL MEDICINE
Payer: COMMERCIAL

## 2023-07-27 VITALS
SYSTOLIC BLOOD PRESSURE: 147 MMHG | DIASTOLIC BLOOD PRESSURE: 89 MMHG | BODY MASS INDEX: 30.45 KG/M2 | HEART RATE: 92 BPM | WEIGHT: 185 LBS | TEMPERATURE: 98 F | HEIGHT: 65.5 IN | RESPIRATION RATE: 18 BRPM | OXYGEN SATURATION: 99 %

## 2023-07-27 DIAGNOSIS — Z51.11 CHEMOTHERAPY MANAGEMENT, ENCOUNTER FOR: ICD-10-CM

## 2023-07-27 DIAGNOSIS — Z17.0 BREAST CANCER, STAGE 1, ESTROGEN RECEPTOR POSITIVE, RIGHT: Primary | ICD-10-CM

## 2023-07-27 DIAGNOSIS — C50.911 BREAST CANCER, STAGE 1, ESTROGEN RECEPTOR POSITIVE, RIGHT: Primary | ICD-10-CM

## 2023-07-27 DIAGNOSIS — E87.6 HYPOKALEMIA: Primary | ICD-10-CM

## 2023-07-27 DIAGNOSIS — E87.6 HYPOKALEMIA: ICD-10-CM

## 2023-07-27 LAB — POTASSIUM SERPL-SCNC: 3.9 MMOL/L (ref 3.5–5.1)

## 2023-07-27 PROCEDURE — 96375 TX/PRO/DX INJ NEW DRUG ADDON: CPT

## 2023-07-27 PROCEDURE — 96413 CHEMO IV INFUSION 1 HR: CPT

## 2023-07-27 PROCEDURE — 84132 ASSAY OF SERUM POTASSIUM: CPT

## 2023-07-27 RX ORDER — HEPARIN SODIUM (PORCINE) LOCK FLUSH IV SOLN 100 UNIT/ML 100 UNIT/ML
5 SOLUTION INTRAVENOUS ONCE
OUTPATIENT
Start: 2023-08-03

## 2023-07-27 RX ORDER — HEPARIN SODIUM (PORCINE) LOCK FLUSH IV SOLN 100 UNIT/ML 100 UNIT/ML
SOLUTION INTRAVENOUS
Status: COMPLETED
Start: 2023-07-27 | End: 2023-07-27

## 2023-07-27 RX ORDER — HEPARIN SODIUM (PORCINE) LOCK FLUSH IV SOLN 100 UNIT/ML 100 UNIT/ML
5 SOLUTION INTRAVENOUS ONCE
Status: COMPLETED | OUTPATIENT
Start: 2023-07-27 | End: 2023-07-27

## 2023-07-27 RX ADMIN — HEPARIN SODIUM (PORCINE) LOCK FLUSH IV SOLN 100 UNIT/ML 500 UNITS: 100 SOLUTION INTRAVENOUS at 14:43:00

## 2023-07-27 NOTE — PROGRESS NOTES
Pt here for [address]. Arrives Ambulating independently, accompanied by Yosvany Cortes / MD visit 7/20 - was recovering from feeling sick and delayed tx 1 week. Patient feeling well overall today. K redrawn today - WNL. Oral medications included in this regimen:  no    Patient confirms comprehension of cancer treatment schedule:  yes    Pregnancy screening:  Denies possibility of pregnancy    Modifications in dose or schedule:  No    Medications appearance and physical integrity checked by RN.  Chemotherapy IV pump settings verified by 2 RNs:  yes     Frequency of blood return and site check throughout administration: Prior to administration, Prior to each drug, and At completion of therapy     Infusion/treatment outcome:  patient tolerated treatment without incident    Education Record    Learner:  Patient  Barriers / Limitations:  None  Method:  Discussion  Education / instructions given:  treatment plan, schedule, lab results   Outcome:  Shows understanding    Discharged Home, Ambulating independently, accompanied by:Self    Patient/family verbalized understanding of future appointments: by [facility]

## 2023-08-03 ENCOUNTER — APPOINTMENT (OUTPATIENT)
Dept: HEMATOLOGY/ONCOLOGY | Facility: HOSPITAL | Age: 38
End: 2023-08-03
Attending: INTERNAL MEDICINE
Payer: COMMERCIAL

## 2023-08-10 ENCOUNTER — APPOINTMENT (OUTPATIENT)
Dept: HEMATOLOGY/ONCOLOGY | Facility: HOSPITAL | Age: 38
End: 2023-08-10
Attending: INTERNAL MEDICINE
Payer: COMMERCIAL

## 2023-08-11 ENCOUNTER — TELEPHONE (OUTPATIENT)
Dept: INTERNAL MEDICINE CLINIC | Facility: CLINIC | Age: 38
End: 2023-08-11

## 2023-08-11 RX ORDER — HYDROCODONE BITARTRATE AND ACETAMINOPHEN 10; 325 MG/1; MG/1
TABLET ORAL
Qty: 84 TABLET | Refills: 0 | Status: SHIPPED | OUTPATIENT
Start: 2023-08-11

## 2023-08-17 ENCOUNTER — OFFICE VISIT (OUTPATIENT)
Dept: HEMATOLOGY/ONCOLOGY | Facility: HOSPITAL | Age: 38
End: 2023-08-17
Attending: INTERNAL MEDICINE
Payer: COMMERCIAL

## 2023-08-17 VITALS
DIASTOLIC BLOOD PRESSURE: 98 MMHG | SYSTOLIC BLOOD PRESSURE: 150 MMHG | RESPIRATION RATE: 20 BRPM | WEIGHT: 181.63 LBS | HEIGHT: 65.5 IN | HEART RATE: 91 BPM | TEMPERATURE: 98 F | BODY MASS INDEX: 29.9 KG/M2 | OXYGEN SATURATION: 100 %

## 2023-08-17 DIAGNOSIS — T45.1X5A CHEMOTHERAPY INDUCED DIARRHEA: ICD-10-CM

## 2023-08-17 DIAGNOSIS — G62.0 CHEMOTHERAPY-INDUCED PERIPHERAL NEUROPATHY: ICD-10-CM

## 2023-08-17 DIAGNOSIS — K52.1 CHEMOTHERAPY INDUCED DIARRHEA: ICD-10-CM

## 2023-08-17 DIAGNOSIS — Z17.0 BREAST CANCER, STAGE 1, ESTROGEN RECEPTOR POSITIVE, RIGHT: Primary | ICD-10-CM

## 2023-08-17 DIAGNOSIS — M25.50 ARTHRALGIA, UNSPECIFIED JOINT: ICD-10-CM

## 2023-08-17 DIAGNOSIS — T45.1X5A CHEMOTHERAPY-INDUCED PERIPHERAL NEUROPATHY: ICD-10-CM

## 2023-08-17 DIAGNOSIS — C50.911 MALIGNANT NEOPLASM OF RIGHT BREAST IN FEMALE, ESTROGEN RECEPTOR POSITIVE, UNSPECIFIED SITE OF BREAST: Primary | ICD-10-CM

## 2023-08-17 DIAGNOSIS — Z90.13 S/P BILATERAL MASTECTOMY: ICD-10-CM

## 2023-08-17 DIAGNOSIS — Z51.11 CHEMOTHERAPY MANAGEMENT, ENCOUNTER FOR: ICD-10-CM

## 2023-08-17 DIAGNOSIS — C50.911 BREAST CANCER, STAGE 1, ESTROGEN RECEPTOR POSITIVE, RIGHT: Primary | ICD-10-CM

## 2023-08-17 DIAGNOSIS — E87.6 HYPOKALEMIA: ICD-10-CM

## 2023-08-17 DIAGNOSIS — Z17.0 MALIGNANT NEOPLASM OF RIGHT BREAST IN FEMALE, ESTROGEN RECEPTOR POSITIVE, UNSPECIFIED SITE OF BREAST: Primary | ICD-10-CM

## 2023-08-17 LAB
ALBUMIN SERPL-MCNC: 3.8 G/DL (ref 3.4–5)
ALBUMIN/GLOB SERPL: 1.2 {RATIO} (ref 1–2)
ALP LIVER SERPL-CCNC: 43 U/L
ALT SERPL-CCNC: 19 U/L
ANION GAP SERPL CALC-SCNC: 6 MMOL/L (ref 0–18)
AST SERPL-CCNC: 20 U/L (ref 15–37)
BASOPHILS # BLD AUTO: 0.03 X10(3) UL (ref 0–0.2)
BASOPHILS NFR BLD AUTO: 0.3 %
BILIRUB SERPL-MCNC: 0.2 MG/DL (ref 0.1–2)
BUN BLD-MCNC: 10 MG/DL (ref 7–18)
BUN/CREAT SERPL: 14.7 (ref 10–20)
CALCIUM BLD-MCNC: 9.1 MG/DL (ref 8.5–10.1)
CHLORIDE SERPL-SCNC: 108 MMOL/L (ref 98–112)
CO2 SERPL-SCNC: 25 MMOL/L (ref 21–32)
CREAT BLD-MCNC: 0.68 MG/DL
DEPRECATED RDW RBC AUTO: 46.8 FL (ref 35.1–46.3)
EGFRCR SERPLBLD CKD-EPI 2021: 113 ML/MIN/1.73M2 (ref 60–?)
EOSINOPHIL # BLD AUTO: 0.03 X10(3) UL (ref 0–0.7)
EOSINOPHIL NFR BLD AUTO: 0.3 %
ERYTHROCYTE [DISTWIDTH] IN BLOOD BY AUTOMATED COUNT: 13.1 % (ref 11–15)
GLOBULIN PLAS-MCNC: 3.3 G/DL (ref 2.8–4.4)
GLUCOSE BLD-MCNC: 100 MG/DL (ref 70–99)
HCT VFR BLD AUTO: 37.4 %
HGB BLD-MCNC: 12.5 G/DL
IMM GRANULOCYTES # BLD AUTO: 0.02 X10(3) UL (ref 0–1)
IMM GRANULOCYTES NFR BLD: 0.2 %
LYMPHOCYTES # BLD AUTO: 4.03 X10(3) UL (ref 1–4)
LYMPHOCYTES NFR BLD AUTO: 38.2 %
MCH RBC QN AUTO: 32.2 PG (ref 26–34)
MCHC RBC AUTO-ENTMCNC: 33.4 G/DL (ref 31–37)
MCV RBC AUTO: 96.4 FL
MONOCYTES # BLD AUTO: 0.42 X10(3) UL (ref 0.1–1)
MONOCYTES NFR BLD AUTO: 4 %
NEUTROPHILS # BLD AUTO: 6.02 X10 (3) UL (ref 1.5–7.7)
NEUTROPHILS # BLD AUTO: 6.02 X10(3) UL (ref 1.5–7.7)
NEUTROPHILS NFR BLD AUTO: 57 %
OSMOLALITY SERPL CALC.SUM OF ELEC: 287 MOSM/KG (ref 275–295)
PLATELET # BLD AUTO: 283 10(3)UL (ref 150–450)
POTASSIUM SERPL-SCNC: 3.6 MMOL/L (ref 3.5–5.1)
PROT SERPL-MCNC: 7.1 G/DL (ref 6.4–8.2)
RBC # BLD AUTO: 3.88 X10(6)UL
SODIUM SERPL-SCNC: 139 MMOL/L (ref 136–145)
WBC # BLD AUTO: 10.6 X10(3) UL (ref 4–11)

## 2023-08-17 PROCEDURE — 99215 OFFICE O/P EST HI 40 MIN: CPT | Performed by: INTERNAL MEDICINE

## 2023-08-17 PROCEDURE — 85025 COMPLETE CBC W/AUTO DIFF WBC: CPT

## 2023-08-17 PROCEDURE — 96367 TX/PROPH/DG ADDL SEQ IV INF: CPT

## 2023-08-17 PROCEDURE — 96413 CHEMO IV INFUSION 1 HR: CPT

## 2023-08-17 PROCEDURE — 80053 COMPREHEN METABOLIC PANEL: CPT

## 2023-08-17 RX ORDER — HEPARIN SODIUM (PORCINE) LOCK FLUSH IV SOLN 100 UNIT/ML 100 UNIT/ML
5 SOLUTION INTRAVENOUS ONCE
Status: COMPLETED | OUTPATIENT
Start: 2023-08-17 | End: 2023-08-17

## 2023-08-17 RX ORDER — HEPARIN SODIUM (PORCINE) LOCK FLUSH IV SOLN 100 UNIT/ML 100 UNIT/ML
5 SOLUTION INTRAVENOUS ONCE
OUTPATIENT
Start: 2023-08-24

## 2023-08-17 RX ADMIN — HEPARIN SODIUM (PORCINE) LOCK FLUSH IV SOLN 100 UNIT/ML 500 UNITS: 100 SOLUTION INTRAVENOUS at 15:00:00

## 2023-08-17 NOTE — PROGRESS NOTES
Pt here for C8D1 Kadcyla. Arrives Ambulating independently, accompanied by Self. PAC previously accessed in lab. Oral medications included in this regimen:  no    Patient confirms comprehension of cancer treatment schedule:  yes    Pregnancy screening:  Denies possibility of pregnancy    Modifications in dose or schedule:  No    Medications appearance and physical integrity checked by RN.  Chemotherapy IV pump settings verified by 2 RNs:  yes     Frequency of blood return and site check throughout administration: Prior to administration and At completion of therapy     Infusion/treatment outcome:  patient tolerated treatment without incident    Education Record    Learner:  Patient  Barriers / Limitations:  None  Method:  Reinforcement  Education / instructions given:  Reviewed plan of care and treatment regimen  Outcome:  Shows understanding    Discharged Home, Ambulating independently, accompanied by:Self    Patient/family verbalized understanding of future appointments: by [facility]

## 2023-08-19 ENCOUNTER — TELEPHONE (OUTPATIENT)
Dept: INTERNAL MEDICINE CLINIC | Facility: CLINIC | Age: 38
End: 2023-08-19

## 2023-08-19 RX ORDER — NALOXONE HYDROCHLORIDE 4 MG/.1ML
4 SPRAY NASAL AS NEEDED
Qty: 1 KIT | Refills: 0 | Status: SHIPPED | OUTPATIENT
Start: 2023-08-19

## 2023-08-19 RX ORDER — HYDROCODONE BITARTRATE AND ACETAMINOPHEN 10; 325 MG/1; MG/1
TABLET ORAL
Qty: 112 TABLET | Refills: 0 | Status: SHIPPED | OUTPATIENT
Start: 2023-08-19

## 2023-08-28 RX ORDER — CYCLOBENZAPRINE HCL 10 MG
10 TABLET ORAL 3 TIMES DAILY
Qty: 90 TABLET | Refills: 0 | Status: SHIPPED | OUTPATIENT
Start: 2023-08-28

## 2023-08-28 NOTE — TELEPHONE ENCOUNTER
Please review. Protocol failed / Has no protocol.      Requested Prescriptions   Pending Prescriptions Disp Refills    CYCLOBENZAPRINE 10 MG Oral Tab [Pharmacy Med Name: CYCLOBENZAPRINE 10 MG TABLET] 90 tablet 0     Sig: TAKE 1 TABLET BY MOUTH THREE TIMES A DAY       There is no refill protocol information for this order        Future Appointments         Provider Department Appt Notes    In 1 week EM [address] - Infusion SD CBC CMP PORT-SL    In 1 week [facility] Hematology Oncology PRE CHEMO VISITtransfer from [place]    In 1 week EM  [facility] - Infusion SD C9 D1 KADCYLA-PORT-SL* Auth'd until 1-30-24*    In 1 month EM [address] - Infusion SD CBC CMP PORT-SL    In 1 month [facility] Hematology Oncology PRE CHEMO VISITtransfer from [place]    In 1 month EM  [place] - Infusion SD C10 D1 KADCYLA-PORT-SL* Auth'd until 1-30-24*    In 1 month MD Humza Phillips, [place] Medication f/u    In 1 month EM [address] - Infusion SD CBC CMP PORT-SL    In 1 month Delilah Saldana MD [facility] Hematology Oncology PRE CHEMO VISITtransfer from [place]    In 1 month EM  [place] - Infusion SD C11 D1 KADCYLA-PORT-SL* Auth'd until 1-30-24*    In 2 months EM [address] - Infusion SD CBC CMP PORT-SL    In 2 months Delilah Saldana MD [facility] Hematology Oncology PRE CHEMO VISITtransfer from [place]    In 2 months EM  [facility] - Infusion SD C11 D1 KADCYLA-PORT-SL* Auth'd until 1-30-24*           Recent Outpatient Visits              1 week ago Breast cancer, stage 1, estrogen receptor positive, right     [address] Visit    1 week ago Malignant neoplasm of right breast in female, estrogen receptor positive, unspecified site of breast     [facility] Hematology Oncology Roberto Salinas MD    Office Visit    1 week ago Breast cancer, stage 1, estrogen receptor positive, right     [address] - Infusion    Nurse Only    1 month ago Breast cancer, stage 1, estrogen receptor positive, right     [address] Visit    1 month ago Chemotherapy management, encounter for    [address] [place] [address]    Office Visit

## 2023-08-30 ENCOUNTER — TELEPHONE (OUTPATIENT)
Dept: INTERNAL MEDICINE CLINIC | Facility: CLINIC | Age: 38
End: 2023-08-30

## 2023-08-30 RX ORDER — HYDROCODONE BITARTRATE AND ACETAMINOPHEN 10; 325 MG/1; MG/1
TABLET ORAL
Qty: 84 TABLET | Refills: 0 | Status: SHIPPED | OUTPATIENT
Start: 2023-08-30

## 2023-09-05 ENCOUNTER — TELEPHONE (OUTPATIENT)
Dept: INTERNAL MEDICINE CLINIC | Facility: CLINIC | Age: 38
End: 2023-09-05

## 2023-09-05 RX ORDER — HYDROCODONE BITARTRATE AND ACETAMINOPHEN 10; 325 MG/1; MG/1
TABLET ORAL
Qty: 126 TABLET | Refills: 0 | Status: SHIPPED | OUTPATIENT
Start: 2023-09-05

## 2023-09-05 NOTE — TELEPHONE ENCOUNTER
Pt here to  refill for norco; sick late last weekend and taking more norco. Had called to get to see me but no slots available. Tested negative for covid 3 times arleady. She is also going for chemo this week which causes her more pain, thus need to increase dose; we  had again discussed regarding eventual wean off again as we had tried before as well as use of adjuvants which was also tried before.

## 2023-09-07 ENCOUNTER — NURSE ONLY (OUTPATIENT)
Dept: HEMATOLOGY/ONCOLOGY | Facility: HOSPITAL | Age: 38
End: 2023-09-07
Attending: INTERNAL MEDICINE
Payer: COMMERCIAL

## 2023-09-07 VITALS
RESPIRATION RATE: 18 BRPM | HEART RATE: 101 BPM | TEMPERATURE: 99 F | SYSTOLIC BLOOD PRESSURE: 142 MMHG | BODY MASS INDEX: 30.29 KG/M2 | HEIGHT: 65.5 IN | DIASTOLIC BLOOD PRESSURE: 89 MMHG | WEIGHT: 184 LBS | OXYGEN SATURATION: 98 %

## 2023-09-07 DIAGNOSIS — Z51.11 CHEMOTHERAPY MANAGEMENT, ENCOUNTER FOR: ICD-10-CM

## 2023-09-07 DIAGNOSIS — C50.911 BREAST CANCER, STAGE 1, ESTROGEN RECEPTOR POSITIVE, RIGHT: Primary | ICD-10-CM

## 2023-09-07 DIAGNOSIS — Z51.11 CHEMOTHERAPY MANAGEMENT, ENCOUNTER FOR: Primary | ICD-10-CM

## 2023-09-07 DIAGNOSIS — M25.50 ARTHRALGIA, UNSPECIFIED JOINT: ICD-10-CM

## 2023-09-07 DIAGNOSIS — D64.81 ANEMIA DUE TO ANTINEOPLASTIC CHEMOTHERAPY: ICD-10-CM

## 2023-09-07 DIAGNOSIS — Z17.0 BREAST CANCER, STAGE 1, ESTROGEN RECEPTOR POSITIVE, RIGHT: Primary | ICD-10-CM

## 2023-09-07 DIAGNOSIS — T45.1X5A ANEMIA DUE TO ANTINEOPLASTIC CHEMOTHERAPY: ICD-10-CM

## 2023-09-07 LAB
ALBUMIN SERPL-MCNC: 3.5 G/DL (ref 3.4–5)
ALBUMIN/GLOB SERPL: 0.9 {RATIO} (ref 1–2)
ALP LIVER SERPL-CCNC: 44 U/L
ALT SERPL-CCNC: 18 U/L
ANION GAP SERPL CALC-SCNC: 7 MMOL/L (ref 0–18)
AST SERPL-CCNC: 13 U/L (ref 15–37)
BASOPHILS # BLD AUTO: 0.03 X10(3) UL (ref 0–0.2)
BASOPHILS NFR BLD AUTO: 0.3 %
BILIRUB SERPL-MCNC: 0.3 MG/DL (ref 0.1–2)
BUN BLD-MCNC: 12 MG/DL (ref 7–18)
BUN/CREAT SERPL: 14.8 (ref 10–20)
CALCIUM BLD-MCNC: 8.5 MG/DL (ref 8.5–10.1)
CHLORIDE SERPL-SCNC: 108 MMOL/L (ref 98–112)
CO2 SERPL-SCNC: 21 MMOL/L (ref 21–32)
CREAT BLD-MCNC: 0.81 MG/DL
DEPRECATED RDW RBC AUTO: 47.2 FL (ref 35.1–46.3)
EGFRCR SERPLBLD CKD-EPI 2021: 94 ML/MIN/1.73M2 (ref 60–?)
EOSINOPHIL # BLD AUTO: 0.01 X10(3) UL (ref 0–0.7)
EOSINOPHIL NFR BLD AUTO: 0.1 %
ERYTHROCYTE [DISTWIDTH] IN BLOOD BY AUTOMATED COUNT: 13.2 % (ref 11–15)
GLOBULIN PLAS-MCNC: 3.8 G/DL (ref 2.8–4.4)
GLUCOSE BLD-MCNC: 104 MG/DL (ref 70–99)
HCT VFR BLD AUTO: 37.3 %
HGB BLD-MCNC: 11.9 G/DL
IMM GRANULOCYTES # BLD AUTO: 0.02 X10(3) UL (ref 0–1)
IMM GRANULOCYTES NFR BLD: 0.2 %
LYMPHOCYTES # BLD AUTO: 4.56 X10(3) UL (ref 1–4)
LYMPHOCYTES NFR BLD AUTO: 46.2 %
MCH RBC QN AUTO: 31.4 PG (ref 26–34)
MCHC RBC AUTO-ENTMCNC: 31.9 G/DL (ref 31–37)
MCV RBC AUTO: 98.4 FL
MONOCYTES # BLD AUTO: 0.35 X10(3) UL (ref 0.1–1)
MONOCYTES NFR BLD AUTO: 3.5 %
NEUTROPHILS # BLD AUTO: 4.9 X10 (3) UL (ref 1.5–7.7)
NEUTROPHILS # BLD AUTO: 4.9 X10(3) UL (ref 1.5–7.7)
NEUTROPHILS NFR BLD AUTO: 49.7 %
OSMOLALITY SERPL CALC.SUM OF ELEC: 282 MOSM/KG (ref 275–295)
PLATELET # BLD AUTO: 308 10(3)UL (ref 150–450)
POTASSIUM SERPL-SCNC: 4 MMOL/L (ref 3.5–5.1)
PROT SERPL-MCNC: 7.3 G/DL (ref 6.4–8.2)
RBC # BLD AUTO: 3.79 X10(6)UL
SODIUM SERPL-SCNC: 136 MMOL/L (ref 136–145)
WBC # BLD AUTO: 9.9 X10(3) UL (ref 4–11)

## 2023-09-07 PROCEDURE — 85025 COMPLETE CBC W/AUTO DIFF WBC: CPT

## 2023-09-07 PROCEDURE — 99215 OFFICE O/P EST HI 40 MIN: CPT | Performed by: PHYSICIAN ASSISTANT

## 2023-09-07 PROCEDURE — 80053 COMPREHEN METABOLIC PANEL: CPT

## 2023-09-07 PROCEDURE — 36591 DRAW BLOOD OFF VENOUS DEVICE: CPT

## 2023-09-07 RX ORDER — HEPARIN SODIUM (PORCINE) LOCK FLUSH IV SOLN 100 UNIT/ML 100 UNIT/ML
5 SOLUTION INTRAVENOUS ONCE
Status: COMPLETED | OUTPATIENT
Start: 2023-09-07 | End: 2023-09-07

## 2023-09-07 RX ORDER — HEPARIN SODIUM (PORCINE) LOCK FLUSH IV SOLN 100 UNIT/ML 100 UNIT/ML
5 SOLUTION INTRAVENOUS ONCE
OUTPATIENT
Start: 2023-09-14

## 2023-09-07 RX ADMIN — HEPARIN SODIUM (PORCINE) LOCK FLUSH IV SOLN 100 UNIT/ML 500 UNITS: 100 SOLUTION INTRAVENOUS at 10:58:00

## 2023-09-07 NOTE — PROGRESS NOTES
Pt being deferred 1 week due to fevers per [facility]. Pt does not need to see provider next week. Port deaccessed and heparin locked - site covered with gauze and paper tape. Discharged ambulating independently with future appointments scheduled.

## 2023-09-07 NOTE — PROGRESS NOTES
Hematology Oncology Progress Note    Patient Name: Melodie Rowan   YOB: 1985   Medical Record Number: [id number]     Chief Complaint:  Breast cancer  Chemotherapy    Diagnosis: Right breast invasive ductal carcinoma, grade 3, ER/SD positive, HER2 positive, ki high, pT2N0    Oncologic History:  34year old premenopausal female presented to palpable right breast mass   7/6/22: diag laurent with us. T2 (2.3cm) N0 12:00 4cm nipple  7/8/22: biopsy IDC, grade 3, ER/SD>90%, Her 3+, Ki 46%  7/13/22: MRI with 2 areas of focal non mass enhancement bx, benign and concordant. Genetics negative. 7/28/22: TCHP x6 cycles with dose reduced T completed 11/21/22  1/10/23: bilateral mastectomies: [id number].   2/20/23: adjuvant kadcyla- current    Interval History:  Raheel Giron returns for follow up and C9 Kadcyla. Doing ok. For the last week or so, has been experiencing non productive cough, sore throat, low grade fevers. Dtr returned to school and was sick as well. Did see pcp. Sxs slowly improving however taking tylenol for fevers- was 100.5 this am.   Myalgia, arthralgia for 7-10 days after chemo are stable. Diffuse joint pain in the hips, shoulders and back. Pain is manageable with norco as needed which she obtains from pcp. Stable neuropathy in the feet, chronic numbness in mainly the big toes is slowly improving. Occasional diarrhea controlled with lomotil +/- imodium. Having menstrual cycles. ECOG PS: 0     PMH/PSH: herniated disc with back pain, urethral stricture, migraine, left forarm lipoma  Family History: Pcousin with breast ca age 25- was neg for genetics  Social History: works for [employer], non smoker. Medications:    Current Outpatient Medications:     HYDROcodone-acetaminophen (NORCO)  MG Oral Tab, Take 1 to 2 tabs po q 4 to  6hr prn for pain, Disp: 126 tablet, Rfl: 0    Naloxone HCl 4 MG/0.1ML Nasal Liquid, 4 mg by Nasal route as needed for Opioid Overdose.  Spray 4 mg into a single nostril, if patient remains unresponsive, repeat in 2-3 minutes in alternating nostrils until medical assistance becomes available., Disp: 1 kit, Rfl: 0    cyclobenzaprine 10 MG Oral Tab, Take 1 tablet (10 mg total) by mouth 3 (three) times daily. , Disp: 90 tablet, Rfl: 0    butalbital-acetaminophen-caffeine -40 MG Oral Tab, Take 1 tablet by mouth every 8 (eight) hours as needed for Headaches., Disp: 30 tablet, Rfl: 2    tobramycin 0.3 % Ophthalmic Ointment, Apply to nasal lesion daily, Disp: 3.5 g, Rfl: 0    tamoxifen 20 MG Oral Tab, Take 1 tablet (20 mg total) by mouth daily. , Disp: 90 tablet, Rfl: 3    nitrofurantoin monohydrate macro 100 MG Oral Cap, Take 1 capsule (100 mg total) by mouth 2 (two) times daily. , Disp: 14 capsule, Rfl: 0    mupirocin 2 % External Ointment, Apply 1 Application topically 2 (two) times daily. , Disp: 30 g, Rfl: 3    diphenoxylate-atropine 2.5-0.025 MG Oral Tab, Take 1-2 tablets by mouth 4 (four) times daily as needed for Diarrhea., Disp: 120 tablet, Rfl: 3    docusate sodium 100 MG Oral Cap, Take 1 capsule (100 mg total) by mouth 2 (two) times daily. , Disp: 30 capsule, Rfl: 1    ondansetron (ZOFRAN) 4 mg tablet, Take 1 tablet (4 mg total) by mouth every 8 (eight) hours as needed for Nausea., Disp: 12 tablet, Rfl: 0    lidocaine-prilocaine 2.5-2.5 % External Cream, Apply to site 1 hour prior to port a cath needle insertion, Disp: 1 each, Rfl: 3    Review of Systems:   Oncology specific ROS negative except as per HPI    Vitals:  LMP 05/23/2023 (Exact Date)     Weight:  Wt Readings from Last 6 Encounters:  08/17/23 : 82.4 kg (181 lb 9.6 oz)  07/27/23 : 83.9 kg (185 lb)  07/20/23 : 85.8 kg (189 lb 3.2 oz)  06/29/23 : 86.4 kg (190 lb 6.4 oz)  06/22/23 : 85.3 kg (188 lb)  06/20/23 : 87.2 kg (192 lb 3.2 oz)    Physical Exam:  General: alert and oriented x 3, not in acute distress. HEENT: non icterus. Oropharynx clear. Breast: not examined today.  b/l mastectomy with expanders, intact skin. No palpable lesions or tenderness. No axillary lymphadenopathy bilaterally. Chest: non labored breathing, CTA  Abd: soft, non tender  Ext: no edema. Neurological: motor strength grossly intact, MA4E    Laboratory:  Lab Results   Component Value Date    WBC 10.6 08/17/2023    RBC 3.88 08/17/2023    HGB 12.5 08/17/2023    HCT 37.4 08/17/2023    MCV 96.4 08/17/2023    MCH 32.2 08/17/2023    MCHC 33.4 08/17/2023    RDW 13.1 08/17/2023    .0 08/17/2023    MPV 8.3 12/17/2018     Lab Results   Component Value Date     08/17/2023    K 3.6 08/17/2023     08/17/2023    CO2 25.0 08/17/2023    BUN 10 08/17/2023    CREATSERUM 0.68 08/17/2023     (H) 08/17/2023    CA 9.1 08/17/2023    ALKPHO 43 08/17/2023    ALT 19 08/17/2023    AST 20 08/17/2023    BILT 0.2 08/17/2023    ALB 3.8 08/17/2023    TP 7.1 08/17/2023      Radiology; No results found. Cancer Staging   Breast cancer, stage 1, estrogen receptor positive, right   Staging form: Breast, AJCC 8th Edition  - Clinical: Stage IB (cT2, cN0, cM0, G3, ER+, MD+, HER2+) - Signed by Shirlene Chen MD on 7/28/2022    Impression and Plan:    Right breast invasive ductal carcinoma, grade 3, ER/MD positive, HER2 positive, Ki high, [id number]:  - s/p neoadjuvant TCHP--> bilateral mastectomy [id number]--> adjuvant T-DM1 (kadcyla). no XRT indication  - tolerating therapy well. Pt with URI w/ fevers. Delay C9 x 1 week. Resume next week. - Echo q3 mo; next due end of Sep 2023 - ordered. Pt to sched   - OFS not tolerated. Plan to start tamoxifen after completion of kadcyla. - Pt aware of utilizing other forms of birthcontrol while on chemo  - Breast reconstruction planned in March 2024. Chemo induced neuropathy- grade 1, mild numbness limited to toes only and stable. Not requiring meds. URI - delay tx x 1 week. If sxs do not improve - fu with pcp as directed. Hypokalemia - sec to diarrhea. Encouraged potassium rich foods. Will supplement as needed. Left IT band numbness as above. CT A/P completed - L5/S1 grade 1 t. Diarrhea - on lomotil, taking 2 tabs at night w/ good relief     Chronic arthralgia/myalgia - on norco (refill per primary)    MDM: HIGH - breast cancer receiving chemotherapy with systemic effects requiring intervention and close monitoring, multiple ongoing issues.        Evy Dela Cruz PA-C   Hematology Oncology

## 2023-09-08 DIAGNOSIS — Z51.11 CHEMOTHERAPY MANAGEMENT, ENCOUNTER FOR: Primary | ICD-10-CM

## 2023-09-08 DIAGNOSIS — Z17.0 BREAST CANCER, STAGE 1, ESTROGEN RECEPTOR POSITIVE, RIGHT: ICD-10-CM

## 2023-09-08 DIAGNOSIS — C50.911 BREAST CANCER, STAGE 1, ESTROGEN RECEPTOR POSITIVE, RIGHT: ICD-10-CM

## 2023-09-14 ENCOUNTER — OFFICE VISIT (OUTPATIENT)
Dept: HEMATOLOGY/ONCOLOGY | Facility: HOSPITAL | Age: 38
End: 2023-09-14
Attending: INTERNAL MEDICINE
Payer: COMMERCIAL

## 2023-09-14 VITALS
RESPIRATION RATE: 18 BRPM | SYSTOLIC BLOOD PRESSURE: 128 MMHG | DIASTOLIC BLOOD PRESSURE: 76 MMHG | BODY MASS INDEX: 30 KG/M2 | TEMPERATURE: 98 F | OXYGEN SATURATION: 98 % | WEIGHT: 180.81 LBS | HEART RATE: 96 BPM

## 2023-09-14 DIAGNOSIS — C50.911 BREAST CANCER, STAGE 1, ESTROGEN RECEPTOR POSITIVE, RIGHT: Primary | ICD-10-CM

## 2023-09-14 DIAGNOSIS — Z17.0 BREAST CANCER, STAGE 1, ESTROGEN RECEPTOR POSITIVE, RIGHT: Primary | ICD-10-CM

## 2023-09-14 PROCEDURE — 96375 TX/PRO/DX INJ NEW DRUG ADDON: CPT

## 2023-09-14 PROCEDURE — 96413 CHEMO IV INFUSION 1 HR: CPT

## 2023-09-18 ENCOUNTER — TELEPHONE (OUTPATIENT)
Dept: INTERNAL MEDICINE CLINIC | Facility: CLINIC | Age: 38
End: 2023-09-18

## 2023-09-18 RX ORDER — HYDROCODONE BITARTRATE AND ACETAMINOPHEN 10; 325 MG/1; MG/1
TABLET ORAL
Qty: 126 TABLET | Refills: 0 | Status: SHIPPED | OUTPATIENT
Start: 2023-09-18 | End: 2023-09-18

## 2023-09-18 RX ORDER — HYDROCODONE BITARTRATE AND ACETAMINOPHEN 10; 325 MG/1; MG/1
TABLET ORAL
Qty: 126 TABLET | Refills: 0 | Status: SHIPPED | OUTPATIENT
Start: 2023-09-18

## 2023-09-28 ENCOUNTER — APPOINTMENT (OUTPATIENT)
Dept: HEMATOLOGY/ONCOLOGY | Facility: HOSPITAL | Age: 38
End: 2023-09-28
Attending: INTERNAL MEDICINE
Payer: COMMERCIAL

## 2023-09-28 ENCOUNTER — APPOINTMENT (OUTPATIENT)
Dept: HEMATOLOGY/ONCOLOGY | Facility: HOSPITAL | Age: 38
End: 2023-09-28
Attending: PHYSICIAN ASSISTANT
Payer: COMMERCIAL

## 2023-09-28 ENCOUNTER — TELEPHONE (OUTPATIENT)
Dept: SURGERY | Facility: CLINIC | Age: 38
End: 2023-09-28

## 2023-09-28 DIAGNOSIS — Z90.13 ABSENCE OF BOTH BREASTS: Primary | ICD-10-CM

## 2023-09-29 ENCOUNTER — TELEPHONE (OUTPATIENT)
Dept: INTERNAL MEDICINE CLINIC | Facility: CLINIC | Age: 38
End: 2023-09-29

## 2023-09-29 RX ORDER — HYDROCODONE BITARTRATE AND ACETAMINOPHEN 10; 325 MG/1; MG/1
TABLET ORAL
Qty: 84 TABLET | Refills: 0 | Status: SHIPPED | OUTPATIENT
Start: 2023-09-29

## 2023-10-02 ENCOUNTER — OFFICE VISIT (OUTPATIENT)
Dept: INTERNAL MEDICINE CLINIC | Facility: CLINIC | Age: 38
End: 2023-10-02

## 2023-10-02 VITALS
HEART RATE: 83 BPM | HEIGHT: 65.5 IN | BODY MASS INDEX: 29.82 KG/M2 | WEIGHT: 181.13 LBS | OXYGEN SATURATION: 98 % | TEMPERATURE: 99 F | DIASTOLIC BLOOD PRESSURE: 76 MMHG | SYSTOLIC BLOOD PRESSURE: 122 MMHG

## 2023-10-02 DIAGNOSIS — Z17.0 BREAST CANCER, STAGE 1, ESTROGEN RECEPTOR POSITIVE, RIGHT: ICD-10-CM

## 2023-10-02 DIAGNOSIS — E78.00 HYPERCHOLESTEREMIA: ICD-10-CM

## 2023-10-02 DIAGNOSIS — Z00.00 ANNUAL PHYSICAL EXAM: Primary | ICD-10-CM

## 2023-10-02 DIAGNOSIS — G89.29 CHRONIC BILATERAL LOW BACK PAIN, UNSPECIFIED WHETHER SCIATICA PRESENT: ICD-10-CM

## 2023-10-02 DIAGNOSIS — M54.50 CHRONIC BILATERAL LOW BACK PAIN, UNSPECIFIED WHETHER SCIATICA PRESENT: ICD-10-CM

## 2023-10-02 DIAGNOSIS — C50.911 BREAST CANCER, STAGE 1, ESTROGEN RECEPTOR POSITIVE, RIGHT: ICD-10-CM

## 2023-10-02 PROCEDURE — 99396 PREV VISIT EST AGE 40-64: CPT | Performed by: INTERNAL MEDICINE

## 2023-10-02 PROCEDURE — 3008F BODY MASS INDEX DOCD: CPT | Performed by: INTERNAL MEDICINE

## 2023-10-02 PROCEDURE — 3078F DIAST BP <80 MM HG: CPT | Performed by: INTERNAL MEDICINE

## 2023-10-02 PROCEDURE — 3074F SYST BP LT 130 MM HG: CPT | Performed by: INTERNAL MEDICINE

## 2023-10-02 RX ORDER — DEXAMETHASONE 4 MG/1
TABLET ORAL
COMMUNITY
Start: 2023-07-20

## 2023-10-02 RX ORDER — CYCLOBENZAPRINE HCL 10 MG
10 TABLET ORAL 3 TIMES DAILY
Qty: 90 TABLET | Refills: 0 | Status: SHIPPED | OUTPATIENT
Start: 2023-10-02

## 2023-10-02 NOTE — PROGRESS NOTES
Subjective:     Patient ID: Dayana Montes is a 34year old female. Patient present today for her annual physical.         History/Other:   Review of Systems   Constitutional: Negative. HENT: Negative. Eyes: Negative. Respiratory: Negative. Cardiovascular:  Negative for chest pain and palpitations. No pnd   Gastrointestinal: Negative. Negative for abdominal pain, anal bleeding, blood in stool, constipation, diarrhea and vomiting. Genitourinary: Negative. Musculoskeletal:  Positive for back pain. Allergic/Immunologic: Positive for environmental allergies. Negative for food allergies and immunocompromised state. Neurological:  Negative for syncope. Hematological: Negative. Current Outpatient Medications   Medication Sig Dispense Refill    dexamethasone (DECADRON) 4 MG tablet TAKE 1 TABLET BY MOUTH DAILY WITH BREAKFAST FOR 3 DAYS      HYDROcodone-acetaminophen (NORCO)  MG Oral Tab Take 1 to 2 tabs po q 4 to  6hr prn for pain 84 tablet 0    cyclobenzaprine 10 MG Oral Tab Take 1 tablet (10 mg total) by mouth 3 (three) times daily. 90 tablet 0    butalbital-acetaminophen-caffeine -40 MG Oral Tab Take 1 tablet by mouth every 8 (eight) hours as needed for Headaches. 30 tablet 2    diphenoxylate-atropine 2.5-0.025 MG Oral Tab Take 1-2 tablets by mouth 4 (four) times daily as needed for Diarrhea. 120 tablet 3    ondansetron (ZOFRAN) 4 mg tablet Take 1 tablet (4 mg total) by mouth every 8 (eight) hours as needed for Nausea. 12 tablet 0    Naloxone HCl 4 MG/0.1ML Nasal Liquid 4 mg by Nasal route as needed for Opioid Overdose. Spray 4 mg into a single nostril, if patient remains unresponsive, repeat in 2-3 minutes in alternating nostrils until medical assistance becomes available.  (Patient not taking: Reported on 9/7/2023) 1 kit 0    tobramycin 0.3 % Ophthalmic Ointment Apply to nasal lesion daily (Patient not taking: Reported on 9/7/2023) 3.5 g 0    tamoxifen 20 MG Oral Tab Take 1 tablet (20 mg total) by mouth daily. (Patient not taking: Reported on 2023) 90 tablet 3    docusate sodium 100 MG Oral Cap Take 1 capsule (100 mg total) by mouth 2 (two) times daily.  (Patient not taking: Reported on 2023) 30 capsule 1    lidocaine-prilocaine 2.5-2.5 % External Cream Apply to site 1 hour prior to port a cath needle insertion (Patient not taking: Reported on 2023) 1 each 3     Allergies:  Codeine                 SWELLING    Comment:Other reaction(s): Swelling  Dust Mite Extract       HIVES    Comment:Nasal congestion  Grass                   HIVES    Comment:Nasal congestion  Aspirin                 SWELLING    Comment:Other reaction(s): ASPIRIN  Adhesive Tape           RASH    Past Medical History:   Diagnosis Date    Back pain     Cancer ([address]     COVID-2021-headache-not hospitalized    Essential hypertension     Gestational hypertension     Herniated disc     Herniated lumbar intervertebral disc     Hx of migraine headaches     Hyperlipidemia     Mass of left forearm     Migraines     Obesity, unspecified     Urethral stricture       Past Surgical History:   Procedure Laterality Date    APPENDECTOMY      EYE SURGERY      INSERTION OF ACCESS PORT      EMMA BIOPSY STEREO NODULE 1 SITE RIGHT (CPT=19081)  2022    clip top hat    MASTECTOMY BRA Bilateral     NEEDLE BIOPSY LEFT  2022    MRI 2 site    NEEDLE BIOPSY RIGHT  2022    us guided bx          OTHER SURGICAL HISTORY      Excision lipoma of left forearm    OTHER SURGICAL HISTORY  2018    IUD placement    OTHER SURGICAL HISTORY      eye surgery    TONSILLECTOMY        Family History   Problem Relation Age of Onset    Heart Disease Other     Hypertension Other     Glaucoma Other     Heart Disease Mother     Lipids Mother     Hypertension Mother     Heart Disorder Mother     ADHD Sister     Lipids Father     Stroke Father     Other (Other) Brother         DJD spine    Breast Cancer Paternal Cousin Female 25        also had @ 29 and 39 (last was TNBC); neg genetics     Cancer Paternal Grandmother         lung ca; smoker    Dementia Paternal Grandfather     Cancer Paternal Cousin Female 27        uterine ca (sister of cousin w/br ca)    Cancer Paternal Cousin Male 36        gastric ca (smoker); also leukemia @ 10      Social History:   Social History     Socioeconomic History    Marital status:    Tobacco Use    Smoking status: Former     Packs/day: 0.50     Years: 2.00     Additional pack years: 0.00     Total pack years: 1.00     Types: Cigarettes     Quit date: 2000     Years since quittin.7     Passive exposure: Past    Smokeless tobacco: Never   Vaping Use    Vaping Use: Never used   Substance and Sexual Activity    Alcohol use: Yes     Alcohol/week: 0.0 standard drinks of alcohol     Comment: Rarely    Drug use: No   Other Topics Concern    Caffeine Concern No        Objective:   Physical Exam  Constitutional:       General: She is not in acute distress. Appearance: She is well-developed. She is not ill-appearing, toxic-appearing or diaphoretic. HENT:      Head: Normocephalic and atraumatic. Right Ear: External ear normal.      Left Ear: External ear normal.      Nose: Nose normal.      Mouth/Throat:      Pharynx: No oropharyngeal exudate. Eyes:      General:         Right eye: No discharge. Left eye: No discharge. Conjunctiva/sclera: Conjunctivae normal.      Pupils: Pupils are equal, round, and reactive to light. Neck:      Vascular: No JVD. Cardiovascular:      Rate and Rhythm: Normal rate and regular rhythm. Heart sounds: Normal heart sounds. Pulmonary:      Effort: Pulmonary effort is normal. No respiratory distress. Breath sounds: Normal breath sounds. No wheezing or rales. Abdominal:      General: Bowel sounds are normal. There is no distension. Palpations: Abdomen is soft. There is no mass. Tenderness:  There is no abdominal tenderness. There is no guarding or rebound. Musculoskeletal:         General: No tenderness. Normal range of motion. Cervical back: Normal range of motion and neck supple. No rigidity or tenderness. Right lower leg: No edema. Left lower leg: No edema. Lymphadenopathy:      Cervical: No cervical adenopathy. Skin:     General: Skin is warm and dry. Coloration: Skin is not jaundiced or pale. Findings: No rash. Neurological:      Mental Status: She is alert and oriented to person, place, and time. Assessment & Plan:   (Z00.00) Annual physical exam  (primary encounter diagnosis)  Plan: Lipid Panel        Check lipid panel; pt already had her flu shot from [facility]; she will come back for her prevnar 20.     (M54.50,  G89.29) Chronic bilateral low back pain, unspecified whether sciatica present  Plan: Drug Screen 7 W/confirmation, urine        She is on chronic pain med; had tried to give her adjuvants before like gabapentin and effexor which she didn't tolerate and didn't helped. Urine drug screen ordered. Pt does have narcan at home.     (C50.911,  Z17.0) Breast cancer, stage 1, estrogen receptor positive, right   Plan: pt ffup with her oncologist, currently being treated with chemotx.     (E78.00) Hypercholesteremia  Plan: check lipid panel; ff low fat low chol diet.         Orders Placed This Encounter      Lipid Panel      Drug Screen 7 W/confirmation, urine      Meds This Visit:  Requested Prescriptions      No prescriptions requested or ordered in this encounter       Imaging & Referrals:  None

## 2023-10-05 ENCOUNTER — OFFICE VISIT (OUTPATIENT)
Dept: HEMATOLOGY/ONCOLOGY | Facility: HOSPITAL | Age: 38
End: 2023-10-05
Attending: PHYSICIAN ASSISTANT
Payer: COMMERCIAL

## 2023-10-05 ENCOUNTER — LAB ENCOUNTER (OUTPATIENT)
Dept: LAB | Age: 38
End: 2023-10-05
Attending: INTERNAL MEDICINE
Payer: COMMERCIAL

## 2023-10-05 ENCOUNTER — NURSE ONLY (OUTPATIENT)
Dept: HEMATOLOGY/ONCOLOGY | Facility: HOSPITAL | Age: 38
End: 2023-10-05
Attending: INTERNAL MEDICINE
Payer: COMMERCIAL

## 2023-10-05 ENCOUNTER — PATIENT MESSAGE (OUTPATIENT)
Dept: INTERNAL MEDICINE CLINIC | Facility: CLINIC | Age: 38
End: 2023-10-05

## 2023-10-05 VITALS
OXYGEN SATURATION: 99 % | BODY MASS INDEX: 30.45 KG/M2 | DIASTOLIC BLOOD PRESSURE: 80 MMHG | WEIGHT: 185 LBS | SYSTOLIC BLOOD PRESSURE: 151 MMHG | TEMPERATURE: 98 F | HEART RATE: 74 BPM | RESPIRATION RATE: 18 BRPM | HEIGHT: 65.5 IN

## 2023-10-05 DIAGNOSIS — M25.50 ARTHRALGIA, UNSPECIFIED JOINT: ICD-10-CM

## 2023-10-05 DIAGNOSIS — C50.911 BREAST CANCER, STAGE 1, ESTROGEN RECEPTOR POSITIVE, RIGHT: Primary | ICD-10-CM

## 2023-10-05 DIAGNOSIS — Z17.0 BREAST CANCER, STAGE 1, ESTROGEN RECEPTOR POSITIVE, RIGHT: Primary | ICD-10-CM

## 2023-10-05 DIAGNOSIS — E87.6 HYPOKALEMIA: ICD-10-CM

## 2023-10-05 DIAGNOSIS — Z51.11 CHEMOTHERAPY MANAGEMENT, ENCOUNTER FOR: ICD-10-CM

## 2023-10-05 DIAGNOSIS — G89.29 CHRONIC BILATERAL LOW BACK PAIN, UNSPECIFIED WHETHER SCIATICA PRESENT: ICD-10-CM

## 2023-10-05 DIAGNOSIS — M54.50 CHRONIC BILATERAL LOW BACK PAIN, UNSPECIFIED WHETHER SCIATICA PRESENT: ICD-10-CM

## 2023-10-05 DIAGNOSIS — Z00.00 ANNUAL PHYSICAL EXAM: ICD-10-CM

## 2023-10-05 LAB
ALBUMIN SERPL-MCNC: 3.8 G/DL (ref 3.4–5)
ALBUMIN/GLOB SERPL: 1 {RATIO} (ref 1–2)
ALP LIVER SERPL-CCNC: 45 U/L
ALT SERPL-CCNC: 33 U/L
AMPHET UR QL SCN: NEGATIVE
ANION GAP SERPL CALC-SCNC: 6 MMOL/L (ref 0–18)
AST SERPL-CCNC: 20 U/L (ref 15–37)
BASOPHILS # BLD AUTO: 0.05 X10(3) UL (ref 0–0.2)
BASOPHILS NFR BLD AUTO: 0.4 %
BENZODIAZ UR QL SCN: NEGATIVE
BILIRUB SERPL-MCNC: 0.3 MG/DL (ref 0.1–2)
BUN BLD-MCNC: 15 MG/DL (ref 7–18)
BUN/CREAT SERPL: 19.5 (ref 10–20)
CALCIUM BLD-MCNC: 9.1 MG/DL (ref 8.5–10.1)
CANNABINOIDS UR QL SCN: NEGATIVE
CHLORIDE SERPL-SCNC: 108 MMOL/L (ref 98–112)
CHOLEST SERPL-MCNC: 197 MG/DL (ref ?–200)
CO2 SERPL-SCNC: 25 MMOL/L (ref 21–32)
COCAINE UR QL: NEGATIVE
CREAT BLD-MCNC: 0.77 MG/DL
CREAT UR-SCNC: 246 MG/DL
DEPRECATED RDW RBC AUTO: 50.8 FL (ref 35.1–46.3)
EGFRCR SERPLBLD CKD-EPI 2021: 100 ML/MIN/1.73M2 (ref 60–?)
EOSINOPHIL # BLD AUTO: 0.02 X10(3) UL (ref 0–0.7)
EOSINOPHIL NFR BLD AUTO: 0.2 %
ERYTHROCYTE [DISTWIDTH] IN BLOOD BY AUTOMATED COUNT: 14.2 % (ref 11–15)
FASTING PATIENT LIPID ANSWER: YES
GLOBULIN PLAS-MCNC: 3.8 G/DL (ref 2.8–4.4)
GLUCOSE BLD-MCNC: 101 MG/DL (ref 70–99)
HCT VFR BLD AUTO: 37.5 %
HDLC SERPL-MCNC: 47 MG/DL (ref 40–59)
HGB BLD-MCNC: 12.4 G/DL
IMM GRANULOCYTES # BLD AUTO: 0.02 X10(3) UL (ref 0–1)
IMM GRANULOCYTES NFR BLD: 0.2 %
LDLC SERPL CALC-MCNC: 110 MG/DL (ref ?–100)
LYMPHOCYTES # BLD AUTO: 5.78 X10(3) UL (ref 1–4)
LYMPHOCYTES NFR BLD AUTO: 50.3 %
MCH RBC QN AUTO: 32.1 PG (ref 26–34)
MCHC RBC AUTO-ENTMCNC: 33.1 G/DL (ref 31–37)
MCV RBC AUTO: 97.2 FL
MDMA UR QL SCN: NEGATIVE
MONOCYTES # BLD AUTO: 0.43 X10(3) UL (ref 0.1–1)
MONOCYTES NFR BLD AUTO: 3.7 %
NEUTROPHILS # BLD AUTO: 5.19 X10 (3) UL (ref 1.5–7.7)
NEUTROPHILS # BLD AUTO: 5.19 X10(3) UL (ref 1.5–7.7)
NEUTROPHILS NFR BLD AUTO: 45.2 %
NONHDLC SERPL-MCNC: 150 MG/DL (ref ?–130)
OSMOLALITY SERPL CALC.SUM OF ELEC: 289 MOSM/KG (ref 275–295)
OXYCODONE UR QL SCN: NEGATIVE
PLATELET # BLD AUTO: 320 10(3)UL (ref 150–450)
POTASSIUM SERPL-SCNC: 3.9 MMOL/L (ref 3.5–5.1)
PROT SERPL-MCNC: 7.6 G/DL (ref 6.4–8.2)
RBC # BLD AUTO: 3.86 X10(6)UL
SODIUM SERPL-SCNC: 139 MMOL/L (ref 136–145)
TRIGL SERPL-MCNC: 232 MG/DL (ref 30–149)
VLDLC SERPL CALC-MCNC: 40 MG/DL (ref 0–30)
WBC # BLD AUTO: 11.5 X10(3) UL (ref 4–11)

## 2023-10-05 PROCEDURE — 96375 TX/PRO/DX INJ NEW DRUG ADDON: CPT

## 2023-10-05 PROCEDURE — 99215 OFFICE O/P EST HI 40 MIN: CPT | Performed by: PHYSICIAN ASSISTANT

## 2023-10-05 PROCEDURE — 80361 OPIATES 1 OR MORE: CPT

## 2023-10-05 PROCEDURE — 80307 DRUG TEST PRSMV CHEM ANLYZR: CPT

## 2023-10-05 PROCEDURE — 80061 LIPID PANEL: CPT

## 2023-10-05 PROCEDURE — 36415 COLL VENOUS BLD VENIPUNCTURE: CPT

## 2023-10-05 PROCEDURE — 85025 COMPLETE CBC W/AUTO DIFF WBC: CPT

## 2023-10-05 PROCEDURE — 80053 COMPREHEN METABOLIC PANEL: CPT

## 2023-10-05 PROCEDURE — 96413 CHEMO IV INFUSION 1 HR: CPT

## 2023-10-05 RX ORDER — HEPARIN SODIUM (PORCINE) LOCK FLUSH IV SOLN 100 UNIT/ML 100 UNIT/ML
5 SOLUTION INTRAVENOUS ONCE
Status: COMPLETED | OUTPATIENT
Start: 2023-10-05 | End: 2023-10-05

## 2023-10-05 RX ORDER — HEPARIN SODIUM (PORCINE) LOCK FLUSH IV SOLN 100 UNIT/ML 100 UNIT/ML
5 SOLUTION INTRAVENOUS ONCE
OUTPATIENT
Start: 2023-10-26

## 2023-10-05 RX ADMIN — HEPARIN SODIUM (PORCINE) LOCK FLUSH IV SOLN 100 UNIT/ML 500 UNITS: 100 SOLUTION INTRAVENOUS at 13:14:00

## 2023-10-05 NOTE — PROGRESS NOTES
Hematology Oncology Progress Note    Patient Name: Giuseppe Pierre   YOB: 1985   Medical Record Number: [id number]     Chief Complaint:  Breast cancer  Chemotherapy    Diagnosis: Right breast invasive ductal carcinoma, grade 3, ER/MT positive, HER2 positive, ki high, pT2N0    Oncologic History:  34year old premenopausal female presented to palpable right breast mass   7/6/22: diag laurent with us. T2 (2.3cm) N0 12:00 4cm nipple  7/8/22: biopsy IDC, grade 3, ER/MT>90%, Her 3+, Ki 46%  7/13/22: MRI with 2 areas of focal non mass enhancement bx, benign and concordant. Genetics negative. 7/28/22: TCHP x6 cycles with dose reduced T completed 11/21/22  1/10/23: bilateral mastectomies: [id number].   2/20/23: adjuvant kadcyla- current    Interval History:  Nitza Bueno returns for follow up and C10 Kadcyla. Doing well overall. Had UTI last week s/p abx. No further sxs. Occasional diarrhea controlled with lomotil +/- imodium. Still w/ chronic aches/pains management per primary w/ norco.   Having menstrual cycles. Utilizing birth control. ECOG PS: 0     PMH/PSH: herniated disc with back pain, urethral stricture, migraine, left forarm lipoma  Family History: Pcousin with breast ca age 25- was neg for genetics  Social History: works for [employer], non smoker. Medications:    Current Outpatient Medications:     dexamethasone (DECADRON) 4 MG tablet, TAKE 1 TABLET BY MOUTH DAILY WITH BREAKFAST FOR 3 DAYS, Disp: , Rfl:     cyclobenzaprine 10 MG Oral Tab, Take 1 tablet (10 mg total) by mouth 3 (three) times daily. , Disp: 90 tablet, Rfl: 0    HYDROcodone-acetaminophen (NORCO)  MG Oral Tab, Take 1 to 2 tabs po q 4 to  6hr prn for pain, Disp: 84 tablet, Rfl: 0    Naloxone HCl 4 MG/0.1ML Nasal Liquid, 4 mg by Nasal route as needed for Opioid Overdose. Spray 4 mg into a single nostril, if patient remains unresponsive, repeat in 2-3 minutes in alternating nostrils until medical assistance becomes available. (Patient not taking: Reported on 9/7/2023), Disp: 1 kit, Rfl: 0    butalbital-acetaminophen-caffeine -40 MG Oral Tab, Take 1 tablet by mouth every 8 (eight) hours as needed for Headaches., Disp: 30 tablet, Rfl: 2    tobramycin 0.3 % Ophthalmic Ointment, Apply to nasal lesion daily (Patient not taking: Reported on 9/7/2023), Disp: 3.5 g, Rfl: 0    tamoxifen 20 MG Oral Tab, Take 1 tablet (20 mg total) by mouth daily. (Patient not taking: Reported on 9/7/2023), Disp: 90 tablet, Rfl: 3    diphenoxylate-atropine 2.5-0.025 MG Oral Tab, Take 1-2 tablets by mouth 4 (four) times daily as needed for Diarrhea., Disp: 120 tablet, Rfl: 3    docusate sodium 100 MG Oral Cap, Take 1 capsule (100 mg total) by mouth 2 (two) times daily. (Patient not taking: Reported on 9/7/2023), Disp: 30 capsule, Rfl: 1    ondansetron (ZOFRAN) 4 mg tablet, Take 1 tablet (4 mg total) by mouth every 8 (eight) hours as needed for Nausea., Disp: 12 tablet, Rfl: 0    lidocaine-prilocaine 2.5-2.5 % External Cream, Apply to site 1 hour prior to port a cath needle insertion (Patient not taking: Reported on 9/7/2023), Disp: 1 each, Rfl: 3    Review of Systems:   Oncology specific ROS negative except as per HPI    Vitals:  LMP 09/15/2023 (Exact Date)     Weight:  Wt Readings from Last 6 Encounters:  10/02/23 : 82.1 kg (181 lb 1.6 oz)  09/14/23 : 82 kg (180 lb 12.8 oz)  09/07/23 : 83.5 kg (184 lb)  08/17/23 : 82.4 kg (181 lb 9.6 oz)  07/27/23 : 83.9 kg (185 lb)  07/20/23 : 85.8 kg (189 lb 3.2 oz)    Physical Exam:  General: alert and oriented x 3, not in acute distress. HEENT: non icterus. Oropharynx clear. Breast: not examined today. b/l mastectomy with expanders, intact skin. No palpable lesions or tenderness. No axillary lymphadenopathy bilaterally. Chest: non labored breathing, CTA  Abd: soft, non tender  Ext: no edema.   Neurological: motor strength grossly intact, MA4E    Laboratory:  Lab Results   Component Value Date    WBC 9.9 09/07/2023 RBC 3.79 (L) 09/07/2023    HGB 11.9 (L) 09/07/2023    HCT 37.3 09/07/2023    MCV 98.4 09/07/2023    MCH 31.4 09/07/2023    MCHC 31.9 09/07/2023    RDW 13.2 09/07/2023    .0 09/07/2023    MPV 8.3 12/17/2018     Lab Results   Component Value Date     09/07/2023    K 4.0 09/07/2023     09/07/2023    CO2 21.0 09/07/2023    BUN 12 09/07/2023    CREATSERUM 0.81 09/07/2023     (H) 09/07/2023    CA 8.5 09/07/2023    ALKPHO 44 09/07/2023    ALT 18 09/07/2023    AST 13 (L) 09/07/2023    BILT 0.3 09/07/2023    ALB 3.5 09/07/2023    TP 7.3 09/07/2023      Radiology; No results found. Cancer Staging   Breast cancer, stage 1, estrogen receptor positive, right   Staging form: Breast, AJCC 8th Edition  - Clinical: Stage IB (cT2, cN0, cM0, G3, ER+, PA+, HER2+) - Signed by Clive Meneses MD on 7/28/2022    Impression and Plan:    Right breast invasive ductal carcinoma, grade 3, ER/PA positive, HER2 positive, Ki high, [id number]:  - s/p neoadjuvant TCHP--> bilateral mastectomy ypT1a--> adjuvant T-DM1 (kadcyla). no XRT indication  - Echo q3 mo; next due end of Sep 2023- scheduled for end of the mo   - OFS not tolerated. Plan to start tamoxifen after completion of kadcyla. - Pt aware of utilizing other forms of birthcontrol while on chemo  - Breast reconstruction planned in March 2024. Chemo induced neuropathy- grade 1, mild numbness limited to toes only and stable. Not requiring meds. Hypokalemia - sec to diarrhea. Encouraged potassium rich foods. Will supplement as needed. Left IT band numbness as above. CT A/P completed - L5/S1 grade 1 t. Diarrhea - on lomotil, taking 2 tabs at night w/ good relief     Chronic arthralgia/myalgia - on norco (refill per primary)    MDM: HIGH - breast cancer receiving chemotherapy with systemic effects requiring intervention and close monitoring, multiple ongoing issues.        Detra Mcburney, PA-C   Hematology Oncology

## 2023-10-05 NOTE — PROGRESS NOTES
Pt here for C10D1 Kadcyla. Arrives Ambulating independently, accompanied by Self       Oral medications included in this regimen:  yes - Tamoxifen    Patient confirms comprehension of cancer treatment schedule:  yes  Pregnancy screening:  Denies possibility of pregnancy  Modifications in dose or schedule:  No  Medications appearance and physical integrity checked by RN. Chemotherapy IV pump settings verified by 2 RNs:  yes   Frequency of blood return and site check throughout administration: Prior to administration, Prior to each drug, and At completion of therapy     Infusion/treatment outcome:  patient tolerated treatment without incident    Education Record    Learner:  Patient  Barriers / Limitations:  None  Method:  Reinforcement  Education / instructions given:  Plan of care.   Outcome:  Shows understanding    Discharged Home, Ambulating independently, accompanied by:Self    Patient/family verbalized understanding of future appointments: by [facility]

## 2023-10-06 ENCOUNTER — TELEPHONE (OUTPATIENT)
Dept: INTERNAL MEDICINE CLINIC | Facility: CLINIC | Age: 38
End: 2023-10-06

## 2023-10-06 DIAGNOSIS — M54.50 CHRONIC BILATERAL LOW BACK PAIN, UNSPECIFIED WHETHER SCIATICA PRESENT: Primary | ICD-10-CM

## 2023-10-06 DIAGNOSIS — G89.29 CHRONIC BILATERAL LOW BACK PAIN, UNSPECIFIED WHETHER SCIATICA PRESENT: Primary | ICD-10-CM

## 2023-10-06 RX ORDER — HYDROCODONE BITARTRATE AND ACETAMINOPHEN 7.5; 325 MG/1; MG/1
TABLET ORAL
Qty: 126 TABLET | Refills: 0 | Status: SHIPPED | OUTPATIENT
Start: 2023-10-06 | End: 2023-10-06

## 2023-10-06 RX ORDER — HYDROCODONE BITARTRATE AND ACETAMINOPHEN 7.5; 325 MG/1; MG/1
TABLET ORAL
Qty: 126 TABLET | Refills: 0 | Status: SHIPPED | OUTPATIENT
Start: 2023-10-06

## 2023-10-08 NOTE — TELEPHONE ENCOUNTER
Provider responded to patient and patient acknowledged message. No further action needed at this time. Closing this encounter.

## 2023-10-08 NOTE — TELEPHONE ENCOUNTER
From: Ramila Christine  To: Trini Troncoso  Sent: 10/5/2023 8:09 AM CDT  Subject: Labs    The cancer center cannot draw my lab and urine during chemo so I did them this AM at your building! Thanks!   Meseret Blair

## 2023-10-10 LAB
CODEINE UR: NEGATIVE
HYDROCODONE CONF UR: >3000 NG/ML
HYDROCODONE UR: POSITIVE
HYDROMORPH CONF UR: 1794 NG/ML
HYDROMORPH UR: POSITIVE
MORPHINE UR: NEGATIVE
OPIATES CLASS UR: POSITIVE NG/ML

## 2023-10-18 ENCOUNTER — TELEPHONE (OUTPATIENT)
Dept: INTERNAL MEDICINE CLINIC | Facility: CLINIC | Age: 38
End: 2023-10-18

## 2023-10-18 DIAGNOSIS — G89.29 CHRONIC BILATERAL LOW BACK PAIN, UNSPECIFIED WHETHER SCIATICA PRESENT: Primary | ICD-10-CM

## 2023-10-18 DIAGNOSIS — M54.50 CHRONIC BILATERAL LOW BACK PAIN, UNSPECIFIED WHETHER SCIATICA PRESENT: Primary | ICD-10-CM

## 2023-10-18 RX ORDER — HYDROCODONE BITARTRATE AND ACETAMINOPHEN 7.5; 325 MG/1; MG/1
TABLET ORAL
Qty: 126 TABLET | Refills: 0 | Status: SHIPPED | OUTPATIENT
Start: 2023-10-18 | End: 2023-10-23 | Stop reason: DRUGHIGH

## 2023-10-18 RX ORDER — HYDROCODONE BITARTRATE AND ACETAMINOPHEN 7.5; 325 MG/1; MG/1
TABLET ORAL
Qty: 126 TABLET | Refills: 0 | Status: SHIPPED | OUTPATIENT
Start: 2023-10-18 | End: 2023-10-18

## 2023-10-19 ENCOUNTER — APPOINTMENT (OUTPATIENT)
Dept: HEMATOLOGY/ONCOLOGY | Facility: HOSPITAL | Age: 38
End: 2023-10-19
Attending: INTERNAL MEDICINE
Payer: COMMERCIAL

## 2023-10-23 ENCOUNTER — NURSE TRIAGE (OUTPATIENT)
Dept: INTERNAL MEDICINE CLINIC | Facility: CLINIC | Age: 38
End: 2023-10-23

## 2023-10-23 ENCOUNTER — HOSPITAL ENCOUNTER (OUTPATIENT)
Dept: CV DIAGNOSTICS | Facility: HOSPITAL | Age: 38
Discharge: HOME | End: 2023-10-23
Attending: PHYSICIAN ASSISTANT

## 2023-10-23 ENCOUNTER — OFFICE VISIT (OUTPATIENT)
Dept: INTERNAL MEDICINE CLINIC | Facility: CLINIC | Age: 38
End: 2023-10-23

## 2023-10-23 VITALS
DIASTOLIC BLOOD PRESSURE: 84 MMHG | SYSTOLIC BLOOD PRESSURE: 134 MMHG | TEMPERATURE: 99 F | HEIGHT: 65.5 IN | HEART RATE: 108 BPM | BODY MASS INDEX: 30.19 KG/M2 | WEIGHT: 183.38 LBS | OXYGEN SATURATION: 98 %

## 2023-10-23 DIAGNOSIS — Z51.11 CHEMOTHERAPY MANAGEMENT, ENCOUNTER FOR: ICD-10-CM

## 2023-10-23 DIAGNOSIS — N30.00 ACUTE CYSTITIS WITHOUT HEMATURIA: Primary | ICD-10-CM

## 2023-10-23 DIAGNOSIS — C50.911 BREAST CANCER, STAGE 1, ESTROGEN RECEPTOR POSITIVE, RIGHT: ICD-10-CM

## 2023-10-23 DIAGNOSIS — Z17.0 BREAST CANCER, STAGE 1, ESTROGEN RECEPTOR POSITIVE, RIGHT: ICD-10-CM

## 2023-10-23 LAB
APPEARANCE: CLEAR
BILIRUBIN: NEGATIVE
GLUCOSE (URINE DIPSTICK): NEGATIVE MG/DL
KETONES (URINE DIPSTICK): NEGATIVE MG/DL
MULTISTIX LOT#: ABNORMAL NUMERIC
NITRITE, URINE: NEGATIVE
OCCULT BLOOD: NEGATIVE
PH, URINE: 6 (ref 4.5–8)
PROTEIN (URINE DIPSTICK): NEGATIVE MG/DL
SPECIFIC GRAVITY: 1.01 (ref 1–1.03)
URINE-COLOR: YELLOW
UROBILINOGEN,SEMI-QN: 0.2 MG/DL (ref 0–1.9)

## 2023-10-23 PROCEDURE — 3079F DIAST BP 80-89 MM HG: CPT | Performed by: INTERNAL MEDICINE

## 2023-10-23 PROCEDURE — 93306 TTE W/DOPPLER COMPLETE: CPT | Performed by: PHYSICIAN ASSISTANT

## 2023-10-23 PROCEDURE — 3075F SYST BP GE 130 - 139MM HG: CPT | Performed by: INTERNAL MEDICINE

## 2023-10-23 PROCEDURE — 3008F BODY MASS INDEX DOCD: CPT | Performed by: INTERNAL MEDICINE

## 2023-10-23 PROCEDURE — 81003 URINALYSIS AUTO W/O SCOPE: CPT | Performed by: INTERNAL MEDICINE

## 2023-10-23 PROCEDURE — 99213 OFFICE O/P EST LOW 20 MIN: CPT | Performed by: INTERNAL MEDICINE

## 2023-10-23 RX ORDER — NITROFURANTOIN 25; 75 MG/1; MG/1
100 CAPSULE ORAL 2 TIMES DAILY
Qty: 14 CAPSULE | Refills: 0 | Status: SHIPPED | OUTPATIENT
Start: 2023-10-23

## 2023-10-23 RX ORDER — HYDROCODONE BITARTRATE AND ACETAMINOPHEN 10; 325 MG/1; MG/1
TABLET ORAL
Qty: 112 TABLET | Refills: 0 | Status: SHIPPED | OUTPATIENT
Start: 2023-10-23

## 2023-10-23 NOTE — PROGRESS NOTES
Subjective:     Patient ID: Martin English is a 34year old female. Dysuria   This is a new problem. The current episode started yesterday. The problem occurs intermittently. The problem has been unchanged. The quality of the pain is described as burning. The pain is mild. There has been no fever. Associated symptoms include frequency. Pertinent negatives include no hematuria, hesitancy, nausea, possible pregnancy or vomiting. She has tried increased fluids for the symptoms. The treatment provided no relief. There is no history of catheterization, kidney stones or a urological procedure. History/Other:   Review of Systems   Constitutional: Negative. Negative for fever. Respiratory: Negative. Gastrointestinal: Negative. Negative for nausea and vomiting. Genitourinary:  Positive for dysuria and frequency. Negative for hematuria and hesitancy. Current Outpatient Medications   Medication Sig Dispense Refill    HYDROcodone-acetaminophen (NORCO) 7.5-325 MG Oral Tab Take 1 to 2 tabs po q 4 to 6 hrs prn for pain 126 tablet 0    HYDROcodone-acetaminophen (NORCO) 7.5-325 MG Oral Tab Take 1 to 2 tabs every 4 to 6 hr prn for pain; max 9/day 126 tablet 0    dexamethasone (DECADRON) 4 MG tablet TAKE 1 TABLET BY MOUTH DAILY WITH BREAKFAST FOR 3 DAYS      cyclobenzaprine 10 MG Oral Tab Take 1 tablet (10 mg total) by mouth 3 (three) times daily. 90 tablet 0    butalbital-acetaminophen-caffeine -40 MG Oral Tab Take 1 tablet by mouth every 8 (eight) hours as needed for Headaches. 30 tablet 2    diphenoxylate-atropine 2.5-0.025 MG Oral Tab Take 1-2 tablets by mouth 4 (four) times daily as needed for Diarrhea. 120 tablet 3    docusate sodium 100 MG Oral Cap Take 1 capsule (100 mg total) by mouth 2 (two) times daily. 30 capsule 1    ondansetron (ZOFRAN) 4 mg tablet Take 1 tablet (4 mg total) by mouth every 8 (eight) hours as needed for Nausea.  12 tablet 0    Naloxone HCl 4 MG/0.1ML Nasal Liquid 4 mg by Nasal route as needed for Opioid Overdose. Spray 4 mg into a single nostril, if patient remains unresponsive, repeat in 2-3 minutes in alternating nostrils until medical assistance becomes available. (Patient not taking: Reported on 2023) 1 kit 0    tobramycin 0.3 % Ophthalmic Ointment Apply to nasal lesion daily (Patient not taking: Reported on 2023) 3.5 g 0    tamoxifen 20 MG Oral Tab Take 1 tablet (20 mg total) by mouth daily.  (Patient not taking: Reported on 2023) 90 tablet 3    lidocaine-prilocaine 2.5-2.5 % External Cream Apply to site 1 hour prior to port a cath needle insertion (Patient not taking: Reported on 2023) 1 each 3     Allergies:  Codeine                 SWELLING    Comment:Other reaction(s): Swelling  Dust Mite Extract       HIVES    Comment:Nasal congestion  Grass                   HIVES    Comment:Nasal congestion  Aspirin                 SWELLING    Comment:Other reaction(s): ASPIRIN  Adhesive Tape           RASH    Past Medical History:   Diagnosis Date    Back pain     Cancer ([address]     COVID-2021-headache-not hospitalized    Essential hypertension     Gestational hypertension     Herniated disc     Herniated lumbar intervertebral disc     Hx of migraine headaches     Hyperlipidemia     Mass of left forearm     Migraines     Obesity, unspecified     Urethral stricture       Past Surgical History:   Procedure Laterality Date    APPENDECTOMY      EYE SURGERY      INSERTION OF ACCESS PORT      EMMA BIOPSY STEREO NODULE 1 SITE RIGHT (CPT=19081)  2022    clip top hat    MASTECTOMY BRA Bilateral     NEEDLE BIOPSY LEFT  2022    MRI 2 site    NEEDLE BIOPSY RIGHT  2022    us guided bx          OTHER SURGICAL HISTORY  2013    Excision lipoma of left forearm    OTHER SURGICAL HISTORY  2018    IUD placement    OTHER SURGICAL HISTORY      eye surgery    TONSILLECTOMY        Family History   Problem Relation Age of Onset    Heart Disease Other     Hypertension Other Glaucoma Other     Heart Disease Mother     Lipids Mother     Hypertension Mother     Heart Disorder Mother     ADHD Sister     Lipids Father     Stroke Father     Other (Other) Brother         DJD spine    Breast Cancer Paternal Cousin Female 25        also had @ 29 and 39 (last was TNBC); neg genetics     Cancer Paternal Grandmother         lung ca; smoker    Dementia Paternal Grandfather     Cancer Paternal Cousin Female 27        uterine ca (sister of cousin w/br ca)    Cancer Paternal Cousin Male 36        gastric ca (smoker); also leukemia @ 8      Social History:   Social History     Socioeconomic History    Marital status:    Tobacco Use    Smoking status: Former     Packs/day: 0.50     Years: 2.00     Additional pack years: 0.00     Total pack years: 1.00     Types: Cigarettes     Quit date: 2000     Years since quittin.8     Passive exposure: Past    Smokeless tobacco: Never   Vaping Use    Vaping Use: Never used   Substance and Sexual Activity    Alcohol use: Yes     Alcohol/week: 0.0 standard drinks of alcohol     Comment: Rarely    Drug use: No   Other Topics Concern    Caffeine Concern No        Objective:   Physical Exam  Constitutional:       General: She is not in acute distress. Appearance: She is not ill-appearing, toxic-appearing or diaphoretic. Cardiovascular:      Rate and Rhythm: Normal rate and regular rhythm. Heart sounds: Normal heart sounds. No murmur heard. Pulmonary:      Effort: Pulmonary effort is normal. No respiratory distress. Breath sounds: No stridor. No wheezing or rales. Abdominal:      General: There is no distension. Palpations: Abdomen is soft. There is no mass. Tenderness: There is no abdominal tenderness. There is no right CVA tenderness, left CVA tenderness, guarding or rebound. Skin:     Coloration: Skin is not jaundiced or pale. Neurological:      Mental Status: She is alert.          Assessment & Plan:   (N30.00) Acute cystitis without hematuria  (primary encounter diagnosis)  Plan: POC Urinalysis, Automated Dip without         microscopy (PCA and EMM[facility] ONLY) [14932]        We did urine dip and showed trace WBC; she has dysuria whch pt has been typical for her bouts of uti before. We will start pt on macrobid. Increase oral fluids. Call back if symptoms persist/worens. Pt had also asked for refill for her norco; there had been availability problems for the norco 10mg tabs and pt states pharmacist had told her they will have it available so she can get an refill so can get it; she will [place] having next [place] 2 [place] No orders of the defined types were placed in this encounter.       Meds This Visit:  Requested Prescriptions      No prescriptions requested or ordered in this encounter       Imaging & Referrals:  None

## 2023-10-23 NOTE — TELEPHONE ENCOUNTER
Action Requested: Summary for Provider     []  Critical Lab, Recommendations Needed  [x] Need Additional Advice  []   FYI    []   Need Orders  [] Need Medications Sent to Pharmacy  []  Other     SUMMARY: Dr Lewis Poster: are you able to add on patient this afternoon. Patient seeking to see you only; she is undergoing chemotherapy and prefers to see you. Reason for call: Urinary Symptoms  Onset: 10/20/23    Symptoms started with urgency Friday. Symptoms progressively worsening. Very uncomfortable and believes she has a UTI. Patient is doing chemotherapy  Patient declines to see other providers.  Patient preferred to see Dr. Lewis Poster only                  Reason for Disposition   Urinating more frequently than usual (i.e., frequency)    Protocols used: Urinary Symptoms-A-[place]

## 2023-10-23 NOTE — TELEPHONE ENCOUNTER
Patient contacted and agreed with time today. Patient has been added to schedule.     10/23/23 at 4:45pm with DR Cartwright.

## 2023-10-24 ENCOUNTER — OFFICE VISIT (OUTPATIENT)
Dept: SURGERY | Facility: CLINIC | Age: 38
End: 2023-10-24

## 2023-10-24 DIAGNOSIS — Z90.13 ABSENCE OF BOTH BREASTS: Primary | ICD-10-CM

## 2023-10-24 PROCEDURE — 99212 OFFICE O/P EST SF 10 MIN: CPT | Performed by: SURGERY

## 2023-10-24 NOTE — PROGRESS NOTES
Giuseppe Pierre is a 34year old female who presents today for a follow-up. She reports intermittent anterior posterior malposition of her tissue expanders. Physical Examination:  Breasts: Bilateral breast incisions are clean dry intact. Tissue expanders are in the normal anatomic configuration. Abdomen: Moderate lower abdominal pannus striations is noted. Right lower quadrant scar is noted. No palpable hernias detected. Assessment and Plan: We reviewed the plan for removal of bilateral breast tissue expanders and delayed reconstruction with abdominal free flap. The risks of surgery including but not limited to bleeding, infection, scarring, seroma, delayed wound healing, partial/total flap loss, fat necrosis, asymmetry, injury to adjacent structures, abdominal hernia/weakness/bulge, need for further surgery, and venous thromboembolism were reviewed. The expected post-operative course was discussed including the need for activity limitation, drains, and suture removal. Finally, we discussed the possible need for revisions as well as the process of nipple areolar reconstruction. Multiple questions were answered to the patient's satisfaction. No guarantees as to outcome were offered. The patient expresses understanding and wishes to proceed. A total of 20 minutes was spent reviewing the patient's history and diagnostic testing, examining the patient, reviewing reconstructive options, and coordinating the patient's care.

## 2023-10-26 ENCOUNTER — NURSE ONLY (OUTPATIENT)
Dept: HEMATOLOGY/ONCOLOGY | Facility: HOSPITAL | Age: 38
End: 2023-10-26
Attending: INTERNAL MEDICINE
Payer: COMMERCIAL

## 2023-10-26 VITALS
HEIGHT: 65.5 IN | WEIGHT: 187 LBS | DIASTOLIC BLOOD PRESSURE: 91 MMHG | SYSTOLIC BLOOD PRESSURE: 150 MMHG | OXYGEN SATURATION: 100 % | BODY MASS INDEX: 30.78 KG/M2 | TEMPERATURE: 98 F | RESPIRATION RATE: 18 BRPM | HEART RATE: 90 BPM

## 2023-10-26 DIAGNOSIS — T45.1X5A CHEMOTHERAPY-INDUCED PERIPHERAL NEUROPATHY: ICD-10-CM

## 2023-10-26 DIAGNOSIS — C50.911 MALIGNANT NEOPLASM OF RIGHT BREAST IN FEMALE, ESTROGEN RECEPTOR POSITIVE, UNSPECIFIED SITE OF BREAST: Primary | ICD-10-CM

## 2023-10-26 DIAGNOSIS — Z90.13 S/P BILATERAL MASTECTOMY: ICD-10-CM

## 2023-10-26 DIAGNOSIS — Z17.0 BREAST CANCER, STAGE 1, ESTROGEN RECEPTOR POSITIVE, RIGHT: Primary | ICD-10-CM

## 2023-10-26 DIAGNOSIS — Z17.0 MALIGNANT NEOPLASM OF RIGHT BREAST IN FEMALE, ESTROGEN RECEPTOR POSITIVE, UNSPECIFIED SITE OF BREAST: Primary | ICD-10-CM

## 2023-10-26 DIAGNOSIS — C50.911 BREAST CANCER, STAGE 1, ESTROGEN RECEPTOR POSITIVE, RIGHT: Primary | ICD-10-CM

## 2023-10-26 DIAGNOSIS — N39.0 RECURRENT URINARY TRACT INFECTION: ICD-10-CM

## 2023-10-26 DIAGNOSIS — Z51.11 CHEMOTHERAPY MANAGEMENT, ENCOUNTER FOR: ICD-10-CM

## 2023-10-26 DIAGNOSIS — T45.1X5A CHEMOTHERAPY INDUCED DIARRHEA: ICD-10-CM

## 2023-10-26 DIAGNOSIS — K52.1 CHEMOTHERAPY INDUCED DIARRHEA: ICD-10-CM

## 2023-10-26 DIAGNOSIS — G62.0 CHEMOTHERAPY-INDUCED PERIPHERAL NEUROPATHY: ICD-10-CM

## 2023-10-26 LAB
ALBUMIN SERPL-MCNC: 3.9 G/DL (ref 3.4–5)
ALBUMIN/GLOB SERPL: 1.1 {RATIO} (ref 1–2)
ALP LIVER SERPL-CCNC: 46 U/L
ALT SERPL-CCNC: 19 U/L
ANION GAP SERPL CALC-SCNC: 7 MMOL/L (ref 0–18)
AST SERPL-CCNC: 20 U/L (ref 15–37)
BASOPHILS # BLD AUTO: 0.03 X10(3) UL (ref 0–0.2)
BASOPHILS NFR BLD AUTO: 0.3 %
BILIRUB SERPL-MCNC: 0.2 MG/DL (ref 0.1–2)
BUN BLD-MCNC: 8 MG/DL (ref 7–18)
BUN/CREAT SERPL: 10.8 (ref 10–20)
CALCIUM BLD-MCNC: 9.2 MG/DL (ref 8.5–10.1)
CHLORIDE SERPL-SCNC: 109 MMOL/L (ref 98–112)
CO2 SERPL-SCNC: 24 MMOL/L (ref 21–32)
CREAT BLD-MCNC: 0.74 MG/DL
DEPRECATED RDW RBC AUTO: 48.5 FL (ref 35.1–46.3)
EGFRCR SERPLBLD CKD-EPI 2021: 105 ML/MIN/1.73M2 (ref 60–?)
EOSINOPHIL # BLD AUTO: 0.03 X10(3) UL (ref 0–0.7)
EOSINOPHIL NFR BLD AUTO: 0.3 %
ERYTHROCYTE [DISTWIDTH] IN BLOOD BY AUTOMATED COUNT: 13.8 % (ref 11–15)
GLOBULIN PLAS-MCNC: 3.7 G/DL (ref 2.8–4.4)
GLUCOSE BLD-MCNC: 100 MG/DL (ref 70–99)
HCT VFR BLD AUTO: 37.8 %
HGB BLD-MCNC: 12.6 G/DL
IMM GRANULOCYTES # BLD AUTO: 0.02 X10(3) UL (ref 0–1)
IMM GRANULOCYTES NFR BLD: 0.2 %
LYMPHOCYTES # BLD AUTO: 4.89 X10(3) UL (ref 1–4)
LYMPHOCYTES NFR BLD AUTO: 49 %
MCH RBC QN AUTO: 31.7 PG (ref 26–34)
MCHC RBC AUTO-ENTMCNC: 33.3 G/DL (ref 31–37)
MCV RBC AUTO: 95 FL
MONOCYTES # BLD AUTO: 0.37 X10(3) UL (ref 0.1–1)
MONOCYTES NFR BLD AUTO: 3.7 %
NEUTROPHILS # BLD AUTO: 4.63 X10 (3) UL (ref 1.5–7.7)
NEUTROPHILS # BLD AUTO: 4.63 X10(3) UL (ref 1.5–7.7)
NEUTROPHILS NFR BLD AUTO: 46.5 %
OSMOLALITY SERPL CALC.SUM OF ELEC: 288 MOSM/KG (ref 275–295)
PLATELET # BLD AUTO: 285 10(3)UL (ref 150–450)
POTASSIUM SERPL-SCNC: 4.2 MMOL/L (ref 3.5–5.1)
PROT SERPL-MCNC: 7.6 G/DL (ref 6.4–8.2)
RBC # BLD AUTO: 3.98 X10(6)UL
SODIUM SERPL-SCNC: 140 MMOL/L (ref 136–145)
WBC # BLD AUTO: 10 X10(3) UL (ref 4–11)

## 2023-10-26 PROCEDURE — 80053 COMPREHEN METABOLIC PANEL: CPT

## 2023-10-26 PROCEDURE — 85025 COMPLETE CBC W/AUTO DIFF WBC: CPT

## 2023-10-26 PROCEDURE — 36593 DECLOT VASCULAR DEVICE: CPT

## 2023-10-26 PROCEDURE — 36591 DRAW BLOOD OFF VENOUS DEVICE: CPT

## 2023-10-26 PROCEDURE — 99215 OFFICE O/P EST HI 40 MIN: CPT | Performed by: INTERNAL MEDICINE

## 2023-10-26 PROCEDURE — 36415 COLL VENOUS BLD VENIPUNCTURE: CPT

## 2023-10-26 RX ORDER — HEPARIN SODIUM (PORCINE) LOCK FLUSH IV SOLN 100 UNIT/ML 100 UNIT/ML
5 SOLUTION INTRAVENOUS ONCE
OUTPATIENT
Start: 2023-11-16

## 2023-10-26 RX ORDER — WATER 10 ML/10ML
INJECTION INTRAMUSCULAR; INTRAVENOUS; SUBCUTANEOUS
Status: DISCONTINUED
Start: 2023-10-26 | End: 2023-10-26

## 2023-10-26 NOTE — PROGRESS NOTES
Message received from clinic  No tx today due to UTI   Will defer 1 week    Patient aware of next weeks appt    Port flushed with good blood return post TPA dwell time      Discharged stable

## 2023-10-27 ENCOUNTER — DOCUMENTATION ONLY (OUTPATIENT)
Dept: SURGERY | Facility: CLINIC | Age: 38
End: 2023-10-27

## 2023-10-27 ENCOUNTER — TELEPHONE (OUTPATIENT)
Dept: SURGERY | Facility: CLINIC | Age: 38
End: 2023-10-27

## 2023-10-27 DIAGNOSIS — Z90.13 ABSENCE OF BOTH BREASTS: Primary | ICD-10-CM

## 2023-10-27 NOTE — PATIENT INSTRUCTIONS
Surgeon:         Dr. Kayla Rebollar                                        Tel:         [phone]                                  Fax:        [phone]    Surgery/Procedure:THIS IS NOT A PRE-OP  Second stage reconstruction with removal of bilateral breast tissue expanders, placement of permanent implants and autologous fat grafting to the bilateral breasts. 2.5 hours, general anesthesia, outpatient. Dx Code: J92.30    Hospital:  [facility] CENTER: [address], [place], [address]           [phone]  [facility] AND CLINICS: [address], [facility], [facility]               [phone]    1. Someone will need to drive you to and from the hospital if your procedure is outpatient. 2.Do not drink alcohol or smoke 24 hours prior to your procedure. 3. Bring a picture ID and your insurance card. 4. You will be contacted by the hospital the day before to confirm the procedure time and location. 5. Do not take any herbal supplements or blood thinners at least one week before your procedure/surgery. This includes NSAID's (aspirin, baby aspirin, Motrin, Ibuprofen, Aleve, Advil, Naproxen, etc), Plavix, fish oil, vitamin E, turmeric, CoQ10, or green tea supplements, etc. *TYLENOL or acetaminophen is ok to take*    6. PRE-OPERATIVE TESTING: History and physical with medical clearance is REQUIRED within 30 days of the surgery date and is mandatory per Dr. Ralf Martinez. *If this is not done, your surgery will be postponed*  MEDICAL CLEARANCE WITH  ____  CBC  CMP  EKG    7. Please inform us if you start or change any medications at least one week before surgery (ex: blood thinners, weight loss medications, diabetic medications, herbal supplements, etc)    8. Does patient have diagnosis of sleep apnea?     [   ] Yes     [   ]  No    Consent obtained  Photos taken on _________

## 2023-10-27 NOTE — TELEPHONE ENCOUNTER
Spoke to patient who has decided against the NAIN FLAP and would like to proceed with implant based reconstruction. She was informed we will look for a new surgical date (needs to be march 2024 or sooner per her work short term disability). She will need to return for a pre-op visit once surgery is scheduled. Called patient back to inform her of second stage surgery date of 02/21/2024 at [facility] to have her implants placed. Tony confirmed surgery date. Call was transferred to PSR's to schedule pre-op visit.

## 2023-11-02 ENCOUNTER — TELEPHONE (OUTPATIENT)
Dept: INTERNAL MEDICINE CLINIC | Facility: CLINIC | Age: 38
End: 2023-11-02

## 2023-11-02 ENCOUNTER — OFFICE VISIT (OUTPATIENT)
Dept: HEMATOLOGY/ONCOLOGY | Facility: HOSPITAL | Age: 38
End: 2023-11-02
Attending: INTERNAL MEDICINE
Payer: COMMERCIAL

## 2023-11-02 VITALS
BODY MASS INDEX: 30 KG/M2 | WEIGHT: 184 LBS | SYSTOLIC BLOOD PRESSURE: 169 MMHG | HEART RATE: 94 BPM | TEMPERATURE: 98 F | RESPIRATION RATE: 18 BRPM | OXYGEN SATURATION: 100 % | DIASTOLIC BLOOD PRESSURE: 89 MMHG

## 2023-11-02 DIAGNOSIS — Z17.0 BREAST CANCER, STAGE 1, ESTROGEN RECEPTOR POSITIVE, RIGHT: Primary | ICD-10-CM

## 2023-11-02 DIAGNOSIS — C50.911 BREAST CANCER, STAGE 1, ESTROGEN RECEPTOR POSITIVE, RIGHT: Primary | ICD-10-CM

## 2023-11-02 DIAGNOSIS — Z51.11 CHEMOTHERAPY MANAGEMENT, ENCOUNTER FOR: ICD-10-CM

## 2023-11-02 PROCEDURE — 96367 TX/PROPH/DG ADDL SEQ IV INF: CPT

## 2023-11-02 PROCEDURE — 96413 CHEMO IV INFUSION 1 HR: CPT

## 2023-11-02 RX ORDER — HEPARIN SODIUM (PORCINE) LOCK FLUSH IV SOLN 100 UNIT/ML 100 UNIT/ML
5 SOLUTION INTRAVENOUS ONCE
OUTPATIENT
Start: 2023-11-16

## 2023-11-02 RX ORDER — HYDROCODONE BITARTRATE AND ACETAMINOPHEN 10; 325 MG/1; MG/1
TABLET ORAL
Qty: 84 TABLET | Refills: 0 | Status: SHIPPED | OUTPATIENT
Start: 2023-11-02

## 2023-11-02 RX ORDER — HEPARIN SODIUM (PORCINE) LOCK FLUSH IV SOLN 100 UNIT/ML 100 UNIT/ML
5 SOLUTION INTRAVENOUS ONCE
Status: COMPLETED | OUTPATIENT
Start: 2023-11-02 | End: 2023-11-02

## 2023-11-02 RX ADMIN — HEPARIN SODIUM (PORCINE) LOCK FLUSH IV SOLN 100 UNIT/ML 500 UNITS: 100 SOLUTION INTRAVENOUS at 12:23:00

## 2023-11-02 NOTE — TELEPHONE ENCOUNTER
Here to pickup script; she is leaving out of town and wont be able to refill in other town.  Will pickup tomorrow; next refill will be 11/19/23; [facility]

## 2023-11-02 NOTE — PROGRESS NOTES
Pt here for C11D1 Drug(s)Kadcyla. Arrives Ambulating independently, accompanied by Self     Patient was evaluated today by Treatment Nurse. Oral medications included in this regimen:  no    Patient confirms comprehension of cancer treatment schedule:  yes    Pregnancy screening:  Denies possibility of pregnancy    Modifications in dose or schedule:  No,delayed 1 week due to UTI, ok to treat and use labs from last week. Medications appearance and physical integrity checked by RN: yes. Chemotherapy IV pump settings verified by 2 RNs:  Yes. Frequency of blood return and site check throughout administration: Prior to administration, Prior to each drug, and At completion of therapy     Infusion/treatment outcome:  patient tolerated treatment without incident    Education Record    Learner:  Patient  Barriers / Limitations:  Denial  Method:  Brief focused  Education / instructions given:  Discussed plan of care.    Outcome:  Needs reinforcement    Discharged Home, Ambulating independently, accompanied by:Self    Patient/family verbalized understanding of future appointments: by [employer]

## 2023-11-09 ENCOUNTER — APPOINTMENT (OUTPATIENT)
Dept: HEMATOLOGY/ONCOLOGY | Facility: HOSPITAL | Age: 38
End: 2023-11-09
Attending: INTERNAL MEDICINE
Payer: COMMERCIAL

## 2023-11-15 ENCOUNTER — TELEPHONE (OUTPATIENT)
Dept: INTERNAL MEDICINE CLINIC | Facility: CLINIC | Age: 38
End: 2023-11-15

## 2023-11-15 RX ORDER — HYDROCODONE BITARTRATE AND ACETAMINOPHEN 10; 325 MG/1; MG/1
TABLET ORAL
Qty: 84 TABLET | Refills: 0 | Status: SHIPPED | OUTPATIENT
Start: 2023-11-15

## 2023-11-16 ENCOUNTER — APPOINTMENT (OUTPATIENT)
Dept: HEMATOLOGY/ONCOLOGY | Facility: HOSPITAL | Age: 38
End: 2023-11-16
Attending: INTERNAL MEDICINE
Payer: COMMERCIAL

## 2023-11-21 NOTE — TELEPHONE ENCOUNTER
Please review; protocol failed. Requested Prescriptions   Pending Prescriptions Disp Refills    CYCLOBENZAPRINE 10 MG Oral Tab [Pharmacy Med Name: CYCLOBENZAPRINE 10 MG TABLET] 90 tablet 0     Sig: TAKE 1 TABLET BY MOUTH THREE TIMES A DAY       There is no refill protocol information for this order       BUTALBITAL-ACETAMINOPHEN-CAFFEINE -40 MG Oral Tab [Pharmacy Med Name: GRLWVY-EAMHTRRK-HJDR -40] 30 tablet 2     Sig: Take 1 tablet by mouth every 8 (eight) hours as needed for Headaches.        There is no refill protocol information for this order        Recent Outpatient Visits              2 weeks ago Breast cancer, stage 1, estrogen receptor positive, right     [address] Visit    3 weeks ago     [address] Visit    3 weeks ago Malignant neoplasm of right breast in female, estrogen receptor positive, unspecified site of breast     [facility] Hematology Oncology James Ferrari MD    Office Visit    3 weeks ago Breast cancer, stage 1, estrogen receptor positive, right     [address]    Nurse Only    4 weeks ago Absence of both breasts    Kevan Mendoza MD    Office Visit          Future Appointments         Provider Department Appt Notes    Tomorrow [facility] [facility] Hematology Oncology FT-CBC CMP PORT-SL( pt wants PIV labs in order to come this day)    Tomorrow James Ferrari MD [facility] Hematology Oncology PRE CHEMO VISIT    In 3 days EM CC INFRN Perlie Berth - Infusion C12 D1 KADCYLA-PORT-SL* Auth'd until 1-30-24*    In 3 weeks EM Suresh Saldana [address]52 - Infusion SD CBC CMP PORT-SL    In 3 weeks Damion Mcintosh [facility] Hematology Oncology PRE CHEMO VISIT    In 3 weeks EM CC INFRN Perlie Berth - Infusion SD C13 D1 KADCYLA-PORT-SL* Auth'd until 1-30-24*    In 1 month Olamide Taylor MD [address], [address], [facility]

## 2023-11-22 ENCOUNTER — TELEPHONE (OUTPATIENT)
Dept: INTERNAL MEDICINE CLINIC | Facility: CLINIC | Age: 38
End: 2023-11-22

## 2023-11-22 ENCOUNTER — NURSE ONLY (OUTPATIENT)
Dept: HEMATOLOGY/ONCOLOGY | Facility: HOSPITAL | Age: 38
End: 2023-11-22
Attending: INTERNAL MEDICINE
Payer: COMMERCIAL

## 2023-11-22 VITALS
WEIGHT: 186.88 LBS | HEIGHT: 65.5 IN | DIASTOLIC BLOOD PRESSURE: 90 MMHG | OXYGEN SATURATION: 99 % | SYSTOLIC BLOOD PRESSURE: 155 MMHG | BODY MASS INDEX: 30.76 KG/M2 | TEMPERATURE: 98 F | HEART RATE: 100 BPM | RESPIRATION RATE: 16 BRPM

## 2023-11-22 DIAGNOSIS — Z17.0 BREAST CANCER, STAGE 1, ESTROGEN RECEPTOR POSITIVE, RIGHT: Primary | ICD-10-CM

## 2023-11-22 DIAGNOSIS — C50.911 BREAST CANCER, STAGE 1, ESTROGEN RECEPTOR POSITIVE, RIGHT: Primary | ICD-10-CM

## 2023-11-22 DIAGNOSIS — T45.1X5A CHEMOTHERAPY INDUCED DIARRHEA: ICD-10-CM

## 2023-11-22 DIAGNOSIS — N39.0 RECURRENT URINARY TRACT INFECTION: ICD-10-CM

## 2023-11-22 DIAGNOSIS — K52.1 CHEMOTHERAPY INDUCED DIARRHEA: ICD-10-CM

## 2023-11-22 DIAGNOSIS — Z90.13 S/P BILATERAL MASTECTOMY: ICD-10-CM

## 2023-11-22 DIAGNOSIS — T45.1X5A CHEMOTHERAPY-INDUCED PERIPHERAL NEUROPATHY: ICD-10-CM

## 2023-11-22 DIAGNOSIS — Z17.0 MALIGNANT NEOPLASM OF RIGHT BREAST IN FEMALE, ESTROGEN RECEPTOR POSITIVE, UNSPECIFIED SITE OF BREAST: Primary | ICD-10-CM

## 2023-11-22 DIAGNOSIS — C50.911 MALIGNANT NEOPLASM OF RIGHT BREAST IN FEMALE, ESTROGEN RECEPTOR POSITIVE, UNSPECIFIED SITE OF BREAST: Primary | ICD-10-CM

## 2023-11-22 DIAGNOSIS — Z51.11 CHEMOTHERAPY MANAGEMENT, ENCOUNTER FOR: ICD-10-CM

## 2023-11-22 DIAGNOSIS — G62.0 CHEMOTHERAPY-INDUCED PERIPHERAL NEUROPATHY: ICD-10-CM

## 2023-11-22 LAB
ALBUMIN SERPL-MCNC: 4.4 G/DL (ref 3.2–4.8)
ALBUMIN/GLOB SERPL: 1.4 {RATIO} (ref 1–2)
ALP LIVER SERPL-CCNC: 44 U/L
ALT SERPL-CCNC: 16 U/L
ANION GAP SERPL CALC-SCNC: 6 MMOL/L (ref 0–18)
AST SERPL-CCNC: 16 U/L (ref ?–34)
BASOPHILS # BLD AUTO: 0.03 X10(3) UL (ref 0–0.2)
BASOPHILS NFR BLD AUTO: 0.3 %
BILIRUB SERPL-MCNC: 0.3 MG/DL (ref 0.3–1.2)
BUN BLD-MCNC: 8 MG/DL (ref 9–23)
BUN/CREAT SERPL: 12.1 (ref 10–20)
CALCIUM BLD-MCNC: 9.5 MG/DL (ref 8.7–10.4)
CHLORIDE SERPL-SCNC: 104 MMOL/L (ref 98–112)
CO2 SERPL-SCNC: 25 MMOL/L (ref 21–32)
CREAT BLD-MCNC: 0.66 MG/DL
DEPRECATED RDW RBC AUTO: 48.9 FL (ref 35.1–46.3)
EGFRCR SERPLBLD CKD-EPI 2021: 114 ML/MIN/1.73M2 (ref 60–?)
EOSINOPHIL # BLD AUTO: 0.02 X10(3) UL (ref 0–0.7)
EOSINOPHIL NFR BLD AUTO: 0.2 %
ERYTHROCYTE [DISTWIDTH] IN BLOOD BY AUTOMATED COUNT: 13.6 % (ref 11–15)
FASTING STATUS PATIENT QL REPORTED: NO
GLOBULIN PLAS-MCNC: 3.2 G/DL (ref 2.8–4.4)
GLUCOSE BLD-MCNC: 103 MG/DL (ref 70–99)
HCT VFR BLD AUTO: 39.9 %
HGB BLD-MCNC: 13.1 G/DL
IMM GRANULOCYTES # BLD AUTO: 0.02 X10(3) UL (ref 0–1)
IMM GRANULOCYTES NFR BLD: 0.2 %
LYMPHOCYTES # BLD AUTO: 4.08 X10(3) UL (ref 1–4)
LYMPHOCYTES NFR BLD AUTO: 38.5 %
MCH RBC QN AUTO: 31.6 PG (ref 26–34)
MCHC RBC AUTO-ENTMCNC: 32.8 G/DL (ref 31–37)
MCV RBC AUTO: 96.4 FL
MONOCYTES # BLD AUTO: 0.39 X10(3) UL (ref 0.1–1)
MONOCYTES NFR BLD AUTO: 3.7 %
NEUTROPHILS # BLD AUTO: 6.07 X10 (3) UL (ref 1.5–7.7)
NEUTROPHILS # BLD AUTO: 6.07 X10(3) UL (ref 1.5–7.7)
NEUTROPHILS NFR BLD AUTO: 57.1 %
OSMOLALITY SERPL CALC.SUM OF ELEC: 279 MOSM/KG (ref 275–295)
PLATELET # BLD AUTO: 338 10(3)UL (ref 150–450)
POTASSIUM SERPL-SCNC: 4 MMOL/L (ref 3.5–5.1)
PROT SERPL-MCNC: 7.6 G/DL (ref 5.7–8.2)
RBC # BLD AUTO: 4.14 X10(6)UL
SODIUM SERPL-SCNC: 135 MMOL/L (ref 136–145)
WBC # BLD AUTO: 10.6 X10(3) UL (ref 4–11)

## 2023-11-22 PROCEDURE — 80053 COMPREHEN METABOLIC PANEL: CPT

## 2023-11-22 PROCEDURE — 85025 COMPLETE CBC W/AUTO DIFF WBC: CPT

## 2023-11-22 PROCEDURE — 36415 COLL VENOUS BLD VENIPUNCTURE: CPT

## 2023-11-22 PROCEDURE — 99215 OFFICE O/P EST HI 40 MIN: CPT | Performed by: INTERNAL MEDICINE

## 2023-11-22 RX ORDER — HYDROCODONE BITARTRATE AND ACETAMINOPHEN 10; 325 MG/1; MG/1
TABLET ORAL
Qty: 112 TABLET | Refills: 0 | Status: SHIPPED | OUTPATIENT
Start: 2023-11-22

## 2023-11-22 RX ORDER — CYCLOBENZAPRINE HCL 10 MG
10 TABLET ORAL 3 TIMES DAILY
Qty: 90 TABLET | Refills: 0 | Status: SHIPPED | OUTPATIENT
Start: 2023-11-22

## 2023-11-22 RX ORDER — BUTALBITAL, ACETAMINOPHEN AND CAFFEINE 50; 325; 40 MG/1; MG/1; MG/1
1 TABLET ORAL EVERY 8 HOURS PRN
Qty: 30 TABLET | Refills: 0 | Status: SHIPPED | OUTPATIENT
Start: 2023-11-22

## 2023-11-22 NOTE — TELEPHONE ENCOUNTER
Pt here to pickup norco script; will have her chemo this week and needs increse dose to 8/day; [place] the weekend[place]

## 2023-11-24 ENCOUNTER — APPOINTMENT (OUTPATIENT)
Dept: HEMATOLOGY/ONCOLOGY | Facility: HOSPITAL | Age: 38
End: 2023-11-24
Attending: INTERNAL MEDICINE
Payer: COMMERCIAL

## 2023-11-24 VITALS
SYSTOLIC BLOOD PRESSURE: 175 MMHG | RESPIRATION RATE: 16 BRPM | HEART RATE: 96 BPM | DIASTOLIC BLOOD PRESSURE: 89 MMHG | TEMPERATURE: 98 F | OXYGEN SATURATION: 99 %

## 2023-11-24 DIAGNOSIS — Z17.0 BREAST CANCER, STAGE 1, ESTROGEN RECEPTOR POSITIVE, RIGHT: Primary | ICD-10-CM

## 2023-11-24 DIAGNOSIS — C50.911 BREAST CANCER, STAGE 1, ESTROGEN RECEPTOR POSITIVE, RIGHT: Primary | ICD-10-CM

## 2023-11-24 DIAGNOSIS — Z51.11 CHEMOTHERAPY MANAGEMENT, ENCOUNTER FOR: ICD-10-CM

## 2023-11-24 PROCEDURE — 96375 TX/PRO/DX INJ NEW DRUG ADDON: CPT

## 2023-11-24 PROCEDURE — 96413 CHEMO IV INFUSION 1 HR: CPT

## 2023-11-24 RX ORDER — HEPARIN SODIUM (PORCINE) LOCK FLUSH IV SOLN 100 UNIT/ML 100 UNIT/ML
SOLUTION INTRAVENOUS
Status: COMPLETED
Start: 2023-11-24 | End: 2023-11-24

## 2023-11-24 RX ORDER — HEPARIN SODIUM (PORCINE) LOCK FLUSH IV SOLN 100 UNIT/ML 100 UNIT/ML
5 SOLUTION INTRAVENOUS ONCE
OUTPATIENT
Start: 2023-12-07

## 2023-11-24 RX ORDER — HEPARIN SODIUM (PORCINE) LOCK FLUSH IV SOLN 100 UNIT/ML 100 UNIT/ML
5 SOLUTION INTRAVENOUS ONCE
Status: COMPLETED | OUTPATIENT
Start: 2023-11-24 | End: 2023-11-24

## 2023-11-24 RX ADMIN — HEPARIN SODIUM (PORCINE) LOCK FLUSH IV SOLN 100 UNIT/ML 500 UNITS: 100 SOLUTION INTRAVENOUS at 09:30:00

## 2023-11-24 NOTE — PROGRESS NOTES
Pt here for C12D1 Drug(s)Kadcyla. Arrives Ambulating independently, accompanied by Self     Patient was evaluated today by Treatment Nurse. Oral medications included in this regimen:  no    Patient confirms comprehension of cancer treatment schedule:  yes    Pregnancy screening:  Denies possibility of pregnancy    Modifications in dose or schedule:  No    Medications appearance and physical integrity checked by RN: yes. Chemotherapy IV pump settings verified by 2 RNs:  Yes.   Frequency of blood return and site check throughout administration: Prior to administration and At completion of therapy     Infusion/treatment outcome:  patient tolerated treatment without incident    Education Record    Learner:  Patient  Barriers / Limitations:  None  Method:  Discussion  Education / instructions given:  Reviewed schedule  Outcome:  Observed demonstration    Discharged Home, Ambulating independently, accompanied by:Self    Patient/family verbalized understanding of future appointments: by [facility] [employer]

## 2023-11-30 ENCOUNTER — TELEPHONE (OUTPATIENT)
Dept: INTERNAL MEDICINE CLINIC | Facility: CLINIC | Age: 38
End: 2023-11-30

## 2023-11-30 RX ORDER — HYDROCODONE BITARTRATE AND ACETAMINOPHEN 10; 325 MG/1; MG/1
TABLET ORAL
Qty: 84 TABLET | Refills: 0 | Status: SHIPPED | OUTPATIENT
Start: 2023-11-30

## 2023-12-13 NOTE — PROGRESS NOTES
Hematology Oncology Progress Note    Patient Name: Mc Dolan   YOB: 1985   Medical Record Number: [id number]   Attending Physician: Asha Callaway    Date of Visit: 12/14/23     Chief Complaint:  Breast cancer  Chemotherapy    Diagnosis: Right breast invasive ductal carcinoma, grade 3, ER/IN positive, HER2 positive, ki high, pT2N0    Oncologic History:  34year old premenopausal female presented with palpable right breast mass   7/6/22: diag laurent with us. T2 (2.3cm) N0 12:00 4cm nipple  7/8/22: biopsy IDC, grade 3, ER/IN>90%, Her 3+, Ki 46%  7/13/22: MRI with 2 areas of focal non mass enhancement bx, benign and concordant. Genetics negative. 7/28/22: TCHP x6 cycles with dose reduced T completed 11/21/22  1/10/23: bilateral mastectomies: [id number].   2/20/23: adjuvant kadcyla- current    Interval History:  Landy Messer returns for follow up and pre chemo evaluation. Due for C13 Kadcyla today. Doing well. Tolerating therapy without major toxicity. Mild diarrhea, manageable with imodium/lomotil. Needing refill. Minimal neuropathy- mainly to L foot - overall better than before. Energy level is good. Having regular periods. Using birth control. ECOG PS: 0     PMH/PSH: herniated disc with back pain, urethral stricture, migraine, left forarm lipoma  Family History: Pcousin with breast ca age 25- was neg for genetics  Social History: works for [employer], non smoker. Medications:    Current Outpatient Medications:     HYDROcodone-acetaminophen (NORCO)  MG Oral Tab, Take 1 to 2 tabs po every 4 to 6 hrs prn for pain, Disp: 84 tablet, Rfl: 0    cyclobenzaprine 10 MG Oral Tab, Take 1 tablet (10 mg total) by mouth 3 (three) times daily. , Disp: 90 tablet, Rfl: 0    butalbital-acetaminophen-caffeine -40 MG Oral Tab, Take 1 tablet by mouth every 8 (eight) hours as needed for Headaches., Disp: 30 tablet, Rfl: 0    nitrofurantoin monohydrate macro 100 MG Oral Cap, Take 1 capsule (100 mg total) by mouth 2 (two) times daily. , Disp: 14 capsule, Rfl: 0    dexamethasone (DECADRON) 4 MG tablet, TAKE 1 TABLET BY MOUTH DAILY WITH BREAKFAST FOR 3 DAYS, Disp: , Rfl:     Naloxone HCl 4 MG/0.1ML Nasal Liquid, 4 mg by Nasal route as needed for Opioid Overdose. Spray 4 mg into a single nostril, if patient remains unresponsive, repeat in 2-3 minutes in alternating nostrils until medical assistance becomes available., Disp: 1 kit, Rfl: 0    tobramycin 0.3 % Ophthalmic Ointment, Apply to nasal lesion daily, Disp: 3.5 g, Rfl: 0    tamoxifen 20 MG Oral Tab, Take 1 tablet (20 mg total) by mouth daily. , Disp: 90 tablet, Rfl: 3    diphenoxylate-atropine 2.5-0.025 MG Oral Tab, Take 1-2 tablets by mouth 4 (four) times daily as needed for Diarrhea., Disp: 120 tablet, Rfl: 3    docusate sodium 100 MG Oral Cap, Take 1 capsule (100 mg total) by mouth 2 (two) times daily. , Disp: 30 capsule, Rfl: 1    ondansetron (ZOFRAN) 4 mg tablet, Take 1 tablet (4 mg total) by mouth every 8 (eight) hours as needed for Nausea., Disp: 12 tablet, Rfl: 0    lidocaine-prilocaine 2.5-2.5 % External Cream, Apply to site 1 hour prior to port a cath needle insertion, Disp: 1 each, Rfl: 3    Review of Systems:   Oncology specific ROS negative except as per HPI    Vitals:  LMP 09/18/2023 (Exact Date)     Weight:  Wt Readings from Last 6 Encounters:   11/22/23 84.8 kg (186 lb 14.4 oz)   11/02/23 83.5 kg (184 lb)   10/26/23 84.8 kg (187 lb)   10/23/23 83.2 kg (183 lb 6.4 oz)   10/05/23 83.9 kg (185 lb)   10/02/23 82.1 kg (181 lb 1.6 oz)     Physical Exam:  General: alert and oriented x 3, not in acute distress. HEENT: non icterus. Oropharynx clear. Breast: b/l mastectomy with expanders, intact skin. No palpable lesions or tenderness. No axillary lymphadenopathy b/l. Chest: non labored breathing, CTA  Abd: soft, non tender  Ext: no edema.   Neurological: motor strength grossly intact, MA4E    Laboratory:  Lab Results   Component Value Date    WBC 7.4 12/14/2023    RBC 3.91 12/14/2023    HGB 12.1 12/14/2023    HCT 38.0 12/14/2023    MCV 97.2 12/14/2023    MCH 30.9 12/14/2023    MCHC 31.8 12/14/2023    RDW 14.0 12/14/2023    .0 12/14/2023    MPV 8.3 12/17/2018     Lab Results   Component Value Date     12/14/2023    K 3.9 12/14/2023     12/14/2023    CO2 26.0 12/14/2023    BUN 13 12/14/2023    CREATSERUM 0.77 12/14/2023     (H) 12/14/2023    CA 9.2 12/14/2023    ALKPHO 44 12/14/2023    ALT 19 12/14/2023    AST 23 12/14/2023    BILT 0.4 12/14/2023    ALB 4.3 12/14/2023    TP 7.3 12/14/2023      Radiology; No results found. Impression and Plan:    Right breast invasive ductal carcinoma, grade 3, ER/[place] positive, HER2 positive, Ki high, [id number]:  - s/p neoadjuvant TCHP--> bilateral mastectomy ypT1a--> adjuvant T-DM1 (kadcyla). no XRT indication  - on kadcyla q3 weeks, completed 11 cycles with good tolerance  - C11 delayed due to UTI. Due for C12 today  - OFS not tolerated. Plan to start tamoxifen after completion of kadcyla  - aware of utilizing other forms of birthcontrol while on chemo (spoke about Mirena IUD)  - echo 10/2023 normal.  Repeat q3 mo  - breast reconstruction planned on 2/21/24  - proceed with C13 kadcyla today. No dose adjustment. Recurrent UTI's- chronic issues for many years, predated diagnosis. recurring mostly after treatments, will refer to urogyne. Chemo induced neuropathy- grade 1, mild numbness limited to toes only and stable. Not requiring meds. Hypokalemia - sec to diarrhea. will supplement as needed. Left IT band numbness as above. CT A/P completed - L5/S1 grade 1 t. Diarrhea - on lomotil, taking 2 tabs at night w/ good relief. Refilled today. Chronic arthralgia/myalgia - mild, on norco as needed (refill per primary)    MDM: high, malignancy, life threatening.  Drug therapy requiring intensive monitoring for tox      Mary Galdamez PA-C   Hematology Oncology

## 2023-12-14 ENCOUNTER — OFFICE VISIT (OUTPATIENT)
Dept: HEMATOLOGY/ONCOLOGY | Facility: HOSPITAL | Age: 38
End: 2023-12-14
Attending: PHYSICIAN ASSISTANT
Payer: COMMERCIAL

## 2023-12-14 ENCOUNTER — NURSE ONLY (OUTPATIENT)
Dept: HEMATOLOGY/ONCOLOGY | Facility: HOSPITAL | Age: 38
End: 2023-12-14
Attending: INTERNAL MEDICINE
Payer: COMMERCIAL

## 2023-12-14 ENCOUNTER — TELEPHONE (OUTPATIENT)
Dept: INTERNAL MEDICINE CLINIC | Facility: CLINIC | Age: 38
End: 2023-12-14

## 2023-12-14 VITALS
TEMPERATURE: 98 F | OXYGEN SATURATION: 99 % | BODY MASS INDEX: 30.78 KG/M2 | WEIGHT: 187 LBS | DIASTOLIC BLOOD PRESSURE: 90 MMHG | SYSTOLIC BLOOD PRESSURE: 150 MMHG | HEART RATE: 98 BPM | HEIGHT: 65.5 IN | RESPIRATION RATE: 16 BRPM

## 2023-12-14 DIAGNOSIS — C50.911 BREAST CANCER, STAGE 1, ESTROGEN RECEPTOR POSITIVE, RIGHT  (HCC): Primary | ICD-10-CM

## 2023-12-14 DIAGNOSIS — Z17.0 BREAST CANCER, STAGE 1, ESTROGEN RECEPTOR POSITIVE, RIGHT  (HCC): Primary | ICD-10-CM

## 2023-12-14 DIAGNOSIS — Z51.11 CHEMOTHERAPY MANAGEMENT, ENCOUNTER FOR: ICD-10-CM

## 2023-12-14 DIAGNOSIS — D64.81 ANEMIA DUE TO ANTINEOPLASTIC CHEMOTHERAPY: ICD-10-CM

## 2023-12-14 DIAGNOSIS — T45.1X5A CHEMOTHERAPY INDUCED DIARRHEA: ICD-10-CM

## 2023-12-14 DIAGNOSIS — T45.1X5A ANEMIA DUE TO ANTINEOPLASTIC CHEMOTHERAPY: ICD-10-CM

## 2023-12-14 DIAGNOSIS — K52.1 CHEMOTHERAPY INDUCED DIARRHEA: ICD-10-CM

## 2023-12-14 LAB
ALBUMIN SERPL-MCNC: 4.3 G/DL (ref 3.2–4.8)
ALBUMIN/GLOB SERPL: 1.4 {RATIO} (ref 1–2)
ALP LIVER SERPL-CCNC: 44 U/L
ALT SERPL-CCNC: 19 U/L
ANION GAP SERPL CALC-SCNC: 4 MMOL/L (ref 0–18)
AST SERPL-CCNC: 23 U/L (ref ?–34)
BASOPHILS # BLD AUTO: 0.03 X10(3) UL (ref 0–0.2)
BASOPHILS NFR BLD AUTO: 0.4 %
BILIRUB SERPL-MCNC: 0.4 MG/DL (ref 0.3–1.2)
BUN BLD-MCNC: 13 MG/DL (ref 9–23)
BUN/CREAT SERPL: 16.9 (ref 10–20)
CALCIUM BLD-MCNC: 9.2 MG/DL (ref 8.7–10.4)
CHLORIDE SERPL-SCNC: 107 MMOL/L (ref 98–112)
CO2 SERPL-SCNC: 26 MMOL/L (ref 21–32)
CREAT BLD-MCNC: 0.77 MG/DL
DEPRECATED RDW RBC AUTO: 49.6 FL (ref 35.1–46.3)
EGFRCR SERPLBLD CKD-EPI 2021: 100 ML/MIN/1.73M2 (ref 60–?)
EOSINOPHIL # BLD AUTO: 0 X10(3) UL (ref 0–0.7)
EOSINOPHIL NFR BLD AUTO: 0 %
ERYTHROCYTE [DISTWIDTH] IN BLOOD BY AUTOMATED COUNT: 14 % (ref 11–15)
GLOBULIN PLAS-MCNC: 3 G/DL (ref 2.8–4.4)
GLUCOSE BLD-MCNC: 119 MG/DL (ref 70–99)
HCT VFR BLD AUTO: 38 %
HGB BLD-MCNC: 12.1 G/DL
IMM GRANULOCYTES # BLD AUTO: 0.02 X10(3) UL (ref 0–1)
IMM GRANULOCYTES NFR BLD: 0.3 %
LYMPHOCYTES # BLD AUTO: 3.19 X10(3) UL (ref 1–4)
LYMPHOCYTES NFR BLD AUTO: 42.9 %
MCH RBC QN AUTO: 30.9 PG (ref 26–34)
MCHC RBC AUTO-ENTMCNC: 31.8 G/DL (ref 31–37)
MCV RBC AUTO: 97.2 FL
MONOCYTES # BLD AUTO: 0.4 X10(3) UL (ref 0.1–1)
MONOCYTES NFR BLD AUTO: 5.4 %
NEUTROPHILS # BLD AUTO: 3.79 X10 (3) UL (ref 1.5–7.7)
NEUTROPHILS # BLD AUTO: 3.79 X10(3) UL (ref 1.5–7.7)
NEUTROPHILS NFR BLD AUTO: 51 %
OSMOLALITY SERPL CALC.SUM OF ELEC: 285 MOSM/KG (ref 275–295)
PLATELET # BLD AUTO: 321 10(3)UL (ref 150–450)
POTASSIUM SERPL-SCNC: 3.9 MMOL/L (ref 3.5–5.1)
PROT SERPL-MCNC: 7.3 G/DL (ref 5.7–8.2)
RBC # BLD AUTO: 3.91 X10(6)UL
SODIUM SERPL-SCNC: 137 MMOL/L (ref 136–145)
WBC # BLD AUTO: 7.4 X10(3) UL (ref 4–11)

## 2023-12-14 PROCEDURE — 80053 COMPREHEN METABOLIC PANEL: CPT

## 2023-12-14 PROCEDURE — 36593 DECLOT VASCULAR DEVICE: CPT

## 2023-12-14 PROCEDURE — 85025 COMPLETE CBC W/AUTO DIFF WBC: CPT

## 2023-12-14 PROCEDURE — 96413 CHEMO IV INFUSION 1 HR: CPT

## 2023-12-14 PROCEDURE — 96374 THER/PROPH/DIAG INJ IV PUSH: CPT

## 2023-12-14 PROCEDURE — 96375 TX/PRO/DX INJ NEW DRUG ADDON: CPT

## 2023-12-14 PROCEDURE — 99215 OFFICE O/P EST HI 40 MIN: CPT | Performed by: PHYSICIAN ASSISTANT

## 2023-12-14 RX ORDER — WATER 10 ML/10ML
INJECTION INTRAMUSCULAR; INTRAVENOUS; SUBCUTANEOUS
Status: DISCONTINUED
Start: 2023-12-14 | End: 2023-12-14

## 2023-12-14 RX ORDER — HYDROCODONE BITARTRATE AND ACETAMINOPHEN 10; 325 MG/1; MG/1
TABLET ORAL
Qty: 126 TABLET | Refills: 0 | Status: SHIPPED | OUTPATIENT
Start: 2023-12-14

## 2023-12-14 RX ORDER — DIPHENOXYLATE HYDROCHLORIDE AND ATROPINE SULFATE 2.5; .025 MG/1; MG/1
1-2 TABLET ORAL 4 TIMES DAILY PRN
Qty: 120 TABLET | Refills: 3 | Status: SHIPPED | OUTPATIENT
Start: 2023-12-14

## 2023-12-14 RX ORDER — HEPARIN SODIUM (PORCINE) LOCK FLUSH IV SOLN 100 UNIT/ML 100 UNIT/ML
5 SOLUTION INTRAVENOUS ONCE
OUTPATIENT
Start: 2023-12-28

## 2023-12-14 RX ORDER — HEPARIN SODIUM (PORCINE) LOCK FLUSH IV SOLN 100 UNIT/ML 100 UNIT/ML
5 SOLUTION INTRAVENOUS ONCE
Status: COMPLETED | OUTPATIENT
Start: 2023-12-14 | End: 2023-12-14

## 2023-12-14 RX ORDER — HEPARIN SODIUM (PORCINE) LOCK FLUSH IV SOLN 100 UNIT/ML 100 UNIT/ML
SOLUTION INTRAVENOUS
Status: COMPLETED
Start: 2023-12-14 | End: 2023-12-14

## 2023-12-14 RX ADMIN — HEPARIN SODIUM (PORCINE) LOCK FLUSH IV SOLN 100 UNIT/ML 500 UNITS: 100 SOLUTION INTRAVENOUS at 11:46:00

## 2023-12-14 NOTE — TELEPHONE ENCOUNTER
Pt here to pickup script for her norco. Will start chemo today and needs to up dose of [place]

## 2023-12-14 NOTE — PROGRESS NOTES
Pt here for C13D1 Drug(s)Kadcyla. Arrives Ambulating independently, accompanied by Self     Patient was evaluated today by NESTOR and Treatment Nurse. Oral medications included in this regimen:  no    Patient confirms comprehension of cancer treatment schedule:  yes    Pregnancy screening:  Denies possibility of pregnancy    Modifications in dose or schedule:  No    Medications appearance and physical integrity checked by RN: yes. Chemotherapy IV pump settings verified by 2 RNs:  Yes. Frequency of blood return and site check throughout administration: Prior to administration and At completion of therapy     Patient did not have blood return to port upon arrival to infusion center. Patient was repositioned, received a fluid challenge and multiple saline flushes without success. Due to patient needed to return to work, PIV established for treatment while TPA was instilled. TPA was administered over 46 minutes without success. Elsa messaged regarding port status. Infusion/treatment outcome:  patient tolerated treatment without incident. Per RN order, no observation required.      Education Record    Learner:  Patient  Barriers / Limitations:  None  Method:  Discussion  Education / instructions given:  Reviewed schedule  Outcome:  Observed demonstration    Discharged Home, Ambulating independently, accompanied by:Self    Patient/family verbalized understanding of future appointments: by [facility] [employer]

## 2023-12-16 NOTE — TELEPHONE ENCOUNTER
Pharmacy requesting refill    Current Outpatient Medications   Medication Sig Dispense Refill    HYDROcodone-acetaminophen (NORCO)  MG Oral Tab Take 1 to 2 tabs po every 4 to 6 hrs prn for pain 126 tablet 0

## 2023-12-22 RX ORDER — HYDROCODONE BITARTRATE AND ACETAMINOPHEN 10; 325 MG/1; MG/1
TABLET ORAL
Qty: 126 TABLET | Refills: 0 | OUTPATIENT
Start: 2023-12-22

## 2023-12-22 NOTE — TELEPHONE ENCOUNTER
Patient contacted, she states she did not request refill, not due until next week.  States she will be in Tuesday to  printed rx.  Dr. Minoo VALIENTE.

## 2023-12-26 ENCOUNTER — TELEPHONE (OUTPATIENT)
Dept: INTERNAL MEDICINE CLINIC | Facility: CLINIC | Age: 38
End: 2023-12-26

## 2023-12-26 RX ORDER — HYDROCODONE BITARTRATE AND ACETAMINOPHEN 10; 325 MG/1; MG/1
TABLET ORAL
Qty: 126 TABLET | Refills: 0 | Status: SHIPPED | OUTPATIENT
Start: 2023-12-29

## 2023-12-26 NOTE — TELEPHONE ENCOUNTER
Pt here to pickup norco script; will be having her chemo this coming week; continue  [place] had talked about weaning off eventually from norco after she is done with her chemo and surgery early next year.

## 2024-01-02 ENCOUNTER — OFFICE VISIT (OUTPATIENT)
Dept: HEMATOLOGY/ONCOLOGY | Facility: HOSPITAL | Age: 39
End: 2024-01-02
Attending: INTERNAL MEDICINE
Payer: COMMERCIAL

## 2024-01-02 VITALS
HEIGHT: 65.51 IN | SYSTOLIC BLOOD PRESSURE: 155 MMHG | OXYGEN SATURATION: 100 % | RESPIRATION RATE: 16 BRPM | DIASTOLIC BLOOD PRESSURE: 97 MMHG | HEART RATE: 82 BPM | WEIGHT: 187.19 LBS | BODY MASS INDEX: 30.81 KG/M2 | TEMPERATURE: 98 F

## 2024-01-02 DIAGNOSIS — Z51.81 ENCOUNTER FOR MEDICATION MONITORING: ICD-10-CM

## 2024-01-02 DIAGNOSIS — Z17.0 BREAST CANCER, STAGE 1, ESTROGEN RECEPTOR POSITIVE, RIGHT  (HCC): Primary | ICD-10-CM

## 2024-01-02 DIAGNOSIS — C50.911 BREAST CANCER, STAGE 1, ESTROGEN RECEPTOR POSITIVE, RIGHT  (HCC): Primary | ICD-10-CM

## 2024-01-02 LAB
ALBUMIN SERPL-MCNC: 4.4 G/DL (ref 3.2–4.8)
ALBUMIN/GLOB SERPL: 1.4 {RATIO} (ref 1–2)
ALP LIVER SERPL-CCNC: 47 U/L
ALT SERPL-CCNC: 16 U/L
ANION GAP SERPL CALC-SCNC: 0 MMOL/L (ref 0–18)
AST SERPL-CCNC: 20 U/L (ref ?–34)
BASOPHILS # BLD AUTO: 0.04 X10(3) UL (ref 0–0.2)
BASOPHILS NFR BLD AUTO: 0.3 %
BILIRUB SERPL-MCNC: 0.3 MG/DL (ref 0.3–1.2)
BUN BLD-MCNC: 12 MG/DL (ref 9–23)
BUN/CREAT SERPL: 17.6 (ref 10–20)
CALCIUM BLD-MCNC: 9.6 MG/DL (ref 8.7–10.4)
CHLORIDE SERPL-SCNC: 108 MMOL/L (ref 98–112)
CO2 SERPL-SCNC: 28 MMOL/L (ref 21–32)
CREAT BLD-MCNC: 0.68 MG/DL
DEPRECATED RDW RBC AUTO: 47.6 FL (ref 35.1–46.3)
EGFRCR SERPLBLD CKD-EPI 2021: 113 ML/MIN/1.73M2 (ref 60–?)
EOSINOPHIL # BLD AUTO: 0.01 X10(3) UL (ref 0–0.7)
EOSINOPHIL NFR BLD AUTO: 0.1 %
ERYTHROCYTE [DISTWIDTH] IN BLOOD BY AUTOMATED COUNT: 13.5 % (ref 11–15)
GLOBULIN PLAS-MCNC: 3.2 G/DL (ref 2.8–4.4)
GLUCOSE BLD-MCNC: 98 MG/DL (ref 70–99)
HCT VFR BLD AUTO: 40.1 %
HGB BLD-MCNC: 13.1 G/DL
IMM GRANULOCYTES # BLD AUTO: 0.03 X10(3) UL (ref 0–1)
IMM GRANULOCYTES NFR BLD: 0.2 %
LYMPHOCYTES # BLD AUTO: 4.29 X10(3) UL (ref 1–4)
LYMPHOCYTES NFR BLD AUTO: 35.1 %
MCH RBC QN AUTO: 31 PG (ref 26–34)
MCHC RBC AUTO-ENTMCNC: 32.7 G/DL (ref 31–37)
MCV RBC AUTO: 95 FL
MONOCYTES # BLD AUTO: 0.39 X10(3) UL (ref 0.1–1)
MONOCYTES NFR BLD AUTO: 3.2 %
NEUTROPHILS # BLD AUTO: 7.46 X10 (3) UL (ref 1.5–7.7)
NEUTROPHILS # BLD AUTO: 7.46 X10(3) UL (ref 1.5–7.7)
NEUTROPHILS NFR BLD AUTO: 61.1 %
OSMOLALITY SERPL CALC.SUM OF ELEC: 282 MOSM/KG (ref 275–295)
PLATELET # BLD AUTO: 328 10(3)UL (ref 150–450)
POTASSIUM SERPL-SCNC: 4 MMOL/L (ref 3.5–5.1)
PROT SERPL-MCNC: 7.6 G/DL (ref 5.7–8.2)
RBC # BLD AUTO: 4.22 X10(6)UL
SODIUM SERPL-SCNC: 136 MMOL/L (ref 136–145)
WBC # BLD AUTO: 12.2 X10(3) UL (ref 4–11)

## 2024-01-02 PROCEDURE — 80053 COMPREHEN METABOLIC PANEL: CPT

## 2024-01-02 PROCEDURE — 36415 COLL VENOUS BLD VENIPUNCTURE: CPT

## 2024-01-02 PROCEDURE — 99215 OFFICE O/P EST HI 40 MIN: CPT | Performed by: PHYSICIAN ASSISTANT

## 2024-01-02 PROCEDURE — 85025 COMPLETE CBC W/AUTO DIFF WBC: CPT

## 2024-01-02 NOTE — PROGRESS NOTES
Hematology Oncology Progress Note    Patient Name: Pratik Frances   YOB: 1985   Medical Record Number: [id number]   Attending Physician: Tila Lopez    Date of Visit:  1/2/2024    Chief Complaint:  Breast cancer  Chemotherapy    Diagnosis: Right breast invasive ductal carcinoma, grade 3, ER/[place] positive, HER2 positive, ki high, pT2N0    Oncologic History:  38 year old premenopausal female presented with palpable right breast mass   7/6/22: diag laurent with us. T2 (2.3cm) N0 12:00 4cm nipple  7/8/22: biopsy IDC, grade 3, ER/[place]>90%, Her 3+, Ki 46%  7/13/22: MRI with 2 areas of focal non mass enhancement bx, benign and concordant.   Genetics negative.  7/28/22: TCHP x6 cycles with dose reduced T completed 11/21/22  1/10/23: bilateral mastectomies: [id number].   2/20/23: adjuvant kadcyla- current    Interval History:  Pratik returns for follow up and pre chemo evaluation. Due for C14 Kadcyla today.   Doing well. Tolerating therapy without major toxicity.   Mild diarrhea, 7-10 days post-treatment, manageable with imodium/lomotil.   Minimal neuropathy- mainly to L foot - overall better than before. Energy level is good.   Having regular periods. Using birth control.   Wants port removed since malfunctioning   Reports compliance with Tamoxifen    ECOG PS: 0     PMH/PSH: herniated disc with back pain, urethral stricture, migraine, left forarm lipoma  Family History: Pcousin with breast ca age 24- was neg for genetics  Social History: works for insurance, non smoker.     Medications:    Current Outpatient Medications:     HYDROcodone-acetaminophen (NORCO)  MG Oral Tab, Take 1 to 2 tabs po every 4 to 6 hrs prn for pain, Disp: 126 tablet, Rfl: 0    diphenoxylate-atropine 2.5-0.025 MG Oral Tab, Take 1-2 tablets by mouth 4 (four) times daily as needed for Diarrhea., Disp: 120 tablet, Rfl: 3    cyclobenzaprine 10 MG Oral Tab, Take 1 tablet (10 mg total) by mouth 3 (three) times daily., Disp: 90 tablet, Rfl: 0     butalbital-acetaminophen-caffeine -40 MG Oral Tab, Take 1 tablet by mouth every 8 (eight) hours as needed for Headaches., Disp: 30 tablet, Rfl: 0    nitrofurantoin monohydrate macro 100 MG Oral Cap, Take 1 capsule (100 mg total) by mouth 2 (two) times daily., Disp: 14 capsule, Rfl: 0    dexamethasone (DECADRON) 4 MG tablet, TAKE 1 TABLET BY MOUTH DAILY WITH BREAKFAST FOR 3 DAYS, Disp: , Rfl:     Naloxone HCl 4 MG/0.1ML Nasal Liquid, 4 mg by Nasal route as needed for Opioid Overdose. Spray 4 mg into a single nostril, if patient remains unresponsive, repeat in 2-3 minutes in alternating nostrils until medical assistance becomes available., Disp: 1 kit, Rfl: 0    tobramycin 0.3 % Ophthalmic Ointment, Apply to nasal lesion daily, Disp: 3.5 g, Rfl: 0    tamoxifen 20 MG Oral Tab, Take 1 tablet (20 mg total) by mouth daily., Disp: 90 tablet, Rfl: 3    docusate sodium 100 MG Oral Cap, Take 1 capsule (100 mg total) by mouth 2 (two) times daily., Disp: 30 capsule, Rfl: 1    ondansetron (ZOFRAN) 4 mg tablet, Take 1 tablet (4 mg total) by mouth every 8 (eight) hours as needed for Nausea., Disp: 12 tablet, Rfl: 0    lidocaine-prilocaine 2.5-2.5 % External Cream, Apply to site 1 hour prior to port a cath needle insertion, Disp: 1 each, Rfl: 3    Review of Systems:   Oncology specific ROS negative except as per HPI    Vitals:  BP (!) 155/97 (BP Location: Left arm, Patient Position: Sitting, Cuff Size: adult)   Pulse 82   Temp 98 °F (36.7 °C) (Oral)   Resp 16   Ht 1.664 m (5' 5.51\")   Wt 84.9 kg (187 lb 3.2 oz)   LMP 09/18/2023 (Exact Date)   SpO2 100%   BMI 30.67 kg/m²     Weight:  Wt Readings from Last 6 Encounters:   01/02/24 84.9 kg (187 lb 3.2 oz)   12/14/23 84.8 kg (187 lb)   11/22/23 84.8 kg (186 lb 14.4 oz)   11/02/23 83.5 kg (184 lb)   10/26/23 84.8 kg (187 lb)   10/23/23 83.2 kg (183 lb 6.4 oz)     Physical Exam:  General: alert and oriented x 3, not in acute distress.  HEENT: non icterus. Oropharynx clear.    Breast: Per history, b/l mastectomy with expanders, intact skin. No palpable lesions or tenderness. No axillary lymphadenopathy b/l.    Chest: non labored breathing, CTA  Cor:  RRR  Abd: soft, non tender, non distended, bs+  Ext: no edema.  Neurological: motor strength grossly intact, MA4E    Laboratory:  Lab Results   Component Value Date    WBC 12.2 (H) 01/02/2024    RBC 4.22 01/02/2024    HGB 13.1 01/02/2024    HCT 40.1 01/02/2024    MCV 95.0 01/02/2024    MCH 31.0 01/02/2024    MCHC 32.7 01/02/2024    RDW 13.5 01/02/2024    .0 01/02/2024    MPV 8.3 12/17/2018     Lab Results   Component Value Date     01/02/2024    K 4.0 01/02/2024     01/02/2024    CO2 28.0 01/02/2024    BUN 12 01/02/2024    CREATSERUM 0.68 01/02/2024    GLU 98 01/02/2024    CA 9.6 01/02/2024    ALKPHO 47 01/02/2024    ALT 16 01/02/2024    AST 20 01/02/2024    BILT 0.3 01/02/2024    ALB 4.4 01/02/2024    TP 7.6 01/02/2024      Radiology;  No results found.    Impression and Plan:    Right breast invasive ductal carcinoma, grade 3, ER/[place] positive, HER2 positive, Ki high, [id number]:  - s/p neoadjuvant TCHP--> bilateral mastectomy ypT1a--> adjuvant T-DM1 (kadcyla). no XRT indication  - on kadcyla q3 weeks, completed 11 cycles with good tolerance  - C11 delayed due to UTI. Proceed with cycle 14 today  - On Tamoxifen currently per patient.    - OFS not tolerated. Plan to start tamoxifen after completion of kadcyla  - aware of utilizing other forms of birth control while on chemo (spoke about Mirena IUD)  - echo 10/2023 normal.  No further echo required.   - breast reconstruction planned on 2/21/24 (Ollie)  - malfunctioning port, port removal.  Patient is aware that if she needs a port in the future, another will need to be replaced.       Prior visit:  Recurrent UTI's- chronic issues for many years, predated diagnosis. recurring mostly after treatments, will refer to urogyne.     Chemo induced neuropathy- grade 1, mild numbness  limited to toes only and stable. Not requiring meds.     Hypokalemia - sec to diarrhea. will supplement as needed.     Left IT band numbness as above. CT A/P completed - L5/S1 grade 1 t.     Diarrhea - on lomotil, taking 2 tabs at night w/ good relief.     Chronic arthralgia/myalgia - mild, on norco as needed (refill per primary)    Call prn.  Follow-up in 4 weeks    MDM: high, malignancy, life threatening. Drug therapy requiring intensive monitoring for tox      DIMPLE Hobbs   Hematology Oncology

## 2024-01-04 ENCOUNTER — OFFICE VISIT (OUTPATIENT)
Dept: HEMATOLOGY/ONCOLOGY | Facility: HOSPITAL | Age: 39
End: 2024-01-04
Attending: INTERNAL MEDICINE
Payer: COMMERCIAL

## 2024-01-04 DIAGNOSIS — C50.911 BREAST CANCER, STAGE 1, ESTROGEN RECEPTOR POSITIVE, RIGHT  (HCC): Primary | ICD-10-CM

## 2024-01-04 DIAGNOSIS — Z17.0 BREAST CANCER, STAGE 1, ESTROGEN RECEPTOR POSITIVE, RIGHT  (HCC): Primary | ICD-10-CM

## 2024-01-04 PROCEDURE — 96375 TX/PRO/DX INJ NEW DRUG ADDON: CPT

## 2024-01-04 PROCEDURE — 96413 CHEMO IV INFUSION 1 HR: CPT

## 2024-01-04 NOTE — PROGRESS NOTES
Pt here for C14D1 KADCYLA.  Arrives Ambulating independently, accompanied by Self     Patient was evaluated today by Treatment Nurse.    Oral medications included in this regimen:  no    Patient confirms comprehension of cancer treatment schedule:  yes    Pregnancy screening:  Denies possibility of pregnancy    Modifications in dose or schedule:  No    PIV placed as pt's port is not functioning.  She will be having port removed.    Medications appearance and physical integrity checked by RN: yes.    Chemotherapy IV pump settings verified by 2 RNs:  Yes.  Frequency of blood return and site check throughout administration: Prior to administration and At completion of therapy     Infusion/treatment outcome:  patient tolerated treatment without incident    Education Record    Learner:  Patient  Barriers / Limitations:  None  Method:  Discussion  Education / instructions given:  plan of care  Outcome:  Shows understanding    Discharged Home, Ambulating independently, accompanied by:Self    Patient/family verbalized understanding of future appointments: by [employer]

## 2024-01-08 ENCOUNTER — TELEPHONE (OUTPATIENT)
Dept: SURGERY | Facility: CLINIC | Age: 39
End: 2024-01-08

## 2024-01-09 ENCOUNTER — TELEPHONE (OUTPATIENT)
Dept: INTERNAL MEDICINE CLINIC | Facility: CLINIC | Age: 39
End: 2024-01-09

## 2024-01-09 DIAGNOSIS — G89.29 CHRONIC BILATERAL LOW BACK PAIN, UNSPECIFIED WHETHER SCIATICA PRESENT: Primary | ICD-10-CM

## 2024-01-09 DIAGNOSIS — M54.50 CHRONIC BILATERAL LOW BACK PAIN, UNSPECIFIED WHETHER SCIATICA PRESENT: Primary | ICD-10-CM

## 2024-01-09 RX ORDER — HYDROCODONE BITARTRATE AND ACETAMINOPHEN 10; 325 MG/1; MG/1
TABLET ORAL
Qty: 126 TABLET | Refills: 0 | Status: SHIPPED | OUTPATIENT
Start: 2024-01-09 | End: 2024-01-09 | Stop reason: CLARIF

## 2024-01-09 RX ORDER — HYDROCODONE BITARTRATE AND ACETAMINOPHEN 10; 325 MG/1; MG/1
TABLET ORAL
Qty: 112 TABLET | Refills: 0 | Status: SHIPPED | OUTPATIENT
Start: 2024-01-09

## 2024-01-11 ENCOUNTER — HOSPITAL ENCOUNTER (OUTPATIENT)
Dept: INTERVENTIONAL RADIOLOGY/VASCULAR | Facility: HOSPITAL | Age: 39
Discharge: HOME | End: 2024-01-11
Attending: PHYSICIAN ASSISTANT | Admitting: RADIOLOGY
Payer: COMMERCIAL

## 2024-01-11 VITALS
BODY MASS INDEX: 30.78 KG/M2 | RESPIRATION RATE: 20 BRPM | OXYGEN SATURATION: 96 % | DIASTOLIC BLOOD PRESSURE: 82 MMHG | WEIGHT: 187 LBS | SYSTOLIC BLOOD PRESSURE: 121 MMHG | TEMPERATURE: 97 F | HEIGHT: 65.5 IN | HEART RATE: 85 BPM

## 2024-01-11 DIAGNOSIS — C50.911 BREAST CANCER, STAGE 1, ESTROGEN RECEPTOR POSITIVE, RIGHT  (HCC): ICD-10-CM

## 2024-01-11 DIAGNOSIS — Z17.0 BREAST CANCER, STAGE 1, ESTROGEN RECEPTOR POSITIVE, RIGHT  (HCC): ICD-10-CM

## 2024-01-11 PROCEDURE — 36590 REMOVAL TUNNELED CV CATH: CPT | Performed by: RADIOLOGY

## 2024-01-11 PROCEDURE — 36415 COLL VENOUS BLD VENIPUNCTURE: CPT

## 2024-01-11 PROCEDURE — 02PY33Z REMOVAL OF INFUSION DEVICE FROM GREAT VESSEL, PERCUTANEOUS APPROACH: ICD-10-PCS | Performed by: RADIOLOGY

## 2024-01-11 PROCEDURE — 0JPT0WZ REMOVAL OF TOTALLY IMPLANTABLE VASCULAR ACCESS DEVICE FROM TRUNK SUBCUTANEOUS TISSUE AND FASCIA, OPEN APPROACH: ICD-10-PCS | Performed by: RADIOLOGY

## 2024-01-11 PROCEDURE — 99152 MOD SED SAME PHYS/QHP 5/>YRS: CPT | Performed by: RADIOLOGY

## 2024-01-11 RX ORDER — LIDOCAINE HYDROCHLORIDE 20 MG/ML
INJECTION, SOLUTION EPIDURAL; INFILTRATION; INTRACAUDAL; PERINEURAL
Status: COMPLETED
Start: 2024-01-11 | End: 2024-01-11

## 2024-01-11 RX ORDER — SODIUM CHLORIDE 9 MG/ML
INJECTION, SOLUTION INTRAVENOUS CONTINUOUS
Status: DISCONTINUED | OUTPATIENT
Start: 2024-01-11 | End: 2024-01-11

## 2024-01-11 RX ORDER — MIDAZOLAM HYDROCHLORIDE 1 MG/ML
INJECTION INTRAMUSCULAR; INTRAVENOUS
Status: COMPLETED
Start: 2024-01-11 | End: 2024-01-11

## 2024-01-11 RX ADMIN — SODIUM CHLORIDE: 9 INJECTION, SOLUTION INTRAVENOUS at 10:15:00

## 2024-01-11 NOTE — PROCEDURES
[facility]  Procedure Note    Pratik Frances Patient Status:  Outpatient    7/15/1985 MRN [id number]   Location [facility] INTERVENTIONAL SUITES Attending Nick Babin PA   Hosp Day # 0 PCP Venkata Cartwright MD     Procedure: Chest port removal    Pre-Procedure Diagnosis: Breast cancer, stage 1, estrogen receptor positive, right  (HCC) [C50.911, Z17.0]      Post-Procedure Diagnosis: Breast cancer, stage 1, estrogen receptor positive, right  (HCC) [C50.911, Z17.0]    Anesthesia:  Local and Sedation    Findings:  Left chest port removed.    Specimens: None    Blood Loss:  5mL      Complications:  None    Drains:  None    Plan:  Bedrest for 1 hour.      HAO HILL MD  2024

## 2024-01-11 NOTE — INTERVAL H&P NOTE
The above referenced H&P was reviewed by HAO HILL MD on 1/11/2024, the patient was examined and no significant changes have occurred in the patient's condition since the H&P was performed.  Risks, benefits, alternative treatments and consequences of no treatment were discussed.  We will proceed with procedure as planned.      HAO HILL MD  1/11/2024  10:18 AM

## 2024-01-11 NOTE — IVS NOTE
Port removed at bedside with Dr. Saunders. 8ml of lidocaine given to left chest. 2 mg of Versed and 100mcg of Fentanyl given for sedation. Left chest incision closed with sutures and skin glue. Report given to Rahel PEDERSON.

## 2024-01-11 NOTE — PRE-SEDATION ASSESSMENT
[facility]  IR Pre-Procedure Sedation Assessment    History of snoring or sleep or apnea?   No    History of previous problems with anesthesia or sedation  No    Physical Findings:  Neck: nl ROM  CV: regular rate  PULM: normal respiratory rate/effort    Mallampati Score:  II (hard and soft palate, upper portion of tonsils anduvula visible)    ASA Classification:   2. Patient with mild systemic disease    Plan:   -IV moderate sedation    HAO HILL MD

## 2024-01-11 NOTE — IVS NOTE
DISCHARGE NOTE     Pt is able to sit up and ambulate without difficulty.   Pt voided and tolerated fluids and food.   Procedural site remains dry and intact with good circulation, motion, and sensation.   No signs and symptoms of bleeding/hematoma noted.   IV access removed  Instruction provided, patient/family verbalizes understanding.   Dr. Saunders spoke with patient/family post procedure.     Pt discharge via wheelchair to [facility]       Follow up Appointment: none    New Prescription: none

## 2024-01-11 NOTE — DISCHARGE INSTRUCTIONS
INTERVENTIONAL RADIOLOGY  [facility]  [phone]     Patient Name:  Pratik Frances    Procedure:  Port Removal     Site Care: Dermabond (skin glue) has been applied to your incision.  Do not scrub the area.  Allow the Dermabond to flake off on its own.   No showering for 24 hours. After 24 hours, wash with mild soap and water and dab dry. No ointment, powder, or cream to the site. No swimming pools or bathtubs until site is completely healed.                                      Activity/Diet  No heavy lifting or strenuous activity for 48 hours.  Drink plenty of fluids, unless you have otherwise been told to restrict your fluid intake.  Do not drink alcohol for 24 hours.  Do not drive,  operate heavy machinery, make important decisions or sign legal documents today.    Medications:  Make no changes to your existing medications.    Contact Interventional Radiology at [phone] if you have severe/unrelieved pain, fever, chills, dizziness/lightheadedness, or drainage/bleeding from your incision site.

## 2024-01-18 RX ORDER — CYCLOBENZAPRINE HCL 10 MG
10 TABLET ORAL 3 TIMES DAILY
Qty: 90 TABLET | Refills: 0 | Status: SHIPPED | OUTPATIENT
Start: 2024-01-18

## 2024-01-18 NOTE — TELEPHONE ENCOUNTER
Please review; protocol failed/ has no protocol    Requested Prescriptions   Pending Prescriptions Disp Refills    cyclobenzaprine 10 MG Oral Tab 90 tablet 0     Sig: Take 1 tablet (10 mg total) by mouth 3 (three) times daily.       There is no refill protocol information for this order        Recent Outpatient Visits              2 weeks ago Breast cancer, stage 1, estrogen receptor positive, right  (HCC)    [facility] SHEPP[facility] - Infusion    Office Visit    2 weeks ago Breast cancer, stage 1, estrogen receptor positive, right  (HCC)    [facility] - Infusion    Nurse Only    2 weeks ago Breast cancer, stage 1, estrogen receptor positive, right  (HCC)    [facility] Oncology Nick Babin PA    Office Visit    1 month ago Breast cancer, stage 1, estrogen receptor positive, right  (HCC)    [facility]    Nurse Only    1 month ago Breast cancer, stage 1, estrogen receptor positive, right  (HCC)    [facility] - Infusion    Office Visit          Future Appointments         Provider Department Appt Notes    In 6 days Venkata Cartwright MD [facility] Surgical clearance    In 1 week Benito Levin MD [facility] for second stage on   +wants chest port removed

## 2024-01-22 ENCOUNTER — TELEPHONE (OUTPATIENT)
Dept: INTERNAL MEDICINE CLINIC | Facility: CLINIC | Age: 39
End: 2024-01-22

## 2024-01-22 DIAGNOSIS — M54.50 CHRONIC BILATERAL LOW BACK PAIN, UNSPECIFIED WHETHER SCIATICA PRESENT: ICD-10-CM

## 2024-01-22 DIAGNOSIS — G89.29 CHRONIC BILATERAL LOW BACK PAIN, UNSPECIFIED WHETHER SCIATICA PRESENT: ICD-10-CM

## 2024-01-22 RX ORDER — HYDROCODONE BITARTRATE AND ACETAMINOPHEN 10; 325 MG/1; MG/1
TABLET ORAL
Qty: 84 TABLET | Refills: 0 | Status: SHIPPED | OUTPATIENT
Start: 2024-01-22

## 2024-01-23 ENCOUNTER — TELEPHONE (OUTPATIENT)
Dept: SURGERY | Facility: CLINIC | Age: 39
End: 2024-01-23

## 2024-01-23 NOTE — TELEPHONE ENCOUNTER
Patient called to discuss her pcp medical clearance appt tomorrow. We discussed the requirements for upcoming surgery. She was appreciative of the call.

## 2024-01-24 ENCOUNTER — OFFICE VISIT (OUTPATIENT)
Dept: INTERNAL MEDICINE CLINIC | Facility: CLINIC | Age: 39
End: 2024-01-24

## 2024-01-24 VITALS
TEMPERATURE: 98 F | SYSTOLIC BLOOD PRESSURE: 128 MMHG | DIASTOLIC BLOOD PRESSURE: 78 MMHG | HEART RATE: 96 BPM | WEIGHT: 189.19 LBS | HEIGHT: 65.5 IN | BODY MASS INDEX: 31.14 KG/M2 | OXYGEN SATURATION: 99 %

## 2024-01-24 DIAGNOSIS — C50.911 BREAST CANCER, STAGE 1, ESTROGEN RECEPTOR POSITIVE, RIGHT  (HCC): ICD-10-CM

## 2024-01-24 DIAGNOSIS — Z17.0 BREAST CANCER, STAGE 1, ESTROGEN RECEPTOR POSITIVE, RIGHT  (HCC): ICD-10-CM

## 2024-01-24 DIAGNOSIS — Z01.818 PREOP GENERAL PHYSICAL EXAM: Primary | ICD-10-CM

## 2024-01-24 DIAGNOSIS — Z90.13 HISTORY OF BILATERAL MASTECTOMY: ICD-10-CM

## 2024-01-24 PROCEDURE — 99214 OFFICE O/P EST MOD 30 MIN: CPT | Performed by: INTERNAL MEDICINE

## 2024-01-24 PROCEDURE — 3008F BODY MASS INDEX DOCD: CPT | Performed by: INTERNAL MEDICINE

## 2024-01-24 PROCEDURE — 3074F SYST BP LT 130 MM HG: CPT | Performed by: INTERNAL MEDICINE

## 2024-01-24 PROCEDURE — 3078F DIAST BP <80 MM HG: CPT | Performed by: INTERNAL MEDICINE

## 2024-01-24 NOTE — PROGRESS NOTES
Subjective:     Patient ID: Pratik Frances is a 38 year old female.    Patient presents today for preop medical clearance as requested by Dr Levin plastic surgeon for scheduled surgery with removal of bilateral tissue expanders and placement of breast implants  on Feb 21, 2024 at [facility]. Pt had bilateral mastectomy done before due to right  breast cancer.   Pt denies history of CAD, CHF, CVA, IDDM nor CKD. No IAN nor bleeding or clotting disorder before.         History/Other:   Review of Systems   Constitutional: Negative.    HENT: Negative.     Eyes: Negative.    Respiratory: Negative.     Cardiovascular: Negative.  Negative for chest pain, palpitations and leg swelling.        No pnd/orthopnea  Able to climb flight of stair at home several times a day and no chest pains     Gastrointestinal: Negative.    Genitourinary: Negative.    Musculoskeletal:  Positive for back pain.   Skin:  Negative for rash.   Allergic/Immunologic: Positive for environmental allergies. Negative for food allergies and immunocompromised state.   Neurological:  Negative for syncope.   Hematological: Negative.      Current Outpatient Medications   Medication Sig Dispense Refill    HYDROcodone-acetaminophen (NORCO)  MG Oral Tab Take 1 to 2 tabs po every 4 to 6 hrs prn for pain 84 tablet 0    cyclobenzaprine 10 MG Oral Tab Take 1 tablet (10 mg total) by mouth 3 (three) times daily. 90 tablet 0    butalbital-acetaminophen-caffeine -40 MG Oral Tab Take 1 tablet by mouth every 8 (eight) hours as needed for Headaches. 30 tablet 0    Naloxone HCl 4 MG/0.1ML Nasal Liquid 4 mg by Nasal route as needed for Opioid Overdose. Spray 4 mg into a single nostril, if patient remains unresponsive, repeat in 2-3 minutes in alternating nostrils until medical assistance becomes available. (Patient not taking: Reported on 1/24/2024) 1 kit 0    docusate sodium 100 MG Oral Cap Take 1 capsule (100 mg total) by mouth 2 (two) times daily.  (Patient not taking: Reported on 2024) 30 capsule 1     Allergies:  Allergies   Allergen Reactions    Aspirin SHORTNESS OF BREATH     Other reaction(s): ASPIRIN - throat swelling    Codeine SWELLING     Other reaction(s): Swelling of hands and toes    Dust Mite Extract HIVES     Nasal congestion    Grass HIVES     Nasal congestion    Adhesive Tape RASH    Chlorhexidine Gluconate Cloth ITCHING     Burning; wipes only. Okay with plain chlorhexedine       Past Medical History:   Diagnosis Date    Back pain     Back problem     Cancer (HCC)     breast cancer    COVID-2021-headache-not hospitalized    Essential hypertension     Gestational hypertension     Herniated disc     Herniated lumbar intervertebral disc     Hx of migraine headaches     Hyperlipidemia     Mass of left forearm     Migraines     Obesity, unspecified     Personal history of antineoplastic chemotherapy     last dose 2024    Urethral stricture       Past Surgical History:   Procedure Laterality Date    APPENDECTOMY      EYE SURGERY      INSERTION OF ACCESS PORT      EMMA BIOPSY STEREO NODULE 1 SITE RIGHT (CPT=19081)  2022    clip top hat    MASTECTOMY BRA Bilateral     MASTECTOMY LEFT      MASTECTOMY RIGHT      NEEDLE BIOPSY LEFT  2022    MRI 2 site    NEEDLE BIOPSY RIGHT  2022    us guided bx          OTHER SURGICAL HISTORY      Excision lipoma of left forearm    OTHER SURGICAL HISTORY  2018    IUD placement    OTHER SURGICAL HISTORY      eye surgery    TONSILLECTOMY        Family History   Problem Relation Age of Onset    Heart Disease Other     Hypertension Other     Glaucoma Other     Heart Disease Mother     Lipids Mother     Hypertension Mother     Heart Disorder Mother     ADHD Sister     Lipids Father     Stroke Father     Other (Other) Brother         DJD spine    Breast Cancer Paternal Cousin Female 24        also had @ 34 and 41 (last was TNBC); neg genetics     Cancer Paternal Grandmother          lung ca; smoker    Dementia Paternal Grandfather     Cancer Paternal Cousin Female 30        uterine ca (sister of cousin w/br ca)    Cancer Paternal Cousin Male 40        gastric ca (smoker); also leukemia @ 10      Social History:   Social History     Socioeconomic History    Marital status:    Tobacco Use    Smoking status: Former     Packs/day: 0.50     Years: 2.00     Additional pack years: 0.00     Total pack years: 1.00     Types: Cigarettes     Quit date: 2000     Years since quittin.0     Passive exposure: Past    Smokeless tobacco: Never   Vaping Use    Vaping Use: Never used   Substance and Sexual Activity    Alcohol use: Yes     Alcohol/week: 0.0 standard drinks of alcohol     Comment: Rarely    Drug use: No   Other Topics Concern    Caffeine Concern No        Objective:   Physical Exam  Constitutional:       General: She is not in acute distress.     Appearance: She is well-developed. She is obese. She is not ill-appearing, toxic-appearing or diaphoretic.   HENT:      Head: Normocephalic and atraumatic.      Right Ear: External ear normal.      Left Ear: External ear normal.      Nose: Nose normal.      Mouth/Throat:      Pharynx: No oropharyngeal exudate.   Eyes:      General: No scleral icterus.        Right eye: No discharge.         Left eye: No discharge.      Conjunctiva/sclera: Conjunctivae normal.      Pupils: Pupils are equal, round, and reactive to light.   Neck:      Vascular: No JVD.   Cardiovascular:      Rate and Rhythm: Normal rate and regular rhythm.      Pulses: Normal pulses.      Heart sounds: Normal heart sounds. No murmur heard.  Pulmonary:      Effort: Pulmonary effort is normal. No respiratory distress.      Breath sounds: Normal breath sounds. No wheezing or rales.   Abdominal:      General: Bowel sounds are normal. There is no distension.      Palpations: Abdomen is soft. There is no mass.      Tenderness: There is no abdominal tenderness. There is no  guarding or rebound.   Musculoskeletal:         General: No tenderness. Normal range of motion.      Cervical back: Normal range of motion and neck supple. No rigidity or tenderness.      Right lower leg: No edema.      Left lower leg: No edema.   Lymphadenopathy:      Cervical: No cervical adenopathy.   Skin:     General: Skin is warm and dry.      Coloration: Skin is not jaundiced or pale.      Findings: No rash.   Neurological:      Mental Status: She is alert and oriented to person, place, and time.         Assessment & Plan:   (Z01.818) Preop general physical exam  (primary encounter diagnosis)  Plan: EKG 12 Lead to be performed at [facility], CBC With Differential With         Platelet, Comp Metabolic Panel (14)        Preop labs and EKG ordered as per request by surgical service.   Pt has no active cardiac conditions and has good functional capacity able to do >4METS activities without getting chest pains.   She has acceptable risk for her surgery and may proceed with her surgery as planned.     (Z90.13) History of bilateral mastectomy  Plan: plan for removal of tissue expanders and breast implant placement per Dr Levin     (C50.911,  Z17.0) Breast cancer, stage 1, estrogen receptor positive, right  (HCC)  Plan: pt had bilateral mastectomy before and already had finished her chemotx already per her oncologist.        No orders of the defined types were placed in this encounter.      Meds This Visit:  Requested Prescriptions      No prescriptions requested or ordered in this encounter       Imaging & Referrals:  None

## 2024-01-29 ENCOUNTER — EKG ENCOUNTER (OUTPATIENT)
Dept: LAB | Age: 39
End: 2024-01-29
Attending: INTERNAL MEDICINE
Payer: COMMERCIAL

## 2024-01-29 ENCOUNTER — LAB ENCOUNTER (OUTPATIENT)
Dept: LAB | Age: 39
End: 2024-01-29
Attending: INTERNAL MEDICINE
Payer: COMMERCIAL

## 2024-01-29 DIAGNOSIS — Z01.818 PREOP GENERAL PHYSICAL EXAM: ICD-10-CM

## 2024-01-29 LAB
ALBUMIN SERPL-MCNC: 4.6 G/DL (ref 3.2–4.8)
ALBUMIN/GLOB SERPL: 1.6 {RATIO} (ref 1–2)
ALP LIVER SERPL-CCNC: 52 U/L
ALT SERPL-CCNC: 10 U/L
ANION GAP SERPL CALC-SCNC: 4 MMOL/L (ref 0–18)
AST SERPL-CCNC: 14 U/L (ref ?–34)
ATRIAL RATE: 87 BPM
BASOPHILS # BLD AUTO: 0.03 X10(3) UL (ref 0–0.2)
BASOPHILS NFR BLD AUTO: 0.3 %
BILIRUB SERPL-MCNC: 0.3 MG/DL (ref 0.3–1.2)
BUN BLD-MCNC: 9 MG/DL (ref 9–23)
BUN/CREAT SERPL: 14.5 (ref 10–20)
CALCIUM BLD-MCNC: 9.6 MG/DL (ref 8.7–10.4)
CHLORIDE SERPL-SCNC: 109 MMOL/L (ref 98–112)
CO2 SERPL-SCNC: 26 MMOL/L (ref 21–32)
CREAT BLD-MCNC: 0.62 MG/DL
DEPRECATED RDW RBC AUTO: 49.8 FL (ref 35.1–46.3)
EGFRCR SERPLBLD CKD-EPI 2021: 115 ML/MIN/1.73M2 (ref 60–?)
EOSINOPHIL # BLD AUTO: 0.03 X10(3) UL (ref 0–0.7)
EOSINOPHIL NFR BLD AUTO: 0.3 %
ERYTHROCYTE [DISTWIDTH] IN BLOOD BY AUTOMATED COUNT: 13.7 % (ref 11–15)
FASTING STATUS PATIENT QL REPORTED: YES
GLOBULIN PLAS-MCNC: 2.9 G/DL (ref 2.8–4.4)
GLUCOSE BLD-MCNC: 107 MG/DL (ref 70–99)
HCT VFR BLD AUTO: 40 %
HGB BLD-MCNC: 12.6 G/DL
IMM GRANULOCYTES # BLD AUTO: 0.04 X10(3) UL (ref 0–1)
IMM GRANULOCYTES NFR BLD: 0.4 %
LYMPHOCYTES # BLD AUTO: 2.66 X10(3) UL (ref 1–4)
LYMPHOCYTES NFR BLD AUTO: 23.6 %
MCH RBC QN AUTO: 31 PG (ref 26–34)
MCHC RBC AUTO-ENTMCNC: 31.5 G/DL (ref 31–37)
MCV RBC AUTO: 98.3 FL
MONOCYTES # BLD AUTO: 0.56 X10(3) UL (ref 0.1–1)
MONOCYTES NFR BLD AUTO: 5 %
NEUTROPHILS # BLD AUTO: 7.93 X10 (3) UL (ref 1.5–7.7)
NEUTROPHILS # BLD AUTO: 7.93 X10(3) UL (ref 1.5–7.7)
NEUTROPHILS NFR BLD AUTO: 70.4 %
OSMOLALITY SERPL CALC.SUM OF ELEC: 287 MOSM/KG (ref 275–295)
P AXIS: 72 DEGREES
P-R INTERVAL: 132 MS
PLATELET # BLD AUTO: 320 10(3)UL (ref 150–450)
POTASSIUM SERPL-SCNC: 4.1 MMOL/L (ref 3.5–5.1)
PROT SERPL-MCNC: 7.5 G/DL (ref 5.7–8.2)
Q-T INTERVAL: 374 MS
QRS DURATION: 84 MS
QTC CALCULATION (BEZET): 450 MS
R AXIS: 79 DEGREES
RBC # BLD AUTO: 4.07 X10(6)UL
SODIUM SERPL-SCNC: 139 MMOL/L (ref 136–145)
T AXIS: 68 DEGREES
VENTRICULAR RATE: 87 BPM
WBC # BLD AUTO: 11.3 X10(3) UL (ref 4–11)

## 2024-01-29 PROCEDURE — 93010 ELECTROCARDIOGRAM REPORT: CPT | Performed by: INTERNAL MEDICINE

## 2024-01-29 PROCEDURE — 93005 ELECTROCARDIOGRAM TRACING: CPT

## 2024-01-29 PROCEDURE — 80053 COMPREHEN METABOLIC PANEL: CPT

## 2024-01-29 PROCEDURE — 85025 COMPLETE CBC W/AUTO DIFF WBC: CPT

## 2024-01-29 PROCEDURE — 36415 COLL VENOUS BLD VENIPUNCTURE: CPT

## 2024-01-30 ENCOUNTER — OFFICE VISIT (OUTPATIENT)
Dept: SURGERY | Facility: CLINIC | Age: 39
End: 2024-01-30
Payer: COMMERCIAL

## 2024-01-30 DIAGNOSIS — Z90.13 ABSENCE OF BOTH BREASTS: Primary | ICD-10-CM

## 2024-01-30 PROCEDURE — 99212 OFFICE O/P EST SF 10 MIN: CPT | Performed by: SURGERY

## 2024-01-30 NOTE — PROGRESS NOTES
Pratik Frances is a 38 year old female who presents today for a follow-up.  Since her last visit, the patient elected undergo implant based reconstruction instead of abdominal free flap surgery.  She currently has a 14.6 cm base diameter tissue expander filled to 410 cc.    Physical Examination:  Breasts:Bilateral breasts show well-healed vertical scars.  Moderate soft tissue excess is noted along the inframammary fold.  Volume deficiency is noted at the superior medial aspect of bilateral breast.  Abdomen: Moderate upper abdominal and flank lipodystrophy is noted.  There are no palpable hernias noted.    Assessment and Plan:  We discussed the plan for the second stage which will include removal of bilateral breast tissue expanders, placement of smooth, round, silicone implants, excision of soft tissue excess along the inframammary fold, and fat grafting to bilateral reconstructed breasts.  The nature of the procedure was reviewed with the patient.  We discussed the risks of surgery including but not limited to bleeding, infection, scarring, delayed wound healing, asymmetry, implant malposition, implant infection or extrusion requiring removal, ALCL, capsular contracture, hypertrophic scarring or keloid, injury to intra-abdominal structures, contour abnormalities, cysts or calcifications requiring biopsy, and need for further surgery.  We reviewed the expected postoperative course including possible need for drains, as well as need for activity limitation and compression.  Multiple questions were answered the patient's satisfaction.  No guarantees as to outcome were offered.  The patient expresses understanding and wishes to proceed.

## 2024-01-30 NOTE — PATIENT INSTRUCTIONS
Surgeon:         Dr. Benito Levin                                        Tel:         [phone]                                  Fax:        [phone]    Surgery/Procedure: Revision of bilateral reconstructed breasts, placement of bilateral permanent silicone implants, autologous fat grafting to the bilateral breasts, outpatient, general anesthesia, 3 hours    Dx Code: Z90.13    Hospital:  [facility]: [address] [place] [id number]           [phone]  2/21/2024    1. Someone will need to drive you to and from the hospital if your procedure is outpatient.    2.Do not drink alcohol or smoke 24 hours prior to your procedure.    3. Bring a picture ID and your insurance card.    4. You will be contacted by the hospital the day before to confirm the procedure time and location.     5. Do not take any herbal supplements or blood thinners at least one week before your procedure/surgery. This includes NSAID's (aspirin, baby aspirin, Motrin, Ibuprofen, Aleve, Advil, Naproxen, etc), Plavix, fish oil, vitamin E, turmeric, CoQ10, or green tea supplements, etc. *TYLENOL or acetaminophen is ok to take*    6. PRE-OPERATIVE TESTING: History and physical with medical clearance is REQUIRED within 30 days of the surgery date and is mandatory per Dr. Levin. *If this is not done, your surgery will be postponed*  MEDICAL CLEARANCE WITH DR. Cartwright  CBC  CMP  EKG (within 90 days)    7. If you take Coumadin, Plavix, Xarelto, or Eliquis, please contact your prescribing physician for special instructions on how long to hold. If you take insulin, contact your primary care physician for special instructions.     8. Please inform us if you start or change any medications at least one week before surgery (ex: blood thinners, weight loss medications, diabetic medications, herbal supplements, etc)    9. Does patient have diagnosis of sleep apnea?    [   ] Yes     [   X ]  No    Consent obtained

## 2024-02-02 ENCOUNTER — TELEPHONE (OUTPATIENT)
Dept: SURGERY | Facility: CLINIC | Age: 39
End: 2024-02-02

## 2024-02-02 ENCOUNTER — TELEPHONE (OUTPATIENT)
Dept: INTERNAL MEDICINE CLINIC | Facility: CLINIC | Age: 39
End: 2024-02-02

## 2024-02-02 ENCOUNTER — OFFICE VISIT (OUTPATIENT)
Dept: SURGERY | Facility: CLINIC | Age: 39
End: 2024-02-02
Payer: COMMERCIAL

## 2024-02-02 DIAGNOSIS — Z90.13 ABSENCE OF BOTH BREASTS: Primary | ICD-10-CM

## 2024-02-02 DIAGNOSIS — G89.29 CHRONIC BILATERAL LOW BACK PAIN, UNSPECIFIED WHETHER SCIATICA PRESENT: ICD-10-CM

## 2024-02-02 DIAGNOSIS — M54.50 CHRONIC BILATERAL LOW BACK PAIN, UNSPECIFIED WHETHER SCIATICA PRESENT: ICD-10-CM

## 2024-02-02 PROCEDURE — 87102 FUNGUS ISOLATION CULTURE: CPT | Performed by: PHYSICIAN ASSISTANT

## 2024-02-02 PROCEDURE — 87070 CULTURE OTHR SPECIMN AEROBIC: CPT | Performed by: PHYSICIAN ASSISTANT

## 2024-02-02 PROCEDURE — 87077 CULTURE AEROBIC IDENTIFY: CPT | Performed by: PHYSICIAN ASSISTANT

## 2024-02-02 PROCEDURE — 87186 SC STD MICRODIL/AGAR DIL: CPT | Performed by: PHYSICIAN ASSISTANT

## 2024-02-02 PROCEDURE — 99213 OFFICE O/P EST LOW 20 MIN: CPT | Performed by: PHYSICIAN ASSISTANT

## 2024-02-02 PROCEDURE — 87205 SMEAR GRAM STAIN: CPT | Performed by: PHYSICIAN ASSISTANT

## 2024-02-02 PROCEDURE — 87206 SMEAR FLUORESCENT/ACID STAI: CPT | Performed by: PHYSICIAN ASSISTANT

## 2024-02-02 PROCEDURE — 87075 CULTR BACTERIA EXCEPT BLOOD: CPT | Performed by: PHYSICIAN ASSISTANT

## 2024-02-02 RX ORDER — HYDROCODONE BITARTRATE AND ACETAMINOPHEN 10; 325 MG/1; MG/1
TABLET ORAL
Qty: 84 TABLET | Refills: 0 | Status: SHIPPED | OUTPATIENT
Start: 2024-02-02 | End: 2024-02-12

## 2024-02-02 RX ORDER — CEPHALEXIN 500 MG/1
500 CAPSULE ORAL 4 TIMES DAILY
Qty: 20 CAPSULE | Refills: 0 | Status: SHIPPED | OUTPATIENT
Start: 2024-02-02 | End: 2024-02-07

## 2024-02-02 NOTE — TELEPHONE ENCOUNTER
Pt called LVM, stating that she thinks Right TE looks smaller the the left, and she think R TE flipped in the last hours, in the mean time Pt went to [place] and had seroma take out.

## 2024-02-02 NOTE — PROGRESS NOTES
Plastic Surgery Follow Up    HPI: Pratik Frances is a 38 year old female who presents today for a follow-up s/p bilateral skin-sparing mastectomies with right breast sentinel lymph node biopsy by Dr. Han and immediate bilateral breast reconstruction with tissue expander (650mL expander filled with 400mL of air intraoperatively)  and acellular dermal matrix by Dr. Levin on 01/10/2023. She denies fever and chills. Her pain is controlled. She is filled to 410 mL of saline bilaterally.   She presents to the office today with right breast warmth, swelling and her expander flipped.  She reports this started today.  She has previously been dealing with intermittent anterior posterior malposition of her tissue expanders that she previously was able to reposition easily.  She tried to reposition the right expander this time but was unable to do so on her own.      Past Medical History:      Past Medical History:   Diagnosis Date    Back pain     Back problem     Cancer (HCC)     breast cancer    COVID-2021-headache-not hospitalized    Essential hypertension     Gestational hypertension     Herniated disc     Herniated lumbar intervertebral disc     Hx of migraine headaches     Hyperlipidemia     Mass of left forearm     Migraines     Obesity, unspecified     Personal history of antineoplastic chemotherapy     last dose 2024    Urethral stricture          Past Surgical History:  Past Surgical History:   Procedure Laterality Date    APPENDECTOMY      EYE SURGERY      INSERTION OF ACCESS PORT      EMMA BIOPSY STEREO NODULE 1 SITE RIGHT (CPT=19081)  2022    clip top hat    MASTECTOMY BRA Bilateral     MASTECTOMY LEFT      MASTECTOMY RIGHT      NEEDLE BIOPSY LEFT  2022    MRI 2 site    NEEDLE BIOPSY RIGHT  2022    us guided bx          OTHER SURGICAL HISTORY      Excision lipoma of left forearm    OTHER SURGICAL HISTORY      IUD placement    OTHER SURGICAL HISTORY      eye surgery     TONSILLECTOMY           Medications:     cephalexin (KEFLEX) 500 MG Oral Cap Take 1 capsule (500 mg total) by mouth 4 (four) times daily for 5 days. 20 capsule 0         Allergies:    Allergies   Allergen Reactions    Aspirin SHORTNESS OF BREATH     Other reaction(s): ASPIRIN - throat swelling    Codeine SWELLING     Other reaction(s): Swelling of hands and toes    Dust Mite Extract HIVES     Nasal congestion    Grass HIVES     Nasal congestion    Adhesive Tape RASH    Chlorhexidine Gluconate Cloth ITCHING     Burning; wipes only. Okay with plain chlorhexedine         Family History:   Family History   Problem Relation Age of Onset    Heart Disease Other     Hypertension Other     Glaucoma Other     Heart Disease Mother     Lipids Mother     Hypertension Mother     Heart Disorder Mother     ADHD Sister     Lipids Father     Stroke Father     Other (Other) Brother         DJD spine    Breast Cancer Paternal Cousin Female 24        also had @ 34 and 41 (last was TNBC); neg genetics 2010    Cancer Paternal Grandmother         lung ca; smoker    Dementia Paternal Grandfather     Cancer Paternal Cousin Female 30        uterine ca (sister of cousin w/br ca)    Cancer Paternal Cousin Male 40        gastric ca (smoker); also leukemia @ 10         Social History:  History   Alcohol Use    0.0 standard drinks of alcohol/week     Comment: Rarely       History   Smoking Status    Former    Packs/day: 0.50    Years: 2.00    Types: Cigarettes    Quit date: 1/1/2000   Smokeless Tobacco    Never       History   Drug Use No         Review of Systems:    The patient reports: see HPI  All other systems are unremarkable.    Physical Exam     Constitutional: The patient is an alert, oriented and well-developed.     Neurologic: Speech patterns and movements are normal.     Psychiatric: Affect is appropriate.    Eyes: Conjunctiva are clear, non-icteric. PERRL    ENT: no obvious abnormality, no ear drainage, mucous membranes moist and  pink    Integument/Skin: see breast exam    Respiratory: Normal respiratory effort.     Cardiovascular: no cyanosis, no edema    Musculoskeletal: Extremities unremarkable, without edema, tenderness or deformities    Breasts: Right breast with mild lower pole erythema.  Anterior posterior malposition is noted.  Right breast is larger compared to left consistent with seroma fluid.  Bilateral breast incisions remain well-healed.    There were no vitals filed for this visit.      Assessment and Plan     Pratik Frances is s/p bilateral skin-sparing mastectomies with right breast sentinel lymph node biopsy by Dr. Han and immediate bilateral breast reconstruction with tissue expander (650mL expander filled with 400mL of air intraoperatively)  and acellular dermal matrix by Dr. Levin on 01/10/2023     The right expander was flipped.  Aspiration was performed.  The right expander port was identified using the magnet.  Site was prepped with Betadine solution.  A sterile butterfly needle was introduced.  180 cc of straw-colored seroma fluid was aspirated.  This was sent for culture.  Neosporin and Band-Aid was placed.  She tolerated this well.  She reports the right breast felt much better and the appearance was similar to prior to this episode of swelling.  A prescription for Keflex was sent to her pharmacy.  We discussed that this may need to be changed pending the culture results.  I recommended she go to the ER with fever/chills/acute illness.  We will follow-up pending culture results.  She is currently scheduled for second stage reconstruction on 2/21/2024 with Dr. Levin.  I recommend she wear compression at all times.  She was instructed to let us know with any changing or worsening symptoms.    Questions were answered. Patient understands.     25 minutes were spent with the patient, from which 20 minutes were spent counseling the patient and coordinating care.    DIMPLE Rivera  2/2/2024  4:08 PM

## 2024-02-05 ENCOUNTER — TELEPHONE (OUTPATIENT)
Dept: SURGERY | Facility: CLINIC | Age: 39
End: 2024-02-05

## 2024-02-05 ENCOUNTER — OFFICE VISIT (OUTPATIENT)
Dept: SURGERY | Facility: CLINIC | Age: 39
End: 2024-02-05
Payer: COMMERCIAL

## 2024-02-05 DIAGNOSIS — Z90.13 ABSENCE OF BOTH BREASTS: Primary | ICD-10-CM

## 2024-02-05 PROCEDURE — 99213 OFFICE O/P EST LOW 20 MIN: CPT | Performed by: PHYSICIAN ASSISTANT

## 2024-02-05 RX ORDER — CEPHALEXIN 500 MG/1
500 CAPSULE ORAL 4 TIMES DAILY
Qty: 20 CAPSULE | Refills: 0 | Status: SHIPPED | OUTPATIENT
Start: 2024-02-05 | End: 2024-02-05

## 2024-02-05 RX ORDER — CEPHALEXIN 500 MG/1
500 CAPSULE ORAL 4 TIMES DAILY
Qty: 28 CAPSULE | Refills: 0 | Status: SHIPPED | OUTPATIENT
Start: 2024-02-05 | End: 2024-02-12

## 2024-02-05 NOTE — TELEPHONE ENCOUNTER
Patient called to report increased swelling/seroma build up in her right breast. She denies fever or redness. She was added to DIMPLE Bustos's schedule today at 2:30 for repeat aspiration and to discuss her culture results. She will continue to wear compression until her appt and monitor for fever.     She was appreciative of the call.

## 2024-02-05 NOTE — CONSULTS
Estabilshed Patient Consultation    Chief Complaint: absence of both breasts, right breast swelling     History of Present Illness:   Pratik Frances is a 38 year old female who returns to the office s/p bilateral skin-sparing mastectomies with right breast sentinel lymph node biopsy by Dr. Han and immediate bilateral breast reconstruction with tissue expander (650mL expander filled with 400mL of air intraoperatively)  and acellular dermal matrix by Dr. Levin on 01/10/2023.  She was seen in the office on Friday, 2024 with anterior posterior malposition of the right tissue expander and swelling.  Tissue expander repositioning was performed at this visit and aspiration was also performed.  The culture revealed Staphylococcus lugdunensis.  She is currently taking Keflex.  She returns today with recurrent right breast swelling.  She denies fever or chills.  Her pain is controlled.      Past Medical History:      Past Medical History:   Diagnosis Date    Back pain     Back problem     Cancer (HCC)     breast cancer    COVID-2021-headache-not hospitalized    Essential hypertension     Gestational hypertension     Herniated disc     Herniated lumbar intervertebral disc     Hx of migraine headaches     Hyperlipidemia     Mass of left forearm     Migraines     Obesity, unspecified     Personal history of antineoplastic chemotherapy     last dose 2024    Urethral stricture          Past Surgical History:  Past Surgical History:   Procedure Laterality Date    APPENDECTOMY      EYE SURGERY      INSERTION OF ACCESS PORT      EMMA BIOPSY STEREO NODULE 1 SITE RIGHT (CPT=19081)  2022    clip top hat    MASTECTOMY BRA Bilateral     MASTECTOMY LEFT      MASTECTOMY RIGHT      NEEDLE BIOPSY LEFT  2022    MRI 2 site    NEEDLE BIOPSY RIGHT  2022    us guided bx          OTHER SURGICAL HISTORY      Excision lipoma of left forearm    OTHER SURGICAL HISTORY  2018    IUD placement    OTHER  SURGICAL HISTORY      eye surgery    TONSILLECTOMY           Medications:     cephalexin (KEFLEX) 500 MG Oral Cap Take 1 capsule (500 mg total) by mouth 4 (four) times daily for 5 days. 20 capsule 0         Allergies:    Allergies   Allergen Reactions    Aspirin SHORTNESS OF BREATH     Other reaction(s): ASPIRIN - throat swelling    Codeine SWELLING     Other reaction(s): Swelling of hands and toes    Dust Mite Extract HIVES     Nasal congestion    Grass HIVES     Nasal congestion    Adhesive Tape RASH    Chlorhexidine Gluconate Cloth ITCHING     Burning; wipes only. Okay with plain chlorhexedine         Family History:   Family History   Problem Relation Age of Onset    Heart Disease Other     Hypertension Other     Glaucoma Other     Heart Disease Mother     Lipids Mother     Hypertension Mother     Heart Disorder Mother     ADHD Sister     Lipids Father     Stroke Father     Other (Other) Brother         DJD spine    Breast Cancer Paternal Cousin Female 24        also had @ 34 and 41 (last was TNBC); neg genetics 2010    Cancer Paternal Grandmother         lung ca; smoker    Dementia Paternal Grandfather     Cancer Paternal Cousin Female 30        uterine ca (sister of cousin w/br ca)    Cancer Paternal Cousin Male 40        gastric ca (smoker); also leukemia @ 10         Social History:  History   Alcohol Use    0.0 standard drinks of alcohol/week     Comment: Rarely       History   Smoking Status    Former    Packs/day: 0.50    Years: 2.00    Types: Cigarettes    Quit date: 1/1/2000   Smokeless Tobacco    Never       History   Drug Use No         Review of Systems:    The patient reports: see HPI  All other systems are unremarkable.      Physical Exam:    LMP 01/23/2024 (Exact Date)     Constitutional: The patient is an alert, oriented and well-developed.     Neurologic: Speech patterns and movements are normal.     Psychiatric: Affect is appropriate.    Eyes: Conjunctiva are clear, non-icteric. PERRL    ENT:  no obvious abnormality, no ear drainage, mucous membranes moist and pink    Integument/Skin: See breast exam    Breasts: Right breast skin is with mild erythema.  Right breast is larger compared to left consistent with seroma fluid.  Bilateral breast incisions remain well-healed.    Respiratory: Normal respiratory effort.     Cardiovascular: no cyanosis, no edema    Musculoskeletal: Extremities unremarkable, without edema, tenderness or deformities    Impression:   Pratik Frances  is a 38 year old s/p bilateral skin-sparing mastectomies with right breast sentinel lymph node biopsy by Dr. Han and immediate bilateral breast reconstruction with tissue expander (650mL expander filled with 400mL of air intraoperatively)  and acellular dermal matrix by Dr. Levin on 01/10/2023, right breast seroma with Staphylococcus lugdunensis     Discussion and Plan:  The patient was counseled on the different treatment options.     Repeat aspiration was performed today.  The right expander port was identified using the magnet.  Site was prepped with Betadine solution.  A sterile butterfly needle was introduced and 135 cc of seroma fluid was aspirated.  Neosporin and Band-Aid was placed.  She tolerated this well.  I spoke with the pharmacy infectious disease specialist Celeste who confirmed that Keflex is the antibiotic of choice.  Another 5 days of antibiotic was sent to her pharmacy.  Celeste informed me that a backup antibiotic choice would be Bactrim.  I recommended she go to the ER with fever/chills/acute illness.  She will continue with compression.  She was instructed to let us know with any changing or worsening symptoms or recurrent seroma accumulation.  She is currently scheduled for second stage reconstruction on 2/21/2024 with Dr. Levin.  She will follow-up with Dr. Levin on 2/13/2024. She will stay on her antibiotic until she sees Dr. Levin.    25 minutes were spent with the patient, from which 20 minutes were spent  counseling the patient and coordinating care.    DIMPLE Rivera  2/5/2024  2:54 PM     Patient/Caregiver provided printed discharge information.

## 2024-02-08 ENCOUNTER — NURSE ONLY (OUTPATIENT)
Dept: SURGERY | Facility: CLINIC | Age: 39
End: 2024-02-08
Payer: COMMERCIAL

## 2024-02-08 VITALS
SYSTOLIC BLOOD PRESSURE: 133 MMHG | OXYGEN SATURATION: 96 % | DIASTOLIC BLOOD PRESSURE: 88 MMHG | TEMPERATURE: 98 F | RESPIRATION RATE: 16 BRPM | HEART RATE: 98 BPM

## 2024-02-08 DIAGNOSIS — Z90.13 S/P MASTECTOMY, BILATERAL: ICD-10-CM

## 2024-02-08 DIAGNOSIS — Z90.13 ABSENCE OF BOTH BREASTS: Primary | ICD-10-CM

## 2024-02-08 PROCEDURE — 3075F SYST BP GE 130 - 139MM HG: CPT | Performed by: SURGERY

## 2024-02-08 PROCEDURE — 3079F DIAST BP 80-89 MM HG: CPT | Performed by: SURGERY

## 2024-02-08 PROCEDURE — 99024 POSTOP FOLLOW-UP VISIT: CPT | Performed by: SURGERY

## 2024-02-08 RX ORDER — SULFAMETHOXAZOLE AND TRIMETHOPRIM 800; 160 MG/1; MG/1
1 TABLET ORAL 2 TIMES DAILY
Qty: 14 TABLET | Refills: 0 | Status: SHIPPED | OUTPATIENT
Start: 2024-02-08 | End: 2024-02-15

## 2024-02-08 NOTE — PROGRESS NOTES
Pratik Frances is a 38 year old female who presents today for a follow-up after bilateral skin-sparing mastectomies with right breast sentinel lymph node biopsy by Dr. Han and immediate bilateral breast reconstruction with tissue expander (650mL expander filled with 400mL of air intraoperatively)  and acellular dermal matrix by Dr. Levin on 01/10/2023  on     The culture revealed Staphylococcus lugdunensis. She is currently taking Keflex. She returns today with recurrent right breast swelling. She denies fever or chills. Her pain is controlled.     She is currently filled to a bilateral total of 410mL of saline bilaterally.       Physical Exam     Surgical incisions are clean, dry, and intact. No erythema, no wound drainage.     Breasts: Right breast is without erythema ,ecchymosis or sign of hematoma. There is a positive fluid wave on the right breast. Right breast is double the size of the left breast. Left breast is healing well.       Vitals were filled for todays visit.       Assessment and Plan     Pratik Frances is doing well s/p bilateral skin-sparing mastectomies with right breast sentinel lymph node biopsy by Dr. Han and immediate bilateral breast reconstruction with tissue expander (650mL expander filled with 400mL of air intraoperatively)  and acellular dermal matrix by Dr. Levin on 01/10/2023     Repeat aspiration was performed today.  The right expander port was identified using the magnet.  Site was prepped with Betadine solution.  A sterile butterfly needle was introduced and 135 cc of seroma fluid was aspirated.  Neosporin, gauze and paptertape was placed.  She tolerated this well. She will continue her oral antibiotics. She stated that keflex doesn't work for her as she used it in the past during chemo for recurrent UTI's. Dr. Levin was advised on a new antibiotic order. Bactrim for 7 days ordered. Patient will discontinue her Keflex.     She will continue with compression.  She was  instructed to let us know with any changing or worsening symptoms or recurrent seroma accumulation.  She is currently scheduled for second stage reconstruction on 2/21/2024 with Dr. Levin.  She will follow-up with Dr. Levin on 2/13/2024. She will stay on her antibiotic until she sees Dr. Levin.     Questions were answered. Patient understands.     Leanna Ballard RN  2/8/2024  10:32am

## 2024-02-12 ENCOUNTER — TELEPHONE (OUTPATIENT)
Dept: INTERNAL MEDICINE CLINIC | Facility: CLINIC | Age: 39
End: 2024-02-12

## 2024-02-12 DIAGNOSIS — G89.29 CHRONIC BILATERAL LOW BACK PAIN, UNSPECIFIED WHETHER SCIATICA PRESENT: ICD-10-CM

## 2024-02-12 DIAGNOSIS — M54.50 CHRONIC BILATERAL LOW BACK PAIN, UNSPECIFIED WHETHER SCIATICA PRESENT: ICD-10-CM

## 2024-02-12 RX ORDER — FLUCONAZOLE 150 MG/1
150 TABLET ORAL ONCE
Qty: 1 TABLET | Refills: 0 | Status: SHIPPED | OUTPATIENT
Start: 2024-02-12 | End: 2024-02-12

## 2024-02-12 RX ORDER — HYDROCODONE BITARTRATE AND ACETAMINOPHEN 10; 325 MG/1; MG/1
TABLET ORAL
Qty: 112 TABLET | Refills: 0 | Status: SHIPPED | OUTPATIENT
Start: 2024-02-12

## 2024-02-12 NOTE — TELEPHONE ENCOUNTER
Patient called, verified Name and . States PCP prescribed her Norco and Diflucan. She was about to  the medications but was told by pharmacy that Diflucan is typically prescribed for 3 days. She wants to make sure she only needs to take 1 dose as insurance will not cover additional doses once she picks up the medication.    Dr. Cartwright please advise.

## 2024-02-12 NOTE — TELEPHONE ENCOUNTER
Patient called back and states that she is good with the one dose. She realizes now it's one every 72 hours. She will call if problem or needs another dose.

## 2024-02-12 NOTE — TELEPHONE ENCOUNTER
Pt here for refill norco since taking more over the past week due to stap infection on breast expanders. She is on bactrim DS but now having yeast vaginitis so aksing for diflucan.  [place]rx [place]

## 2024-02-12 NOTE — TELEPHONE ENCOUNTER
I gave her the 150mg single dose. She's not immunocompromised anymore since done with her chemotx/biologic tx last year

## 2024-02-13 ENCOUNTER — OFFICE VISIT (OUTPATIENT)
Dept: SURGERY | Facility: CLINIC | Age: 39
End: 2024-02-13
Payer: COMMERCIAL

## 2024-02-13 DIAGNOSIS — Z17.0 BREAST CANCER, STAGE 1, ESTROGEN RECEPTOR POSITIVE, RIGHT  (HCC): ICD-10-CM

## 2024-02-13 DIAGNOSIS — Z90.13 ABSENCE OF BOTH BREASTS: Primary | ICD-10-CM

## 2024-02-13 DIAGNOSIS — C50.911 BREAST CANCER, STAGE 1, ESTROGEN RECEPTOR POSITIVE, RIGHT  (HCC): ICD-10-CM

## 2024-02-13 PROCEDURE — 99212 OFFICE O/P EST SF 10 MIN: CPT | Performed by: SURGERY

## 2024-02-13 RX ORDER — SULFAMETHOXAZOLE AND TRIMETHOPRIM 800; 160 MG/1; MG/1
1 TABLET ORAL 2 TIMES DAILY
Qty: 6 TABLET | Refills: 0 | Status: SHIPPED | OUTPATIENT
Start: 2024-02-13 | End: 2024-02-23

## 2024-02-13 NOTE — PROGRESS NOTES
Pratik Frances is a 38 year old female who presents today for a follow-up.  The patient has undergone aspiration of her right breast seroma which was culture positive for  Staphylococcus lugdunensis.  She remains on oral antibiotics and denies fever and chills.      Physical Examination:  Breasts: The right breast is noted to have a palpable fluid wave.  There is no erythema noted.    Procedure: The right breast was sterilely accessed and 70 cc of clear straw-colored seroma fluid were aspirated.      Assessment and Plan:  The patient will continue her prescribed course of oral antibiotics.  We discussed proceeding with surgery next week.  We discussed possible need for expander removal and drain placement depending on operative findings.  We also discussed the elevated risk of postoperative healing issues and infectious complications given her culture positive seroma.  Multiple questions were answered to patient satisfaction.

## 2024-02-14 ENCOUNTER — TELEPHONE (OUTPATIENT)
Dept: INTERNAL MEDICINE CLINIC | Facility: CLINIC | Age: 39
End: 2024-02-14

## 2024-02-14 DIAGNOSIS — M54.50 CHRONIC BILATERAL LOW BACK PAIN, UNSPECIFIED WHETHER SCIATICA PRESENT: Primary | ICD-10-CM

## 2024-02-14 DIAGNOSIS — G89.29 CHRONIC BILATERAL LOW BACK PAIN, UNSPECIFIED WHETHER SCIATICA PRESENT: Primary | ICD-10-CM

## 2024-02-14 RX ORDER — HYDROCODONE BITARTRATE AND ACETAMINOPHEN 10; 300 MG/1; MG/1
TABLET ORAL
Qty: 112 TABLET | Refills: 0 | Status: SHIPPED | OUTPATIENT
Start: 2024-02-14

## 2024-02-14 NOTE — TELEPHONE ENCOUNTER
Pt came in today to redo script for norco, only available with 300mg acetaminophen instead of 325mg; previous script returned to me.  [place]

## 2024-02-20 ENCOUNTER — ANESTHESIA EVENT (OUTPATIENT)
Dept: SURGERY | Facility: HOSPITAL | Age: 39
End: 2024-02-20
Payer: COMMERCIAL

## 2024-02-21 ENCOUNTER — HOSPITAL ENCOUNTER (OUTPATIENT)
Facility: HOSPITAL | Age: 39
Setting detail: HOSPITAL OUTPATIENT SURGERY
Discharge: HOME | End: 2024-02-21
Attending: SURGERY | Admitting: SURGERY
Payer: COMMERCIAL

## 2024-02-21 ENCOUNTER — ANESTHESIA (OUTPATIENT)
Dept: SURGERY | Facility: HOSPITAL | Age: 39
End: 2024-02-21
Payer: COMMERCIAL

## 2024-02-21 VITALS
TEMPERATURE: 98 F | OXYGEN SATURATION: 100 % | SYSTOLIC BLOOD PRESSURE: 161 MMHG | BODY MASS INDEX: 30.66 KG/M2 | HEIGHT: 65 IN | WEIGHT: 184 LBS | RESPIRATION RATE: 16 BRPM | DIASTOLIC BLOOD PRESSURE: 93 MMHG | HEART RATE: 73 BPM

## 2024-02-21 DIAGNOSIS — Z90.13 ABSENCE OF BOTH BREASTS: ICD-10-CM

## 2024-02-21 LAB — B-HCG UR QL: NEGATIVE

## 2024-02-21 PROCEDURE — 87205 SMEAR GRAM STAIN: CPT | Performed by: SURGERY

## 2024-02-21 PROCEDURE — 87206 SMEAR FLUORESCENT/ACID STAI: CPT | Performed by: SURGERY

## 2024-02-21 PROCEDURE — 87070 CULTURE OTHR SPECIMN AEROBIC: CPT | Performed by: SURGERY

## 2024-02-21 PROCEDURE — 81025 URINE PREGNANCY TEST: CPT

## 2024-02-21 PROCEDURE — 0HRV0JZ REPLACEMENT OF BILATERAL BREAST WITH SYNTHETIC SUBSTITUTE, OPEN APPROACH: ICD-10-PCS | Performed by: SURGERY

## 2024-02-21 PROCEDURE — 87102 FUNGUS ISOLATION CULTURE: CPT | Performed by: SURGERY

## 2024-02-21 PROCEDURE — 0HPU0NZ REMOVAL OF TISSUE EXPANDER FROM LEFT BREAST, OPEN APPROACH: ICD-10-PCS | Performed by: SURGERY

## 2024-02-21 PROCEDURE — 87075 CULTR BACTERIA EXCEPT BLOOD: CPT | Performed by: SURGERY

## 2024-02-21 PROCEDURE — 0HPT0NZ REMOVAL OF TISSUE EXPANDER FROM RIGHT BREAST, OPEN APPROACH: ICD-10-PCS | Performed by: SURGERY

## 2024-02-21 DEVICE — IMPLANTABLE DEVICE: Type: IMPLANTABLE DEVICE | Site: BREAST | Status: FUNCTIONAL

## 2024-02-21 RX ORDER — ONDANSETRON 2 MG/ML
4 INJECTION INTRAMUSCULAR; INTRAVENOUS EVERY 6 HOURS PRN
Status: DISCONTINUED | OUTPATIENT
Start: 2024-02-21 | End: 2024-02-21

## 2024-02-21 RX ORDER — HYDROMORPHONE HYDROCHLORIDE 1 MG/ML
0.4 INJECTION, SOLUTION INTRAMUSCULAR; INTRAVENOUS; SUBCUTANEOUS EVERY 5 MIN PRN
Status: DISCONTINUED | OUTPATIENT
Start: 2024-02-21 | End: 2024-02-21

## 2024-02-21 RX ORDER — OXYCODONE HYDROCHLORIDE 5 MG/1
10 TABLET ORAL ONCE AS NEEDED
Status: COMPLETED | OUTPATIENT
Start: 2024-02-21 | End: 2024-02-21

## 2024-02-21 RX ORDER — MEPERIDINE HYDROCHLORIDE 25 MG/ML
12.5 INJECTION INTRAMUSCULAR; INTRAVENOUS; SUBCUTANEOUS AS NEEDED
Status: DISCONTINUED | OUTPATIENT
Start: 2024-02-21 | End: 2024-02-21

## 2024-02-21 RX ORDER — HYDROMORPHONE HYDROCHLORIDE 1 MG/ML
INJECTION, SOLUTION INTRAMUSCULAR; INTRAVENOUS; SUBCUTANEOUS
Status: COMPLETED
Start: 2024-02-21 | End: 2024-02-21

## 2024-02-21 RX ORDER — ACETAMINOPHEN 500 MG
1000 TABLET ORAL ONCE
Status: DISCONTINUED | OUTPATIENT
Start: 2024-02-21 | End: 2024-02-21 | Stop reason: HOSPADM

## 2024-02-21 RX ORDER — LIDOCAINE HYDROCHLORIDE AND EPINEPHRINE 10; 10 MG/ML; UG/ML
INJECTION, SOLUTION INFILTRATION; PERINEURAL AS NEEDED
Status: DISCONTINUED | OUTPATIENT
Start: 2024-02-21 | End: 2024-02-21 | Stop reason: HOSPADM

## 2024-02-21 RX ORDER — NEOSTIGMINE METHYLSULFATE 1 MG/ML
INJECTION, SOLUTION INTRAVENOUS AS NEEDED
Status: DISCONTINUED | OUTPATIENT
Start: 2024-02-21 | End: 2024-02-21 | Stop reason: SURG

## 2024-02-21 RX ORDER — ROCURONIUM BROMIDE 10 MG/ML
INJECTION, SOLUTION INTRAVENOUS AS NEEDED
Status: DISCONTINUED | OUTPATIENT
Start: 2024-02-21 | End: 2024-02-21 | Stop reason: SURG

## 2024-02-21 RX ORDER — NALOXONE HYDROCHLORIDE 0.4 MG/ML
0.08 INJECTION, SOLUTION INTRAMUSCULAR; INTRAVENOUS; SUBCUTANEOUS AS NEEDED
Status: DISCONTINUED | OUTPATIENT
Start: 2024-02-21 | End: 2024-02-21

## 2024-02-21 RX ORDER — MIDAZOLAM HYDROCHLORIDE 1 MG/ML
1 INJECTION INTRAMUSCULAR; INTRAVENOUS EVERY 5 MIN PRN
Status: DISCONTINUED | OUTPATIENT
Start: 2024-02-21 | End: 2024-02-21

## 2024-02-21 RX ORDER — ONDANSETRON 2 MG/ML
INJECTION INTRAMUSCULAR; INTRAVENOUS AS NEEDED
Status: DISCONTINUED | OUTPATIENT
Start: 2024-02-21 | End: 2024-02-21 | Stop reason: SURG

## 2024-02-21 RX ORDER — SODIUM CHLORIDE, SODIUM LACTATE, POTASSIUM CHLORIDE, CALCIUM CHLORIDE 600; 310; 30; 20 MG/100ML; MG/100ML; MG/100ML; MG/100ML
INJECTION, SOLUTION INTRAVENOUS CONTINUOUS
Status: DISCONTINUED | OUTPATIENT
Start: 2024-02-21 | End: 2024-02-21

## 2024-02-21 RX ORDER — OXYCODONE HYDROCHLORIDE 5 MG/1
5 TABLET ORAL ONCE AS NEEDED
Status: COMPLETED | OUTPATIENT
Start: 2024-02-21 | End: 2024-02-21

## 2024-02-21 RX ORDER — ACETAMINOPHEN 10 MG/ML
INJECTION, SOLUTION INTRAVENOUS AS NEEDED
Status: DISCONTINUED | OUTPATIENT
Start: 2024-02-21 | End: 2024-02-21 | Stop reason: SURG

## 2024-02-21 RX ORDER — CEFAZOLIN SODIUM/WATER 2 G/20 ML
2 SYRINGE (ML) INTRAVENOUS ONCE
Status: COMPLETED | OUTPATIENT
Start: 2024-02-21 | End: 2024-02-21

## 2024-02-21 RX ORDER — DIPHENHYDRAMINE HYDROCHLORIDE 50 MG/ML
12.5 INJECTION INTRAMUSCULAR; INTRAVENOUS AS NEEDED
Status: DISCONTINUED | OUTPATIENT
Start: 2024-02-21 | End: 2024-02-21

## 2024-02-21 RX ORDER — HYDROMORPHONE HYDROCHLORIDE 1 MG/ML
0.6 INJECTION, SOLUTION INTRAMUSCULAR; INTRAVENOUS; SUBCUTANEOUS EVERY 5 MIN PRN
Status: DISCONTINUED | OUTPATIENT
Start: 2024-02-21 | End: 2024-02-21

## 2024-02-21 RX ORDER — GLYCOPYRROLATE 0.2 MG/ML
INJECTION, SOLUTION INTRAMUSCULAR; INTRAVENOUS AS NEEDED
Status: DISCONTINUED | OUTPATIENT
Start: 2024-02-21 | End: 2024-02-21 | Stop reason: SURG

## 2024-02-21 RX ORDER — CEFAZOLIN SODIUM/WATER 2 G/20 ML
SYRINGE (ML) INTRAVENOUS
Status: DISCONTINUED
Start: 2024-02-21 | End: 2024-02-21

## 2024-02-21 RX ORDER — PROCHLORPERAZINE EDISYLATE 5 MG/ML
5 INJECTION INTRAMUSCULAR; INTRAVENOUS EVERY 8 HOURS PRN
Status: DISCONTINUED | OUTPATIENT
Start: 2024-02-21 | End: 2024-02-21

## 2024-02-21 RX ORDER — KETAMINE HYDROCHLORIDE 50 MG/ML
INJECTION, SOLUTION INTRAMUSCULAR; INTRAVENOUS AS NEEDED
Status: DISCONTINUED | OUTPATIENT
Start: 2024-02-21 | End: 2024-02-21 | Stop reason: SURG

## 2024-02-21 RX ORDER — DEXAMETHASONE SODIUM PHOSPHATE 4 MG/ML
VIAL (ML) INJECTION AS NEEDED
Status: DISCONTINUED | OUTPATIENT
Start: 2024-02-21 | End: 2024-02-21 | Stop reason: SURG

## 2024-02-21 RX ORDER — LIDOCAINE HYDROCHLORIDE 10 MG/ML
INJECTION, SOLUTION EPIDURAL; INFILTRATION; INTRACAUDAL; PERINEURAL AS NEEDED
Status: DISCONTINUED | OUTPATIENT
Start: 2024-02-21 | End: 2024-02-21 | Stop reason: SURG

## 2024-02-21 RX ORDER — HYDROMORPHONE HYDROCHLORIDE 1 MG/ML
0.2 INJECTION, SOLUTION INTRAMUSCULAR; INTRAVENOUS; SUBCUTANEOUS EVERY 5 MIN PRN
Status: DISCONTINUED | OUTPATIENT
Start: 2024-02-21 | End: 2024-02-21

## 2024-02-21 RX ORDER — SULFAMETHOXAZOLE AND TRIMETHOPRIM 800; 160 MG/1; MG/1
1 TABLET ORAL 2 TIMES DAILY
Qty: 20 TABLET | Refills: 0 | Status: SHIPPED | OUTPATIENT
Start: 2024-02-21 | End: 2024-03-02

## 2024-02-21 RX ADMIN — NEOSTIGMINE METHYLSULFATE 3 MG: 1 INJECTION, SOLUTION INTRAVENOUS at 13:12:00

## 2024-02-21 RX ADMIN — DEXAMETHASONE SODIUM PHOSPHATE 8 MG: 4 MG/ML VIAL (ML) INJECTION at 10:49:00

## 2024-02-21 RX ADMIN — ONDANSETRON 4 MG: 2 INJECTION INTRAMUSCULAR; INTRAVENOUS at 12:51:00

## 2024-02-21 RX ADMIN — SODIUM CHLORIDE, SODIUM LACTATE, POTASSIUM CHLORIDE, CALCIUM CHLORIDE: 600; 310; 30; 20 INJECTION, SOLUTION INTRAVENOUS at 13:40:00

## 2024-02-21 RX ADMIN — ROCURONIUM BROMIDE 40 MG: 10 INJECTION, SOLUTION INTRAVENOUS at 10:40:00

## 2024-02-21 RX ADMIN — CEFAZOLIN SODIUM/WATER 2 G: 2 G/20 ML SYRINGE (ML) INTRAVENOUS at 10:49:00

## 2024-02-21 RX ADMIN — KETAMINE HYDROCHLORIDE 15 MG: 50 INJECTION, SOLUTION INTRAMUSCULAR; INTRAVENOUS at 10:46:00

## 2024-02-21 RX ADMIN — LIDOCAINE HYDROCHLORIDE 50 MG: 10 INJECTION, SOLUTION EPIDURAL; INFILTRATION; INTRACAUDAL; PERINEURAL at 10:40:00

## 2024-02-21 RX ADMIN — GLYCOPYRROLATE 0.4 MG: 0.2 INJECTION, SOLUTION INTRAMUSCULAR; INTRAVENOUS at 13:12:00

## 2024-02-21 RX ADMIN — ACETAMINOPHEN 1000 MG: 10 INJECTION, SOLUTION INTRAVENOUS at 12:51:00

## 2024-02-21 RX ADMIN — SODIUM CHLORIDE, SODIUM LACTATE, POTASSIUM CHLORIDE, CALCIUM CHLORIDE: 600; 310; 30; 20 INJECTION, SOLUTION INTRAVENOUS at 10:37:00

## 2024-02-21 NOTE — BRIEF OP NOTE
Pre-Operative Diagnosis: Absence of both breasts [Z90.13]     Post-Operative Diagnosis: Absence of both breasts [Z90.13]      Procedure Performed:   Second stage reconstruction with removal of bilateral breast tissue expanders, placement of permanent implants    Surgeon(s) and Role:     * Benito Levin MD - Primary    Assistant(s):  APN: Lala Contreras APRN     Surgical Findings: See dictation     Specimen: Bilateral breast tissue     Estimated Blood Loss: Blood Output: 10 mL (2/21/2024  1:08 PM)        Benito Levin MD  2/21/2024  1:52 PM

## 2024-02-21 NOTE — H&P
Dr. Hennessy's H&P from 1/24 reviewed and no changes. We reviewed the plan for exploration of the right breast, possible expander removal and drain placement, and possible bilateral exchange to permanent implants depending on operative findings. The risks, benefits, and alternatives were reviewed with the patient. She expresses understanding and wishes to proceed.

## 2024-02-21 NOTE — DISCHARGE INSTRUCTIONS
Call for fevers, chills, redness. May shower in 48 hours but no bathing. Leave steristrips in place. Record and empty HARRY drains daily. No heavy lifting or strenuous activity.     You have been given a prescription for Norco 5/325  oxycodone was given to you at: 4PM  Next dose no sooner than four hours after first dose  Take this medication as directed  This medication contains Tylenol (acetaminophen)  Do not take additional Tylenol while taking Norco    Norco is a Narcotic and can be constipating or upset your stomach  Don't take Norco on an empty stomach  Drink plenty of water  Alcoholic beverages should be avoided while taking narcotics

## 2024-02-21 NOTE — ANESTHESIA PROCEDURE NOTES
Airway  Date/Time: 2/21/2024 10:42 AM  Urgency: elective      General Information and Staff    Patient location during procedure: OR  Anesthesiologist: Merlin Ba MD  Resident/CRNA: Jhonathan Garza CRNA  Performed: CRNA   Performed by: Jhonathan Garza CRNA  Authorized by: Merlin Ba MD      Indications and Patient Condition  Indications for airway management: anesthesia  Sedation level: deep  Preoxygenated: yes  Patient position: sniffing  Mask difficulty assessment: 1 - vent by mask    Final Airway Details  Final airway type: endotracheal airway      Successful airway: ETT  Cuffed: yes   Successful intubation technique: direct laryngoscopy  Endotracheal tube insertion site: oral  Blade: Silva  Blade size: #3  ETT size (mm): 7.0    Cormack-Lehane Classification: grade I - full view of glottis  Placement verified by: capnometry   Measured from: lips  ETT to lips (cm): 21  Number of attempts at approach: 1

## 2024-02-21 NOTE — OPERATIVE REPORT
[facility]    PATIENT'S NAME: REGULO RIVERA   ATTENDING PHYSICIAN: Benito Levin M.D.   OPERATING PHYSICIAN: Benito Levin M.D.   PATIENT ACCOUNT#:   [id number]    [facility]  PACU [facility]  MEDICAL RECORD #:   [id number]       YOB: 1985  ADMISSION DATE:       02/21/2024      OPERATION DATE:  02/21/2024    OPERATIVE REPORT    PREOPERATIVE DIAGNOSIS:  History of bilateral mastectomy and tissue expander reconstruction.  POSTOPERATIVE DIAGNOSIS:  History of bilateral mastectomy and tissue expander reconstruction.  PROCEDURE:    1.   Removal of bilateral breast tissue expanders.  2.   Right breast capsulectomy and capsulotomy.  3.   Left breast capsulorrhaphy.  4.   Placement of silicone gel implants bilateral breasts.  5.   Revision of bilateral reconstructed breasts with Wise pattern skin envelope reduction.    ASSISTANT:  ISHA Joshi    ESTIMATED BLOOD LOSS:  20 mL.    COMPLICATIONS:  None.    INDICATIONS:  The patient is a 38-year-old female who previously underwent bilateral mastectomy and prepectoral tissue expander reconstruction.  The patient completed uncomplicated expansion as a staged approach to her ultimate autologous reconstruction.  However, the patient elected to convert to implant-based reconstruction.  During this time, she was noted to have recurrent seromas of the right breast without obvious clinical signs of infection.  The seroma fluid was positive for Staphylococcus lugdunensis, and the patient was maintained on oral antibiotics.  We discussed proceeding with exploration of the right breast and possible implant placement dependent on operative findings.      FINDINGS:  Bilateral breasts reconstructed with Natrelle Inspira SoftTouch breast implant, 650 mL, reference SSF-650, on the right serial #[id number] and on the left serial #[id number].    OPERATIVE TECHNIQUE:  Informed consent was obtained from the patient.  Risks, benefits, and alternatives reviewed with  the patient preoperatively.  She expressed understanding and wished to proceed.  The patient was marked in the preoperative holding area in the upright position.  The midline and inframammary folds were marked.  Areas of potential liposuction in the upper abdomen were marked.  Areas to be fat grafted were marked.  The patient was taken to the operating room, properly identified, placed in supine position.  Sequential compression devices were placed on bilateral lower extremities.  Intravenous antibiotic prophylaxis was administered.  The patient then underwent successful induction of general anesthesia and endotracheal intubation.  The arms were placed abducted on foam-padded cradles and secured with Kerlix.  The chest and abdomen were prepped and draped sterilely.  The inferior extension of the vertical incision was infiltrated with 1% lidocaine with epinephrine bilaterally.  The procedure began on the right breast.  The skin was incised and subcutaneous tissues divided with electrocautery to the underlying capsule.  A vertical capsulotomy was then created and approximately 60 mL of clear straw-colored seroma fluid were evacuated.  It was sent for Gram stain and culture.  The tissue expander was removed intact.  The pocket was inspected.  A hypertrophied capsule was noted.  There was no obvious granulation tissue or purulence noted.  As such, decision was made to proceed with implant placement.  First, the pocket was rinsed with Irrisept and chlorhexidine irrigation.  While the chlorhexidine was taking effect, attention was turned to the left breast.  In a similar fashion, the inferior extent of the vertical incision was created with a scalpel.  It was deepened to underlying capsule with electrocautery.  An intact tissue expander was then removed.  The pocket was inspected.  There was no periprosthetic fluid noted.  A capsule of thin morphology was noted.  Sizers were placed, and attention was turned to the right  breast.  The capsule was released superiorly and medially along its base with electrocautery.  Radial scoring was performed to facilitate expansion of the pocket.  The hypertrophied capsule on the pectoralis muscle was excised and sent for permanent pathologic analysis.  Once the pocket had been adequately released, attention was then returned to the left breast.  A sizer was placed, and significant lateral displacement of the implant was noted.  Hence, a lateral capsulorrhaphy of interrupted running 2-0 Vicryl sutures was performed.  Once the capsule had been adequately corrected, a Wise pattern skin excision was tailor tacked and marked.  The skin within the Wise pattern was then incised and de-epithelialized.  The area of de-epithelialization totaled approximately 120 sq cm.  Sizers of different volumes and projections were placed.  Ultimately, it appeared that the style SSX with 150 mL implant gave the best size and shape in accordance with the patient's desires.  Both pockets were rinsed with Irrisept, Betadine, and antibiotic irrigation until clear.  On the right side, a 15-Kazakh Paul drain was exited through a lateral stab incision and sutured to the skin with 3-0 nylon suture.  Gloves were changed, and the implants were brought on the field and immediately bathed in antibiotic irrigation.  They were checked for integrity and noted to be intact.  The implants were placed in the prepectoral space, taking care to maintain their proper orientation.  The capsules were closed with running 2-0 Vicryl sutures.  The Wise pattern was then stapled in place and then repaired with 3-0 Vicryl deep sutures and 4-0 Monocryl subcuticular sutures.  Bacitracin, Xeroform, and gauze were placed on the incisions.  Biopatch and Tegaderm were placed on the drain site.  A surgical bra was applied.  The patient was awakened, extubated, taken to the recovery area in stable condition.  There were no operative complications.  All  needle, sponge, and instrument counts were correct at the end the procedure.     Dictated By Benito Levin M.D.  d: 02/21/2024 14:09:47  t: 02/21/2024 14:33:52  [facility] [id number]  LAP/

## 2024-02-21 NOTE — ANESTHESIA POSTPROCEDURE EVALUATION
[facility]    Pratik Frances Patient Status:  Hospital Outpatient Surgery   Age/Gender 38 year old female MRN [id number]   Location [facility] POST ANESTHESIA CARE UNIT Attending Benito Levin MD   Hosp Day # 0 PCP Venkata Cartwright MD       Anesthesia Post-op Note    Second stage reconstruction with removal of bilateral breast tissue expanders, placement of permanent implants    Procedure Summary       Date: 02/21/24 Room / Location:  MAIN OR [address] [place] [address] MAIN OR    Anesthesia Start: 1033 Anesthesia Stop: 1343    Procedure: Second stage reconstruction with removal of bilateral breast tissue expanders, placement of permanent implants (Bilateral) Diagnosis:       Absence of both breasts      (Absence of both breasts [Z90.13])    Surgeons: Benito Levin MD Anesthesiologist: Merlin Ba MD    Anesthesia Type: general ASA Status: 2            Anesthesia Type: general    Vitals Value Taken Time   /87 02/21/24 1346   Temp 98.1 02/21/24 1346   Pulse 92 02/21/24 1346   Resp 18 02/21/24 1346   SpO2 98% 02/21/24 1346       Patient Location: PACU    Anesthesia Type: general    Airway Patency: patent    Postop Pain Control: adequate    Mental Status: mildly sedated but able to meaningfully participate in the post-anesthesia evaluation    Nausea/Vomiting: none    Cardiopulmonary/Hydration status: stable euvolemic    Complications: no apparent anesthesia related complications    Postop vital signs: stable    Dental Exam: Unchanged from Preop    Patient to be discharged from PACU when criteria met.

## 2024-02-21 NOTE — ANESTHESIA PREPROCEDURE EVALUATION
PRE-OP EVALUATION    Patient Name: Pratik Frances    Admit Diagnosis: Absence of both breasts [Z90.13]    Pre-op Diagnosis: Absence of both breasts [Z90.13]    Second stage reconstruction with removal of bilateral breast tissue expanders, placement of permanent implants and autologous fat grafting to the bilateral breasts    Anesthesia Procedure: Second stage reconstruction with removal of bilateral breast tissue expanders, placement of permanent implants and autologous fat grafting to the bilateral breasts (Bilateral)  FAT GRAFTING (Bilateral)    Surgeon(s) and Role:     * Benito Levin MD - Primary    Pre-op vitals reviewed.  Temp: 98.4 °F (36.9 °C)  Pulse: 79  Resp: 16  BP: 129/78  SpO2: 99 %  Body mass index is 30.62 kg/m².    Current medications reviewed.  Hospital Medications:  • [MAR Hold] acetaminophen (Tylenol Extra Strength) tab 1,000 mg  1,000 mg Oral Once   • lactated ringers infusion   Intravenous Continuous   • ceFAZolin (Ancef) 2 g in 20mL IV syringe premix  2 g Intravenous Once   • ceFAZolin (Ancef) 2 g/20mL IV syringe premix       • ceFAZolin (Ancef) 1 g, gentamicin (Garamycin) 80 mg in sodium chloride 0.9% 1,000 mL irrigation   Irrigation Once (Intra-Op)   • EPINEPHrine (Adrenalin) 1 mg, lidocaine (Xylocaine) 1 % 50 mL in lactated ringers 1,000 mL tumescent mixture/irrigation   Infiltration Once (Intra-Op)       Outpatient Medications:     Medications Prior to Admission   Medication Sig Dispense Refill Last Dose   • HYDROcodone-acetaminophen (NORCO)  MG Oral Tab Take 1 to 2 tabs po every 4 to 6 hrs prn for pain 112 tablet 0 2/21/2024   • butalbital-acetaminophen-caffeine -40 MG Oral Tab Take 1 tablet by mouth every 8 (eight) hours as needed for Headaches. 30 tablet 0 Past Month   • HYDROcodone-Acetaminophen  MG Oral Tab Take 1 to 2 tabs po q 4 to 6 hrs prn for pain 112 tablet 0    • sulfamethoxazole-trimethoprim -160 MG Oral Tab per tablet Take 1 tablet by mouth 2 (two)  times daily for 10 days. 6 tablet 0    • [] fluconazole (DIFLUCAN) 150 MG Oral Tab Take 1 tablet (150 mg total) by mouth once for 1 dose. 1 tablet 0    • [] sulfamethoxazole-trimethoprim DS (BACTRIM DS) 800-160 MG Oral Tab per tablet Take 1 tablet by mouth 2 (two) times daily for 7 days. 14 tablet 0    • [] cephalexin (KEFLEX) 500 MG Oral Cap Take 1 capsule (500 mg total) by mouth 4 (four) times daily for 5 days. 20 capsule 0    • cyclobenzaprine 10 MG Oral Tab Take 1 tablet (10 mg total) by mouth 3 (three) times daily. 90 tablet 0 2024   • Naloxone HCl 4 MG/0.1ML Nasal Liquid 4 mg by Nasal route as needed for Opioid Overdose. Spray 4 mg into a single nostril, if patient remains unresponsive, repeat in 2-3 minutes in alternating nostrils until medical assistance becomes available. 1 kit 0    • docusate sodium 100 MG Oral Cap Take 1 capsule (100 mg total) by mouth 2 (two) times daily. 30 capsule 1        Allergies: Aspirin, Codeine, Dust mite extract, Grass, Adhesive tape, and Chlorhexidine gluconate cloth      Anesthesia Evaluation    Patient summary reviewed.    Anesthetic Complications  (-) history of anesthetic complications         GI/Hepatic/Renal    Negative GI/hepatic/renal ROS.                             Cardiovascular      ECG reviewed.            (+) hypertension   (+) hyperlipidemia                                  Endo/Other  Comment: S/p mastectomy for second stage reconstruction of bilateral breasts                                Pulmonary    Negative pulmonary ROS.                       Neuro/Psych                   (+) headaches                 Past Surgical History:   Procedure Laterality Date   • APPENDECTOMY     • EYE SURGERY     • INSERTION OF ACCESS PORT     • EMMA BIOPSY STEREO NODULE 1 SITE RIGHT (CPT=19081)  2022    clip top hat   • MASTECTOMY BRA Bilateral    • MASTECTOMY LEFT     • MASTECTOMY RIGHT     • NEEDLE BIOPSY LEFT  2022    MRI 2 site   • NEEDLE  BIOPSY RIGHT  2022    us guided bx   •      • OTHER SURGICAL HISTORY      Excision lipoma of left forearm   • OTHER SURGICAL HISTORY  2018    IUD placement   • OTHER SURGICAL HISTORY      eye surgery   • TONSILLECTOMY       Social History     Socioeconomic History   • Marital status:    Tobacco Use   • Smoking status: Former     Packs/day: 0.50     Years: 2.00     Additional pack years: 0.00     Total pack years: 1.00     Types: Cigarettes     Quit date: 2000     Years since quittin.1     Passive exposure: Past   • Smokeless tobacco: Never   Vaping Use   • Vaping Use: Never used   Substance and Sexual Activity   • Alcohol use: Yes     Alcohol/week: 0.0 standard drinks of alcohol     Comment: Rarely   • Drug use: No   Other Topics Concern   • Caffeine Concern No     History   Drug Use No     Available pre-op labs reviewed.  Lab Results   Component Value Date    WBC 11.3 (H) 2024    RBC 4.07 2024    HGB 12.6 2024    HCT 40.0 2024    MCV 98.3 2024    MCH 31.0 2024    MCHC 31.5 2024    RDW 13.7 2024    .0 2024     Lab Results   Component Value Date     2024    K 4.1 2024     2024    CO2 26.0 2024    BUN 9 2024    CREATSERUM 0.62 2024     (H) 2024    CA 9.6 2024            Airway      Mallampati: II  Mouth opening: >3 FB  TM distance: > 6 cm  Neck ROM: full Cardiovascular    Cardiovascular exam normal.  Rhythm: regular  Rate: normal  (-) murmur   Dental    Dentition appears grossly intact         Pulmonary    Pulmonary exam normal.  Breath sounds clear to auscultation bilaterally.               Other findings        ASA: 2   Plan: general  NPO status verified and patient meets guidelines.        Comment: GA with OETT/LMA explained to patient. Risks/benefits explained including but not limited to sore throat, hoarse voice, nausea, dental trauma, eye injury,pulmonary  complication and other complications as discussed in anesthesia consent form. Patient agrees to proceed. Anesthesia consent signed.     Plan/risks discussed with: patient            Present on Admission:  **None**

## 2024-02-22 ENCOUNTER — TELEPHONE (OUTPATIENT)
Dept: INTERNAL MEDICINE CLINIC | Facility: CLINIC | Age: 39
End: 2024-02-22

## 2024-02-22 DIAGNOSIS — M54.50 CHRONIC BILATERAL LOW BACK PAIN, UNSPECIFIED WHETHER SCIATICA PRESENT: ICD-10-CM

## 2024-02-22 DIAGNOSIS — G89.29 CHRONIC BILATERAL LOW BACK PAIN, UNSPECIFIED WHETHER SCIATICA PRESENT: ICD-10-CM

## 2024-02-22 RX ORDER — HYDROCODONE BITARTRATE AND ACETAMINOPHEN 10; 300 MG/1; MG/1
TABLET ORAL
Qty: 112 TABLET | Refills: 0 | Status: SHIPPED | OUTPATIENT
Start: 2024-02-22

## 2024-02-22 RX ORDER — CYCLOBENZAPRINE HCL 10 MG
10 TABLET ORAL 3 TIMES DAILY
Qty: 90 TABLET | Refills: 0 | Status: SHIPPED | OUTPATIENT
Start: 2024-02-22

## 2024-02-22 NOTE — TELEPHONE ENCOUNTER
Pt here to get norco refill; had her breast surgery done, had to increase dose of pain med to 10tabs/day so [place]ier[place]

## 2024-02-28 ENCOUNTER — NURSE ONLY (OUTPATIENT)
Dept: SURGERY | Facility: CLINIC | Age: 39
End: 2024-02-28
Payer: COMMERCIAL

## 2024-02-28 PROCEDURE — 99024 POSTOP FOLLOW-UP VISIT: CPT | Performed by: SURGERY

## 2024-02-28 NOTE — PROGRESS NOTES
Pratik Frances is a 38 year old female who presents today for a follow-up after removal of bilateral breast tissue expanders, right breast capsulorrhaphy, placement of silicone gel implants (SSF-650mL) and revision of the bilateral reconstructed breasts with wise pattern skin envelope reduction on 2/21/24 by Dr. Levin.        She denies fever and chills. She denies nausea, vomiting, diarrhea or constipation. She denies shortness of breath or chest pain. Her pain is controlled.        Physical Exam      Breasts: Bilateral breasts are without erythema, ecchymosis or signs of hematoma. Steri-strips are in tact. The right breat drain is clean,dry and intact with serosanguinous effluent.       There were no vitals filed for this visit.      Assessment and Plan     Pratik Frances is doing well s/p removal of bilateral breast tissue expanders, right breast capsulorrhaphy, placement of silicone gel implants (SSF-650mL) and revision of the bilateral reconstructed breasts with wise pattern skin envelope reduction on 2/21/24 by Dr. Levin.     Bilateral breast steri-strips were removed. Patient tolerated this well. The incisions were cleaned with alcohol. There was minimal delayed healing at the bilateral T-Junctions. The incision was redressed with an enhancement dressing of neosporin, xeroform and telfa. The dressing was secured with paper tape. She will change these dressings daily.     The right breast drain was removed today due to low output. Patient tolerated this well. The drain site was dressed with neosporin, 2x2 gauze and tegaderm. She was instructed to change the dressings if wet or soiled until fully healed.     We discussed continued activity restriction and continued compression.    She will follow up with Dr. Levin on 3/19/24 for wound check.     Questions were answered. Patient understands.     Leanna Ballard RN  2/28/2024  10:03 AM

## 2024-03-01 ENCOUNTER — TELEPHONE (OUTPATIENT)
Dept: INTERNAL MEDICINE CLINIC | Facility: CLINIC | Age: 39
End: 2024-03-01

## 2024-03-01 DIAGNOSIS — G89.29 CHRONIC BILATERAL LOW BACK PAIN, UNSPECIFIED WHETHER SCIATICA PRESENT: ICD-10-CM

## 2024-03-01 DIAGNOSIS — M54.50 CHRONIC BILATERAL LOW BACK PAIN, UNSPECIFIED WHETHER SCIATICA PRESENT: ICD-10-CM

## 2024-03-01 RX ORDER — HYDROCODONE BITARTRATE AND ACETAMINOPHEN 10; 300 MG/1; MG/1
TABLET ORAL
Qty: 84 TABLET | Refills: 0 | Status: SHIPPED | OUTPATIENT
Start: 2024-03-05

## 2024-03-05 ENCOUNTER — TELEPHONE (OUTPATIENT)
Dept: INTERNAL MEDICINE CLINIC | Facility: CLINIC | Age: 39
End: 2024-03-05

## 2024-03-05 NOTE — TELEPHONE ENCOUNTER
Per pharmacy, medication not covered by plan      HYDROcodone-Acetaminophen  MG Oral Tab, Take 1 to  2 tabs po q 4 to 6  hrs prn for pain, Disp: 84 tablet, Rfl: 0

## 2024-03-13 ENCOUNTER — TELEPHONE (OUTPATIENT)
Dept: INTERNAL MEDICINE CLINIC | Facility: CLINIC | Age: 39
End: 2024-03-13

## 2024-03-13 DIAGNOSIS — M54.50 CHRONIC BILATERAL LOW BACK PAIN, UNSPECIFIED WHETHER SCIATICA PRESENT: ICD-10-CM

## 2024-03-13 DIAGNOSIS — G89.29 CHRONIC BILATERAL LOW BACK PAIN, UNSPECIFIED WHETHER SCIATICA PRESENT: ICD-10-CM

## 2024-03-14 ENCOUNTER — TELEPHONE (OUTPATIENT)
Dept: INTERNAL MEDICINE CLINIC | Facility: CLINIC | Age: 39
End: 2024-03-14

## 2024-03-14 DIAGNOSIS — G89.29 CHRONIC BILATERAL LOW BACK PAIN, UNSPECIFIED WHETHER SCIATICA PRESENT: ICD-10-CM

## 2024-03-14 DIAGNOSIS — M54.50 CHRONIC BILATERAL LOW BACK PAIN, UNSPECIFIED WHETHER SCIATICA PRESENT: ICD-10-CM

## 2024-03-14 RX ORDER — HYDROCODONE BITARTRATE AND ACETAMINOPHEN 10; 300 MG/1; MG/1
TABLET ORAL
Qty: 84 TABLET | Refills: 0 | Status: SHIPPED | OUTPATIENT
Start: 2024-03-14 | End: 2024-03-22

## 2024-03-19 ENCOUNTER — OFFICE VISIT (OUTPATIENT)
Dept: SURGERY | Facility: CLINIC | Age: 39
End: 2024-03-19
Payer: COMMERCIAL

## 2024-03-19 DIAGNOSIS — Z90.13 ABSENCE OF BOTH BREASTS: Primary | ICD-10-CM

## 2024-03-19 PROCEDURE — 99024 POSTOP FOLLOW-UP VISIT: CPT | Performed by: SURGERY

## 2024-03-19 NOTE — PROGRESS NOTES
Pratik Frances is a 38 year old female who presents today for a follow-up after undergoing exchange of permanent implants and breast revision on 2/21.  Cultures and pathology results were negative. She denies fever and chills.       Physical Examination:  Breasts:The right breast incisions are clean dry and intact.  The left breast is noted to have few visible Vicryl sutures along the inframammary fold which were debrided.  There are 2 superficial ulcerations of the vertical scar noted.  There is no purulence, malodor, or surrounding erythema.  The wound did not probe.  There is no erythema or seroma noted.  Good shape and symmetry is noted.      Assessment and Plan:  Patient is doing well.  We discussed continue local wound care with topical antibiotic ointment.  We discussed the need for fat grafting in the future.  The patient will return for reevaluation in 3 weeks.  She instructed call for fevers, chills, or erythema.  The plan was reviewed with the patient and questions were answered.

## 2024-03-22 ENCOUNTER — TELEPHONE (OUTPATIENT)
Dept: INTERNAL MEDICINE CLINIC | Facility: CLINIC | Age: 39
End: 2024-03-22

## 2024-03-22 DIAGNOSIS — G89.29 CHRONIC BILATERAL LOW BACK PAIN, UNSPECIFIED WHETHER SCIATICA PRESENT: ICD-10-CM

## 2024-03-22 DIAGNOSIS — M54.50 CHRONIC BILATERAL LOW BACK PAIN, UNSPECIFIED WHETHER SCIATICA PRESENT: ICD-10-CM

## 2024-03-22 RX ORDER — HYDROCODONE BITARTRATE AND ACETAMINOPHEN 10; 300 MG/1; MG/1
TABLET ORAL
Qty: 84 TABLET | Refills: 0 | Status: SHIPPED | OUTPATIENT
Start: 2024-03-22

## 2024-04-01 ENCOUNTER — TELEPHONE (OUTPATIENT)
Dept: SURGERY | Facility: CLINIC | Age: 39
End: 2024-04-01

## 2024-04-01 DIAGNOSIS — Z90.13 ABSENCE OF BOTH BREASTS: Primary | ICD-10-CM

## 2024-04-01 RX ORDER — CEPHALEXIN 500 MG/1
500 CAPSULE ORAL 4 TIMES DAILY
Qty: 40 CAPSULE | Refills: 1 | Status: SHIPPED | OUTPATIENT
Start: 2024-04-01

## 2024-04-01 NOTE — TELEPHONE ENCOUNTER
Called patient regarding the photo that she sent on 3/29 of her left breast.  Patient reports on 3-27 she noticed that the previous superficial ulcerations had widened.  Patient denies surrounding erythema of the ulcerations.  Denies any wound drainage or signs of infection including fever, body aches, chills.  Patient denies pain at the site.  Oral antibiotics and mupirocin ointment was called into the patient's pharmacy.  She is to pick these medications up today and start them today.  I am seeing her tomorrow for reevaluation.  She was encouraged to contact the office if signs of infection start.  All patient questions were answered and she verbalizes understanding.

## 2024-04-02 ENCOUNTER — OFFICE VISIT (OUTPATIENT)
Dept: SURGERY | Facility: CLINIC | Age: 39
End: 2024-04-02
Payer: COMMERCIAL

## 2024-04-02 ENCOUNTER — TELEPHONE (OUTPATIENT)
Dept: INTERNAL MEDICINE CLINIC | Facility: CLINIC | Age: 39
End: 2024-04-02

## 2024-04-02 DIAGNOSIS — G43.009 MIGRAINE WITHOUT AURA AND WITHOUT STATUS MIGRAINOSUS, NOT INTRACTABLE: Primary | ICD-10-CM

## 2024-04-02 DIAGNOSIS — M54.50 CHRONIC BILATERAL LOW BACK PAIN, UNSPECIFIED WHETHER SCIATICA PRESENT: ICD-10-CM

## 2024-04-02 DIAGNOSIS — G89.29 CHRONIC BILATERAL LOW BACK PAIN, UNSPECIFIED WHETHER SCIATICA PRESENT: ICD-10-CM

## 2024-04-02 DIAGNOSIS — Z17.0 BREAST CANCER, STAGE 1, ESTROGEN RECEPTOR POSITIVE, RIGHT (HCC): ICD-10-CM

## 2024-04-02 DIAGNOSIS — Z90.13 ABSENCE OF BOTH BREASTS: Primary | ICD-10-CM

## 2024-04-02 DIAGNOSIS — C50.911 BREAST CANCER, STAGE 1, ESTROGEN RECEPTOR POSITIVE, RIGHT (HCC): ICD-10-CM

## 2024-04-02 PROCEDURE — 99024 POSTOP FOLLOW-UP VISIT: CPT | Performed by: SURGERY

## 2024-04-02 PROCEDURE — 88304 TISSUE EXAM BY PATHOLOGIST: CPT | Performed by: SURGERY

## 2024-04-02 RX ORDER — HYDROCODONE BITARTRATE AND ACETAMINOPHEN 10; 300 MG/1; MG/1
TABLET ORAL
Qty: 84 TABLET | Refills: 0 | Status: SHIPPED | OUTPATIENT
Start: 2024-04-05

## 2024-04-02 NOTE — PROCEDURES
Treatment of left breast wound.  The wounds were encompassed in a vertically oriented ellipse.  The areas were cleansed with alcohol and infiltrated with 3 cc of 1% lidocaine with epinephrine.  The eschar was excised and sent for permanent pathologic analysis.  The wound was repaired in a layered fashion with 3-0 Vicryl deep sutures and 5-0 Prolene simple suture.  Bacitracin, Xeroform, gauze, and Tegaderm were applied.  The patient tolerated the procedure well.  There are no complications.

## 2024-04-02 NOTE — TELEPHONE ENCOUNTER
Pt here to pickup norco script; will be having more breast surgery per pt; [place]

## 2024-04-02 NOTE — PROGRESS NOTES
Pratik Frances is a 38 year old female who presents today for a follow-up.  She reports worsening wound of her left breast.  She denies fevers or chills.    Physical Examination:  Breasts: The left breast incision is noted to have a 1 cm superficial eschar at the midportion of the vertical scar and a lower approximate 5 mm superficial eschar which do not probe.  There is no purulence, malodor, or surrounding erythema.    Assessment and Plan:  Left breast wound.  Given the proximity to the underlying implant, we discussed debridement and reclosure under local anesthesia.  The risk, benefits, and alternatives were reviewed with the patient.  She expresses understanding and wishes to proceed.

## 2024-04-04 RX ORDER — CYCLOBENZAPRINE HCL 10 MG
10 TABLET ORAL 3 TIMES DAILY
Qty: 90 TABLET | Refills: 0 | Status: SHIPPED | OUTPATIENT
Start: 2024-04-04

## 2024-04-04 RX ORDER — BUTALBITAL, ACETAMINOPHEN AND CAFFEINE 50; 325; 40 MG/1; MG/1; MG/1
1 TABLET ORAL EVERY 8 HOURS PRN
Qty: 30 TABLET | Refills: 0 | Status: SHIPPED | OUTPATIENT
Start: 2024-04-04

## 2024-04-04 NOTE — TELEPHONE ENCOUNTER
Please review. Protocol Failed or has no Protocol    Requested Prescriptions   Pending Prescriptions Disp Refills    butalbital-acetaminophen-caffeine -40 MG Oral Tab 30 tablet 0     Sig: Take 1 tablet by mouth every 8 (eight) hours as needed for Headaches.       Controlled Substance Medication Failed - 4/4/2024  3:16 PM        Failed - This medication is a controlled substance - forward to provider to refill          cyclobenzaprine 10 MG Oral Tab 90 tablet 0     Sig: Take 1 tablet (10 mg total) by mouth 3 (three) times daily.       There is no refill protocol information for this order

## 2024-04-11 ENCOUNTER — TELEPHONE (OUTPATIENT)
Dept: HEMATOLOGY/ONCOLOGY | Facility: HOSPITAL | Age: 39
End: 2024-04-11

## 2024-04-15 ENCOUNTER — TELEPHONE (OUTPATIENT)
Dept: INTERNAL MEDICINE CLINIC | Facility: CLINIC | Age: 39
End: 2024-04-15

## 2024-04-15 DIAGNOSIS — M54.50 CHRONIC BILATERAL LOW BACK PAIN, UNSPECIFIED WHETHER SCIATICA PRESENT: ICD-10-CM

## 2024-04-15 DIAGNOSIS — G89.29 CHRONIC BILATERAL LOW BACK PAIN, UNSPECIFIED WHETHER SCIATICA PRESENT: ICD-10-CM

## 2024-04-15 RX ORDER — HYDROCODONE BITARTRATE AND ACETAMINOPHEN 10; 300 MG/1; MG/1
TABLET ORAL
Qty: 112 TABLET | Refills: 0 | Status: SHIPPED | OUTPATIENT
Start: 2024-04-15

## 2024-04-16 ENCOUNTER — OFFICE VISIT (OUTPATIENT)
Dept: SURGERY | Facility: CLINIC | Age: 39
End: 2024-04-16
Payer: COMMERCIAL

## 2024-04-16 DIAGNOSIS — C50.911 BREAST CANCER, STAGE 1, ESTROGEN RECEPTOR POSITIVE, RIGHT (HCC): Primary | ICD-10-CM

## 2024-04-16 DIAGNOSIS — Z17.0 BREAST CANCER, STAGE 1, ESTROGEN RECEPTOR POSITIVE, RIGHT (HCC): Primary | ICD-10-CM

## 2024-04-16 PROCEDURE — 99024 POSTOP FOLLOW-UP VISIT: CPT | Performed by: SURGERY

## 2024-04-16 PROCEDURE — 88304 TISSUE EXAM BY PATHOLOGIST: CPT | Performed by: SURGERY

## 2024-04-16 PROCEDURE — 88342 IMHCHEM/IMCYTCHM 1ST ANTB: CPT | Performed by: SURGERY

## 2024-04-16 PROCEDURE — 88305 TISSUE EXAM BY PATHOLOGIST: CPT | Performed by: SURGERY

## 2024-04-16 NOTE — PROGRESS NOTES
Pratik Frances is a 38 year old female who presents today for a follow-up.   She denies fever and chills. She denies nausea, vomiting, diarrhea or constipation.       Physical Examination:  Breasts: The left breast is noted to have marginal necrosis of her vertical scar back approximately 5 mm in diameter.    Assessment and Plan:  Left breast skin necrosis.  Discussed debridement and reclosure under local anesthesia.  The risk, benefits, and alternatives reviewed with the patient.  She expresses understanding and wishes to proceed.

## 2024-04-16 NOTE — PATIENT INSTRUCTIONS
Surgeon:         Dr. Benito Levin                                        Tel:         [phone]                                  Fax:        [phone]    Surgery/Procedure: THIS IS NOT A PRE-OP  Autologous fat grafting to the bilateral reconstruction breasts. 1.5 hours, general anesthesia, outpatient.     Dx Code: Z90.13    Hospital:  [facility]: [address] [place] [address]           [phone]  [facility]: [address] [facility], [place] [address]               [phone]    1. Someone will need to drive you to and from the hospital if your procedure is outpatient.    2.Do not drink alcohol or smoke 24 hours prior to your procedure.    3. Bring a picture ID and your insurance card.    4. You will be contacted by the hospital the day before to confirm the procedure time and location.     5. Do not take any herbal supplements or blood thinners at least one week before your procedure/surgery. This includes NSAID's (aspirin, baby aspirin, Motrin, Ibuprofen, Aleve, Advil, Naproxen, etc), Plavix, fish oil, vitamin E, turmeric, CoQ10, or green tea supplements, etc. *TYLENOL or acetaminophen is ok to take*    6. PRE-OPERATIVE TESTING: History and physical with medical clearance is REQUIRED within 30 days of the surgery date and is mandatory per Dr. Levin. *If this is not done, your surgery will be postponed*  MEDICAL CLEARANCE WITH  ____  CBC  CMP  EKG (within 90 days)    7. If you take Coumadin, Plavix, Xarelto, or Eliquis, please contact your prescribing physician for special instructions on how long to hold. If you take insulin, contact your primary care physician for special instructions.     8. Please inform us if you start or change any medications at least one week before surgery (ex: blood thinners, weight loss medications, diabetic medications, herbal supplements, etc)    9. Does patient have diagnosis of sleep apnea?    [   ] Yes     [   ]  No    Consent obtained  Photos  taken on ______

## 2024-04-16 NOTE — PROCEDURES
Treatment and closure of left breast wound. The wounds were encompassed in a vertically oriented ellipse. The areas were cleansed with alcohol and infiltrated with 3 cc of 1% lidocaine with epinephrine. The eschar was excised and sent for permanent pathologic analysis. The wound was repaired in a layered fashion with 3-0 Vicryl deep sutures and 5-0 Prolene vertical mattress sutures. Bacitracin, Xeroform, gauze, and Tegaderm were applied. The patient tolerated the procedure well. There are no complications.

## 2024-04-24 ENCOUNTER — HOSPITAL ENCOUNTER (OUTPATIENT)
Age: 39
Discharge: HOME | End: 2024-04-24
Payer: COMMERCIAL

## 2024-04-24 VITALS
TEMPERATURE: 98 F | HEIGHT: 65 IN | DIASTOLIC BLOOD PRESSURE: 81 MMHG | RESPIRATION RATE: 16 BRPM | SYSTOLIC BLOOD PRESSURE: 125 MMHG | OXYGEN SATURATION: 100 % | HEART RATE: 98 BPM | BODY MASS INDEX: 29.99 KG/M2 | WEIGHT: 180 LBS

## 2024-04-24 DIAGNOSIS — J02.9 SORE THROAT: ICD-10-CM

## 2024-04-24 DIAGNOSIS — R53.83 OTHER FATIGUE: Primary | ICD-10-CM

## 2024-04-24 DIAGNOSIS — J02.9 VIRAL PHARYNGITIS: ICD-10-CM

## 2024-04-24 LAB
POCT INFLUENZA A: NEGATIVE
POCT INFLUENZA B: NEGATIVE
S PYO AG THROAT QL: NEGATIVE

## 2024-04-24 PROCEDURE — 99213 OFFICE O/P EST LOW 20 MIN: CPT | Performed by: NURSE PRACTITIONER

## 2024-04-24 PROCEDURE — 87147 CULTURE TYPE IMMUNOLOGIC: CPT | Performed by: NURSE PRACTITIONER

## 2024-04-24 PROCEDURE — 87880 STREP A ASSAY W/OPTIC: CPT | Performed by: NURSE PRACTITIONER

## 2024-04-24 PROCEDURE — 87502 INFLUENZA DNA AMP PROBE: CPT | Performed by: NURSE PRACTITIONER

## 2024-04-24 PROCEDURE — 87081 CULTURE SCREEN ONLY: CPT | Performed by: NURSE PRACTITIONER

## 2024-04-24 NOTE — ED PROVIDER NOTES
s  Patient Seen in: [facility]      History     Chief Complaint   Patient presents with    Sore Throat     Stated Complaint: Sore throat    Subjective:   HPI    38 yr old female here for evaluation of sore throat that started this morning. She reports feeling more fatigued since yesterday. Denies nausea, vomiting,diarrhea, abd pain, shortness of breath, ear pain or HA. Reports minimal cough. Daughter sick with cold symptoms, her class had a few out with strep. Denies other sick contact. Last immunotherapy completed a few months back for breast CA.    Objective:   Past Medical History:    Back pain    Back problem    Cancer (HCC)    breast cancer    COVID-2021-headache-not hospitalized    Essential hypertension    Gestational hypertension (HCC)    Herniated disc    Herniated lumbar intervertebral disc    Hx of migraine headaches    Hyperlipidemia    Mass of left forearm    Migraines    Obesity, unspecified    Personal history of antineoplastic chemotherapy    last dose 2024    Urethral stricture              Past Surgical History:   Procedure Laterality Date    Appendectomy      Eye surgery      Insertion of access port      Blair biopsy stereo nodule 1 site right (cpt=19081)  2022    clip top hat    Mastectomy bra Bilateral     Mastectomy left      Mastectomy right      Needle biopsy left  2022    MRI 2 site    Needle biopsy right  2022    us guided bx          Other surgical history  2013    Excision lipoma of left forearm    Other surgical history  2018    IUD placement    Other surgical history      eye surgery    Tonsillectomy                  Social History     Socioeconomic History    Marital status:    Tobacco Use    Smoking status: Former     Current packs/day: 0.00     Average packs/day: 0.5 packs/day for 2.0 years (1.0 ttl pk-yrs)     Types: Cigarettes     Start date: 1998     Quit date: 2000     Years since quittin.3     Passive exposure: Past     Smokeless tobacco: Never   Vaping Use    Vaping status: Never Used   Substance and Sexual Activity    Alcohol use: Yes     Alcohol/week: 0.0 standard drinks of alcohol     Comment: Rarely    Drug use: No   Other Topics Concern    Caffeine Concern No              Review of Systems    Positive for stated complaint: Sore throat  Other systems are as noted in HPI.  Constitutional and vital signs reviewed.      All other systems reviewed and negative except as noted above.    Physical Exam     ED Triage Vitals   BP 04/24/24 1240 (!) 149/101   Pulse 04/24/24 1239 118   Resp 04/24/24 1239 20   Temp 04/24/24 1239 97.7 °F (36.5 °C)   Temp src 04/24/24 1239 Oral   SpO2 04/24/24 1239 100 %   O2 Device 04/24/24 1344 None (Room air)       Current:/81   Pulse 98   Temp 97.7 °F (36.5 °C) (Oral)   Resp 16   Ht 165.1 cm (5' 5\")   Wt 81.6 kg   LMP 01/23/2024 (Exact Date)   SpO2 100%   BMI 29.95 kg/m²         Physical Exam  Vitals and nursing note reviewed.   Constitutional:       General: She is not in acute distress.     Appearance: She is well-developed. She is not ill-appearing or toxic-appearing.   HENT:      Head: Normocephalic and atraumatic.      Right Ear: Tympanic membrane and ear canal normal.      Left Ear: Tympanic membrane and ear canal normal.      Nose: No congestion or rhinorrhea.      Mouth/Throat:      Mouth: Mucous membranes are moist. No oral lesions.      Pharynx: Oropharynx is clear. Uvula midline. Posterior oropharyngeal erythema (minimal) present. No pharyngeal swelling, oropharyngeal exudate or uvula swelling.      Tonsils: No tonsillar exudate or tonsillar abscesses.   Eyes:      Pupils: Pupils are equal, round, and reactive to light.   Cardiovascular:      Rate and Rhythm: Normal rate and regular rhythm.      Heart sounds: Normal heart sounds.   Pulmonary:      Effort: Pulmonary effort is normal. No respiratory distress.      Breath sounds: Normal breath sounds. No stridor. No wheezing or  rales.   Musculoskeletal:      Cervical back: Neck supple.   Lymphadenopathy:      Cervical: No cervical adenopathy.   Skin:     General: Skin is warm.   Neurological:      Mental Status: She is alert and oriented to person, place, and time.   Psychiatric:         Mood and Affect: Mood normal.         Behavior: Behavior normal.               ED Course     Labs Reviewed   POCT RAPID STREP - Normal   POCT FLU TEST - Normal    Narrative:     This assay is a rapid molecular in vitro test utilizing nucleic acid amplification of influenza A and B viral RNA.   GRP A STREP CULT, THROAT          ED Course as of 04/24/24 1349  ------------------------------------------------------------  Time: 04/24 1303  Value: POCT Rapid Strep  Comment: (Reviewed)  ------------------------------------------------------------  Time: 04/24 1342  Value: POCT Flu Test  Comment: (Reviewed)            MDM     38 yr old female here for sore throat and fatigue.    ON exam, pt well appearing, talking in full setnences, no muffled voice. Uvula midline. NO swelling or exudate noted. Minimal erythema to pharynx. No lesions. Tongue moist. Lungs clear with no wheezing or crackles. BL TM normal. With no erythema or bulging.    Diff dx\" strep vs flu vs other viral syndrome    STREP NEG  Discussed viral pharyngitis, due to fatigue will add FLU on.  PT concerned due to heavy strep exposure. She does not appear strep like now. Will send culture, with shared decision making.    FLU=negative    Discussed salt water gargles, ibuprofen or tylenol, flonase nasal spray to help with post nasal drip possibly causing sore throat.    All questions answered. Return and ER precautions given.    Counseled: Patient, regarding diagnosis, regarding treatment plan, regarding diagnostic results, regarding prescription, I have discussed with the patient the results of tests, differential diagnosis, and warning signs and symptoms that should prompt immediate return. The patient  understands these instructions and agrees to the follow-up plan provided. There is no barriers to learning. Appropriate f/u given. Patient agrees to return for any concerns/ problems/complications.                                     Medical Decision Making      Disposition and Plan     Clinical Impression:  1. Other fatigue    2. Sore throat    3. Viral pharyngitis         Disposition:  Discharge  4/24/2024  1:45 pm    Follow-up:  No follow-up provider specified.        Medications Prescribed:  Discharge Medication List as of 4/24/2024  1:46 PM

## 2024-04-24 NOTE — DISCHARGE INSTRUCTIONS
Increase water intake, soft and liquid diet while throat pain is present.  Salt water gargles 2-3x per day to help with throat pain.    Take ibuprofen or tylenol every 6 hours as needed.  Use flonase nasal spray to each nostril daily for post nasal drip that could be causing sore throat too.     A Culture was sent if positive you will get a call for treatment.    RETURN OR GO TO ED for fever > 103 despite medication, difficulty swallowing saliva, shortness of breath, worsening swelling to throat that you cannot tolerate fluids.

## 2024-04-24 NOTE — ED INITIAL ASSESSMENT (HPI)
Pt presents to the IC with c/o a sore throat and fatigue. Pt finished immunotherapy 2 months ago for CA. +mild cough. No congestion or fevers.

## 2024-04-26 ENCOUNTER — TELEPHONE (OUTPATIENT)
Dept: INTERNAL MEDICINE CLINIC | Facility: CLINIC | Age: 39
End: 2024-04-26

## 2024-04-26 DIAGNOSIS — M54.50 CHRONIC BILATERAL LOW BACK PAIN, UNSPECIFIED WHETHER SCIATICA PRESENT: ICD-10-CM

## 2024-04-26 DIAGNOSIS — G89.29 CHRONIC BILATERAL LOW BACK PAIN, UNSPECIFIED WHETHER SCIATICA PRESENT: ICD-10-CM

## 2024-04-26 RX ORDER — HYDROCODONE BITARTRATE AND ACETAMINOPHEN 10; 300 MG/1; MG/1
TABLET ORAL
Qty: 84 TABLET | Refills: 0 | Status: SHIPPED | OUTPATIENT
Start: 2024-04-26

## 2024-04-26 RX ORDER — PENICILLIN V POTASSIUM 500 MG/1
500 TABLET ORAL 2 TIMES DAILY
Qty: 20 TABLET | Refills: 0 | Status: SHIPPED | OUTPATIENT
Start: 2024-04-26 | End: 2024-05-06

## 2024-04-26 RX ORDER — CYCLOBENZAPRINE HCL 10 MG
10 TABLET ORAL 3 TIMES DAILY
Qty: 90 TABLET | Refills: 1 | Status: SHIPPED | OUTPATIENT
Start: 2024-04-26

## 2024-04-29 ENCOUNTER — TELEPHONE (OUTPATIENT)
Dept: SURGERY | Facility: CLINIC | Age: 39
End: 2024-04-29

## 2024-04-29 DIAGNOSIS — Z90.13 ABSENCE OF BREAST, BILATERAL: Primary | ICD-10-CM

## 2024-04-29 NOTE — TELEPHONE ENCOUNTER
Calling pt in regards to scheduling surgery.  Informed pt that I have 01/09/2025 available at [facility] with Dr. Levin.  Pt verbalized understanding and in agreement with date and location.  All questions answered.   Encouraged pt to call or [facility] message office with any other questions or concerns.

## 2024-04-30 ENCOUNTER — OFFICE VISIT (OUTPATIENT)
Dept: SURGERY | Facility: CLINIC | Age: 39
End: 2024-04-30
Payer: COMMERCIAL

## 2024-04-30 DIAGNOSIS — Z90.13 ABSENCE OF BOTH BREASTS: Primary | ICD-10-CM

## 2024-04-30 PROCEDURE — 99024 POSTOP FOLLOW-UP VISIT: CPT | Performed by: SURGERY

## 2024-04-30 NOTE — PROGRESS NOTES
Pratik Frances is a 38 year old female who presents today for a follow-up. She denies fever and chills. She denies nausea, vomiting, diarrhea or constipation.     Physical Examination:  Breasts: The left breast incisions are clean dry and intact.  There is no erythema or seroma noted.    Assessment and Plan:  Patient is doing well.  Sutures were removed and Steri-Strips were applied.  The patient may slowly increase activity with compression in place.  She will follow-up in 3 weeks but that is likely relateds for reassessment.

## 2024-05-02 ENCOUNTER — TELEPHONE (OUTPATIENT)
Dept: SURGERY | Facility: CLINIC | Age: 39
End: 2024-05-02

## 2024-05-02 DIAGNOSIS — C50.911 BREAST CANCER, STAGE 1, ESTROGEN RECEPTOR POSITIVE, RIGHT (HCC): Primary | ICD-10-CM

## 2024-05-02 DIAGNOSIS — Z17.0 BREAST CANCER, STAGE 1, ESTROGEN RECEPTOR POSITIVE, RIGHT (HCC): Primary | ICD-10-CM

## 2024-05-02 DIAGNOSIS — Z90.13 ABSENCE OF BREAST, BILATERAL: Primary | ICD-10-CM

## 2024-05-02 NOTE — TELEPHONE ENCOUNTER
Called patient and informed her we moved surgery to 06/27/2024 at [facility] with Dr. Levin

## 2024-05-07 ENCOUNTER — TELEPHONE (OUTPATIENT)
Dept: INTERNAL MEDICINE CLINIC | Facility: CLINIC | Age: 39
End: 2024-05-07

## 2024-05-07 DIAGNOSIS — M54.50 CHRONIC BILATERAL LOW BACK PAIN, UNSPECIFIED WHETHER SCIATICA PRESENT: ICD-10-CM

## 2024-05-07 DIAGNOSIS — G89.29 CHRONIC BILATERAL LOW BACK PAIN, UNSPECIFIED WHETHER SCIATICA PRESENT: ICD-10-CM

## 2024-05-07 RX ORDER — HYDROCODONE BITARTRATE AND ACETAMINOPHEN 10; 325 MG/1; MG/1
TABLET ORAL
Qty: 112 TABLET | Refills: 0 | Status: SHIPPED | OUTPATIENT
Start: 2024-05-07

## 2024-05-07 NOTE — TELEPHONE ENCOUNTER
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

## 2024-05-16 ENCOUNTER — TELEPHONE (OUTPATIENT)
Dept: INTERNAL MEDICINE CLINIC | Facility: CLINIC | Age: 39
End: 2024-05-16

## 2024-05-16 DIAGNOSIS — M54.50 CHRONIC BILATERAL LOW BACK PAIN, UNSPECIFIED WHETHER SCIATICA PRESENT: ICD-10-CM

## 2024-05-16 DIAGNOSIS — G89.29 CHRONIC BILATERAL LOW BACK PAIN, UNSPECIFIED WHETHER SCIATICA PRESENT: ICD-10-CM

## 2024-05-16 RX ORDER — HYDROCODONE BITARTRATE AND ACETAMINOPHEN 10; 325 MG/1; MG/1
TABLET ORAL
Qty: 98 TABLET | Refills: 0 | Status: SHIPPED | OUTPATIENT
Start: 2024-05-16

## 2024-05-16 NOTE — TELEPHONE ENCOUNTER
[place] here to [place] dose to 7/day; will continue to work in getting her weaned down as d/w pt

## 2024-05-21 ENCOUNTER — OFFICE VISIT (OUTPATIENT)
Dept: SURGERY | Facility: CLINIC | Age: 39
End: 2024-05-21

## 2024-05-21 DIAGNOSIS — Z90.13 ABSENCE OF BREAST, BILATERAL: Primary | ICD-10-CM

## 2024-05-21 DIAGNOSIS — Z90.13 ABSENCE OF BOTH BREASTS: Primary | ICD-10-CM

## 2024-05-21 PROCEDURE — 99024 POSTOP FOLLOW-UP VISIT: CPT | Performed by: SURGERY

## 2024-05-21 NOTE — PATIENT INSTRUCTIONS
Surgeon:         Dr. Benito Levin                                        Tel:         [phone]                                  Fax:        [phone]    Surgery/Procedure: Autologous fat grafting to the bilateral reconstruction breasts. 1.5 hours, general anesthesia, outpatient.     Dx Code: Z90.13    Hospital:  [facility]: [address] [place] [id number]           [phone]  6/27/2024    1. Someone will need to drive you to and from the hospital if your procedure is outpatient.    2.Do not drink alcohol or smoke 24 hours prior to your procedure.    3. Bring a picture ID and your insurance card.    4. You will be contacted by the hospital the day before to confirm the procedure time and location.     5. Do not take any herbal supplements or blood thinners at least one week before your procedure/surgery. This includes NSAID's (aspirin, baby aspirin, Motrin, Ibuprofen, Aleve, Advil, Naproxen, etc), Plavix, fish oil, vitamin E, turmeric, CoQ10, or green tea supplements, etc. *TYLENOL or acetaminophen is ok to take*    6. PRE-OPERATIVE TESTING: History and physical with medical clearance is REQUIRED within 30 days of the surgery date and is mandatory per Dr. Levin. *If this is not done, your surgery will be postponed*  MEDICAL CLEARANCE WITH DR. Cartwright  CBC  CMP  EKG (within 90 days)  *Need oncology clearance*     7. If you take Coumadin, Plavix, Xarelto, or Eliquis, please contact your prescribing physician for special instructions on how long to hold. If you take insulin, contact your primary care physician for special instructions.     8. Please inform us if you start or change any medications at least one week before surgery (ex: blood thinners, weight loss medications, diabetic medications, herbal supplements, etc)    9. Does patient have diagnosis of sleep apnea?    [   ] Yes     [ X  ]  No    Consent obtained  Photos taken on 5/21/2024

## 2024-05-21 NOTE — PROGRESS NOTES
Pratik Frances is a 38 year old female who presents today in follow-up.     Physical Examination:  Breasts: Breast incisions are clean dry and intact.  There is no erythema or seroma noted.  Hollowing is noted in the upper pole of bilateral breast.  A moderate abdominal pannus with striae is noted.  The right lower quadrant appendectomy scar is noted. No palpable hernia is detected.     Assessment and Plan:  We discussed proceeding with fat grafting to bilateral breast.  The nature procedure was reviewed with the patient.  We discussed the risk of surgery including but not limited to bleeding, infection, scarring, delayed wound healing, asymmetry, implant infection or extrusion requiring removal, injury to intra-abdominal structures, cysts or calcifications requiring biopsy, and need for further surgery.  We discussed expected postoperative course including need for drains, activity limitation, and compression.  Multiple questions were answered to patient's satisfaction.  No guarantees as to outcome were offered.  The patient expresses understanding and wishes to proceed.

## 2024-05-23 ENCOUNTER — OFFICE VISIT (OUTPATIENT)
Dept: INTERNAL MEDICINE CLINIC | Facility: CLINIC | Age: 39
End: 2024-05-23

## 2024-05-23 ENCOUNTER — NURSE TRIAGE (OUTPATIENT)
Dept: INTERNAL MEDICINE CLINIC | Facility: CLINIC | Age: 39
End: 2024-05-23

## 2024-05-23 VITALS
TEMPERATURE: 98 F | OXYGEN SATURATION: 99 % | BODY MASS INDEX: 31.51 KG/M2 | HEIGHT: 65 IN | SYSTOLIC BLOOD PRESSURE: 134 MMHG | DIASTOLIC BLOOD PRESSURE: 84 MMHG | WEIGHT: 189.13 LBS | HEART RATE: 92 BPM
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
## 2024-05-23 DIAGNOSIS — S10.96XA INSECT BITE OF NECK, INITIAL ENCOUNTER: Primary | ICD-10-CM

## 2024-05-23 PROCEDURE — 3079F DIAST BP 80-89 MM HG: CPT | Performed by: INTERNAL MEDICINE

## 2024-05-23 PROCEDURE — 3008F BODY MASS INDEX DOCD: CPT | Performed by: INTERNAL MEDICINE

## 2024-05-23 PROCEDURE — 3075F SYST BP GE 130 - 139MM HG: CPT | Performed by: INTERNAL MEDICINE

## 2024-05-23 PROCEDURE — 99213 OFFICE O/P EST LOW 20 MIN: CPT | Performed by: INTERNAL MEDICINE

## 2024-05-23 RX ORDER — DOXYCYCLINE 100 MG/1
100 TABLET ORAL 2 TIMES DAILY
Qty: 14 TABLET | Refills: 0 | Status: SHIPPED | OUTPATIENT
Start: 2024-05-23

## 2024-05-23 NOTE — TELEPHONE ENCOUNTER
Action Requested: Summary for Provider     []  Critical Lab, Recommendations Needed  [x] Need Additional Advice  []   FYI    []   Need Orders  [] Need Medications Sent to Pharmacy  []  Other     SUMMARY: Patient states was bitten by something on , not sure what it was.  Since then she has noticed a raised area on her neck below left ear.  Looks like a donut, is red and getting larger.  Tender when pushed.   Denies fever, chills, joint pain.  Patient has history of breast cancer and chemotherapy, and is requesting Dr Cartwright to check this lesion.  Declines alternate provider.  Dr Cartwright, please advise if patient can be added to your schedule today?    Reason for call: Acute Bug bite to left neck, getting larger, tender  Onset: 2024.    Spoke with patient,  verified.     Reason for Disposition   Red or very tender (to touch) area, getting larger over 48 hours after the bite    Protocols used: Insect Bite-A-[place]

## 2024-05-23 NOTE — PROGRESS NOTES
Subjective:     Patient ID: Pratik Frances is a 38 year old female.    Insect Bite  This is a new problem. The current episode started in the past 7 days. The problem is unchanged. The affected locations include the neck. The rash is characterized by redness. She was exposed to an insect bite/sting. Pertinent negatives include no facial edema, fever or shortness of breath. Past treatments include nothing. The treatment provided no relief.       History/Other:   Review of Systems   Constitutional: Negative.  Negative for fever.   Respiratory:  Negative for shortness of breath.    Skin:  Negative for rash.     Current Outpatient Medications   Medication Sig Dispense Refill    HYDROcodone-acetaminophen (NORCO)  MG Oral Tab Take 1 to 2 tabs every 4 to 6 hrs prn for pain 98 tablet 0    cyclobenzaprine 10 MG Oral Tab Take 1 tablet (10 mg total) by mouth 3 (three) times daily. 90 tablet 1    butalbital-acetaminophen-caffeine -40 MG Oral Tab Take 1 tablet by mouth every 8 (eight) hours as needed for Headaches. 30 tablet 0    HYDROcodone-acetaminophen (NORCO)  MG Oral Tab Take 1 to 2 tabs po every 4 to 6 hrs prn for pain 112 tablet 0    cephalexin 500 MG Oral Cap Take 1 capsule (500 mg total) by mouth 4 (four) times daily. (Patient not taking: Reported on 5/23/2024) 40 capsule 1    Naloxone HCl 4 MG/0.1ML Nasal Liquid 4 mg by Nasal route as needed for Opioid Overdose. Spray 4 mg into a single nostril, if patient remains unresponsive, repeat in 2-3 minutes in alternating nostrils until medical assistance becomes available. (Patient not taking: Reported on 5/23/2024) 1 kit 0    docusate sodium 100 MG Oral Cap Take 1 capsule (100 mg total) by mouth 2 (two) times daily. (Patient not taking: Reported on 5/23/2024) 30 capsule 1     Allergies:  Allergies   Allergen Reactions    Aspirin SHORTNESS OF BREATH     Other reaction(s): ASPIRIN - throat swelling    Codeine SWELLING     Other reaction(s): Swelling of hands  and toes    Dust Mite Extract HIVES     Nasal congestion    Grass HIVES     Nasal congestion    Adhesive Tape RASH    Chlorhexidine Gluconate Cloth ITCHING     Burning; wipes only. Okay with plain chlorhexedine       Past Medical History:    Back pain    Back problem    Cancer (HCC)    breast cancer    COVID-2021-headache-not hospitalized    Essential hypertension    Gestational hypertension (HCC)    Herniated disc    Herniated lumbar intervertebral disc    Hx of migraine headaches    Hyperlipidemia    Mass of left forearm    Migraines    Obesity, unspecified    Personal history of antineoplastic chemotherapy    last dose 2024    Urethral stricture      Past Surgical History:   Procedure Laterality Date    Appendectomy      Eye surgery      Insertion of access port      Blair biopsy stereo nodule 1 site right (cpt=19081)  2022    clip top hat    Mastectomy bra Bilateral     Mastectomy left      Mastectomy right      Needle biopsy left  2022    MRI 2 site    Needle biopsy right  2022    us guided bx          Other surgical history      Excision lipoma of left forearm    Other surgical history  2018    IUD placement    Other surgical history      eye surgery    Tonsillectomy        Family History   Problem Relation Age of Onset    Heart Disease Other     Hypertension Other     Glaucoma Other     Heart Disease Mother     Lipids Mother     Hypertension Mother     Heart Disorder Mother     ADHD Sister     Lipids Father     Stroke Father     Other (Other) Brother         DJD spine    Breast Cancer Paternal Cousin Female 24        also had @ 34 and 41 (last was TNBC); neg genetics     Cancer Paternal Grandmother         lung ca; smoker    Dementia Paternal Grandfather     Cancer Paternal Cousin Female 30        uterine ca (sister of cousin w/br ca)    Cancer Paternal Cousin Male 40        gastric ca (smoker); also leukemia @ 10      Social History:   Social History     Socioeconomic  History    Marital status:    Tobacco Use    Smoking status: Former     Current packs/day: 0.00     Average packs/day: 0.5 packs/day for 2.0 years (1.0 ttl pk-yrs)     Types: Cigarettes     Start date: 1998     Quit date: 2000     Years since quittin.4     Passive exposure: Past    Smokeless tobacco: Never   Vaping Use    Vaping status: Never Used   Substance and Sexual Activity    Alcohol use: Yes     Alcohol/week: 0.0 standard drinks of alcohol     Comment: Rarely    Drug use: No   Other Topics Concern    Caffeine Concern No        Objective:   Physical Exam  Constitutional:       General: She is not in acute distress.     Appearance: She is obese. She is not ill-appearing, toxic-appearing or diaphoretic.   HENT:      Head:        Comments: Round papule with some redness in the midpart; no fluctuance; no discharge noted .  Pulmonary:      Effort: Pulmonary effort is normal. No respiratory distress.   Neurological:      Mental Status: She is alert.         Assessment & Plan:   (S10.96XA,  W57.XXXA) Insect bite of neck, initial encounter  (primary encounter diagnosis)  Plan: probable insect bite on the left side neck;  last travel to [place] was 3 weeks ago but was only inside the indoor resort and never went out on the forest/woods or even outside grounds; she also denies having seen tick on her neck which would have been easily visible. Will start pt on doxycycline; pt denies being pregnant and currently has her period. Call back if persist/worsens.        No orders of the defined types were placed in this encounter.      Meds This Visit:  Requested Prescriptions      No prescriptions requested or ordered in this encounter       Imaging & Referrals:  None

## 2024-05-30 ENCOUNTER — TELEPHONE (OUTPATIENT)
Dept: INTERNAL MEDICINE CLINIC | Facility: CLINIC | Age: 39
End: 2024-05-30

## 2024-05-30 DIAGNOSIS — G89.29 CHRONIC BILATERAL LOW BACK PAIN, UNSPECIFIED WHETHER SCIATICA PRESENT: ICD-10-CM

## 2024-05-30 DIAGNOSIS — M54.50 CHRONIC BILATERAL LOW BACK PAIN, UNSPECIFIED WHETHER SCIATICA PRESENT: ICD-10-CM

## 2024-05-30 RX ORDER — HYDROCODONE BITARTRATE AND ACETAMINOPHEN 10; 325 MG/1; MG/1
TABLET ORAL
Qty: 84 TABLET | Refills: 0 | Status: SHIPPED | OUTPATIENT
Start: 2024-05-31 | End: 2024-06-10

## 2024-06-03 DIAGNOSIS — C50.911 BREAST CANCER, STAGE 1, ESTROGEN RECEPTOR POSITIVE, RIGHT (HCC): Primary | ICD-10-CM

## 2024-06-03 DIAGNOSIS — Z17.0 BREAST CANCER, STAGE 1, ESTROGEN RECEPTOR POSITIVE, RIGHT (HCC): Primary | ICD-10-CM

## 2024-06-03 DIAGNOSIS — Z17.0 MALIGNANT NEOPLASM OF RIGHT BREAST IN FEMALE, ESTROGEN RECEPTOR POSITIVE, UNSPECIFIED SITE OF BREAST (HCC): ICD-10-CM

## 2024-06-03 DIAGNOSIS — C50.911 MALIGNANT NEOPLASM OF RIGHT BREAST IN FEMALE, ESTROGEN RECEPTOR POSITIVE, UNSPECIFIED SITE OF BREAST (HCC): ICD-10-CM

## 2024-06-03 RX ORDER — HEPARIN SODIUM (PORCINE) LOCK FLUSH IV SOLN 100 UNIT/ML 100 UNIT/ML
5 SOLUTION INTRAVENOUS ONCE
OUTPATIENT
Start: 2024-06-03

## 2024-06-04 ENCOUNTER — OFFICE VISIT (OUTPATIENT)
Dept: HEMATOLOGY/ONCOLOGY | Facility: HOSPITAL | Age: 39
End: 2024-06-04
Attending: INTERNAL MEDICINE
Payer: COMMERCIAL

## 2024-06-04 VITALS
RESPIRATION RATE: 16 BRPM | DIASTOLIC BLOOD PRESSURE: 85 MMHG | SYSTOLIC BLOOD PRESSURE: 121 MMHG | HEART RATE: 92 BPM | TEMPERATURE: 98 F | OXYGEN SATURATION: 100 %

## 2024-06-04 DIAGNOSIS — G62.0 CHEMOTHERAPY-INDUCED PERIPHERAL NEUROPATHY (HCC): ICD-10-CM

## 2024-06-04 DIAGNOSIS — C50.911 MALIGNANT NEOPLASM OF RIGHT BREAST IN FEMALE, ESTROGEN RECEPTOR POSITIVE, UNSPECIFIED SITE OF BREAST (HCC): Primary | ICD-10-CM

## 2024-06-04 DIAGNOSIS — Z92.21 HISTORY OF ANTINEOPLASTIC CHEMOTHERAPY: ICD-10-CM

## 2024-06-04 DIAGNOSIS — Z90.13 S/P BILATERAL MASTECTOMY: ICD-10-CM

## 2024-06-04 DIAGNOSIS — Z17.0 MALIGNANT NEOPLASM OF RIGHT BREAST IN FEMALE, ESTROGEN RECEPTOR POSITIVE, UNSPECIFIED SITE OF BREAST (HCC): Primary | ICD-10-CM

## 2024-06-04 DIAGNOSIS — L27.0 DRUG-INDUCED SKIN RASH: ICD-10-CM

## 2024-06-04 DIAGNOSIS — T45.1X5A CHEMOTHERAPY-INDUCED PERIPHERAL NEUROPATHY (HCC): ICD-10-CM

## 2024-06-04 PROCEDURE — 99214 OFFICE O/P EST MOD 30 MIN: CPT | Performed by: INTERNAL MEDICINE

## 2024-06-04 NOTE — PROGRESS NOTES
Hematology Oncology Progress Note    Patient Name: Pratik Frances   YOB: 1985   Medical Record Number: [id number]   Attending Physician: Tila Lopez    Date of Visit:  6/4/24    Chief Complaint:  Breast cancer  Chemotherapy    Diagnosis: Right breast invasive ductal carcinoma, grade 3, ER/DC positive, HER2 positive, ki high, pT2N0    Oncologic History:  38 year old premenopausal female presented with palpable right breast mass   7/6/22: diag laurent with us. T2 (2.3cm) N0 12:00 4cm nipple  7/8/22: biopsy IDC, grade 3, ER/DC>90%, Her 3+, Ki 46%  7/13/22: MRI with 2 areas of focal non mass enhancement bx, benign and concordant.   Genetics negative.  7/28/22: TCHP x6 cycles with dose reduced T completed 11/21/22  1/10/23: bilateral mastectomies: [id number].   2/20/23: adjuvant kadcyla x14 cycles completed 1/4/24.  1/11/24: port removal.   2/21/24: breast reconstruction       Interval History:  Here for follow up. Feeling well and denies any issues or complaints.   Underwent breast recon on 2/21, c/b left breast skin necrosis requiring debridement and recolosure. Now completely healed and plan for fat grafting to bilateral breast.   She is active, enjoys fishing and staying outdoor.   Has not started tamoxifen due to skin reaction in the sun. Prefers to start it in September.   Having regular periods. Using birth control.       ECOG PS: 0     PMH/PSH: herniated disc with back pain, urethral stricture, migraine, left forarm lipoma  Family History: Pcousin with breast ca age 24- was neg for genetics  Social History: works for insurance, non smoker.     Medications:    Current Outpatient Medications:     HYDROcodone-acetaminophen (NORCO)  MG Oral Tab, Take 1 to 2 tabs po every 4 to 6 hrs prn for pain, Disp: 84 tablet, Rfl: 0    HYDROcodone-acetaminophen (NORCO)  MG Oral Tab, Take 1 to 2 tabs every 4 to 6 hrs prn for pain, Disp: 98 tablet, Rfl: 0    cyclobenzaprine 10 MG Oral Tab, Take 1 tablet (10 mg  total) by mouth 3 (three) times daily., Disp: 90 tablet, Rfl: 1    butalbital-acetaminophen-caffeine -40 MG Oral Tab, Take 1 tablet by mouth every 8 (eight) hours as needed for Headaches., Disp: 30 tablet, Rfl: 0    Naloxone HCl 4 MG/0.1ML Nasal Liquid, 4 mg by Nasal route as needed for Opioid Overdose. Spray 4 mg into a single nostril, if patient remains unresponsive, repeat in 2-3 minutes in alternating nostrils until medical assistance becomes available., Disp: 1 kit, Rfl: 0    docusate sodium 100 MG Oral Cap, Take 1 capsule (100 mg total) by mouth 2 (two) times daily. (Patient not taking: Reported on 5/23/2024), Disp: 30 capsule, Rfl: 1    Review of Systems:   Oncology specific ROS negative except as per HPI    Vitals:  /85 (BP Location: Right arm, Patient Position: Sitting, Cuff Size: large)   Pulse 92   Temp 98.2 °F (36.8 °C) (Oral)   Resp 16   LMP 05/21/2024 (Exact Date)   SpO2 100%     Weight:  Wt Readings from Last 6 Encounters:   05/23/24 85.8 kg (189 lb 1.6 oz)   04/24/24 81.6 kg (180 lb)   02/21/24 83.5 kg (184 lb)   01/24/24 85.8 kg (189 lb 3.2 oz)   01/03/24 84.8 kg (187 lb)   01/02/24 84.9 kg (187 lb 3.2 oz)     Physical Exam:  General: alert and oriented x 3, not in acute distress.  HEENT: non icterus. Oropharynx clear.   Breast: bilateral mastectomy an reconstruction. Incisions healed well. Implants soft symmetric with normal contour. No axillary lymphadenopathy b/l.    Chest: non labored breathing, CTA  Abd: soft, non tender, non distended  Ext: no edema.  Neurological: motor strength grossly intact, MA4E    Laboratory:  Lab Results   Component Value Date    WBC 11.3 (H) 01/29/2024    RBC 4.07 01/29/2024    HGB 12.6 01/29/2024    HCT 40.0 01/29/2024    MCV 98.3 01/29/2024    MCH 31.0 01/29/2024    MCHC 31.5 01/29/2024    RDW 13.7 01/29/2024    .0 01/29/2024    MPV 8.3 12/17/2018     Lab Results   Component Value Date     01/29/2024    K 4.1 01/29/2024      01/29/2024    CO2 26.0 01/29/2024    BUN 9 01/29/2024    CREATSERUM 0.62 01/29/2024     (H) 01/29/2024    CA 9.6 01/29/2024    ALKPHO 52 01/29/2024    ALT 10 01/29/2024    AST 14 01/29/2024    BILT 0.3 01/29/2024    ALB 4.6 01/29/2024    TP 7.5 01/29/2024      Radiology;  No results found.    Impression and Plan:    Right breast invasive ductal carcinoma, grade 3, ER/MS positive, HER2 positive, Ki high, [id number]:  - s/p neoadjuvant TCHP--> bilateral mastectomy ypT1a--> adjuvant T-DM1 (kadcyla). no XRT indication  - OFS not tolerated. Plan to start tamoxifen, but patient prefers to start it after summer due to skin reaction in the sun.   - anticipate 5 years of tamoxifen. Discussed again risks and benefits of endocrine therapy.   - labs due next week per PCP.   - clinical SAW. Continue surveillance with clinical follow up. Routine imaging is not recommended post b/l mastectomy.       Chemo induced neuropathy- grade 1, mild numbness limited to toes only and stable. Not requiring meds.     Chronic arthralgia/myalgia - mild, on norco as needed (refill per primary)      Return in about 3 months (around 9/4/2024).     Tila Lopez MD  Hematology Oncology

## 2024-06-08 PROBLEM — Z17.0 MALIGNANT NEOPLASM OF RIGHT BREAST IN FEMALE, ESTROGEN RECEPTOR POSITIVE (HCC): Status: ACTIVE | Noted: 2024-06-08

## 2024-06-08 PROBLEM — C50.911 MALIGNANT NEOPLASM OF RIGHT BREAST IN FEMALE, ESTROGEN RECEPTOR POSITIVE (HCC): Status: ACTIVE | Noted: 2024-06-08

## 2024-06-08 PROBLEM — T45.1X5A CHEMOTHERAPY-INDUCED PERIPHERAL NEUROPATHY (HCC): Status: ACTIVE | Noted: 2024-06-08

## 2024-06-08 PROBLEM — Z90.13 S/P BILATERAL MASTECTOMY: Status: ACTIVE | Noted: 2024-06-08

## 2024-06-08 PROBLEM — Z92.21 HISTORY OF ANTINEOPLASTIC CHEMOTHERAPY: Status: ACTIVE | Noted: 2024-06-08

## 2024-06-08 PROBLEM — G62.0 CHEMOTHERAPY-INDUCED PERIPHERAL NEUROPATHY (HCC): Status: ACTIVE | Noted: 2024-06-08

## 2024-06-10 ENCOUNTER — LAB ENCOUNTER (OUTPATIENT)
Dept: LAB | Age: 39
End: 2024-06-10
Attending: INTERNAL MEDICINE
Payer: COMMERCIAL

## 2024-06-10 ENCOUNTER — EKG ENCOUNTER (OUTPATIENT)
Dept: LAB | Age: 39
End: 2024-06-10
Attending: INTERNAL MEDICINE
Payer: COMMERCIAL

## 2024-06-10 ENCOUNTER — OFFICE VISIT (OUTPATIENT)
Dept: INTERNAL MEDICINE CLINIC | Facility: CLINIC | Age: 39
End: 2024-06-10

## 2024-06-10 ENCOUNTER — TELEPHONE (OUTPATIENT)
Dept: INTERNAL MEDICINE CLINIC | Facility: CLINIC | Age: 39
End: 2024-06-10

## 2024-06-10 VITALS
DIASTOLIC BLOOD PRESSURE: 80 MMHG | SYSTOLIC BLOOD PRESSURE: 120 MMHG | WEIGHT: 186.31 LBS | BODY MASS INDEX: 31.04 KG/M2 | HEIGHT: 65 IN | OXYGEN SATURATION: 98 % | TEMPERATURE: 98 F | HEART RATE: 98 BPM

## 2024-06-10 DIAGNOSIS — Z17.0 BREAST CANCER, STAGE 1, ESTROGEN RECEPTOR POSITIVE, RIGHT (HCC): ICD-10-CM

## 2024-06-10 DIAGNOSIS — G89.29 CHRONIC BILATERAL LOW BACK PAIN, UNSPECIFIED WHETHER SCIATICA PRESENT: ICD-10-CM

## 2024-06-10 DIAGNOSIS — C50.911 MALIGNANT NEOPLASM OF RIGHT BREAST IN FEMALE, ESTROGEN RECEPTOR POSITIVE, UNSPECIFIED SITE OF BREAST (HCC): ICD-10-CM

## 2024-06-10 DIAGNOSIS — Z17.0 MALIGNANT NEOPLASM OF RIGHT BREAST IN FEMALE, ESTROGEN RECEPTOR POSITIVE, UNSPECIFIED SITE OF BREAST (HCC): ICD-10-CM

## 2024-06-10 DIAGNOSIS — C50.911 BREAST CANCER, STAGE 1, ESTROGEN RECEPTOR POSITIVE, RIGHT (HCC): ICD-10-CM

## 2024-06-10 DIAGNOSIS — Z90.13 HISTORY OF BILATERAL MASTECTOMY: ICD-10-CM

## 2024-06-10 DIAGNOSIS — M54.50 CHRONIC BILATERAL LOW BACK PAIN, UNSPECIFIED WHETHER SCIATICA PRESENT: ICD-10-CM

## 2024-06-10 DIAGNOSIS — Z01.818 PREOP GENERAL PHYSICAL EXAM: ICD-10-CM

## 2024-06-10 DIAGNOSIS — Z01.818 PREOP GENERAL PHYSICAL EXAM: Primary | ICD-10-CM

## 2024-06-10 LAB
ALBUMIN SERPL-MCNC: 4.5 G/DL (ref 3.2–4.8)
ALBUMIN/GLOB SERPL: 1.5 {RATIO} (ref 1–2)
ALP LIVER SERPL-CCNC: 47 U/L
ALT SERPL-CCNC: 14 U/L
ANION GAP SERPL CALC-SCNC: 4 MMOL/L (ref 0–18)
AST SERPL-CCNC: 22 U/L (ref ?–34)
ATRIAL RATE: 73 BPM
BASOPHILS # BLD AUTO: 0.03 X10(3) UL (ref 0–0.2)
BASOPHILS NFR BLD AUTO: 0.4 %
BILIRUB SERPL-MCNC: 0.3 MG/DL (ref 0.3–1.2)
BUN BLD-MCNC: 17 MG/DL (ref 9–23)
BUN/CREAT SERPL: 22.7 (ref 10–20)
CALCIUM BLD-MCNC: 9.4 MG/DL (ref 8.7–10.4)
CHLORIDE SERPL-SCNC: 110 MMOL/L (ref 98–112)
CO2 SERPL-SCNC: 25 MMOL/L (ref 21–32)
CREAT BLD-MCNC: 0.75 MG/DL
DEPRECATED RDW RBC AUTO: 48.6 FL (ref 35.1–46.3)
EGFRCR SERPLBLD CKD-EPI 2021: 103 ML/MIN/1.73M2 (ref 60–?)
EOSINOPHIL # BLD AUTO: 0.02 X10(3) UL (ref 0–0.7)
EOSINOPHIL NFR BLD AUTO: 0.3 %
ERYTHROCYTE [DISTWIDTH] IN BLOOD BY AUTOMATED COUNT: 13.4 % (ref 11–15)
FASTING STATUS PATIENT QL REPORTED: YES
GLOBULIN PLAS-MCNC: 3 G/DL (ref 2–3.5)
GLUCOSE BLD-MCNC: 110 MG/DL (ref 70–99)
HCT VFR BLD AUTO: 38.7 %
HGB BLD-MCNC: 13.2 G/DL
IMM GRANULOCYTES # BLD AUTO: 0.02 X10(3) UL (ref 0–1)
IMM GRANULOCYTES NFR BLD: 0.3 %
LYMPHOCYTES # BLD AUTO: 3.21 X10(3) UL (ref 1–4)
LYMPHOCYTES NFR BLD AUTO: 46.1 %
MCH RBC QN AUTO: 33.6 PG (ref 26–34)
MCHC RBC AUTO-ENTMCNC: 34.1 G/DL (ref 31–37)
MCV RBC AUTO: 98.5 FL
MONOCYTES # BLD AUTO: 0.3 X10(3) UL (ref 0.1–1)
MONOCYTES NFR BLD AUTO: 4.3 %
NEUTROPHILS # BLD AUTO: 3.39 X10 (3) UL (ref 1.5–7.7)
NEUTROPHILS # BLD AUTO: 3.39 X10(3) UL (ref 1.5–7.7)
NEUTROPHILS NFR BLD AUTO: 48.6 %
OSMOLALITY SERPL CALC.SUM OF ELEC: 290 MOSM/KG (ref 275–295)
P AXIS: 45 DEGREES
P-R INTERVAL: 116 MS
PLATELET # BLD AUTO: 282 10(3)UL (ref 150–450)
POTASSIUM SERPL-SCNC: 4.7 MMOL/L (ref 3.5–5.1)
PROT SERPL-MCNC: 7.5 G/DL (ref 5.7–8.2)
Q-T INTERVAL: 372 MS
QRS DURATION: 84 MS
QTC CALCULATION (BEZET): 409 MS
R AXIS: 36 DEGREES
RBC # BLD AUTO: 3.93 X10(6)UL
SODIUM SERPL-SCNC: 139 MMOL/L (ref 136–145)
T AXIS: 25 DEGREES
VENTRICULAR RATE: 73 BPM
WBC # BLD AUTO: 7 X10(3) UL (ref 4–11)

## 2024-06-10 PROCEDURE — 80053 COMPREHEN METABOLIC PANEL: CPT

## 2024-06-10 PROCEDURE — 3079F DIAST BP 80-89 MM HG: CPT | Performed by: INTERNAL MEDICINE

## 2024-06-10 PROCEDURE — 3074F SYST BP LT 130 MM HG: CPT | Performed by: INTERNAL MEDICINE

## 2024-06-10 PROCEDURE — 3008F BODY MASS INDEX DOCD: CPT | Performed by: INTERNAL MEDICINE

## 2024-06-10 PROCEDURE — 93010 ELECTROCARDIOGRAM REPORT: CPT | Performed by: INTERNAL MEDICINE

## 2024-06-10 PROCEDURE — 99214 OFFICE O/P EST MOD 30 MIN: CPT | Performed by: INTERNAL MEDICINE

## 2024-06-10 PROCEDURE — 36415 COLL VENOUS BLD VENIPUNCTURE: CPT

## 2024-06-10 PROCEDURE — 93005 ELECTROCARDIOGRAM TRACING: CPT

## 2024-06-10 PROCEDURE — 85025 COMPLETE CBC W/AUTO DIFF WBC: CPT

## 2024-06-10 RX ORDER — HYDROCODONE BITARTRATE AND ACETAMINOPHEN 10; 325 MG/1; MG/1
TABLET ORAL
Qty: 112 TABLET | Refills: 0 | Status: SHIPPED | OUTPATIENT
Start: 2024-06-10

## 2024-06-10 NOTE — TELEPHONE ENCOUNTER
Closed    Close reason: Other  Payer: [facility] Case ID: [id number]    [phone]    [phone]  Note from payer: Your PA request has been closed. No PA required at this time. Please have the pharmacy process the request. - KARRIE, Intake Agent 06/10/2024 01:00 PM  View History

## 2024-06-10 NOTE — PROGRESS NOTES
Subjective:     Patient ID: Pratik Frances is a 38 year old female.    Patient presents today for preop medical clearance as requested by DR Levin plastic surgeon, for scheduled for autologous fat grafting for bilateral breast reconstruction scheduled on June 27,2024 at [facility].     No hx of CAD, CHF, CVA, IDDM nor CKD. No history of IAN. NO history of blood disorders.         History/Other:   Review of Systems   Constitutional: Negative.    HENT: Negative.     Eyes: Negative.    Respiratory: Negative.     Cardiovascular: Negative.  Negative for chest pain, palpitations and leg swelling.        Able to climb flight of stairs no chest pains  No pnd/orthopnea   Gastrointestinal: Negative.  Negative for anal bleeding, blood in stool and vomiting.   Genitourinary: Negative.    Musculoskeletal:  Positive for back pain.   Allergic/Immunologic: Negative for food allergies and immunocompromised state.   Hematological: Negative.      Current Outpatient Medications   Medication Sig Dispense Refill    HYDROcodone-acetaminophen (NORCO)  MG Oral Tab Take 1 to 2 tabs po every 4 to 6 hrs prn for pain 84 tablet 0    HYDROcodone-acetaminophen (NORCO)  MG Oral Tab Take 1 to 2 tabs every 4 to 6 hrs prn for pain 98 tablet 0    cyclobenzaprine 10 MG Oral Tab Take 1 tablet (10 mg total) by mouth 3 (three) times daily. 90 tablet 1    butalbital-acetaminophen-caffeine -40 MG Oral Tab Take 1 tablet by mouth every 8 (eight) hours as needed for Headaches. 30 tablet 0    Naloxone HCl 4 MG/0.1ML Nasal Liquid 4 mg by Nasal route as needed for Opioid Overdose. Spray 4 mg into a single nostril, if patient remains unresponsive, repeat in 2-3 minutes in alternating nostrils until medical assistance becomes available. 1 kit 0    docusate sodium 100 MG Oral Cap Take 1 capsule (100 mg total) by mouth 2 (two) times daily. (Patient not taking: Reported on 5/23/2024) 30 capsule 1     Allergies:  Allergies   Allergen Reactions    Aspirin  SHORTNESS OF BREATH     Other reaction(s): ASPIRIN - throat swelling    Codeine SWELLING     Other reaction(s): Swelling of hands and toes    Dust Mite Extract HIVES     Nasal congestion    Grass HIVES     Nasal congestion    Adhesive Tape RASH    Chlorhexidine Gluconate Cloth ITCHING     Burning; wipes only. Okay with plain chlorhexedine       Past Medical History:    Back pain    Back problem    Cancer (HCC)    breast cancer    COVID-2021-headache-not hospitalized    Essential hypertension    Gestational hypertension (HCC)    Herniated disc    Herniated lumbar intervertebral disc    Hx of migraine headaches    Hyperlipidemia    Mass of left forearm    Migraines    Obesity, unspecified    Personal history of antineoplastic chemotherapy    last dose 2024    Urethral stricture      Past Surgical History:   Procedure Laterality Date    Appendectomy      Eye surgery      Insertion of access port      Blair biopsy stereo nodule 1 site right (cpt=19081)  2022    clip top hat    Mastectomy bra Bilateral     Mastectomy left      Mastectomy right      Needle biopsy left  2022    MRI 2 site    Needle biopsy right  2022    us guided bx          Other surgical history      Excision lipoma of left forearm    Other surgical history  2018    IUD placement    Other surgical history      eye surgery    Tonsillectomy        Family History   Problem Relation Age of Onset    Heart Disease Other     Hypertension Other     Glaucoma Other     Heart Disease Mother     Lipids Mother     Hypertension Mother     Heart Disorder Mother     ADHD Sister     Lipids Father     Stroke Father     Other (Other) Brother         DJD spine    Breast Cancer Paternal Cousin Female 24        also had @ 34 and 41 (last was TNBC); neg genetics     Cancer Paternal Grandmother         lung ca; smoker    Dementia Paternal Grandfather     Cancer Paternal Cousin Female 30        uterine ca (sister of cousin w/br ca)    Cancer  Paternal Cousin Male 40        gastric ca (smoker); also leukemia @ 10      Social History:   Social History     Socioeconomic History    Marital status:    Tobacco Use    Smoking status: Former     Current packs/day: 0.00     Average packs/day: 0.5 packs/day for 2.0 years (1.0 ttl pk-yrs)     Types: Cigarettes     Start date: 1998     Quit date: 2000     Years since quittin.4     Passive exposure: Past    Smokeless tobacco: Never   Vaping Use    Vaping status: Never Used   Substance and Sexual Activity    Alcohol use: Yes     Alcohol/week: 0.0 standard drinks of alcohol     Comment: Rarely    Drug use: No   Other Topics Concern    Caffeine Concern No        Objective:   Physical Exam  Constitutional:       General: She is not in acute distress.     Appearance: She is well-developed. She is obese. She is not ill-appearing, toxic-appearing or diaphoretic.   HENT:      Head: Normocephalic and atraumatic.      Right Ear: External ear normal.      Left Ear: External ear normal.      Nose: Nose normal.      Mouth/Throat:      Pharynx: No oropharyngeal exudate.   Eyes:      General:         Right eye: No discharge.         Left eye: No discharge.      Conjunctiva/sclera: Conjunctivae normal.      Pupils: Pupils are equal, round, and reactive to light.   Neck:      Vascular: No JVD.   Cardiovascular:      Rate and Rhythm: Normal rate and regular rhythm.      Heart sounds: Normal heart sounds.   Pulmonary:      Effort: Pulmonary effort is normal. No respiratory distress.      Breath sounds: Normal breath sounds. No wheezing or rales.   Abdominal:      General: Bowel sounds are normal. There is no distension.      Palpations: Abdomen is soft. There is no mass.      Tenderness: There is no abdominal tenderness. There is no guarding or rebound.   Musculoskeletal:         General: No tenderness. Normal range of motion.      Cervical back: Normal range of motion and neck supple. No rigidity or tenderness.       Right lower leg: No edema.      Left lower leg: No edema.   Lymphadenopathy:      Cervical: No cervical adenopathy.   Skin:     General: Skin is warm and dry.      Coloration: Skin is not jaundiced or pale.      Findings: No rash.   Neurological:      Mental Status: She is alert and oriented to person, place, and time.         Assessment & Plan:   (Z01.818) Preop general physical exam  (primary encounter diagnosis)  Plan: EKG 12 Lead to be performed at [facility]        Preop labs and EKG ordered. Pt has no active cardiac conditions and has a good functional capacity able to perform greater than 4 METS activities without any chest pains.  She may proceed with her surgery as scheduled.    (Z90.13) History of bilateral mastectomy  Plan: Patient to undergo autologous fat grafting for bilateral breast reconstruction.    (C50.911,  Z17.0) Breast cancer, stage 1, estrogen receptor positive, right (HCC)  Plan: Patient ready undergone a bilateral cystectomy and completed her chemotherapy.    (M54.50,  G89.29) Chronic bilateral low back pain, unspecified whether sciatica present  Plan: HYDROcodone-acetaminophen (NORCO)  MG         Oral Tab        Patient with chronic ongoing low back pain, had been evaluated by spine surgery in the past.  She has been on chronic pain medicine.  Recently has some flareup noted we had no trauma recalled.  I told her to see physiatrist Dr. Ruelas for evaluation.       No orders of the defined types were placed in this encounter.      Meds This Visit:  Requested Prescriptions      No prescriptions requested or ordered in this encounter       Imaging & Referrals:  None

## 2024-06-13 ENCOUNTER — TELEPHONE (OUTPATIENT)
Dept: INTERNAL MEDICINE CLINIC | Facility: CLINIC | Age: 39
End: 2024-06-13

## 2024-06-21 ENCOUNTER — TELEPHONE (OUTPATIENT)
Dept: INTERNAL MEDICINE CLINIC | Facility: CLINIC | Age: 39
End: 2024-06-21

## 2024-06-21 DIAGNOSIS — M54.50 CHRONIC BILATERAL LOW BACK PAIN, UNSPECIFIED WHETHER SCIATICA PRESENT: Primary | ICD-10-CM

## 2024-06-21 DIAGNOSIS — G89.29 CHRONIC BILATERAL LOW BACK PAIN, UNSPECIFIED WHETHER SCIATICA PRESENT: Primary | ICD-10-CM

## 2024-06-21 RX ORDER — HYDROCODONE BITARTRATE AND ACETAMINOPHEN 10; 325 MG/1; MG/1
TABLET ORAL
Qty: 112 TABLET | Refills: 0 | Status: SHIPPED | OUTPATIENT
Start: 2024-06-21

## 2024-06-21 NOTE — TELEPHONE ENCOUNTER
Pt here to pickup script for norco refill; having her surgery next week. Keep same dose for now. Start cutting back after she is done with [place]w [place]

## 2024-06-26 ENCOUNTER — ANESTHESIA EVENT (OUTPATIENT)
Dept: SURGERY | Facility: HOSPITAL | Age: 39
End: 2024-06-26
Payer: COMMERCIAL

## 2024-06-27 ENCOUNTER — HOSPITAL ENCOUNTER (OUTPATIENT)
Facility: HOSPITAL | Age: 39
Setting detail: HOSPITAL OUTPATIENT SURGERY
Discharge: HOME | End: 2024-06-27
Attending: SURGERY | Admitting: SURGERY
Payer: COMMERCIAL

## 2024-06-27 ENCOUNTER — ANESTHESIA (OUTPATIENT)
Dept: SURGERY | Facility: HOSPITAL | Age: 39
End: 2024-06-27
Payer: COMMERCIAL

## 2024-06-27 VITALS
BODY MASS INDEX: 31.82 KG/M2 | OXYGEN SATURATION: 98 % | RESPIRATION RATE: 18 BRPM | HEART RATE: 87 BPM | SYSTOLIC BLOOD PRESSURE: 138 MMHG | DIASTOLIC BLOOD PRESSURE: 86 MMHG | TEMPERATURE: 97 F | HEIGHT: 65 IN | WEIGHT: 191 LBS

## 2024-06-27 DIAGNOSIS — G89.29 CHRONIC BILATERAL LOW BACK PAIN, UNSPECIFIED WHETHER SCIATICA PRESENT: ICD-10-CM

## 2024-06-27 DIAGNOSIS — C50.911 BREAST CANCER, STAGE 1, ESTROGEN RECEPTOR POSITIVE, RIGHT (HCC): ICD-10-CM

## 2024-06-27 DIAGNOSIS — Z17.0 BREAST CANCER, STAGE 1, ESTROGEN RECEPTOR POSITIVE, RIGHT (HCC): ICD-10-CM

## 2024-06-27 DIAGNOSIS — M54.50 CHRONIC BILATERAL LOW BACK PAIN, UNSPECIFIED WHETHER SCIATICA PRESENT: ICD-10-CM

## 2024-06-27 DIAGNOSIS — Z90.13 S/P BILATERAL MASTECTOMY: Primary | ICD-10-CM

## 2024-06-27 LAB — B-HCG UR QL: NEGATIVE

## 2024-06-27 PROCEDURE — 0HB5XZZ EXCISION OF CHEST SKIN, EXTERNAL APPROACH: ICD-10-PCS | Performed by: SURGERY

## 2024-06-27 PROCEDURE — 0JD83ZZ EXTRACTION OF ABDOMEN SUBCUTANEOUS TISSUE AND FASCIA, PERCUTANEOUS APPROACH: ICD-10-PCS | Performed by: SURGERY

## 2024-06-27 PROCEDURE — 81025 URINE PREGNANCY TEST: CPT

## 2024-06-27 PROCEDURE — 88305 TISSUE EXAM BY PATHOLOGIST: CPT | Performed by: SURGERY

## 2024-06-27 PROCEDURE — 0HRV37Z REPLACEMENT OF BILATERAL BREAST WITH AUTOLOGOUS TISSUE SUBSTITUTE, PERCUTANEOUS APPROACH: ICD-10-PCS | Performed by: SURGERY

## 2024-06-27 RX ORDER — NEOSTIGMINE METHYLSULFATE 1 MG/ML
INJECTION, SOLUTION INTRAVENOUS AS NEEDED
Status: DISCONTINUED | OUTPATIENT
Start: 2024-06-27 | End: 2024-06-27 | Stop reason: SURG

## 2024-06-27 RX ORDER — ROCURONIUM BROMIDE 10 MG/ML
INJECTION, SOLUTION INTRAVENOUS AS NEEDED
Status: DISCONTINUED | OUTPATIENT
Start: 2024-06-27 | End: 2024-06-27 | Stop reason: SURG

## 2024-06-27 RX ORDER — OXYCODONE HYDROCHLORIDE 5 MG/1
5 TABLET ORAL ONCE AS NEEDED
Status: COMPLETED | OUTPATIENT
Start: 2024-06-27 | End: 2024-06-27

## 2024-06-27 RX ORDER — ACETAMINOPHEN 500 MG
1000 TABLET ORAL ONCE
Status: DISCONTINUED | OUTPATIENT
Start: 2024-06-27 | End: 2024-06-27 | Stop reason: HOSPADM

## 2024-06-27 RX ORDER — MIDAZOLAM HYDROCHLORIDE 1 MG/ML
INJECTION INTRAMUSCULAR; INTRAVENOUS AS NEEDED
Status: DISCONTINUED | OUTPATIENT
Start: 2024-06-27 | End: 2024-06-27 | Stop reason: SURG

## 2024-06-27 RX ORDER — CEPHALEXIN 500 MG/1
500 CAPSULE ORAL 4 TIMES DAILY
Qty: 20 CAPSULE | Refills: 0 | Status: SHIPPED | OUTPATIENT
Start: 2024-06-27 | End: 2024-07-02

## 2024-06-27 RX ORDER — KETAMINE HYDROCHLORIDE 50 MG/ML
INJECTION, SOLUTION INTRAMUSCULAR; INTRAVENOUS AS NEEDED
Status: DISCONTINUED | OUTPATIENT
Start: 2024-06-27 | End: 2024-06-27 | Stop reason: SURG

## 2024-06-27 RX ORDER — MEPERIDINE HYDROCHLORIDE 25 MG/ML
12.5 INJECTION INTRAMUSCULAR; INTRAVENOUS; SUBCUTANEOUS AS NEEDED
Status: DISCONTINUED | OUTPATIENT
Start: 2024-06-27 | End: 2024-06-27

## 2024-06-27 RX ORDER — HYDROMORPHONE HYDROCHLORIDE 1 MG/ML
0.6 INJECTION, SOLUTION INTRAMUSCULAR; INTRAVENOUS; SUBCUTANEOUS EVERY 5 MIN PRN
Status: DISCONTINUED | OUTPATIENT
Start: 2024-06-27 | End: 2024-06-27

## 2024-06-27 RX ORDER — NALOXONE HYDROCHLORIDE 0.4 MG/ML
0.08 INJECTION, SOLUTION INTRAMUSCULAR; INTRAVENOUS; SUBCUTANEOUS AS NEEDED
Status: DISCONTINUED | OUTPATIENT
Start: 2024-06-27 | End: 2024-06-27

## 2024-06-27 RX ORDER — DOCUSATE SODIUM 100 MG/1
100 CAPSULE, LIQUID FILLED ORAL 2 TIMES DAILY
Qty: 30 CAPSULE | Refills: 1 | Status: SHIPPED | OUTPATIENT
Start: 2024-06-27

## 2024-06-27 RX ORDER — HYDROMORPHONE HYDROCHLORIDE 1 MG/ML
0.2 INJECTION, SOLUTION INTRAMUSCULAR; INTRAVENOUS; SUBCUTANEOUS EVERY 5 MIN PRN
Status: DISCONTINUED | OUTPATIENT
Start: 2024-06-27 | End: 2024-06-27

## 2024-06-27 RX ORDER — OXYCODONE HYDROCHLORIDE 5 MG/1
10 TABLET ORAL ONCE AS NEEDED
Status: COMPLETED | OUTPATIENT
Start: 2024-06-27 | End: 2024-06-27

## 2024-06-27 RX ORDER — HYDROMORPHONE HYDROCHLORIDE 1 MG/ML
0.4 INJECTION, SOLUTION INTRAMUSCULAR; INTRAVENOUS; SUBCUTANEOUS EVERY 5 MIN PRN
Status: DISCONTINUED | OUTPATIENT
Start: 2024-06-27 | End: 2024-06-27

## 2024-06-27 RX ORDER — ONDANSETRON 2 MG/ML
4 INJECTION INTRAMUSCULAR; INTRAVENOUS EVERY 6 HOURS PRN
Status: DISCONTINUED | OUTPATIENT
Start: 2024-06-27 | End: 2024-06-27

## 2024-06-27 RX ORDER — ONDANSETRON 4 MG/1
4 TABLET, FILM COATED ORAL EVERY 8 HOURS PRN
Qty: 12 TABLET | Refills: 0 | Status: SHIPPED | OUTPATIENT
Start: 2024-06-27

## 2024-06-27 RX ORDER — LIDOCAINE HYDROCHLORIDE AND EPINEPHRINE 10; 10 MG/ML; UG/ML
INJECTION, SOLUTION INFILTRATION; PERINEURAL AS NEEDED
Status: DISCONTINUED | OUTPATIENT
Start: 2024-06-27 | End: 2024-06-27 | Stop reason: HOSPADM

## 2024-06-27 RX ORDER — GLYCOPYRROLATE 0.2 MG/ML
INJECTION, SOLUTION INTRAMUSCULAR; INTRAVENOUS AS NEEDED
Status: DISCONTINUED | OUTPATIENT
Start: 2024-06-27 | End: 2024-06-27 | Stop reason: SURG

## 2024-06-27 RX ORDER — MIDAZOLAM HYDROCHLORIDE 1 MG/ML
1 INJECTION INTRAMUSCULAR; INTRAVENOUS EVERY 5 MIN PRN
Status: DISCONTINUED | OUTPATIENT
Start: 2024-06-27 | End: 2024-06-27

## 2024-06-27 RX ORDER — HYDROCODONE BITARTRATE AND ACETAMINOPHEN 10; 325 MG/1; MG/1
TABLET ORAL
Qty: 20 TABLET | Refills: 0 | Status: SHIPPED | OUTPATIENT
Start: 2024-06-27 | End: 2024-07-01

## 2024-06-27 RX ORDER — LIDOCAINE HYDROCHLORIDE 10 MG/ML
INJECTION, SOLUTION EPIDURAL; INFILTRATION; INTRACAUDAL; PERINEURAL AS NEEDED
Status: DISCONTINUED | OUTPATIENT
Start: 2024-06-27 | End: 2024-06-27 | Stop reason: SURG

## 2024-06-27 RX ORDER — SODIUM CHLORIDE, SODIUM LACTATE, POTASSIUM CHLORIDE, CALCIUM CHLORIDE 600; 310; 30; 20 MG/100ML; MG/100ML; MG/100ML; MG/100ML
INJECTION, SOLUTION INTRAVENOUS CONTINUOUS
Status: DISCONTINUED | OUTPATIENT
Start: 2024-06-27 | End: 2024-06-27

## 2024-06-27 RX ORDER — PROCHLORPERAZINE EDISYLATE 5 MG/ML
5 INJECTION INTRAMUSCULAR; INTRAVENOUS EVERY 8 HOURS PRN
Status: DISCONTINUED | OUTPATIENT
Start: 2024-06-27 | End: 2024-06-27

## 2024-06-27 RX ORDER — ACETAMINOPHEN 10 MG/ML
INJECTION, SOLUTION INTRAVENOUS AS NEEDED
Status: DISCONTINUED | OUTPATIENT
Start: 2024-06-27 | End: 2024-06-27 | Stop reason: SURG

## 2024-06-27 RX ORDER — DEXAMETHASONE SODIUM PHOSPHATE 4 MG/ML
VIAL (ML) INJECTION AS NEEDED
Status: DISCONTINUED | OUTPATIENT
Start: 2024-06-27 | End: 2024-06-27 | Stop reason: SURG

## 2024-06-27 RX ORDER — HYDROMORPHONE HYDROCHLORIDE 1 MG/ML
INJECTION, SOLUTION INTRAMUSCULAR; INTRAVENOUS; SUBCUTANEOUS
Status: COMPLETED
Start: 2024-06-27 | End: 2024-06-27

## 2024-06-27 RX ORDER — CYCLOBENZAPRINE HCL 10 MG
10 TABLET ORAL 3 TIMES DAILY
Qty: 90 TABLET | Refills: 0 | Status: SHIPPED | OUTPATIENT
Start: 2024-06-27

## 2024-06-27 RX ORDER — LIDOCAINE HYDROCHLORIDE 40 MG/ML
INJECTION, SOLUTION RETROBULBAR AS NEEDED
Status: DISCONTINUED | OUTPATIENT
Start: 2024-06-27 | End: 2024-06-27 | Stop reason: SURG

## 2024-06-27 RX ORDER — ONDANSETRON 2 MG/ML
INJECTION INTRAMUSCULAR; INTRAVENOUS AS NEEDED
Status: DISCONTINUED | OUTPATIENT
Start: 2024-06-27 | End: 2024-06-27 | Stop reason: SURG

## 2024-06-27 RX ORDER — DIPHENHYDRAMINE HYDROCHLORIDE 50 MG/ML
12.5 INJECTION INTRAMUSCULAR; INTRAVENOUS AS NEEDED
Status: DISCONTINUED | OUTPATIENT
Start: 2024-06-27 | End: 2024-06-27

## 2024-06-27 RX ADMIN — ONDANSETRON 4 MG: 2 INJECTION INTRAMUSCULAR; INTRAVENOUS at 13:13:00

## 2024-06-27 RX ADMIN — SODIUM CHLORIDE, SODIUM LACTATE, POTASSIUM CHLORIDE, CALCIUM CHLORIDE: 600; 310; 30; 20 INJECTION, SOLUTION INTRAVENOUS at 13:20:00

## 2024-06-27 RX ADMIN — KETAMINE HYDROCHLORIDE 10 MG: 50 INJECTION, SOLUTION INTRAMUSCULAR; INTRAVENOUS at 11:43:00

## 2024-06-27 RX ADMIN — ROCURONIUM BROMIDE 40 MG: 10 INJECTION, SOLUTION INTRAVENOUS at 11:39:00

## 2024-06-27 RX ADMIN — LIDOCAINE HYDROCHLORIDE 50 MG: 10 INJECTION, SOLUTION EPIDURAL; INFILTRATION; INTRACAUDAL; PERINEURAL at 11:39:00

## 2024-06-27 RX ADMIN — SODIUM CHLORIDE, SODIUM LACTATE, POTASSIUM CHLORIDE, CALCIUM CHLORIDE: 600; 310; 30; 20 INJECTION, SOLUTION INTRAVENOUS at 11:37:00

## 2024-06-27 RX ADMIN — ACETAMINOPHEN 1000 MG: 10 INJECTION, SOLUTION INTRAVENOUS at 13:13:00

## 2024-06-27 RX ADMIN — GLYCOPYRROLATE 0.4 MG: 0.2 INJECTION, SOLUTION INTRAMUSCULAR; INTRAVENOUS at 13:13:00

## 2024-06-27 RX ADMIN — NEOSTIGMINE METHYLSULFATE 3.5 MG: 1 INJECTION, SOLUTION INTRAVENOUS at 13:13:00

## 2024-06-27 RX ADMIN — MIDAZOLAM HYDROCHLORIDE 2 MG: 1 INJECTION INTRAMUSCULAR; INTRAVENOUS at 11:36:00

## 2024-06-27 RX ADMIN — LIDOCAINE HYDROCHLORIDE 4 ML: 40 INJECTION, SOLUTION RETROBULBAR at 11:39:00

## 2024-06-27 RX ADMIN — DEXAMETHASONE SODIUM PHOSPHATE 8 MG: 4 MG/ML VIAL (ML) INJECTION at 11:43:00

## 2024-06-27 NOTE — ANESTHESIA POSTPROCEDURE EVALUATION
[facility]    Pratik Frances Patient Status:  Hospital Outpatient Surgery   Age/Gender 38 year old female MRN [id number]   Location [facility] SURGERY Attending Benito Levin MD   Hosp Day # 0 PCP Venkata Cartwright MD       Anesthesia Post-op Note    Autologous fat grafting to the bilateral reconstruction breasts    Procedure Summary       Date: 06/27/24 Room / Location:  MAIN OR 03 / [facility] MAIN OR    Anesthesia Start: 1136 Anesthesia Stop: 1325    Procedure: Autologous fat grafting to the bilateral reconstruction breasts (Bilateral: Breast) Diagnosis:       Breast cancer, stage 1, estrogen receptor positive, right (HCC)      (Breast cancer, stage 1, estrogen receptor positive, right (HCC) [C50.911, Z17.0])    Surgeons: Benito Levin MD Anesthesiologist: Hector Saldana MD    Anesthesia Type: general ASA Status: 2            Anesthesia Type: general    Vitals Value Taken Time   /92 06/27/24 1329   Temp 98.4 06/27/24 1329   Pulse 87 06/27/24 1329   Resp 18 06/27/24 1329   SpO2 97% 06/27/24 1329       Patient Location: PACU    Anesthesia Type: general    Airway Patency: patent    Postop Pain Control: adequate    Mental Status: mildly sedated but able to meaningfully participate in the post-anesthesia evaluation    Nausea/Vomiting: none    Cardiopulmonary/Hydration status: stable euvolemic    Complications: no apparent anesthesia related complications    Postop vital signs: stable    Dental Exam: Unchanged from Preop    Patient to be discharged from PACU when criteria met.

## 2024-06-27 NOTE — BRIEF OP NOTE
Pre-Operative Diagnosis: Breast cancer, stage 1, estrogen receptor positive, right (HCC) [C50.911, Z17.0]     Post-Operative Diagnosis: Breast cancer, stage 1, estrogen receptor positive, right (HCC) [C50.911, Z17.0]      Procedure Performed:   Autologous fat grafting to the bilateral reconstruction breasts    Surgeons and Role:     * Benito Levin MD - Primary    Assistant(s):  APN: Lala Contreras APRN     Surgical Findings: See dictation     Specimen: Left breast tissue     Estimated Blood Loss: <5ml    Benito Levin MD  6/27/2024  1:21 PM

## 2024-06-27 NOTE — ANESTHESIA PROCEDURE NOTES
Airway  Date/Time: 6/27/2024 11:41 AM  Urgency: elective      General Information and Staff    Patient location during procedure: OR  Anesthesiologist: Hector Saldana MD  Resident/CRNA: Jhonathan Garza CRNA  Performed: CRNA   Performed by: Jhonathan Garza CRNA  Authorized by: Hector Saldana MD      Indications and Patient Condition  Indications for airway management: anesthesia  Sedation level: deep  Preoxygenated: yes  Patient position: sniffing  Mask difficulty assessment: 1 - vent by mask    Final Airway Details  Final airway type: endotracheal airway      Successful airway: ETT  Cuffed: yes   Successful intubation technique: direct laryngoscopy  Endotracheal tube insertion site: oral  Blade: Silva  Blade size: #3  ETT size (mm): 7.0    Cormack-Lehane Classification: grade I - full view of glottis  Placement verified by: capnometry   Measured from: lips  ETT to lips (cm): 21  Number of attempts at approach: 1

## 2024-06-27 NOTE — H&P
No change in Dr. Ritchie's H&P from 6/10.  Patient reports a superficial ulceration of her left breast.  We discussed debridement and closure of the left breast wound at the time of fat grafting.  We reviewed the plan for fat grafting to bilateral breasts. The nature procedure was reviewed with the patient. We reviewed the risks of surgery including but not limited to bleeding, infection, scarring, delayed wound healing, asymmetry, implant infection or extrusion requiring removal, injury to intra-abdominal structures, cysts or calcifications requiring biopsy, and need for further surgery. We discussed expected postoperative course including need for drains, activity limitation, and compression. Multiple questions were answered to patient's satisfaction. No guarantees as to outcome were offered. The patient expresses understanding and wishes to proceed.

## 2024-06-27 NOTE — DISCHARGE INSTRUCTIONS
Plastic Surgery Home Care Instructions      We hope you were pleased with your care at [facility].  We wish you the best outcome and overall experience with your operation.  These instructions will help to minimize pain, optimize healing, and improve the likelihood of a successful result.    What To Expect  There will be some spotting of the incision lines for the next few days.  Your breasts will be swollen and might feel congested for the next 1-2 weeks  Temporary areas of numbness are typical in the early weeks following a breast procedure.  Normalization of sensation can typically take up to several months following the operation.  Mild bruising, mild swelling and discomfort in the areas of fat grafting is typical.   Please DO NOT apply heat or ice to the affected area    Bandages (Dressing)  Keep dressings clean and dry  Do not remove your bra unless for hygiene purposes - compression is key - especially at night. You can change to a supportive sports bra or camilsole if this provides good compression and you are more comfortable in that rather than the surgical bra. No underwire.   You are to wear your bra 24/7 for 6 weeks post-operatively.   Reinforce the dressing with insertion of additional padding as needed - this is not required - for your comfort only  Leave white steri-strips in place over incisions.  Wear abdominal binder, foam and ace wraps at all times for at least 3 days after surgery. After this, you can change to your own compression garments if they area more comfortable (such as Spanx etc). Wear some sort of abdominal and thigh compression at all times for at least 7 days after surgery. After 7 days, abdominal and thigh compression is not medically necessary, but compression can continue to help with swelling and prevent contour irregularities when worn for 6 weeks postoperatively.     Bathing/Showers  You can resume showering tomorrow. Let the soap and water run over incisions, do not  scrub.   No baths, swimming, or hot tubs until you receive medical permission    Pain Medication: Norco  Take one or two tablets every six hours as needed for pain.  Do not take narcotics, if you do not have pain. You can take Tylenol if you are not taking Norco. DO NOT take both Tylenol and Norco together as there is already Tylenol in the Norco.  Keep stools soft.  Take a stool softener (Metamucil, Milk of Magnesia, Colace).  Eating fruit will also help to prevent constipation    Over-The-Counter Medication  Non-prescription anti-inflammatory medications can also help to ease the pain.  You can take Aleve or ibuprofen starting 48 hours after surgery  Take as directed on the bottle  Drink a full glass of water with the medication    Home Medication  Resume your home medications as instructed  Do not resume herbal medications for two weeks    Diet  Resume your normal diet    Activity  No strenuous activity or heavy lifting (no lifting over 10 pounds)  No activities that involve your pectoralis muscles (no planks, push-ups, etc)  You can go up and down the stairs as tolerated.   You cannot return to work, if your work requires strenuous activity.  You cannot return to physical exercise, sports, or gym workouts until you are allowed to participate in strenuous activity.    Driving  Do not drive, if you are taking pain medication.    Return to Work or School  You can return to work when you are not taking pain medication, if your work does not involve strenuous activity.  Contact the office, if you need a medical note.     Follow-up Appointment   Our office will call you to set up a time    Questions or Concerns  Call Dr. Levin's office if you experience severe pain not controlled by pain medication, swelling, numbness, tingling, bleeding, fever, or other concerns.   If he is not available, another physician will be able to address your concerns.    Pratik     Thank you for coming to [facility] for your  operation.  The nurses and the anesthesiologist try very hard to make sure you receive the best care possible.  Your trust in them is greatly appreciated.      Thanks so much,  Dr. Ollie Contreras, ADRIENNEP-C

## 2024-06-27 NOTE — ANESTHESIA PREPROCEDURE EVALUATION
PRE-OP EVALUATION    Patient Name: Pratik Frances    Admit Diagnosis: Breast cancer, stage 1, estrogen receptor positive, right (HCC) [C50.911, Z17.0]    Pre-op Diagnosis: Breast cancer, stage 1, estrogen receptor positive, right (HCC) [C50.911, Z17.0]    Autologous fat grafting to the bilateral reconstruction breasts    Anesthesia Procedure: Autologous fat grafting to the bilateral reconstruction breasts (Bilateral: Breast)    Surgeons and Role:     * Benito Levin MD - Primary    Pre-op vitals reviewed.  Temp: 98.1 °F (36.7 °C)  Pulse: 77  Resp: 16  BP: 125/79  SpO2: 98 %  Body mass index is 31.78 kg/m².    Current medications reviewed.  Hospital Medications:   acetaminophen (Tylenol Extra Strength) tab 1,000 mg  1,000 mg Oral Once    lactated ringers infusion   Intravenous Continuous    ceFAZolin (Ancef) 2g in 10mL IV syringe premix  2 g Intravenous Once    EPINEPHrine (Adrenalin) 1 mg, lidocaine (Xylocaine) 1 % 50 mL in lactated ringers 1,000 mL tumescent mixture/irrigation   Infiltration Once (Intra-Op)       Outpatient Medications:     Medications Prior to Admission   Medication Sig Dispense Refill Last Dose    HYDROcodone-acetaminophen (NORCO)  MG Oral Tab Take 1 to 2 tabs po q 4 to 6 hrs prn for pain 112 tablet 0 6/27/2024    cyclobenzaprine 10 MG Oral Tab Take 1 tablet (10 mg total) by mouth 3 (three) times daily. (Patient taking differently: Take 1 tablet (10 mg total) by mouth 3 (three) times daily as needed.) 90 tablet 1 Past Week    butalbital-acetaminophen-caffeine -40 MG Oral Tab Take 1 tablet by mouth every 8 (eight) hours as needed for Headaches. 30 tablet 0 Past Month       Allergies: Aspirin, Codeine, Dust mite extract, Grass, Adhesive tape, and Chlorhexidine gluconate cloth      Anesthesia Evaluation    Patient summary reviewed.    Anesthetic Complications  (-) history of anesthetic complications         GI/Hepatic/Renal    Negative GI/hepatic/renal ROS.                              Cardiovascular  Comment: Echo ():  Conclusions:     1. Left ventricle: Global longitudinal strain (GLS) was -18.2%. The cavity      size was normal. Wall thickness was normal. Systolic function was normal.      The estimated ejection fraction was 60-65%, by visual assessment. No      diagnostic evidence for regional wall motion abnormalities. Left      ventricular diastolic function parameters were normal.   2. Aortic valve: Transvalvular velocity was within the normal range. There      was no evidence for stenosis. The peak systolic velocity was 1.54m/sec.      The mean systolic gradient was 5mm Hg. The valve area (VTI) was 2.49cm^2.      The valve area (VTI) index was 1.32cm^2/m^2.   Impressions:  This study is compared with previous dated 2023: No   significant change since prior study.       ECG reviewed.  Exercise tolerance: good         (+) obesity  (+) hypertension and well controlled  (+) hyperlipidemia               (+) dysrhythmias and PVC                  Endo/Other    Negative endo/other ROS.                              Pulmonary  Comment: Former tobacco abuse                         Neuro/Psych  Comment: Peripheral neuropathy                 (+) headaches (Migraines)           Breast cancer        Past Surgical History:   Procedure Laterality Date    Appendectomy      Eye surgery      Insertion of access port      Blair biopsy stereo nodule 1 site right (cpt=19081)  2022    clip top hat    Mastectomy bra Bilateral     Mastectomy left      Mastectomy right      Needle biopsy left  2022    MRI 2 site    Needle biopsy right  2022    us guided bx          Other surgical history      Excision lipoma of left forearm    Other surgical history      IUD placement    Other surgical history      eye surgery    Other surgical history  2024    Second stage reconstruction with removal of bilateral breast tissue expanders, placement of permanent implants - Bilateral     Tonsillectomy       Social History     Socioeconomic History    Marital status:    Tobacco Use    Smoking status: Former     Current packs/day: 0.00     Average packs/day: 0.5 packs/day for 2.0 years (1.0 ttl pk-yrs)     Types: Cigarettes     Start date: 1998     Quit date: 2000     Years since quittin.5     Passive exposure: Past    Smokeless tobacco: Never   Vaping Use    Vaping status: Never Used   Substance and Sexual Activity    Alcohol use: Yes     Alcohol/week: 0.0 standard drinks of alcohol     Comment: Rarely    Drug use: No   Other Topics Concern    Caffeine Concern No     History   Drug Use No     Available pre-op labs reviewed.  Lab Results   Component Value Date    WBC 7.0 06/10/2024    RBC 3.93 06/10/2024    HGB 13.2 06/10/2024    HCT 38.7 06/10/2024    MCV 98.5 06/10/2024    MCH 33.6 06/10/2024    MCHC 34.1 06/10/2024    RDW 13.4 06/10/2024    .0 06/10/2024     Lab Results   Component Value Date     06/10/2024    K 4.7 06/10/2024     06/10/2024    CO2 25.0 06/10/2024    BUN 17 06/10/2024    CREATSERUM 0.75 06/10/2024     (H) 06/10/2024    CA 9.4 06/10/2024            Airway      Mallampati: II  Mouth opening: 3 FB  TM distance: 4 - 6 cm  Neck ROM: full Cardiovascular    Cardiovascular exam normal.  Rhythm: regular  Rate: normal  (-) murmur   Dental    Dentition appears grossly intact         Pulmonary    Pulmonary exam normal.  Breath sounds clear to auscultation bilaterally.               Other findings              ASA: 2   Plan: general  NPO status verified and patient meets guidelines.    Post-procedure pain management plan discussed with surgeon and patient.      Plan/risks discussed with: patient              Present on Admission:  **None**

## 2024-06-27 NOTE — TELEPHONE ENCOUNTER
Please review; protocol failed/No Protocol    Last Office Visit: 06/10/2024    Requested Prescriptions   Pending Prescriptions Disp Refills    cyclobenzaprine 10 MG Oral Tab 90 tablet 1     Sig: Take 1 tablet (10 mg total) by mouth 3 (three) times daily.       There is no refill protocol information for this order          Recent Outpatient Visits              2 weeks ago Preop general physical exam    [facility], [place] Venkata Cartwright MD    Office Visit    3 weeks ago Malignant neoplasm of right breast in female, estrogen receptor positive, unspecified site of breast (HCC)    [facility] Hematology Oncology Tila Lopez MD    Office Visit    1 month ago Insect bite of neck, initial encounter    [facility], [place] Venkata Cartwright MD    Office Visit    1 month ago [facility]    [facility] Visit    1 month ago [facility] of both breasts    [facility]    Office Visit

## 2024-06-28 NOTE — OPERATIVE REPORT
[facility]    PATIENT'S NAME: REGULO RIVERA   ATTENDING PHYSICIAN: Benito Levin M.D.   OPERATING PHYSICIAN: Benito Levin M.D.   PATIENT ACCOUNT#:   [id number]    LOCATION:  [facility] 1 [facility] 10  MEDICAL RECORD #:   [id number]       YOB: 1985  ADMISSION DATE:       06/27/2024      OPERATION DATE:  06/27/2024    OPERATIVE REPORT      PREOPERATIVE DIAGNOSIS:    1.   History of bilateral mastectomies and implant reconstruction.    2.   Superficial ulceration, left breast.  POSTOPERATIVE DIAGNOSIS:    1.   History of bilateral mastectomies and implant reconstruction.    2.   Superficial ulceration, left breast.  PROCEDURE:    1.   Debridement of left breast wound with excised diameter of 1 cm and layered closure totaling 5 cm.   2.   Autologous fat grafting to bilateral reconstructed breasts.    ASSISTANT:  ISHA Joshi.    ANESTHESIA:  General.    ESTIMATED BLOOD LOSS:  Minimal.    COMPLICATIONS:  None.    INDICATIONS:  Patient is a 38-year-old female who previously underwent bilateral Wise pattern mastectomy and tissue expander reconstruction.  She completed uncomplicated expansion as well as exchange of permanent implants.  She now presents for fat grafting.  She was noted earlier this week to have an area of ulceration at the inferior aspect of her left breast scar.  As such, we discussed debridement and re-closure of this wound at the time of fat grafting.    OPERATIVE TECHNIQUE:  Informed consent was obtained from the patient.  The risks, benefits, and alternatives were reviewed with the patient preoperatively.  She expressed understanding and wished to proceed.  The patient was marked in the preoperative holding area in the upright position.  The midline and inframammary folds were marked.  Areas to be fat grafted in the superior medial aspect of bilateral breasts were marked.  The patient was then taken to the operating room, properly identified, and placed in supine  position.  Sequential compression devices were placed on bilateral lower extremities.  Intravenous antibiotic prophylaxis was administered.  The patient then underwent successful induction of general anesthesia and endotracheal intubation.  The arms were placed abducted on foam-padded cradles and loosely secured with Kerlix.  The chest and abdomen were prepped and draped sterilely.  The proposed liposuction port site at the superior umbilical border was infiltrated with 1% lidocaine with epinephrine.  A stab incision was then created and 1 L of tumescent solution was infiltrated in the upper abdomen and flanks.  While tumescent was taking effect, attention was turned to the left breast.  The wound was encompassed in a vertically-oriented ellipse.  It was infiltrated with 1% lidocaine with epinephrine.  The ellipse was excised and sent for permanent pathologic analysis.  The underlying capsule was plicated with 3-0 Vicryl sutures.  The deep dermis was reapproximated with 3-0 Vicryl, and the skin was closed with 4-0 Monocryl subcuticular suture.  Next, attention was then turned to the abdomen.  Using a 4 mm Mercedes tip cannula, power-assisted liposuction of the upper abdomen and flanks was performed.  Approximately 175 mL of lipoaspirate were collected using the Revolve system.  The fat was processed in the usual fashion, and the liposuction port site was closed with a 3-0 Vicryl deep dermal suture.  The stab incisions at the superior aspect of the vertical scar bilaterally, fat was grafted to bilateral breasts with the patient in the upright position.  A total of 30 mL were grafted to the right breast and 50 mL were grafted to the left breast.  The liposuction port sites were closed with 5-0 chromic sutures.  Dermabond and Steri-Strips were placed on the incision.  Fluff gauze and a surgical bra were placed on the breasts.  TopiFoam and an abdominal binder were placed on the abdomen.  The patient was awakened,  extubated, and taken to the recovery area in stable condition.  There were no operative complications.  All needle, sponge, and instrument counts were correct at the end of the procedure.    Dictated By Benito Levin M.D.  d: 06/27/2024 13:30:58  t: 06/27/2024 20:43:54  [facility] [id number]  [facility]/

## 2024-07-01 ENCOUNTER — TELEPHONE (OUTPATIENT)
Dept: INTERNAL MEDICINE CLINIC | Facility: CLINIC | Age: 39
End: 2024-07-01

## 2024-07-01 DIAGNOSIS — G89.29 CHRONIC BILATERAL LOW BACK PAIN, UNSPECIFIED WHETHER SCIATICA PRESENT: ICD-10-CM

## 2024-07-01 DIAGNOSIS — M54.50 CHRONIC BILATERAL LOW BACK PAIN, UNSPECIFIED WHETHER SCIATICA PRESENT: ICD-10-CM

## 2024-07-01 RX ORDER — HYDROCODONE BITARTRATE AND ACETAMINOPHEN 10; 325 MG/1; MG/1
TABLET ORAL
Qty: 112 TABLET | Refills: 0 | Status: SHIPPED | OUTPATIENT
Start: 2024-07-01

## 2024-07-01 NOTE — TELEPHONE ENCOUNTER
Pt here to pickup norco script; going out of town later this week so will need to get script earlier. Will keep same dose since [place] just [place]

## 2024-07-08 ENCOUNTER — TELEPHONE (OUTPATIENT)
Dept: INTERNAL MEDICINE CLINIC | Facility: CLINIC | Age: 39
End: 2024-07-08

## 2024-07-12 ENCOUNTER — OFFICE VISIT (OUTPATIENT)
Dept: SURGERY | Facility: CLINIC | Age: 39
End: 2024-07-12
Payer: COMMERCIAL

## 2024-07-12 DIAGNOSIS — Z90.13 S/P BILATERAL MASTECTOMY: Primary | ICD-10-CM

## 2024-07-12 NOTE — PROGRESS NOTES
Pratik Frances is a 38 year old female who presents today for a follow-up after  debridement of left breast wound with excised diameter of 1 cm and layered closure totaling 5 cm., autologous fat grafting to bilateral reconstructed breasts with Dr. Levin on 6/27/2024.    She denies fever and chills. She denies nausea, vomiting, diarrhea or constipation.   Her pain is controlled.      Physical Exam     Breasts: Bilateral breast fat grafting injection sites are clean, dry, intact.  No evidence of hematoma or seroma.  Left breast vertical incision is clean and dry.  Evidence of delayed superficial wound healing with a 0.5 cm diameter.  No evidence of open wound, wound drainage, malodor, necrosis at this time.    Abdomen: Soft and non-tender.    There were no vitals filed for this visit.      Assessment and Plan     Pratik Frances is doing well s/p debridement of left breast wound with excised diameter of 1 cm and layered closure totaling 5 cm., autologous fat grafting to bilateral reconstructed breasts with Dr. Levin on 6/27/2024.    Here today for wound check.  The bilateral breast fat grafting injection sites were left open to air today.  The left breast incision was redressed with Xeroform and Telfa.  Patient will change this dressing daily and use mupirocin ointment.  Dressing care instructions were reviewed with the patient.  She will send a follow-up photo on 7-19.  She is then following up with Dr. Levin for wound check on 8-27.    She was encouraged to contact the office with any questions or concerns.    Questions were answered. Patient understands.     ISHA Joshi  7/12/2024  10:02 AM

## 2024-07-15 ENCOUNTER — TELEPHONE (OUTPATIENT)
Dept: INTERNAL MEDICINE CLINIC | Facility: CLINIC | Age: 39
End: 2024-07-15

## 2024-07-15 DIAGNOSIS — G89.29 CHRONIC BILATERAL LOW BACK PAIN, UNSPECIFIED WHETHER SCIATICA PRESENT: ICD-10-CM

## 2024-07-15 DIAGNOSIS — M54.50 CHRONIC BILATERAL LOW BACK PAIN, UNSPECIFIED WHETHER SCIATICA PRESENT: ICD-10-CM

## 2024-07-15 RX ORDER — HYDROCODONE BITARTRATE AND ACETAMINOPHEN 10; 325 MG/1; MG/1
TABLET ORAL
Qty: 84 TABLET | Refills: 0 | Status: SHIPPED | OUTPATIENT
Start: 2024-07-15

## 2024-07-18 DIAGNOSIS — Z90.13 S/P BILATERAL MASTECTOMY: Primary | ICD-10-CM

## 2024-07-18 RX ORDER — CEPHALEXIN 500 MG/1
500 CAPSULE ORAL 4 TIMES DAILY
Qty: 40 CAPSULE | Refills: 1 | Status: SHIPPED | OUTPATIENT
Start: 2024-07-18

## 2024-07-26 ENCOUNTER — TELEPHONE (OUTPATIENT)
Dept: INTERNAL MEDICINE CLINIC | Facility: CLINIC | Age: 39
End: 2024-07-26

## 2024-07-26 DIAGNOSIS — G43.009 MIGRAINE WITHOUT AURA AND WITHOUT STATUS MIGRAINOSUS, NOT INTRACTABLE: ICD-10-CM

## 2024-07-26 DIAGNOSIS — G89.29 CHRONIC BILATERAL LOW BACK PAIN, UNSPECIFIED WHETHER SCIATICA PRESENT: ICD-10-CM

## 2024-07-26 DIAGNOSIS — M54.50 CHRONIC BILATERAL LOW BACK PAIN, UNSPECIFIED WHETHER SCIATICA PRESENT: ICD-10-CM

## 2024-07-26 RX ORDER — HYDROCODONE BITARTRATE AND ACETAMINOPHEN 10; 325 MG/1; MG/1
TABLET ORAL
Qty: 84 TABLET | Refills: 0 | Status: SHIPPED | OUTPATIENT
Start: 2024-07-26

## 2024-07-30 NOTE — TELEPHONE ENCOUNTER
Please review.  Protocol failed / Has no protocol.    Cyclobenzaprine Recent fills each quantity 90 : 4/5, 4/27, 5/31, 6/28  taking at max usage TID, would be due 8/4/24  Last prescription written: 6/27/24  Last office visit: 6/10/24    butalbital-acetaminophen-caffeine Recent fills each quantity 30 : 4/5/24  Last prescription written: 4/4/24       Requested Prescriptions   Pending Prescriptions Disp Refills    butalbital-acetaminophen-caffeine -40 MG Oral Tab 30 tablet 0     Sig: Take 1 tablet by mouth every 8 (eight) hours as needed for Headaches.       Controlled Substance Medication Failed - 7/26/2024  7:41 AM        Failed - This medication is a controlled substance - forward to provider to refill          cyclobenzaprine 10 MG Oral Tab 90 tablet 0     Sig: Take 1 tablet (10 mg total) by mouth 3 (three) times daily.       There is no refill protocol information for this order        Future Appointments         Provider Department Appt Notes    In 4 weeks Benito Levin MD [facility] confirmed [place] LOCATION          Recent Outpatient Visits              2 weeks ago S/P bilateral mastectomy    [facility] Lala Contreras APRN    Office Visit    1 month ago [facility]    Office Visit    1 month ago Malignant neoplasm of right breast in female, estrogen receptor positive, unspecified site of breast (HCC)    [facility] Hematology Oncology Tila Lopez MD    Office Visit    2 months ago Insect bite of neck, initial encounter    [facility] Venkata Jeter MD    Office Visit    2 months ago Absence of both breasts    [facility] Benito Levin MD    Office Visit

## 2024-08-01 RX ORDER — BUTALBITAL, ACETAMINOPHEN AND CAFFEINE 50; 325; 40 MG/1; MG/1; MG/1
1 TABLET ORAL EVERY 8 HOURS PRN
Qty: 30 TABLET | Refills: 0 | Status: SHIPPED | OUTPATIENT
Start: 2024-08-01

## 2024-08-01 RX ORDER — CYCLOBENZAPRINE HCL 10 MG
10 TABLET ORAL 3 TIMES DAILY
Qty: 90 TABLET | Refills: 0 | Status: SHIPPED | OUTPATIENT
Start: 2024-08-01

## 2024-08-05 ENCOUNTER — HOSPITAL ENCOUNTER (OUTPATIENT)
Dept: GENERAL RADIOLOGY | Age: 39
Discharge: HOME | End: 2024-08-05
Attending: INTERNAL MEDICINE
Payer: COMMERCIAL

## 2024-08-05 ENCOUNTER — OFFICE VISIT (OUTPATIENT)
Dept: INTERNAL MEDICINE CLINIC | Facility: CLINIC | Age: 39
End: 2024-08-05

## 2024-08-05 ENCOUNTER — NURSE TRIAGE (OUTPATIENT)
Dept: INTERNAL MEDICINE CLINIC | Facility: CLINIC | Age: 39
End: 2024-08-05

## 2024-08-05 VITALS
WEIGHT: 184 LBS | BODY MASS INDEX: 30.66 KG/M2 | TEMPERATURE: 99 F | DIASTOLIC BLOOD PRESSURE: 76 MMHG | SYSTOLIC BLOOD PRESSURE: 120 MMHG | OXYGEN SATURATION: 98 % | HEART RATE: 89 BPM | HEIGHT: 65 IN

## 2024-08-05 DIAGNOSIS — G89.29 CHRONIC BILATERAL LOW BACK PAIN, UNSPECIFIED WHETHER SCIATICA PRESENT: ICD-10-CM

## 2024-08-05 DIAGNOSIS — M54.50 CHRONIC BILATERAL LOW BACK PAIN, UNSPECIFIED WHETHER SCIATICA PRESENT: ICD-10-CM

## 2024-08-05 DIAGNOSIS — R05.1 ACUTE COUGH: Primary | ICD-10-CM

## 2024-08-05 DIAGNOSIS — R05.1 ACUTE COUGH: ICD-10-CM

## 2024-08-05 PROCEDURE — 3074F SYST BP LT 130 MM HG: CPT | Performed by: INTERNAL MEDICINE

## 2024-08-05 PROCEDURE — 99213 OFFICE O/P EST LOW 20 MIN: CPT | Performed by: INTERNAL MEDICINE

## 2024-08-05 PROCEDURE — 3078F DIAST BP <80 MM HG: CPT | Performed by: INTERNAL MEDICINE

## 2024-08-05 PROCEDURE — 71046 X-RAY EXAM CHEST 2 VIEWS: CPT | Performed by: INTERNAL MEDICINE

## 2024-08-05 PROCEDURE — 3008F BODY MASS INDEX DOCD: CPT | Performed by: INTERNAL MEDICINE

## 2024-08-05 RX ORDER — TAMOXIFEN CITRATE 20 MG/1
TABLET ORAL
COMMUNITY
Start: 2024-06-10

## 2024-08-05 RX ORDER — HYDROCODONE BITARTRATE AND ACETAMINOPHEN 10; 325 MG/1; MG/1
TABLET ORAL
Qty: 112 TABLET | Refills: 0 | Status: SHIPPED | OUTPATIENT
Start: 2024-08-05

## 2024-08-05 NOTE — TELEPHONE ENCOUNTER
Future Appointments   Date Time Provider Department Center   8/5/2024  5:00 PM Venkata Cartwright MD [facility]     Patient was added to schedule accordingly. No further action from RN Triage.

## 2024-08-05 NOTE — TELEPHONE ENCOUNTER
[Per Dr. Cartwright add patient to schedule at 5 pm today]    I called patient and made her aware of above --> agreeable, however unable to schedule visit as I am not authorized. She will come into the office today at 5 pm    ONSITE STAFF - please assist with scheduling the visit, patient is aware and will come in at 5 pm as advised, thank you.

## 2024-08-05 NOTE — TELEPHONE ENCOUNTER
PREETI Johnson at [facility] was made aware of need to schedule advised visit at 5 pm today [no visit scheduled was seen]. She will make appropriate staff aware of added visit today.

## 2024-08-05 NOTE — TELEPHONE ENCOUNTER
Dr. Cartwright - please advise [can patient come into office now?]  Please reply to pool: EM RN TRIAGE  Action Requested: Summary for Provider     []  Critical Lab, Recommendations Needed  [x] Need Additional Advice  []   FYI    []   Need Orders  [] Need Medications Sent to Pharmacy  []  Other     SUMMARY: Cough that is productive and green sputum present. Temp of 100.1 F yesterday and patient had completed Chemotherapy several months ago. Same day office visit now advised --> agreeable, but no visits available and I made her aware I will request visit, she states she will take 10 minutes for commute to office. [See below for more details]     Reason for call: Sore Throat, Cough/URI, Fever, and Fatigue  Onset: 1 week    Reason for Disposition   Fever > 100.0 F (37.8 C) and has diabetes mellitus or a weak immune system (e.g., HIV positive, cancer chemotherapy, organ transplant, splenectomy, chronic steroids)    Protocols used: Cough-A-OH      Patient called states she tested for Covid 5 times and Negative. She reports a cough --> wet and productive and green. Denies any shortness of breath, chest pain, and/or chest tightness, wheezing. Sore Throat --> mild, some redness was present, denies any white patches. Able to open mouth completely. Denies any neck stiffness. Fatigue --> mild. Fever present --> last night temperature on forehead 100.1 F. She has a history of cancer and has completed chemotherapy several months. She has been taking Delsym OTC as directed. She also has incorporated hot showers for cough. Home Care Advice discussed, per protocol. Refer to system/assessment yes/no answers. Same day office visit now advised --> agreeable, but no visits available and I made her aware I will request visit, she states she will take 10 minutes for commute to office. Home Care Advice discussed, per protocol. Patient instructed any new or worsening symptoms [reviewed] seek immediate medical attention--> Emergency  Department. Patient verbalized understanding. No further questions or concerns at this time.

## 2024-08-05 NOTE — PROGRESS NOTES
Subjective:     Patient ID: Pratik Frances is a 39 year old female.    Cough  This is a new problem. The current episode started in the past 7 days. The problem has been unchanged. The cough is Productive of sputum. Associated symptoms include a fever, nasal congestion, postnasal drip and rhinorrhea. Pertinent negatives include no hemoptysis or shortness of breath. Associated symptoms comments: Tmax 100.1f. Nothing aggravates the symptoms. She has tried OTC cough suppressant and rest for the symptoms. The treatment provided no relief. There is no history of asthma or emphysema.       History/Other:   Review of Systems   Constitutional:  Positive for fever.   HENT:  Positive for congestion, postnasal drip and rhinorrhea.    Eyes: Negative.    Respiratory:  Positive for cough. Negative for hemoptysis and shortness of breath.    Cardiovascular: Negative.    Gastrointestinal: Negative.      Current Outpatient Medications   Medication Sig Dispense Refill    tamoxifen 20 MG Oral Tab       butalbital-acetaminophen-caffeine -40 MG Oral Tab Take 1 tablet by mouth every 8 (eight) hours as needed for Headaches. 30 tablet 0    cyclobenzaprine 10 MG Oral Tab Take 1 tablet (10 mg total) by mouth 3 (three) times daily. 90 tablet 0    HYDROcodone-acetaminophen (NORCO)  MG Oral Tab Take 1 to 2 tabs po q 4 to 6 hrs prn for pain 84 tablet 0    cephalexin 500 MG Oral Cap Take 1 capsule (500 mg total) by mouth 4 (four) times daily. (Patient not taking: Reported on 8/5/2024) 40 capsule 1    ondansetron (ZOFRAN) 4 mg tablet Take 1 tablet (4 mg total) by mouth every 8 (eight) hours as needed for Nausea. (Patient not taking: Reported on 8/5/2024) 12 tablet 0    docusate sodium 100 MG Oral Cap Take 1 capsule (100 mg total) by mouth 2 (two) times daily. (Patient not taking: Reported on 8/5/2024) 30 capsule 1     Allergies:  Allergies   Allergen Reactions    Aspirin SHORTNESS OF BREATH     Other reaction(s): ASPIRIN - throat  swelling    Codeine SWELLING     Other reaction(s): Swelling of hands and toes    Dust Mite Extract HIVES     Nasal congestion    Grass HIVES     Nasal congestion    Adhesive Tape RASH    Chlorhexidine Gluconate Cloth ITCHING     Burning; wipes only. Okay with plain chlorhexedine       Past Medical History:    Back pain    Back problem    Cancer (HCC)    breast cancer    COVID-2021-headache-not hospitalized    Essential hypertension    Gestational hypertension (HCC)    Herniated disc    Herniated lumbar intervertebral disc    Hx of migraine headaches    Hyperlipidemia    Mass of left forearm    Migraines    Obesity, unspecified    Personal history of antineoplastic chemotherapy    last dose 2024    Urethral stricture      Past Surgical History:   Procedure Laterality Date    Appendectomy      Eye surgery      Insertion of access port      Blair biopsy stereo nodule 1 site right (cpt=19081)  2022    clip top hat    Mastectomy bra Bilateral     Mastectomy left      Mastectomy right      Needle biopsy left  2022    MRI 2 site    Needle biopsy right  2022    us guided bx          Other surgical history      Excision lipoma of left forearm    Other surgical history      IUD placement    Other surgical history      eye surgery    Other surgical history  2024    Second stage reconstruction with removal of bilateral breast tissue expanders, placement of permanent implants - Bilateral    Tonsillectomy        Family History   Problem Relation Age of Onset    Heart Disease Other     Hypertension Other     Glaucoma Other     Heart Disease Mother     Lipids Mother     Hypertension Mother     Heart Disorder Mother     ADHD Sister     Lipids Father     Stroke Father     Other (Other) Brother         DJD spine    Breast Cancer Paternal Cousin Female 24        also had @ 34 and 41 (last was TNBC); neg genetics     Cancer Paternal Grandmother         lung ca; smoker    Dementia Paternal  Grandfather     Cancer Paternal Cousin Female 30        uterine ca (sister of cousin w/br ca)    Cancer Paternal Cousin Male 40        gastric ca (smoker); also leukemia @ 10      Social History:   Social History     Socioeconomic History    Marital status:    Tobacco Use    Smoking status: Former     Current packs/day: 0.00     Average packs/day: 0.5 packs/day for 2.0 years (1.0 ttl pk-yrs)     Types: Cigarettes     Start date: 1998     Quit date: 2000     Years since quittin.6     Passive exposure: Past    Smokeless tobacco: Never   Vaping Use    Vaping status: Never Used   Substance and Sexual Activity    Alcohol use: Yes     Alcohol/week: 0.0 standard drinks of alcohol     Comment: Rarely    Drug use: No   Other Topics Concern    Caffeine Concern No        Objective:   Physical Exam  Constitutional:       General: She is not in acute distress.     Appearance: She is obese. She is not ill-appearing, toxic-appearing or diaphoretic.   HENT:      Right Ear: Tympanic membrane, ear canal and external ear normal.      Left Ear: Tympanic membrane, ear canal and external ear normal.   Eyes:      General: No scleral icterus.        Right eye: No discharge.         Left eye: No discharge.      Conjunctiva/sclera: Conjunctivae normal.   Cardiovascular:      Rate and Rhythm: Normal rate and regular rhythm.      Heart sounds: Normal heart sounds. No murmur heard.  Pulmonary:      Effort: Pulmonary effort is normal. No respiratory distress.      Breath sounds: Normal breath sounds. No stridor. No wheezing or rales.   Musculoskeletal:      Cervical back: Normal range of motion and neck supple. No rigidity or tenderness.      Right lower leg: No edema.      Left lower leg: No edema.   Lymphadenopathy:      Cervical: No cervical adenopathy.   Skin:     Coloration: Skin is not jaundiced or pale.   Neurological:      Mental Status: She is alert.         Assessment & Plan:   (R05.1) Acute cough  (primary  encounter diagnosis)  Plan: XR CHEST PA + LAT CHEST (CPT=71046)        D/w pt; she already had several home covid tests done past few days which were negative per pt. I told her suspect she has acute bronchitis but did get cxr which showed lungs normal.(Pt denies being pregnant before I ordered cxr).  She had been taking her extra doses of norco to help her cough together with otc delsym which she may continue. No indicaton for abx. Pt told to call back if symptom persist/worsens.            No orders of the defined types were placed in this encounter.      Meds This Visit:  Requested Prescriptions      No prescriptions requested or ordered in this encounter       Imaging & Referrals:  None

## 2024-08-06 ENCOUNTER — TELEPHONE (OUTPATIENT)
Dept: INTERNAL MEDICINE CLINIC | Facility: CLINIC | Age: 39
End: 2024-08-06

## 2024-08-06 NOTE — TELEPHONE ENCOUNTER
Close reason: Other   Note from payer: Your PA request has been closed. HYDROCODONE BITARTRATE/ACETAMINOPHEN 10-325MG TAB #84/14 days // Mock Claim Paid //  M54.50-Low back pain, unspecified // No PA is required test claim pays, 3 Months out.  - [place],  08/06/2024 08:40 AM   Payer: LAZARO Tabor Case ID: [id number]    [phone]    [phone]

## 2024-08-07 ENCOUNTER — TELEPHONE (OUTPATIENT)
Dept: INTERNAL MEDICINE CLINIC | Facility: CLINIC | Age: 39
End: 2024-08-07

## 2024-08-07 NOTE — TELEPHONE ENCOUNTER
Verified name and  of patient.    Eun from [facility] pharmacy calling to confirm that Norco can be dispensed on 24- advised per Dr. Cartwright's notes on prescription as seen below that Dr. Cartwright has authorized this.      Date/Time Action Taken User Additional Information   24 Sign Venkata Cartwright MD Reorder from Order:[id number]   24 Taking Flag Checked Venkata Cartwright MD [id number]     Provider Information    Authorizing Provider Encounter Provider   Venkaat Cartwright MD Linchangco, Emmanuel, MD     Medication Detail    Medication Quantity Refills Start End   HYDROcodone-acetaminophen (NORCO)  MG Oral Tab 112 tablet 0 2024 --   Sig:   Take 1 to 2 tabs po q 4 to 6 hrs prn for pain     Route:   (none)     Note to Pharmacy:   Max 8 tabs/day; ok to fill on 24     Class:   Print Script     Earliest Fill Date:   2024     Order #:   [id number]

## 2024-08-16 ENCOUNTER — TELEPHONE (OUTPATIENT)
Dept: INTERNAL MEDICINE CLINIC | Facility: CLINIC | Age: 39
End: 2024-08-16

## 2024-08-16 DIAGNOSIS — M54.50 CHRONIC BILATERAL LOW BACK PAIN, UNSPECIFIED WHETHER SCIATICA PRESENT: ICD-10-CM

## 2024-08-16 DIAGNOSIS — G89.29 CHRONIC BILATERAL LOW BACK PAIN, UNSPECIFIED WHETHER SCIATICA PRESENT: ICD-10-CM

## 2024-08-16 RX ORDER — HYDROCODONE BITARTRATE AND ACETAMINOPHEN 10; 325 MG/1; MG/1
TABLET ORAL
Qty: 84 TABLET | Refills: 0 | Status: SHIPPED | OUTPATIENT
Start: 2024-08-20 | End: 2024-08-16

## 2024-08-16 RX ORDER — HYDROCODONE BITARTRATE AND ACETAMINOPHEN 10; 325 MG/1; MG/1
TABLET ORAL
Qty: 84 TABLET | Refills: 0 | Status: SHIPPED | OUTPATIENT
Start: 2024-08-20

## 2024-08-16 NOTE — TELEPHONE ENCOUNTER
Pt here to pickup script for norco refill; will cut down dose to 6/day; we also d/w pt regarding weaning off as d/w her before; told will do ov for thsi.

## 2024-08-27 ENCOUNTER — OFFICE VISIT (OUTPATIENT)
Dept: SURGERY | Facility: CLINIC | Age: 39
End: 2024-08-27
Payer: COMMERCIAL

## 2024-08-27 DIAGNOSIS — Z90.13 S/P BILATERAL MASTECTOMY: Primary | ICD-10-CM

## 2024-08-27 PROCEDURE — 99024 POSTOP FOLLOW-UP VISIT: CPT | Performed by: SURGERY

## 2024-08-27 NOTE — PROGRESS NOTES
Pratik Frances is a 39 year old female who presents today in follow-up after undergoing fat grafting to bilateral reconstructed breast on 6/27.  The patient has been performing local wound care to her left breast wound and reports significant improvement in the size of the wound.  She denies fevers or chills.    Physical Examination:  Breasts: The right breast incisions are clean dry and intact.  The left breast is noted to have a punctate wound at the midportion of the vertical scar which does not probe.  There is no purulence, malodor, or surrounding erythema.    Assessment and Plan:  We discussed continuing local wound care with topical antibiotic ointment applied daily.  The patient was instructed to call for fevers, chills, or erythema.  She will follow-up in 4 to 6 weeks for scar check.  The plan was reviewed with the patient and questions were answered.

## 2024-08-29 ENCOUNTER — TELEPHONE (OUTPATIENT)
Dept: INTERNAL MEDICINE CLINIC | Facility: CLINIC | Age: 39
End: 2024-08-29

## 2024-08-29 ENCOUNTER — APPOINTMENT (OUTPATIENT)
Dept: OBGYN | Age: 39
End: 2024-08-29

## 2024-08-29 VITALS
HEIGHT: 66 IN | HEART RATE: 81 BPM | DIASTOLIC BLOOD PRESSURE: 110 MMHG | TEMPERATURE: 98.1 F | OXYGEN SATURATION: 99 % | WEIGHT: 183 LBS | SYSTOLIC BLOOD PRESSURE: 154 MMHG | BODY MASS INDEX: 29.41 KG/M2

## 2024-08-29 DIAGNOSIS — G89.29 CHRONIC BILATERAL LOW BACK PAIN, UNSPECIFIED WHETHER SCIATICA PRESENT: ICD-10-CM

## 2024-08-29 DIAGNOSIS — Z11.3 SCREEN FOR STD (SEXUALLY TRANSMITTED DISEASE): ICD-10-CM

## 2024-08-29 DIAGNOSIS — Z85.3 HISTORY OF BREAST CANCER IN FEMALE: ICD-10-CM

## 2024-08-29 DIAGNOSIS — M54.50 CHRONIC BILATERAL LOW BACK PAIN, UNSPECIFIED WHETHER SCIATICA PRESENT: ICD-10-CM

## 2024-08-29 DIAGNOSIS — Z01.419 ENCOUNTER FOR GYNECOLOGICAL EXAMINATION (GENERAL) (ROUTINE) WITHOUT ABNORMAL FINDINGS: Primary | ICD-10-CM

## 2024-08-29 DIAGNOSIS — Z12.4 ENCOUNTER FOR SCREENING FOR MALIGNANT NEOPLASM OF CERVIX: ICD-10-CM

## 2024-08-29 DIAGNOSIS — N93.0 POSTCOITAL BLEEDING: ICD-10-CM

## 2024-08-29 RX ORDER — HYDROCODONE BITARTRATE AND ACETAMINOPHEN 10; 325 MG/1; MG/1
TABLET ORAL
COMMUNITY
Start: 2015-11-13

## 2024-08-29 RX ORDER — HYDROCODONE BITARTRATE AND ACETAMINOPHEN 10; 325 MG/1; MG/1
TABLET ORAL
Qty: 84 TABLET | Refills: 0 | Status: SHIPPED | OUTPATIENT
Start: 2024-08-29

## 2024-08-29 RX ORDER — BUTALBITAL, ACETAMINOPHEN AND CAFFEINE 50; 325; 40 MG/1; MG/1; MG/1
1 TABLET ORAL
COMMUNITY
Start: 2024-08-01

## 2024-08-29 RX ORDER — TAMOXIFEN CITRATE 20 MG/1
TABLET ORAL
COMMUNITY
Start: 2024-06-10

## 2024-08-29 RX ORDER — CYCLOBENZAPRINE HCL 10 MG
10 TABLET ORAL
COMMUNITY
Start: 2024-08-01

## 2024-08-29 ASSESSMENT — PATIENT HEALTH QUESTIONNAIRE - PHQ9
2. FEELING DOWN, DEPRESSED OR HOPELESS: NOT AT ALL
1. LITTLE INTEREST OR PLEASURE IN DOING THINGS: NOT AT ALL
SUM OF ALL RESPONSES TO PHQ9 QUESTIONS 1 AND 2: 0
CLINICAL INTERPRETATION OF PHQ2 SCORE: NO FURTHER SCREENING NEEDED
SUM OF ALL RESPONSES TO PHQ9 QUESTIONS 1 AND 2: 0

## 2024-08-29 NOTE — TELEPHONE ENCOUNTER
Prior Authorization History  HYDROcodone-acetaminophen (NORCO)  MG Oral Tab     Approval Details    Authorized from August 29, 2024 to February 25, 2025  Information received electronically from payer    Patient picked up paper script.

## 2024-08-29 NOTE — TELEPHONE ENCOUNTER
[place] this [place] so maypickup earlier.butnext refill date will be 9/15/24; pt also told will need to work on weaning down and will retry adjuvant med which she is agreeing to. She has apptment to see me in 2 to 3 weeks.

## 2024-08-30 ENCOUNTER — TELEPHONE (OUTPATIENT)
Dept: OBGYN | Age: 39
End: 2024-08-30

## 2024-08-30 LAB
C TRACH RRNA CVX QL NAA+PROBE: NEGATIVE
Lab: NORMAL
Lab: NORMAL
N GONORRHOEA RRNA CVX QL NAA+PROBE: NEGATIVE
T VAGINALIS RRNA CVX QL NAA+PROBE: NEGATIVE

## 2024-09-05 LAB
CASE RPRT: NORMAL
CYTOLOGY CVX/VAG STUDY: NORMAL
HPV16+18+45 E6+E7MRNA CVX NAA+PROBE: NEGATIVE
Lab: NORMAL
PAP EDUCATIONAL NOTE: NORMAL
SPECIMEN ADEQUACY: NORMAL

## 2024-09-05 RX ORDER — CYCLOBENZAPRINE HCL 10 MG
10 TABLET ORAL 3 TIMES DAILY
Qty: 90 TABLET | Refills: 0 | OUTPATIENT
Start: 2024-09-05

## 2024-09-05 RX ORDER — CYCLOBENZAPRINE HCL 10 MG
10 TABLET ORAL 3 TIMES DAILY
Qty: 90 TABLET | Refills: 0 | Status: SHIPPED | OUTPATIENT
Start: 2024-09-05

## 2024-09-05 NOTE — TELEPHONE ENCOUNTER
Please Review. Protocol Failed; No Protocol   LAST OFFICE VISIT 08/05/2024      Requested Prescriptions   Pending Prescriptions Disp Refills    CYCLOBENZAPRINE 10 MG Oral Tab [Pharmacy Med Name: CYCLOBENZAPRINE 10MG TABLETS] 90 tablet 0     Sig: TAKE 1 TABLET(10 MG) BY MOUTH THREE TIMES DAILY       There is no refill protocol information for this order            Future Appointments         Provider Department Appt Notes    In 3 weeks Venkata Cartwright MD [facility] Medication follow up          Recent Outpatient Visits              1 week ago S/P bilateral mastectomy    [facility] Benito Levin MD    [facility]    [facility]    1 month ago S/P bilateral mastectomy    [facility] Lala Contreras APRN    Office Visit    2 months ago Preop general physical exam    [facility], Venkata Jeter MD    Office Visit    3 months ago Malignant neoplasm of right breast in female, estrogen receptor positive, unspecified site of breast (HCC)    [facility] Tila Lopez MD    Office Visit

## 2024-09-12 ENCOUNTER — TELEPHONE (OUTPATIENT)
Dept: INTERNAL MEDICINE CLINIC | Facility: CLINIC | Age: 39
End: 2024-09-12

## 2024-09-12 DIAGNOSIS — G43.009 MIGRAINE WITHOUT AURA AND WITHOUT STATUS MIGRAINOSUS, NOT INTRACTABLE: ICD-10-CM

## 2024-09-12 DIAGNOSIS — G89.29 CHRONIC BILATERAL LOW BACK PAIN, UNSPECIFIED WHETHER SCIATICA PRESENT: ICD-10-CM

## 2024-09-12 DIAGNOSIS — M54.50 CHRONIC BILATERAL LOW BACK PAIN, UNSPECIFIED WHETHER SCIATICA PRESENT: ICD-10-CM

## 2024-09-12 RX ORDER — BUTALBITAL, ACETAMINOPHEN AND CAFFEINE 50; 325; 40 MG/1; MG/1; MG/1
1 TABLET ORAL EVERY 8 HOURS PRN
Qty: 30 TABLET | Refills: 0 | Status: SHIPPED | OUTPATIENT
Start: 2024-09-12

## 2024-09-12 RX ORDER — HYDROCODONE BITARTRATE AND ACETAMINOPHEN 10; 325 MG/1; MG/1
TABLET ORAL
Qty: 84 TABLET | Refills: 0 | Status: SHIPPED | OUTPATIENT
Start: 2024-09-14

## 2024-09-25 ENCOUNTER — TELEPHONE (OUTPATIENT)
Dept: INTERNAL MEDICINE CLINIC | Facility: CLINIC | Age: 39
End: 2024-09-25

## 2024-09-25 DIAGNOSIS — M54.50 CHRONIC BILATERAL LOW BACK PAIN, UNSPECIFIED WHETHER SCIATICA PRESENT: ICD-10-CM

## 2024-09-25 DIAGNOSIS — G89.29 CHRONIC BILATERAL LOW BACK PAIN, UNSPECIFIED WHETHER SCIATICA PRESENT: ICD-10-CM

## 2024-09-25 RX ORDER — HYDROCODONE BITARTRATE AND ACETAMINOPHEN 10; 325 MG/1; MG/1
TABLET ORAL
Qty: 84 TABLET | Refills: 0 | Status: SHIPPED | OUTPATIENT
Start: 2024-09-25

## 2024-10-07 ENCOUNTER — OFFICE VISIT (OUTPATIENT)
Dept: INTERNAL MEDICINE CLINIC | Facility: CLINIC | Age: 39
End: 2024-10-07

## 2024-10-07 ENCOUNTER — NURSE TRIAGE (OUTPATIENT)
Dept: INTERNAL MEDICINE CLINIC | Facility: CLINIC | Age: 39
End: 2024-10-07

## 2024-10-07 VITALS
HEART RATE: 88 BPM | WEIGHT: 184.5 LBS | SYSTOLIC BLOOD PRESSURE: 130 MMHG | DIASTOLIC BLOOD PRESSURE: 80 MMHG | BODY MASS INDEX: 31 KG/M2

## 2024-10-07 DIAGNOSIS — G89.29 CHRONIC BILATERAL LOW BACK PAIN, UNSPECIFIED WHETHER SCIATICA PRESENT: ICD-10-CM

## 2024-10-07 DIAGNOSIS — G43.009 MIGRAINE WITHOUT AURA AND WITHOUT STATUS MIGRAINOSUS, NOT INTRACTABLE: ICD-10-CM

## 2024-10-07 DIAGNOSIS — S99.921A INJURY OF TOE ON RIGHT FOOT, INITIAL ENCOUNTER: Primary | ICD-10-CM

## 2024-10-07 DIAGNOSIS — M54.50 CHRONIC BILATERAL LOW BACK PAIN, UNSPECIFIED WHETHER SCIATICA PRESENT: ICD-10-CM

## 2024-10-07 PROCEDURE — 99213 OFFICE O/P EST LOW 20 MIN: CPT | Performed by: INTERNAL MEDICINE

## 2024-10-07 PROCEDURE — 3079F DIAST BP 80-89 MM HG: CPT | Performed by: INTERNAL MEDICINE

## 2024-10-07 PROCEDURE — 3075F SYST BP GE 130 - 139MM HG: CPT | Performed by: INTERNAL MEDICINE

## 2024-10-07 RX ORDER — BUTALBITAL, ACETAMINOPHEN AND CAFFEINE 50; 325; 40 MG/1; MG/1; MG/1
1 TABLET ORAL EVERY 8 HOURS PRN
Qty: 30 TABLET | Refills: 0 | Status: SHIPPED | OUTPATIENT
Start: 2024-10-07

## 2024-10-07 RX ORDER — HYDROCODONE BITARTRATE AND ACETAMINOPHEN 10; 325 MG/1; MG/1
TABLET ORAL
Qty: 98 TABLET | Refills: 0 | Status: SHIPPED | OUTPATIENT
Start: 2024-10-07

## 2024-10-07 RX ORDER — HYDROCODONE BITARTRATE AND ACETAMINOPHEN 10; 325 MG/1; MG/1
TABLET ORAL
Qty: 98 TABLET | Refills: 0 | Status: SHIPPED | OUTPATIENT
Start: 2024-10-07 | End: 2024-10-07

## 2024-10-07 RX ORDER — CYCLOBENZAPRINE HCL 10 MG
10 TABLET ORAL 3 TIMES DAILY
Qty: 90 TABLET | Refills: 0 | Status: SHIPPED | OUTPATIENT
Start: 2024-10-07

## 2024-10-07 NOTE — TELEPHONE ENCOUNTER
Action Requested: Summary for Provider     []  Critical Lab, Recommendations Needed  [x] Need Additional Advice  []   FYI    []   Need Orders  [] Need Medications Sent to Pharmacy  []  Other     SUMMARY: Office visit. Patient requesting to be seen today by Dr. Cartwright at any time.     Reason for call: Appt Request and Toe Pain  Onset: Data Unavailable    Patient does not have a toe nail on her toe. She hit her toe on a door Saturday morning and it is tender and red. She doesn't want it to get infected and it is not healing as fast as she would like.     Reason for Disposition   Patient wants to be seen    Protocols used: Toe Injury-A-OH

## 2024-10-07 NOTE — TELEPHONE ENCOUNTER
Spoke to Dr. Cartwright and advised that it is okay to add her in at 1:30pm today.     Future Appointments   Date Time Provider Department Center   10/7/2024  1:30 PM Venkata Cartwright MD [facility]   10/26/2024 10:30 AM Venkata Cartwright MD [facility]

## 2024-10-07 NOTE — PROGRESS NOTES
Subjective:     Patient ID: Pratik Frances is a 39 year old female.    Toe Injury   The incident occurred 3 to 5 days ago. The incident occurred at home (went under the foot of the door). The injury mechanism was a direct blow. Pain location: right great toe. The quality of the pain is described as aching. The pain is moderate. The pain has been Constant since onset. The symptoms are aggravated by weight bearing. She has tried rest for the symptoms. The treatment provided mild relief.       History/Other:   Review of Systems  Current Outpatient Medications   Medication Sig Dispense Refill    HYDROcodone-acetaminophen (NORCO)  MG Oral Tab Take 1 to 2 tabs po q 4 to 6 hrs prn for pain 84 tablet 0    butalbital-acetaminophen-caffeine -40 MG Oral Tab Take 1 tablet by mouth every 8 (eight) hours as needed for Headaches. 30 tablet 0    cyclobenzaprine 10 MG Oral Tab Take 1 tablet (10 mg total) by mouth 3 (three) times daily. 90 tablet 0    tamoxifen 20 MG Oral Tab        Allergies:  Allergies   Allergen Reactions    Aspirin SHORTNESS OF BREATH     Other reaction(s): ASPIRIN - throat swelling    Codeine SWELLING     Other reaction(s): Swelling of hands and toes    Dust Mite Extract HIVES     Nasal congestion    Grass HIVES     Nasal congestion    Adhesive Tape RASH    Chlorhexidine Gluconate Cloth ITCHING     Burning; wipes only. Okay with plain chlorhexedine       Past Medical History:    Back pain    Back problem    Cancer (HCC)    breast cancer    COVID-19 2021-headache-not hospitalized    Essential hypertension    Gestational hypertension (HCC)    Herniated disc    Herniated lumbar intervertebral disc    Hx of migraine headaches    Hyperlipidemia    Mass of left forearm    Migraines    Obesity, unspecified    Personal history of antineoplastic chemotherapy    last dose Jan 2024    Urethral stricture      Past Surgical History:   Procedure Laterality Date    Appendectomy      Eye surgery      Insertion  of access port      Blair biopsy stereo nodule 1 site right (cpt=19081)  2022    clip top hat    Mastectomy bra Bilateral     Mastectomy left      Mastectomy right      Needle biopsy left  2022    MRI 2 site    Needle biopsy right  2022    us guided bx          Other surgical history  2013    Excision lipoma of left forearm    Other surgical history  2018    IUD placement    Other surgical history      eye surgery    Other surgical history  2024    Second stage reconstruction with removal of bilateral breast tissue expanders, placement of permanent implants - Bilateral    Tonsillectomy        Family History   Problem Relation Age of Onset    Heart Disease Other     Hypertension Other     Glaucoma Other     Heart Disease Mother     Lipids Mother     Hypertension Mother     Heart Disorder Mother     ADHD Sister     Lipids Father     Stroke Father     Other (Other) Brother         DJD spine    Breast Cancer Paternal Cousin Female 24        also had @ 34 and 41 (last was TNBC); neg genetics     Cancer Paternal Grandmother         lung ca; smoker    Dementia Paternal Grandfather     Cancer Paternal Cousin Female 30        uterine ca (sister of cousin w/br ca)    Cancer Paternal Cousin Male 40        gastric ca (smoker); also leukemia @ 10      Social History:   Social History     Socioeconomic History    Marital status:    Tobacco Use    Smoking status: Former     Current packs/day: 0.00     Average packs/day: 0.5 packs/day for 2.0 years (1.0 ttl pk-yrs)     Types: Cigarettes     Start date: 1998     Quit date: 2000     Years since quittin.7     Passive exposure: Past    Smokeless tobacco: Never   Vaping Use    Vaping status: Never Used   Substance and Sexual Activity    Alcohol use: Yes     Alcohol/week: 0.0 standard drinks of alcohol     Comment: Rarely    Drug use: No   Other Topics Concern    Caffeine Concern No        Objective:   Physical Exam  Constitutional:        General: She is not in acute distress.     Appearance: She is obese. She is not ill-appearing, toxic-appearing or diaphoretic.   Musculoskeletal:        Feet:       Comments: Pt has no toe nail on her right great toe;  cuticle pushed proximally; there is dried blood on the proximal portion of the toe. No redness nor discharge noted. Able to move toe without difficulty.    Neurological:      Mental Status: She is alert.         Assessment & Plan:   (S93.456M) Injury of toe on right foot, initial encounter  (primary encounter diagnosis)  Plan: appears more like avulsion of the cuticle great toe, doesn't look infected; I advised to see podiatrist. She is up to daet with her Tdap.  Pt told to take her keflex bid that she has at home if starts getting purulent drainage or redness.      (G43.009) Migraine without aura and without status migrainosus, not intractable  Plan: butalbital-acetaminophen-caffeine -40 MG         Oral Tab        Refill of her fioricet.     (M54.50,  G89.29) Chronic bilateral low back pain, unspecified whether sciatica present  Plan: HYDROcodone-acetaminophen (NORCO)  MG         Oral Tab, DISCONTINUED:         HYDROcodone-acetaminophen (NORCO)  MG         Oral Tab        Pt given refill; had to take extra dose daily since 3 days ago due to her toe injury.  [place]       No orders of the defined types were placed in this encounter.      Meds This Visit:  Requested Prescriptions      No prescriptions requested or ordered in this encounter       Imaging & Referrals:  None

## 2024-10-08 ENCOUNTER — TELEPHONE (OUTPATIENT)
Dept: INTERNAL MEDICINE CLINIC | Facility: CLINIC | Age: 39
End: 2024-10-08

## 2024-10-08 NOTE — TELEPHONE ENCOUNTER
Pharmacy requesting drug change, drug not covered by pt's plan.     HYDROcodone-acetaminophen (NORCO)  MG Oral Tab Take 1 to 2 tabs po q 4 to 6 hrs prn for pain 98 tablet 0

## 2024-10-08 NOTE — TELEPHONE ENCOUNTER
Left Voicemail to call back our office. Office phone number provided with office telephone hours.      1st attempt

## 2024-10-08 NOTE — TELEPHONE ENCOUNTER
Patient returned call, full name and date of birth verified.     Patient stated she has already picked up the Norco.   Her insurance has a limit per day so patient has been using discount card.   Patient gets this filled for $20.00 instead of $7.00 (with insurance).     Patient stated she has no further needs at this time.

## 2024-10-08 NOTE — TELEPHONE ENCOUNTER
Please call patient - she has been filling this medication.  How is she paying for it?  Had she been using insurance or a discount card?

## 2024-10-15 ENCOUNTER — TELEPHONE (OUTPATIENT)
Dept: INTERNAL MEDICINE CLINIC | Facility: CLINIC | Age: 39
End: 2024-10-15

## 2024-10-15 ENCOUNTER — OFFICE VISIT (OUTPATIENT)
Dept: INTERNAL MEDICINE CLINIC | Facility: CLINIC | Age: 39
End: 2024-10-15

## 2024-10-15 ENCOUNTER — HOSPITAL ENCOUNTER (OUTPATIENT)
Dept: GENERAL RADIOLOGY | Age: 39
Discharge: HOME | End: 2024-10-15
Attending: INTERNAL MEDICINE
Payer: COMMERCIAL

## 2024-10-15 VITALS
SYSTOLIC BLOOD PRESSURE: 130 MMHG | BODY MASS INDEX: 30.49 KG/M2 | DIASTOLIC BLOOD PRESSURE: 78 MMHG | WEIGHT: 183 LBS | HEIGHT: 65 IN | HEART RATE: 80 BPM

## 2024-10-15 DIAGNOSIS — G89.29 CHRONIC MIDLINE LOW BACK PAIN WITHOUT SCIATICA: ICD-10-CM

## 2024-10-15 DIAGNOSIS — M54.50 CHRONIC MIDLINE LOW BACK PAIN WITHOUT SCIATICA: Primary | ICD-10-CM

## 2024-10-15 DIAGNOSIS — M54.50 CHRONIC MIDLINE LOW BACK PAIN WITHOUT SCIATICA: ICD-10-CM

## 2024-10-15 DIAGNOSIS — G89.29 CHRONIC MIDLINE LOW BACK PAIN WITHOUT SCIATICA: Primary | ICD-10-CM

## 2024-10-15 PROCEDURE — 3008F BODY MASS INDEX DOCD: CPT | Performed by: INTERNAL MEDICINE

## 2024-10-15 PROCEDURE — 3075F SYST BP GE 130 - 139MM HG: CPT | Performed by: INTERNAL MEDICINE

## 2024-10-15 PROCEDURE — 99213 OFFICE O/P EST LOW 20 MIN: CPT | Performed by: INTERNAL MEDICINE

## 2024-10-15 PROCEDURE — 3078F DIAST BP <80 MM HG: CPT | Performed by: INTERNAL MEDICINE

## 2024-10-15 PROCEDURE — 72100 X-RAY EXAM L-S SPINE 2/3 VWS: CPT | Performed by: INTERNAL MEDICINE

## 2024-10-15 RX ORDER — HYDROCODONE BITARTRATE AND ACETAMINOPHEN 10; 325 MG/1; MG/1
TABLET ORAL
Qty: 112 TABLET | Refills: 0 | Status: SHIPPED | OUTPATIENT
Start: 2024-10-18

## 2024-10-15 NOTE — PROGRESS NOTES
Subjective:     Patient ID: Pratik Frances is a 39 year old female.    Having popping sound from the low back started since 4 days; pt said had low back pain had not been worse than previously; no trauma noted. She had seen her chiropractor today for spine adjustments but was noted to have bulge on her lower lumbar midspine. She denies having bowel/bladder incontinence, no urinary symptoms, leg weakness or numbness.     Low Back Pain  This is a chronic problem. The current episode started more than 1 year ago. The problem occurs constantly. The problem has been waxing and waning since onset. The pain is present in the lumbar spine. The quality of the pain is described as aching. The pain does not radiate. The pain is moderate. The pain is The same all the time. The symptoms are aggravated by bending and twisting. Pertinent negatives include no abdominal pain, bladder incontinence, bowel incontinence, dysuria, fever, numbness, paresis, paresthesias, perianal numbness, weakness or weight loss. Risk factors include history of cancer and obesity. She has tried chiropractic manipulation and NSAIDs for the symptoms. The treatment provided mild relief.       History/Other:   Review of Systems   Constitutional: Negative.  Negative for fever and weight loss.   Respiratory: Negative.     Cardiovascular: Negative.    Gastrointestinal: Negative.  Negative for abdominal pain and bowel incontinence.   Genitourinary: Negative.  Negative for bladder incontinence and dysuria.   Musculoskeletal:  Positive for back pain.   Neurological:  Negative for weakness, numbness and paresthesias.     Current Outpatient Medications   Medication Sig Dispense Refill    butalbital-acetaminophen-caffeine -40 MG Oral Tab Take 1 tablet by mouth every 8 (eight) hours as needed for Headaches. 30 tablet 0    cyclobenzaprine 10 MG Oral Tab Take 1 tablet (10 mg total) by mouth 3 (three) times daily. 90 tablet 0    HYDROcodone-acetaminophen (NORCO)   MG Oral Tab Take 1 to 2 tabs po q 4 to 6 hrs prn for pain 98 tablet 0    tamoxifen 20 MG Oral Tab        Allergies:Allergies[1]    Past Medical History:    Back pain    Back problem    Cancer (HCC)    breast cancer    COVID-2021-headache-not hospitalized    Essential hypertension    Gestational hypertension (HCC)    Herniated disc    Herniated lumbar intervertebral disc    Hx of migraine headaches    Hyperlipidemia    Mass of left forearm    Migraines    Obesity, unspecified    Personal history of antineoplastic chemotherapy    last dose 2024    Urethral stricture      Past Surgical History:   Procedure Laterality Date    Appendectomy      Eye surgery      Insertion of access port      Blair biopsy stereo nodule 1 site right (cpt=19081)  2022    clip top hat    Mastectomy bra Bilateral     Mastectomy left      Mastectomy right      Needle biopsy left  2022    MRI 2 site    Needle biopsy right  2022    us guided bx          Other surgical history      Excision lipoma of left forearm    Other surgical history      IUD placement    Other surgical history      eye surgery    Other surgical history  2024    Second stage reconstruction with removal of bilateral breast tissue expanders, placement of permanent implants - Bilateral    Tonsillectomy        Family History   Problem Relation Age of Onset    Heart Disease Other     Hypertension Other     Glaucoma Other     Heart Disease Mother     Lipids Mother     Hypertension Mother     Heart Disorder Mother     ADHD Sister     Lipids Father     Stroke Father     Other (Other) Brother         DJD spine    Breast Cancer Paternal Cousin Female 24        also had @ 34 and 41 (last was TNBC); neg genetics     Cancer Paternal Grandmother         lung ca; smoker    Dementia Paternal Grandfather     Cancer Paternal Cousin Female 30        uterine ca (sister of cousin w/br ca)    Cancer Paternal Cousin Male 40        gastric ca  (smoker); also leukemia @ 10      Social History:   Social History     Socioeconomic History    Marital status:    Tobacco Use    Smoking status: Former     Current packs/day: 0.00     Average packs/day: 0.5 packs/day for 2.0 years (1.0 ttl pk-yrs)     Types: Cigarettes     Start date: 1998     Quit date: 2000     Years since quittin.8     Passive exposure: Past    Smokeless tobacco: Never   Vaping Use    Vaping status: Never Used   Substance and Sexual Activity    Alcohol use: Yes     Alcohol/week: 0.0 standard drinks of alcohol     Comment: Rarely    Drug use: No   Other Topics Concern    Caffeine Concern No        Objective:   Physical Exam  Constitutional:       General: She is not in acute distress.     Appearance: She is obese. She is not ill-appearing, toxic-appearing or diaphoretic.   Neck:      Vascular: No carotid bruit.   Cardiovascular:      Rate and Rhythm: Normal rate and regular rhythm.      Heart sounds: No murmur heard.  Pulmonary:      Effort: Pulmonary effort is normal. No respiratory distress.      Breath sounds: Normal breath sounds. No wheezing or rales.   Abdominal:      General: Abdomen is flat. Bowel sounds are normal. There is no distension.      Palpations: There is no mass.      Tenderness: There is no abdominal tenderness. There is no guarding or rebound.   Musculoskeletal:      Cervical back: Normal range of motion and neck supple. No rigidity or tenderness.      Thoracic back: Normal.      Lumbar back: Tenderness present. No swelling, deformity or bony tenderness. Decreased range of motion. No scoliosis.        Back:       Right lower leg: No edema.      Left lower leg: No edema.      Comments: Bony prominence felt on the lower lumbar spine area.    Lymphadenopathy:      Cervical: No cervical adenopathy.   Skin:     Coloration: Skin is not jaundiced or pale.      Findings: No bruising, erythema or rash.   Neurological:      Mental Status: She is alert.      Sensory:  Sensation is intact. No sensory deficit.      Motor: Motor function is intact. No weakness or tremor.      Deep Tendon Reflexes:      Reflex Scores:       Patellar reflexes are 2+ on the right side and 2+ on the left side.       Achilles reflexes are 2+ on the right side and 2+ on the left side.     Comments: Ankle clonus negative bilaterally         Assessment & Plan:   (M54.50,  G89.29) Chronic midline low back pain without sciatica  (primary encounter diagnosis)  Plan: Physiatry Referral - In Network, XR LUMBAR         SPINE (MIN 2 VIEWS) (CPT=72100)        Pt had to increase her norco to 8/day due to her lower back pain.   Will get xra of lumbar spine,(pt denies being pregnant)  and pt to see Dr nathen valenzuela.  Refill for norco given to be filled this Friday. Pt to  take omeprazole while taking nsaids for GI protection. Pt to call back if pain persist/worsens.        No orders of the defined types were placed in this encounter.      Meds This Visit:  Requested Prescriptions      No prescriptions requested or ordered in this encounter       Imaging & Referrals:  None            [1]   Allergies  Allergen Reactions    Aspirin SHORTNESS OF BREATH     Other reaction(s): ASPIRIN - throat swelling    Codeine SWELLING     Other reaction(s): Swelling of hands and toes    Dust Mite Extract HIVES     Nasal congestion    Grass HIVES     Nasal congestion    Adhesive Tape RASH    Chlorhexidine Gluconate Cloth ITCHING     Burning; wipes only. Okay with plain chlorhexedine

## 2024-10-15 NOTE — TELEPHONE ENCOUNTER
Spoke to patient and relayed Dr. Cartwright's message below. Patient verbalized understanding and had no further questions.     Future Appointments   Date Time Provider Department Center   10/15/2024 11:45 AM Venkata Cartwright MD [facility]   10/26/2024 10:30 AM Venkata Cartwright MD [facility]

## 2024-10-15 NOTE — TELEPHONE ENCOUNTER
Patient contacts clinic reporting ongoing, chronic back pain.  Feels as though it is popping.  Denies pain down her leg, numbness or tingling.  Inquires if Dr. Jackson can see her \"he usually squeezes me in\" or if she should be referred to a specialist. Please advise.

## 2024-10-17 ENCOUNTER — TELEPHONE (OUTPATIENT)
Dept: INTERNAL MEDICINE CLINIC | Facility: CLINIC | Age: 39
End: 2024-10-17

## 2024-10-17 NOTE — TELEPHONE ENCOUNTER
Patient calling ( name and date of birth of patient verified ) reports Last office visit 10/15/2024   Dr. Cartwright informed her would be prescribed Omeprazole for her stomach ( too expensive to buy OTC)  and she would like to try   \" a medication he mentioned something like Lyrica\"     ( Nothing of that nature  regarding Lyrica in LOV notes )   Assessment & Plan:  (M54.50,  G89.29) Chronic midline low back pain without sciatica  (primary encounter diagnosis)  Plan: Physiatry Referral - In Network, XR LUMBAR         SPINE (MIN 2 VIEWS) (CPT=72100)        Pt had to increase her norco to 8/day due to her lower back pain.   Will get xra of lumbar spine,(pt denies being pregnant)  and pt to see Dr nathen valenzuela.  Refill for norco given to be filled this Friday. Pt to  take omeprazole while taking nsaids for GI protection. Pt to call back if pain persist/worsens.           Dr. Cartwright patient asking for:  RX for Omeprazole  2. Med similar to Lyrica           Allergies reviewed and pharmacy confirmed    Please advise and thank you.

## 2024-10-17 NOTE — TELEPHONE ENCOUNTER
Erx for omeprazole sent; have pt ffup with as scheduled and will discuss again with her regarding lyrica at that time.  For now continue with her nsaids

## 2024-10-22 ENCOUNTER — TELEPHONE (OUTPATIENT)
Dept: INTERNAL MEDICINE CLINIC | Facility: CLINIC | Age: 39
End: 2024-10-22

## 2024-10-22 NOTE — TELEPHONE ENCOUNTER
Was here to pickup script of norco for spouse; told me she had bite from friend's dog last Sunday.   She had done wound care, up to date with her tdap, and started keflex she had at home. Told her to replace with augmentin; no fevers, wounds are clean and no discharge, drying up good.  Observe, any worsening, pt to call back or go to ER .

## 2024-10-24 ENCOUNTER — TELEPHONE (OUTPATIENT)
Dept: HEMATOLOGY/ONCOLOGY | Facility: HOSPITAL | Age: 39
End: 2024-10-24

## 2024-10-24 RX ORDER — TAMOXIFEN CITRATE 20 MG/1
20 TABLET ORAL DAILY
Qty: 90 TABLET | Refills: 3 | Status: SHIPPED | OUTPATIENT
Start: 2024-10-24 | End: 2025-10-19

## 2024-10-24 NOTE — TELEPHONE ENCOUNTER
Patient called, had delayed starting Tamoxifen again post summer.  She attempted to refill but pharmacy stated it was no longer valid  she is requesting script for Tamoxifen be sent to [facility] in [place] on [address]..    She will also be setting up next follow up visit with MD>

## 2024-10-26 ENCOUNTER — OFFICE VISIT (OUTPATIENT)
Dept: INTERNAL MEDICINE CLINIC | Facility: CLINIC | Age: 39
End: 2024-10-26

## 2024-10-26 VITALS
BODY MASS INDEX: 30.64 KG/M2 | DIASTOLIC BLOOD PRESSURE: 80 MMHG | TEMPERATURE: 98 F | WEIGHT: 183.88 LBS | OXYGEN SATURATION: 97 % | SYSTOLIC BLOOD PRESSURE: 126 MMHG | HEART RATE: 88 BPM | HEIGHT: 65 IN

## 2024-10-26 DIAGNOSIS — Z17.0 BREAST CANCER, STAGE 1, ESTROGEN RECEPTOR POSITIVE, RIGHT (HCC): ICD-10-CM

## 2024-10-26 DIAGNOSIS — C50.911 BREAST CANCER, STAGE 1, ESTROGEN RECEPTOR POSITIVE, RIGHT (HCC): ICD-10-CM

## 2024-10-26 DIAGNOSIS — G89.29 CHRONIC BILATERAL LOW BACK PAIN, UNSPECIFIED WHETHER SCIATICA PRESENT: ICD-10-CM
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
## 2024-10-26 DIAGNOSIS — M54.50 CHRONIC BILATERAL LOW BACK PAIN, UNSPECIFIED WHETHER SCIATICA PRESENT: ICD-10-CM

## 2024-10-26 DIAGNOSIS — G43.009 MIGRAINE WITHOUT AURA AND WITHOUT STATUS MIGRAINOSUS, NOT INTRACTABLE: ICD-10-CM

## 2024-10-26 DIAGNOSIS — Z00.00 ANNUAL PHYSICAL EXAM: Primary | ICD-10-CM

## 2024-10-26 DIAGNOSIS — Z90.13 HISTORY OF BILATERAL MASTECTOMY: ICD-10-CM

## 2024-10-26 DIAGNOSIS — S71.151D DOG BITE OF RIGHT THIGH, SUBSEQUENT ENCOUNTER: ICD-10-CM

## 2024-10-26 NOTE — PROGRESS NOTES
Subjective:     Patient ID: Pratik Frances is a 39 year old female.    Patient presents today for her annual physical.  She is also here for follow-up regarding her chronic low back pain that had recently flared up.  She continues to see her chiropractor at least twice a week for back manipulation.  She was also bitten by a friend's dog earlier this week and I had started her already on Augmentin.        History/Other:   Review of Systems   Constitutional: Negative.    HENT: Negative.     Eyes: Negative.    Respiratory: Negative.     Cardiovascular:  Negative for chest pain, palpitations and leg swelling.        No pnd   Gastrointestinal:  Negative for abdominal pain, anal bleeding, blood in stool, constipation, diarrhea and vomiting.        No incontinence   Genitourinary: Negative.         No incontinence   Musculoskeletal:  Positive for back pain.   Allergic/Immunologic: Positive for environmental allergies and immunocompromised state. Negative for food allergies.   Neurological:  Negative for syncope.   Hematological: Negative.      Current Outpatient Medications   Medication Sig Dispense Refill    tamoxifen 20 MG Oral Tab Take 1 tablet (20 mg total) by mouth daily. 90 tablet 3    amoxicillin clavulanate 875-125 MG Oral Tab Take 1 tablet by mouth 2 (two) times daily for 7 days. 14 tablet 0    omeprazole 20 MG Oral Capsule Delayed Release Take 1 capsule (20 mg total) by mouth every morning before breakfast. 30 capsule 1    HYDROcodone-acetaminophen (NORCO)  MG Oral Tab Take 1 to 2 tabs po q 4 to 6 hrs prn for pain 112 tablet 0    butalbital-acetaminophen-caffeine -40 MG Oral Tab Take 1 tablet by mouth every 8 (eight) hours as needed for Headaches. 30 tablet 0    cyclobenzaprine 10 MG Oral Tab Take 1 tablet (10 mg total) by mouth 3 (three) times daily. 90 tablet 0     Allergies:Allergies[1]    Past Medical History:    Back pain    Back problem    Cancer (HCC)    breast cancer    COVID-19     -headache-not hospitalized    Essential hypertension    Gestational hypertension (HCC)    Herniated disc    Herniated lumbar intervertebral disc    Hx of migraine headaches    Hyperlipidemia    Mass of left forearm    Migraines    Obesity, unspecified    Personal history of antineoplastic chemotherapy    last dose 2024    Urethral stricture      Past Surgical History:   Procedure Laterality Date    Appendectomy      Eye surgery      Insertion of access port      Blair biopsy stereo nodule 1 site right (cpt=19081)  2022    clip top hat    Mastectomy bra Bilateral     Mastectomy left      Mastectomy right      Needle biopsy left  2022    MRI 2 site    Needle biopsy right  2022    us guided bx          Other surgical history      Excision lipoma of left forearm    Other surgical history      IUD placement    Other surgical history      eye surgery    Other surgical history  2024    Second stage reconstruction with removal of bilateral breast tissue expanders, placement of permanent implants - Bilateral    Tonsillectomy        Family History   Problem Relation Age of Onset    Heart Disease Other     Hypertension Other     Glaucoma Other     Heart Disease Mother     Lipids Mother     Hypertension Mother     Heart Disorder Mother     ADHD Sister     Lipids Father     Stroke Father     Other (Other) Brother         DJD spine    Breast Cancer Paternal Cousin Female 24        also had @ 34 and 41 (last was TNBC); neg genetics     Cancer Paternal Grandmother         lung ca; smoker    Dementia Paternal Grandfather     Cancer Paternal Cousin Female 30        uterine ca (sister of cousin w/br ca)    Cancer Paternal Cousin Male 40        gastric ca (smoker); also leukemia @ 10      Social History:   Social History     Socioeconomic History    Marital status:    Tobacco Use    Smoking status: Former     Current packs/day: 0.00     Average packs/day: 0.5 packs/day for 2.0 years  (1.0 ttl pk-yrs)     Types: Cigarettes     Start date: 1998     Quit date: 2000     Years since quittin.8     Passive exposure: Past    Smokeless tobacco: Never   Vaping Use    Vaping status: Never Used   Substance and Sexual Activity    Alcohol use: Yes     Alcohol/week: 0.0 standard drinks of alcohol     Comment: Rarely    Drug use: No   Other Topics Concern    Caffeine Concern No        Objective:   Physical Exam  Constitutional:       General: She is not in acute distress.     Appearance: She is well-developed. She is obese. She is not ill-appearing, toxic-appearing or diaphoretic.   HENT:      Head: Normocephalic and atraumatic.      Right Ear: External ear normal.      Left Ear: External ear normal.      Nose: Nose normal.      Mouth/Throat:      Pharynx: No oropharyngeal exudate.   Eyes:      General:         Right eye: No discharge.         Left eye: No discharge.      Conjunctiva/sclera: Conjunctivae normal.      Pupils: Pupils are equal, round, and reactive to light.   Neck:      Vascular: No JVD.   Cardiovascular:      Rate and Rhythm: Normal rate and regular rhythm.      Heart sounds: Normal heart sounds.   Pulmonary:      Effort: Pulmonary effort is normal. No respiratory distress.      Breath sounds: Normal breath sounds. No wheezing or rales.   Abdominal:      General: Bowel sounds are normal. There is no distension.      Palpations: Abdomen is soft. There is no mass.      Tenderness: There is no abdominal tenderness. There is no guarding or rebound.   Musculoskeletal:         General: No tenderness. Normal range of motion.      Cervical back: Normal range of motion and neck supple. No rigidity or tenderness.      Right lower leg: No edema.      Left lower leg: No edema.   Lymphadenopathy:      Cervical: No cervical adenopathy.   Skin:     General: Skin is warm and dry.      Coloration: Skin is not jaundiced or pale.      Findings: No rash.      Comments: Bite marks on the right anterior  thigh already dry and healing, no redness  Linear abrasion on the right distal medial forearm also healing well and no redness no discharge   Neurological:      Mental Status: She is alert and oriented to person, place, and time.         Assessment & Plan:   (Z00.00) Annual physical exam  (primary encounter diagnosis)  Plan: Lipid Panel        Patient will be getting her flu shot from pharmacy.  She will be seeing her oncologist and will be doing some labs with him and will add on lipid panel.    (C50.911,  Z17.0) Breast cancer, stage 1, estrogen receptor positive, right (HCC)  Plan: She is status post bilateral mastectomy and been followed also by her oncologist.    (Z90.13) History of bilateral mastectomy  Plan: See above.    (G43.009) Migraine without aura and without status migrainosus, not intractable  Plan: Patient does take Fioricet as needed.    (M54.50,  G89.29) Chronic bilateral low back pain, unspecified whether sciatica present  Plan: Drug Screen 7 W/confirmation, urine        Discussions regarding further management of her chronic low back pain.  Recent lumbar spine x-ray showed progression of anterolisthesis upper lumbar as well as advanced disc degeneration at L5-S1.  She is going to her chiropractor at least twice a week for manipulation of her back and has been helping, I did tell her again to see our physiatrist Dr. Ruelas or further evaluation also help us with metabolic chronic management of her back pain.  She still requires taking Norco chronically and we were talking about use of adjuvant pain therapy to hopefully get her off using chronic narcotic pain medications.    (S71.151D,  W54.0XXD) Dog bite of right thigh, subsequent encounter  Plan: Both her dog bite in the right thigh as well as the right distal forearm ready healing well.  She will finish out Augmentin as prescribed.  She is up-to-date with her Tdap.       No orders of the defined types were placed in this encounter.      Meds This  Visit:  Requested Prescriptions      No prescriptions requested or ordered in this encounter       Imaging & Referrals:  None          [1]   Allergies  Allergen Reactions    Aspirin SHORTNESS OF BREATH     Other reaction(s): ASPIRIN - throat swelling    Codeine SWELLING     Other reaction(s): Swelling of hands and toes    Dust Mite Extract HIVES     Nasal congestion    Grass HIVES     Nasal congestion    Adhesive Tape RASH    Chlorhexidine Gluconate Cloth ITCHING     Burning; wipes only. Okay with plain chlorhexedine

## 2024-10-29 ENCOUNTER — TELEPHONE (OUTPATIENT)
Dept: INTERNAL MEDICINE CLINIC | Facility: CLINIC | Age: 39
End: 2024-10-29

## 2024-10-29 ENCOUNTER — OFFICE VISIT (OUTPATIENT)
Dept: HEMATOLOGY/ONCOLOGY | Facility: HOSPITAL | Age: 39
End: 2024-10-29
Attending: INTERNAL MEDICINE
Payer: COMMERCIAL

## 2024-10-29 VITALS
HEIGHT: 65.5 IN | OXYGEN SATURATION: 100 % | DIASTOLIC BLOOD PRESSURE: 102 MMHG | BODY MASS INDEX: 30.25 KG/M2 | HEART RATE: 83 BPM | SYSTOLIC BLOOD PRESSURE: 146 MMHG | RESPIRATION RATE: 16 BRPM | WEIGHT: 183.81 LBS | TEMPERATURE: 98 F

## 2024-10-29 DIAGNOSIS — Z92.21 HISTORY OF ANTINEOPLASTIC CHEMOTHERAPY: ICD-10-CM

## 2024-10-29 DIAGNOSIS — Z79.810 ENCOUNTER FOR MONITORING TAMOXIFEN THERAPY: ICD-10-CM

## 2024-10-29 DIAGNOSIS — Z90.13 S/P BILATERAL MASTECTOMY: ICD-10-CM

## 2024-10-29 DIAGNOSIS — G89.29 CHRONIC BILATERAL LOW BACK PAIN, UNSPECIFIED WHETHER SCIATICA PRESENT: Primary | ICD-10-CM

## 2024-10-29 DIAGNOSIS — M54.50 CHRONIC BILATERAL LOW BACK PAIN, UNSPECIFIED WHETHER SCIATICA PRESENT: Primary | ICD-10-CM

## 2024-10-29 DIAGNOSIS — C50.911 MALIGNANT NEOPLASM OF RIGHT BREAST IN FEMALE, ESTROGEN RECEPTOR POSITIVE, UNSPECIFIED SITE OF BREAST (HCC): Primary | ICD-10-CM

## 2024-10-29 DIAGNOSIS — G62.0 CHEMOTHERAPY-INDUCED PERIPHERAL NEUROPATHY (HCC): ICD-10-CM

## 2024-10-29 DIAGNOSIS — T45.1X5A CHEMOTHERAPY-INDUCED PERIPHERAL NEUROPATHY (HCC): ICD-10-CM

## 2024-10-29 DIAGNOSIS — Z17.0 MALIGNANT NEOPLASM OF RIGHT BREAST IN FEMALE, ESTROGEN RECEPTOR POSITIVE, UNSPECIFIED SITE OF BREAST (HCC): Primary | ICD-10-CM

## 2024-10-29 DIAGNOSIS — Z51.81 ENCOUNTER FOR MONITORING TAMOXIFEN THERAPY: ICD-10-CM

## 2024-10-29 PROCEDURE — G2211 COMPLEX E/M VISIT ADD ON: HCPCS | Performed by: INTERNAL MEDICINE

## 2024-10-29 PROCEDURE — 99214 OFFICE O/P EST MOD 30 MIN: CPT | Performed by: INTERNAL MEDICINE

## 2024-10-29 RX ORDER — HYDROCODONE BITARTRATE AND ACETAMINOPHEN 10; 325 MG/1; MG/1
TABLET ORAL
Qty: 84 TABLET | Refills: 0 | Status: SHIPPED | OUTPATIENT
Start: 2024-10-29

## 2024-10-29 NOTE — PROGRESS NOTES
Hematology Oncology Progress Note    Patient Name: Pratik Frances   YOB: 1985   Medical Record Number: [id number]   Attending Physician: Tila Lopez    Date of Visit: 10/29/2024     Chief Complaint:  Breast cancer  Chemotherapy    Diagnosis: Right breast invasive ductal carcinoma, grade 3, ER/MO positive, HER2 positive, ki high, pT2N0    Oncologic History:  39 year old premenopausal female presented with palpable right breast mass   7/6/22: diag laurent with us. T2 (2.3cm) N0 12:00 4cm nipple  7/8/22: biopsy IDC, grade 3, ER/MO>90%, Her 3+, Ki 46%  7/13/22: MRI with 2 areas of focal non mass enhancement bx, benign and concordant.   Genetics negative.  7/28/22: TCHP x6 cycles with dose reduced T completed 11/21/22  1/10/23: bilateral mastectomies: [id number].   2/20/23: adjuvant kadcyla x14 cycles completed 1/4/24.  1/11/24: port removal.   2/21/24: breast reconstruction   8/2024: started tamoxifen     Interval History:  Here for 4 months follow up. Feeling well and denies any issues or complaints.   Completed fat grafting in June. Left breast wound continues to slowly heal. She is happy with her cosmetic outcome.  She is tolerating tamoxifen fairly well. Reports mild fatigue, frequent hot flashes and night sweats, overall improving and more tolerable now.    Following with her gynecologist and recently had a normal exam/pap smear.   Periods are regular and normal. Not using any birth control.   She denies any palpable lesion or enlarged nodes.     ECOG PS: 0     PMH/PSH: herniated disc with back pain, urethral stricture, migraine, left forarm lipoma  Family History: Pcousin with breast ca age 24- was neg for genetics  Social History: works for insurance, non smoker.     Medications:    Current Outpatient Medications:     HYDROcodone-acetaminophen (NORCO)  MG Oral Tab, Take 1 to 2 tabs po q 4 to 6 hrs prn for pain, Disp: 84 tablet, Rfl: 0    tamoxifen 20 MG Oral Tab, Take 1 tablet (20 mg total) by  mouth daily., Disp: 90 tablet, Rfl: 3    amoxicillin clavulanate 875-125 MG Oral Tab, Take 1 tablet by mouth 2 (two) times daily for 7 days., Disp: 14 tablet, Rfl: 0    omeprazole 20 MG Oral Capsule Delayed Release, Take 1 capsule (20 mg total) by mouth every morning before breakfast., Disp: 30 capsule, Rfl: 1    butalbital-acetaminophen-caffeine -40 MG Oral Tab, Take 1 tablet by mouth every 8 (eight) hours as needed for Headaches., Disp: 30 tablet, Rfl: 0    cyclobenzaprine 10 MG Oral Tab, Take 1 tablet (10 mg total) by mouth 3 (three) times daily., Disp: 90 tablet, Rfl: 0    Review of Systems:   Oncology specific ROS negative except as per HPI    Vitals:  BP (!) 146/102 (BP Location: Left arm, Patient Position: Sitting, Cuff Size: adult)   Pulse 83   Temp 98.1 °F (36.7 °C) (Oral)   Resp 16   Ht 1.664 m (5' 5.5\")   Wt 83.4 kg (183 lb 12.8 oz)   LMP 10/02/2024   SpO2 100%   BMI 30.12 kg/m²     Weight:  Wt Readings from Last 6 Encounters:   10/29/24 83.4 kg (183 lb 12.8 oz)   10/26/24 83.4 kg (183 lb 14.4 oz)   10/15/24 83 kg (183 lb)   10/07/24 83.7 kg (184 lb 8 oz)   08/05/24 83.5 kg (184 lb)   06/27/24 86.6 kg (191 lb)     Physical Exam:  General: alert and oriented x 3, not in acute distress.  HEENT: non icterus. Oropharynx clear.   Breast: bilateral mastectomy with implant reconstruction. Incisions healed well. Implants soft symmetric with normal contour. No axillary lymphadenopathy b/l.    Chest: non labored breathing, CTA  Abd: soft, non tender, non distended  Ext: no edema.  Neurological: motor strength grossly intact, MA4E    Laboratory:  Lab Results   Component Value Date    WBC 7.0 06/10/2024    RBC 3.93 06/10/2024    HGB 13.2 06/10/2024    HCT 38.7 06/10/2024    MCV 98.5 06/10/2024    MCH 33.6 06/10/2024    MCHC 34.1 06/10/2024    RDW 13.4 06/10/2024    .0 06/10/2024    MPV 8.3 12/17/2018     Lab Results   Component Value Date     06/10/2024    K 4.7 06/10/2024      06/10/2024    CO2 25.0 06/10/2024    BUN 17 06/10/2024    CREATSERUM 0.75 06/10/2024     (H) 06/10/2024    CA 9.4 06/10/2024    ALKPHO 47 06/10/2024    ALT 14 06/10/2024    AST 22 06/10/2024    BILT 0.3 06/10/2024    ALB 4.5 06/10/2024    TP 7.5 06/10/2024      Radiology;  XR LUMBAR SPINE (MIN 2 VIEWS) (CPT=72100)    Result Date: 10/15/2024  CONCLUSION:  1. There is grade 1-2 anterolisthesis of L5 on S1, progressed from previous, and possibly secondary to bilateral spondylolysis of L5.  2. Advanced disc degeneration of the lumbosacral junction.  3. No radiographically visible acute osseous injury of the lumbar spine.    elm-remote.   Dictated by (CST): Sammy Hamilton MD on 10/15/2024 at 4:17 PM     Finalized by (CST): Sammy Hamilton MD on 10/15/2024 at 4:19 PM           Impression and Plan:    Right breast invasive ductal carcinoma, grade 3, ER/MO positive, HER2 positive, Ki high, [id number]:  - s/p neoadjuvant TCHP--> bilateral mastectomy ypT1a--> adjuvant T-DM1 (kadcyla). no XRT indication  - OFS (geserlein x2) not tolerated. Continue tamoxifen, plan for 5 yrs.   - discussed oophorectomy in the future, not interested at this time.   - clinical SAW. Continue surveillance with clinical follow up. Routine imaging is not recommended post b/l mastectomy.   - labs q6-12 months.   - gyne exam annually    Hot flashes, night sweats- due to tamoxifen. mild, tolerable     Chemo induced neuropathy- grade 1, mild numbness limited to toes only and stable. Not requiring meds.     Chronic arthralgia/myalgia - mild, managed by pcp.  on norco as needed       Return in about 4 months (around 2/28/2025).       Tila Lopez MD  Hematology Oncology

## 2024-10-29 NOTE — TELEPHONE ENCOUNTER
Pt here to pickup norco refill script; will go down on dose to 6/day since lowback pain improving; she has callled spine surgeon for apptment and consult and awaiting for their call back.

## 2024-11-02 ENCOUNTER — LAB ENCOUNTER (OUTPATIENT)
Dept: LAB | Age: 39
End: 2024-11-02
Attending: INTERNAL MEDICINE
Payer: COMMERCIAL

## 2024-11-02 DIAGNOSIS — C50.911 MALIGNANT NEOPLASM OF RIGHT BREAST IN FEMALE, ESTROGEN RECEPTOR POSITIVE, UNSPECIFIED SITE OF BREAST (HCC): ICD-10-CM

## 2024-11-02 DIAGNOSIS — G89.29 CHRONIC BILATERAL LOW BACK PAIN, UNSPECIFIED WHETHER SCIATICA PRESENT: ICD-10-CM

## 2024-11-02 DIAGNOSIS — Z17.0 MALIGNANT NEOPLASM OF RIGHT BREAST IN FEMALE, ESTROGEN RECEPTOR POSITIVE, UNSPECIFIED SITE OF BREAST (HCC): ICD-10-CM

## 2024-11-02 DIAGNOSIS — M54.50 CHRONIC BILATERAL LOW BACK PAIN, UNSPECIFIED WHETHER SCIATICA PRESENT: ICD-10-CM

## 2024-11-02 LAB
ALBUMIN SERPL-MCNC: 4.1 G/DL (ref 3.2–4.8)
ALBUMIN/GLOB SERPL: 1.6 {RATIO} (ref 1–2)
ALP LIVER SERPL-CCNC: 45 U/L
ALT SERPL-CCNC: 8 U/L
AMPHET UR QL SCN: NEGATIVE
ANION GAP SERPL CALC-SCNC: 8 MMOL/L (ref 0–18)
AST SERPL-CCNC: 12 U/L (ref ?–34)
BASOPHILS # BLD AUTO: 0.04 X10(3) UL (ref 0–0.2)
BASOPHILS NFR BLD AUTO: 0.3 %
BENZODIAZ UR QL SCN: NEGATIVE
BILIRUB SERPL-MCNC: 0.3 MG/DL (ref 0.3–1.2)
BUN BLD-MCNC: 11 MG/DL (ref 9–23)
BUN/CREAT SERPL: 14.7 (ref 10–20)
CALCIUM BLD-MCNC: 9.5 MG/DL (ref 8.7–10.4)
CANNABINOIDS UR QL SCN: NEGATIVE
CHLORIDE SERPL-SCNC: 109 MMOL/L (ref 98–112)
CO2 SERPL-SCNC: 25 MMOL/L (ref 21–32)
COCAINE UR QL: NEGATIVE
CREAT BLD-MCNC: 0.75 MG/DL
CREAT UR-SCNC: 202.8 MG/DL
DEPRECATED RDW RBC AUTO: 47.6 FL (ref 35.1–46.3)
EGFRCR SERPLBLD CKD-EPI 2021: 103 ML/MIN/1.73M2 (ref 60–?)
EOSINOPHIL # BLD AUTO: 0.03 X10(3) UL (ref 0–0.7)
EOSINOPHIL NFR BLD AUTO: 0.2 %
ERYTHROCYTE [DISTWIDTH] IN BLOOD BY AUTOMATED COUNT: 13.2 % (ref 11–15)
FASTING STATUS PATIENT QL REPORTED: NO
FENTANYL UR QL SCN: NEGATIVE
GLOBULIN PLAS-MCNC: 2.6 G/DL (ref 2–3.5)
GLUCOSE BLD-MCNC: 85 MG/DL (ref 70–99)
HCT VFR BLD AUTO: 36 %
HGB BLD-MCNC: 12.3 G/DL
IMM GRANULOCYTES # BLD AUTO: 0.03 X10(3) UL (ref 0–1)
IMM GRANULOCYTES NFR BLD: 0.2 %
LYMPHOCYTES # BLD AUTO: 5.21 X10(3) UL (ref 1–4)
LYMPHOCYTES NFR BLD AUTO: 43 %
MCH RBC QN AUTO: 33.6 PG (ref 26–34)
MCHC RBC AUTO-ENTMCNC: 34.2 G/DL (ref 31–37)
MCV RBC AUTO: 98.4 FL
MDMA UR QL SCN: NEGATIVE
MONOCYTES # BLD AUTO: 0.38 X10(3) UL (ref 0.1–1)
MONOCYTES NFR BLD AUTO: 3.1 %
NEUTROPHILS # BLD AUTO: 6.43 X10 (3) UL (ref 1.5–7.7)
NEUTROPHILS # BLD AUTO: 6.43 X10(3) UL (ref 1.5–7.7)
NEUTROPHILS NFR BLD AUTO: 53.2 %
OSMOLALITY SERPL CALC.SUM OF ELEC: 293 MOSM/KG (ref 275–295)
OXYCODONE UR QL SCN: NEGATIVE
PLATELET # BLD AUTO: 275 10(3)UL (ref 150–450)
POTASSIUM SERPL-SCNC: 4.2 MMOL/L (ref 3.5–5.1)
PROT SERPL-MCNC: 6.7 G/DL (ref 5.7–8.2)
RBC # BLD AUTO: 3.66 X10(6)UL
SODIUM SERPL-SCNC: 142 MMOL/L (ref 136–145)
WBC # BLD AUTO: 12.1 X10(3) UL (ref 4–11)

## 2024-11-02 PROCEDURE — 85025 COMPLETE CBC W/AUTO DIFF WBC: CPT

## 2024-11-02 PROCEDURE — 80053 COMPREHEN METABOLIC PANEL: CPT

## 2024-11-02 PROCEDURE — 85060 BLOOD SMEAR INTERPRETATION: CPT

## 2024-11-02 PROCEDURE — 80307 DRUG TEST PRSMV CHEM ANLYZR: CPT

## 2024-11-02 PROCEDURE — 80361 OPIATES 1 OR MORE: CPT

## 2024-11-02 PROCEDURE — 36415 COLL VENOUS BLD VENIPUNCTURE: CPT

## 2024-11-05 ENCOUNTER — TELEPHONE (OUTPATIENT)
Dept: OBGYN | Age: 39
End: 2024-11-05

## 2024-11-11 ENCOUNTER — TELEPHONE (OUTPATIENT)
Dept: INTERNAL MEDICINE CLINIC | Facility: CLINIC | Age: 39
End: 2024-11-11

## 2024-11-11 DIAGNOSIS — G89.29 CHRONIC BILATERAL LOW BACK PAIN, UNSPECIFIED WHETHER SCIATICA PRESENT: ICD-10-CM

## 2024-11-11 DIAGNOSIS — M54.50 CHRONIC BILATERAL LOW BACK PAIN, UNSPECIFIED WHETHER SCIATICA PRESENT: ICD-10-CM

## 2024-11-11 RX ORDER — HYDROCODONE BITARTRATE AND ACETAMINOPHEN 10; 325 MG/1; MG/1
TABLET ORAL
Qty: 84 TABLET | Refills: 0 | Status: SHIPPED | OUTPATIENT
Start: 2024-11-11

## 2024-11-11 NOTE — TELEPHONE ENCOUNTER
[place] apptment set up already with physiatry next month

## 2024-11-12 ENCOUNTER — PATIENT MESSAGE (OUTPATIENT)
Dept: INTERNAL MEDICINE CLINIC | Facility: CLINIC | Age: 39
End: 2024-11-12

## 2024-11-12 LAB
CODEINE UR: NEGATIVE
HYDROCODONE CONF UR: >3000 NG/ML
HYDROCODONE UR: POSITIVE
HYDROMORPH CONF UR: >3000 NG/ML
HYDROMORPH UR: POSITIVE
MORPHINE UR: NEGATIVE
OPIATES CLASS UR: POSITIVE NG/ML

## 2024-11-18 ENCOUNTER — APPOINTMENT (OUTPATIENT)
Dept: OBGYN | Age: 39
End: 2024-11-18

## 2024-11-18 DIAGNOSIS — N93.0 POSTCOITAL BLEEDING: ICD-10-CM

## 2024-11-18 DIAGNOSIS — Z30.09 ENCOUNTER FOR OTHER GENERAL COUNSELING OR ADVICE ON CONTRACEPTION: Primary | ICD-10-CM

## 2024-11-18 DIAGNOSIS — Z85.3 HISTORY OF BREAST CANCER IN FEMALE: ICD-10-CM

## 2024-11-18 PROCEDURE — 99213 OFFICE O/P EST LOW 20 MIN: CPT | Performed by: OBSTETRICS & GYNECOLOGY

## 2024-11-18 RX ORDER — MUPIROCIN 20 MG/G
OINTMENT TOPICAL
COMMUNITY
Start: 2024-10-18

## 2024-11-19 ENCOUNTER — TELEPHONE (OUTPATIENT)
Dept: OBGYN | Age: 39
End: 2024-11-19

## 2024-11-20 ENCOUNTER — HOSPITAL ENCOUNTER (OUTPATIENT)
Age: 39
End: 2024-11-20
Attending: OBSTETRICS & GYNECOLOGY | Admitting: OBSTETRICS & GYNECOLOGY

## 2024-11-22 ENCOUNTER — TELEPHONE (OUTPATIENT)
Dept: INTERNAL MEDICINE CLINIC | Facility: CLINIC | Age: 39
End: 2024-11-22

## 2024-11-22 DIAGNOSIS — M54.50 CHRONIC BILATERAL LOW BACK PAIN, UNSPECIFIED WHETHER SCIATICA PRESENT: ICD-10-CM

## 2024-11-22 DIAGNOSIS — G89.29 CHRONIC BILATERAL LOW BACK PAIN, UNSPECIFIED WHETHER SCIATICA PRESENT: ICD-10-CM

## 2024-11-22 RX ORDER — HYDROCODONE BITARTRATE AND ACETAMINOPHEN 10; 325 MG/1; MG/1
TABLET ORAL
Qty: 84 TABLET | Refills: 0 | Status: SHIPPED | OUTPATIENT
Start: 2024-11-25

## 2024-11-22 NOTE — TELEPHONE ENCOUNTER
Pt here to [place]co[place] pt has apptment with Dr Salvador next month;

## 2024-12-03 ENCOUNTER — OFFICE VISIT (OUTPATIENT)
Dept: PHYSICAL MEDICINE AND REHAB | Facility: CLINIC | Age: 39
End: 2024-12-03
Payer: COMMERCIAL

## 2024-12-03 VITALS — HEIGHT: 65.5 IN | WEIGHT: 182 LBS | BODY MASS INDEX: 29.96 KG/M2

## 2024-12-03 DIAGNOSIS — G62.0 CHEMOTHERAPY-INDUCED PERIPHERAL NEUROPATHY (HCC): ICD-10-CM

## 2024-12-03 DIAGNOSIS — T45.1X5A CHEMOTHERAPY-INDUCED PERIPHERAL NEUROPATHY (HCC): ICD-10-CM

## 2024-12-03 DIAGNOSIS — R12 HEARTBURN: ICD-10-CM

## 2024-12-03 DIAGNOSIS — Z85.3 HISTORY OF BREAST CANCER: ICD-10-CM

## 2024-12-03 DIAGNOSIS — M43.16 SPONDYLOLISTHESIS OF LUMBAR REGION: Primary | ICD-10-CM

## 2024-12-03 PROCEDURE — 99204 OFFICE O/P NEW MOD 45 MIN: CPT | Performed by: PHYSICAL MEDICINE & REHABILITATION

## 2024-12-03 PROCEDURE — 3008F BODY MASS INDEX DOCD: CPT | Performed by: PHYSICAL MEDICINE & REHABILITATION

## 2024-12-03 NOTE — PROGRESS NOTES
[facility]  Progress Note    CHIEF COMPLAINT:    Chief Complaint   Patient presents with    Low Back Pain     New patient ref'd for aching/stabbing pain in her middle low back that stays local to that area. Started a couple months ago without injury. Lumbar xray 10/15/24. Has trouble standing from seated position. Has previously experienced N/T in posterior legs but not lately. Norco and flexeril daily with relief. Has done PT,received injs without relief. Last lumbar MRI 2018. LOP 4/10       History of Present Illness:  Pratik Frances is a 39 year old female who is being seen in consultation at the request of Dr. Cartwright.  She has a chief complaint of axial back pain with leg paresthesias.  Paresthesias are once worse and she has chemotherapy related neuropathy.  Her low back symptoms started a few months ago without injury, extension is worse than flexion.  Norco and Flexeril are helping somewhat, she is on a relatively high dose of Norco..  She has done physical therapy and remotely had back injections with no relief.  Regarding her leg pain she has been on Lyrica but not Cymbalta.  She is concerned Cymbalta will interfere with the efficacy of tamoxifen, but the goal is to get off [place].    PAST MEDICAL HISTORY:  Past Medical History:    Back pain    Back problem    Cancer (HCC)    breast cancer    COVID-19    2021-headache-not hospitalized    Essential hypertension    Gestational hypertension (HCC)    Herniated disc    Herniated lumbar intervertebral disc    Hx of migraine headaches    Hyperlipidemia    Mass of left forearm    Migraines    Obesity, unspecified    Personal history of antineoplastic chemotherapy    last dose Jan 2024    Urethral stricture       SURGICAL HISTORY:  Past Surgical History:   Procedure Laterality Date    Appendectomy      Eye surgery      Insertion of access port      Blair biopsy stereo nodule 1 site right (cpt=19081)  07/08/2022     clip top hat    Mastectomy bra Bilateral     Mastectomy left      Mastectomy right      Needle biopsy left  2022    MRI 2 site    Needle biopsy right  2022    us guided bx          Other surgical history      Excision lipoma of left forearm    Other surgical history      IUD placement    Other surgical history      eye surgery    Other surgical history  2024    Second stage reconstruction with removal of bilateral breast tissue expanders, placement of permanent implants - Bilateral    Tonsillectomy         SOCIAL HISTORY:   Social History     Occupational History    Not on file   Tobacco Use    Smoking status: Former     Current packs/day: 0.00     Average packs/day: 0.5 packs/day for 2.0 years (1.0 ttl pk-yrs)     Types: Cigarettes     Start date: 1998     Quit date: 2000     Years since quittin.0     Passive exposure: Past    Smokeless tobacco: Never   Vaping Use    Vaping status: Never Used   Substance and Sexual Activity    Alcohol use: Yes     Alcohol/week: 0.0 standard drinks of alcohol     Comment: Rarely    Drug use: No    Sexual activity: Not on file       CURRENT MEDICATIONS:   Current Outpatient Medications   Medication Sig Dispense Refill    HYDROcodone-acetaminophen (NORCO)  MG Oral Tab Take 1 to 2 tabs po q 4 to 6 hrs prn for pain 112 tablet 0    butalbital-acetaminophen-caffeine -40 MG Oral Tab Take 1 tablet by mouth every 8 (eight) hours as needed for Headaches. 30 tablet 0    cyclobenzaprine 10 MG Oral Tab Take 1 tablet (10 mg total) by mouth 3 (three) times daily. 90 tablet 0    Naloxone HCl 4 MG/0.1ML Nasal Liquid 4 mg by Nasal route as needed. If patient remains unresponsive, repeat dose in other nostril 2-5 minutes after first dose. 1 kit 0    tamoxifen 20 MG Oral Tab Take 1 tablet (20 mg total) by mouth daily. 90 tablet 3    omeprazole 20 MG Oral Capsule Delayed Release Take 1 capsule (20 mg total) by mouth every morning before breakfast.  30 capsule 1       ALLERGIES:   Allergies[1]    REVIEW OF SYSTEMS:   Patient-reported ROS  Constitutional  Sleep Disturbance: denies  Chills: denies  Fever: denies  Weight Gain: denies  Weight Loss: denies   Cardiovascular  Chest Pain: denies  Irregular Heartbeat: denies   Respiratory  Painful Breathing: denies  Wheezing: denies   Gastrointestinal  Bowel Incontinence: denies  Heartburn: denies  Abdominal Pain: denies  Blood in Stool : denies  Rectal Pain: denies   Hematology  Easy Bruising: denies  Easy Bleeding: denies   Genitourinary  Difficulty Urinating: denies  Bladder Incontinence: denies  Pelvic Pain: denies  Painful Urination: denies   Musculoskeletal  Joint Stiffness: denies  Painful Joints: denies  Tailbone Pain: admits  Swollen Joints: denies   Peripheral Vascular  Swelling of Legs/Feet: denies  Cold Extremities: denies   Skin  Open Sores: denies  Nodules or Lumps: denies  Rash: denies   Neurological  Loss of Strength Since last Visit: denies  Tingling/Numbness: admits  Balance: denies   Psychiatric  Anxiety: denies  Depressed Mood: denies             PHYSICAL EXAM:   Ht 65.5\"   Wt 182 lb (82.6 kg)   LMP 10/02/2024   BMI 29.83 kg/m²     Body mass index is 29.83 kg/m².      General: No immediate distress  Head: Normocephalic/ Atraumatic  Extremities: No lower extremity edema bilaterally. Peripheral pulses intact.   Spine: Painful lumbar extension  Hips: full and painfree ROM   Neuro:   Cognition: alert & oriented x 3, attentive, able to follow 2 step commands, comprehention intact, spontaneous speech intact  Strength: Lower extremities have 5/5 strength  Sensation: Normal lower extremities  Reflexes: Distal areflexia  SLR: neg    Data    Radiology Imaging:  I personally reviewed plain film x-ray of lumbar spine shows grade 1 L5-S1 spondylolisthesis with pars defects and collapse of the disc space.  Otherwise there is normal alignment.  A lumbar spine MRI from 2018 shows bony impingement of L5 and S1  nerve roots      ASSESSMENT AND PLAN:  1. Spondylolisthesis of lumbar region  She has chronic back and sometimes leg pain on chronic Norco.  Would like to start in physical therapy.  We may consider either Lyrica or Cymbalta to attempt to get her off of Norco.  She has concerns about some bulging with tamoxifen, so we will need to discuss with oncology regarding Cymbalta.  If impossible start Lyrica next time.  Will obtain a new MRI if no better.  - PHYSICAL THERAPY EXTERNAL    2. Chemotherapy-induced peripheral neuropathy (HCC)  Contributing to leg paresthesias    3. History of breast cancer  On tamoxifen, status post bilateral mastectomy    4. Heartburn  Caution with NSAIDs        RTC: 6 weeks      The patient was in agreement with the assessment and plan.  All questions were answered.        Remi Ruelas MD  Physical Medicine and Rehabilitation/Sports Medicine  [facility]         [1]   Allergies  Allergen Reactions    Aspirin SHORTNESS OF BREATH     Other reaction(s): ASPIRIN - throat swelling    Codeine SWELLING     Other reaction(s): Swelling of hands and toes    Dust Mite Extract HIVES     Nasal congestion    Grass HIVES     Nasal congestion    Adhesive Tape RASH    Chlorhexidine Gluconate Cloth ITCHING     Burning; wipes only. Okay with plain chlorhexedine

## 2024-12-04 DIAGNOSIS — M54.50 CHRONIC BILATERAL LOW BACK PAIN, UNSPECIFIED WHETHER SCIATICA PRESENT: ICD-10-CM

## 2024-12-04 DIAGNOSIS — G89.29 CHRONIC BILATERAL LOW BACK PAIN, UNSPECIFIED WHETHER SCIATICA PRESENT: ICD-10-CM

## 2024-12-04 NOTE — TELEPHONE ENCOUNTER
Current Outpatient Medications:     HYDROcodone-acetaminophen (NORCO)  MG Oral Tab, Take 1 to 2 tabs po q 4 to 6 hrs prn for pain, Disp: 84 tablet, Rfl: 0

## 2024-12-09 ENCOUNTER — TELEPHONE (OUTPATIENT)
Dept: INTERNAL MEDICINE CLINIC | Facility: CLINIC | Age: 39
End: 2024-12-09

## 2024-12-09 DIAGNOSIS — M54.50 CHRONIC BILATERAL LOW BACK PAIN, UNSPECIFIED WHETHER SCIATICA PRESENT: ICD-10-CM

## 2024-12-09 DIAGNOSIS — G89.29 CHRONIC BILATERAL LOW BACK PAIN, UNSPECIFIED WHETHER SCIATICA PRESENT: ICD-10-CM

## 2024-12-09 DIAGNOSIS — G43.009 MIGRAINE WITHOUT AURA AND WITHOUT STATUS MIGRAINOSUS, NOT INTRACTABLE: ICD-10-CM

## 2024-12-09 RX ORDER — HYDROCODONE BITARTRATE AND ACETAMINOPHEN 10; 325 MG/1; MG/1
TABLET ORAL
Qty: 84 TABLET | Refills: 0 | OUTPATIENT
Start: 2024-12-09

## 2024-12-09 RX ORDER — HYDROCODONE BITARTRATE AND ACETAMINOPHEN 10; 325 MG/1; MG/1
TABLET ORAL
Qty: 84 TABLET | Refills: 0 | Status: SHIPPED | OUTPATIENT
Start: 2024-12-09

## 2024-12-09 NOTE — TELEPHONE ENCOUNTER
Please review. Protocol Failed; No Protocol    Requested Prescriptions   Pending Prescriptions Disp Refills    cyclobenzaprine 10 MG Oral Tab 90 tablet 0     Sig: Take 1 tablet (10 mg total) by mouth 3 (three) times daily.       There is no refill protocol information for this order            Future Appointments         Provider Department Appt Notes    In 2 months Tila Lopez MD [facility] Hematology Oncology follow up visit.cl  4m          Recent Outpatient Visits              6 days ago Spondylolisthesis of lumbar region    [facility] Remi Ruelas MD    Office Visit    1 month ago Malignant neoplasm of right breast in female, estrogen receptor positive, unspecified site of breast (HCC)    [facility] Hematology Oncology Tila Lopez MD    Office Visit    1 month ago [facility]    [facility] Visit    1 month ago Chronic midline low back pain without sciatica    [facility] Venkata Jeter MD    Office Visit    2 months ago Injury of toe on right foot, initial encounter    [facility]    Office Visit

## 2024-12-09 NOTE — TELEPHONE ENCOUNTER
Please review. Protocol Failed; No Protocol      Recent fills: 9/12/2024, 10/7/2024  Last Rx written: 10/7/2024  Last office visit: 10/26/2024          Requested Prescriptions   Pending Prescriptions Disp Refills    butalbital-acetaminophen-caffeine -40 MG Oral Tab 30 tablet 0     Sig: Take 1 tablet by mouth every 8 (eight) hours as needed for Headaches.       Controlled Substance Medication Failed - 12/9/2024 10:40 AM        Failed - This medication is a controlled substance - forward to provider to refill               Future Appointments         Provider Department Appt Notes    In 2 months Tila Lopez MD [facility] Hematology Oncology follow up visit.cl  4m          Recent Outpatient Visits              6 days ago Spondylolisthesis of lumbar region    [facility] Remi Ruelas MD    Office Visit    1 month ago Malignant neoplasm of right breast in female, estrogen receptor positive, unspecified site of breast (HCC)    [facility] Hematology Oncology Tila Lopez MD    Office Visit    1 month ago [facility]    Office Visit    1 month ago Chronic midline low back pain without sciatica    [facility]    Office Visit    2 months ago Injury of toe on right foot, initial encounter    [facility], Venkata Jeter MD    Office Visit

## 2024-12-09 NOTE — TELEPHONE ENCOUNTER
Ordered Status Priority Ordering User Department   12/09/24 Sent (none) Venkata Cartwright MD [facility]     Order History  Outpatient  Date/Time Action Taken User Additional Information   12/09/24 0736 Sign Venkata Cartwright MD Reorder from Order:[id number]   12/09/24 0736 Taking Flag Checked Venkata Cartwright MD [id number]     Provider Information    Authorizing Provider Encounter Provider   Venkata Cartwright MD Linchangco, Emmanuel, MD     Medication Detail    Medication Quantity Refills Start End   HYDROcodone-acetaminophen (NORCO)  MG Oral Tab 84 tablet 0 12/9/2024 --   Sig:   Take 1 to 2 tabs po q 4 to 6 hrs prn for pain     Route:   (none)     Note to Pharmacy:   Max 6 tabs/day;     Class:   Print Script     Earliest Fill Date:   12/9/2024     Order #:   [id number]

## 2024-12-10 RX ORDER — CYCLOBENZAPRINE HCL 10 MG
10 TABLET ORAL 3 TIMES DAILY
Qty: 90 TABLET | Refills: 0 | Status: SHIPPED | OUTPATIENT
Start: 2024-12-10

## 2024-12-10 RX ORDER — BUTALBITAL, ACETAMINOPHEN AND CAFFEINE 50; 325; 40 MG/1; MG/1; MG/1
1 TABLET ORAL EVERY 8 HOURS PRN
Qty: 30 TABLET | Refills: 0 | Status: SHIPPED | OUTPATIENT
Start: 2024-12-10

## 2024-12-17 ENCOUNTER — E-ADVICE (OUTPATIENT)
Dept: OBGYN | Age: 39
End: 2024-12-17

## 2024-12-17 ENCOUNTER — TELEPHONE (OUTPATIENT)
Dept: INTERNAL MEDICINE CLINIC | Facility: CLINIC | Age: 39
End: 2024-12-17

## 2024-12-17 DIAGNOSIS — M54.50 CHRONIC BILATERAL LOW BACK PAIN, UNSPECIFIED WHETHER SCIATICA PRESENT: ICD-10-CM

## 2024-12-17 DIAGNOSIS — G89.29 CHRONIC BILATERAL LOW BACK PAIN, UNSPECIFIED WHETHER SCIATICA PRESENT: ICD-10-CM

## 2024-12-17 RX ORDER — HYDROCODONE BITARTRATE AND ACETAMINOPHEN 10; 325 MG/1; MG/1
TABLET ORAL
Qty: 112 TABLET | Refills: 0 | Status: SHIPPED | OUTPATIENT
Start: 2024-12-17

## 2024-12-17 NOTE — TELEPHONE ENCOUNTER
Pt here for refill for norco script; had slipped and fell in her patio few days ago, with pain on her back so had to inreased her norco to 8/day; she will be leaving out of town 12/18/24 for the christmas holiday; will need increase norco for next 2 weeks to 8/tab; plan to decrease back to 6tab/day in 2 weeks and start lyrica at that time.   [place]

## 2024-12-27 PROBLEM — M43.16 SPONDYLOLISTHESIS OF LUMBAR REGION: Status: ACTIVE | Noted: 2024-12-27

## 2024-12-30 ENCOUNTER — TELEPHONE (OUTPATIENT)
Dept: INTERNAL MEDICINE CLINIC | Facility: CLINIC | Age: 39
End: 2024-12-30

## 2024-12-30 DIAGNOSIS — M54.50 CHRONIC BILATERAL LOW BACK PAIN, UNSPECIFIED WHETHER SCIATICA PRESENT: ICD-10-CM

## 2024-12-30 DIAGNOSIS — G89.29 CHRONIC BILATERAL LOW BACK PAIN, UNSPECIFIED WHETHER SCIATICA PRESENT: ICD-10-CM

## 2024-12-30 RX ORDER — HYDROCODONE BITARTRATE AND ACETAMINOPHEN 10; 325 MG/1; MG/1
TABLET ORAL
Qty: 91 TABLET | Refills: 0 | Status: SHIPPED | OUTPATIENT
Start: 2024-12-30

## 2024-12-30 NOTE — TELEPHONE ENCOUNTER
Pt here to pickup refill for her norco; cutting down on dose; she will see her onco before we start putting her pain adjuvant med with intention of getting her off [place] in the future; [place]

## 2024-12-31 ENCOUNTER — TELEPHONE (OUTPATIENT)
Dept: INTERNAL MEDICINE CLINIC | Facility: CLINIC | Age: 39
End: 2024-12-31

## 2024-12-31 NOTE — TELEPHONE ENCOUNTER
Pharmacy states that the following medication is not covered by patients plan. Please call pharmacy to change to preferred alternative.         HYDROcodone-acetaminophen (NORCO)  MG Oral Tab, Take 1 to 2 tabs po q 4 to 6 hrs prn for pain, Disp: 91 tablet, Rfl: 0

## 2025-01-03 ENCOUNTER — E-ADVICE (OUTPATIENT)
Dept: PREADMISSION TESTING | Age: 40
End: 2025-01-03

## 2025-01-13 ENCOUNTER — TELEPHONE (OUTPATIENT)
Dept: INTERNAL MEDICINE CLINIC | Facility: CLINIC | Age: 40
End: 2025-01-13

## 2025-01-13 DIAGNOSIS — M54.50 CHRONIC BILATERAL LOW BACK PAIN, UNSPECIFIED WHETHER SCIATICA PRESENT: ICD-10-CM

## 2025-01-13 DIAGNOSIS — G89.29 CHRONIC BILATERAL LOW BACK PAIN, UNSPECIFIED WHETHER SCIATICA PRESENT: ICD-10-CM

## 2025-01-13 RX ORDER — HYDROCODONE BITARTRATE AND ACETAMINOPHEN 10; 325 MG/1; MG/1
TABLET ORAL
Qty: 84 TABLET | Refills: 0 | Status: SHIPPED | OUTPATIENT
Start: 2025-01-13

## 2025-01-13 NOTE — TELEPHONE ENCOUNTER
[place] holding off on starting lyrica since she wants to d/w her oncologist first .  Pt also have tubal ligation this week and may need to increase dose of norco as needed to 7/day prn as needed only

## 2025-01-15 ENCOUNTER — TELEPHONE (OUTPATIENT)
Dept: OBGYN | Age: 40
End: 2025-01-15

## 2025-01-16 ENCOUNTER — OFFICE VISIT (OUTPATIENT)
Dept: INTERNAL MEDICINE CLINIC | Facility: CLINIC | Age: 40
End: 2025-01-16

## 2025-01-16 ENCOUNTER — NURSE TRIAGE (OUTPATIENT)
Dept: INTERNAL MEDICINE CLINIC | Facility: CLINIC | Age: 40
End: 2025-01-16

## 2025-01-16 VITALS
DIASTOLIC BLOOD PRESSURE: 80 MMHG | TEMPERATURE: 99 F | BODY MASS INDEX: 31.63 KG/M2 | WEIGHT: 192.13 LBS | SYSTOLIC BLOOD PRESSURE: 130 MMHG | HEART RATE: 98 BPM | HEIGHT: 65.5 IN | OXYGEN SATURATION: 98 %

## 2025-01-16 DIAGNOSIS — H10.32 ACUTE CONJUNCTIVITIS OF LEFT EYE, UNSPECIFIED ACUTE CONJUNCTIVITIS TYPE: Primary | ICD-10-CM

## 2025-01-16 PROCEDURE — 99213 OFFICE O/P EST LOW 20 MIN: CPT | Performed by: INTERNAL MEDICINE

## 2025-01-16 RX ORDER — TOBRAMYCIN 3 MG/ML
1 SOLUTION/ DROPS OPHTHALMIC EVERY 6 HOURS
Qty: 1 EACH | Refills: 0 | Status: SHIPPED | OUTPATIENT
Start: 2025-01-16

## 2025-01-16 RX ORDER — MUPIROCIN 20 MG/G
1 OINTMENT TOPICAL 3 TIMES DAILY
COMMUNITY
Start: 2024-10-18

## 2025-01-16 NOTE — TELEPHONE ENCOUNTER
Action Requested: Summary for Provider     []  Critical Lab, Recommendations Needed  [x] Need Additional Advice  []   FYI    []   Need Orders  [] Need Medications Sent to Pharmacy  []  Other     SUMMARY: Per protocol advised : Office visit   Reason for call: Acute  Onset: Data Unavailable   Patient calling ( name and date of birth of patient verified )  has erythema , edema and pain to left  eye lid , has slight crusty drainage   onset of 2 days     Note from daughter school sates \"pink eye\" is around    Denies eye injury, no fevers, no vision changes     ( Hx of 9 Eye surgeries )         Only wants to see Dr. Cartwright  declines offer of other providers  Can pt be added today ??     Please advise and thank you.  Please reply to pool: EM RN TRIAGE        Reason for Disposition   Eye pain present > 24 hours    Protocols used: Eye Pain and Other Symptoms-A-OH

## 2025-01-16 NOTE — TELEPHONE ENCOUNTER
Communicated with  Dr. Cartwright  patient can be added at  at 5pm today      Called patient in regards to appointment   ( identified name and date of birth )     Appointment made       Future Appointments   Date Time Provider Department Center   1/16/2025  4:45 PM Venkata Cartwright MD [facility]   3/7/2025  9:00 AM Tila Lopez MD [facility]     Advised  to bring new insurance card and Photo  ID

## 2025-01-16 NOTE — PROGRESS NOTES
Subjective:     Patient ID: Pratik Frances is a 39 year old female.    Eye Problem   The left eye is affected. This is a new problem. The current episode started yesterday. The problem occurs constantly. The problem has been unchanged. There was no injury mechanism. The patient is experiencing no pain. There is Known exposure to pink eye. She Does not wear contacts. Associated symptoms include eye redness. Pertinent negatives include no blurred vision, eye discharge, double vision, fever, foreign body sensation, nausea, recent URI or vomiting. She has tried nothing for the symptoms. The treatment provided no relief.       History/Other:   Review of Systems   Constitutional:  Negative for fever.   Eyes:  Positive for redness. Negative for blurred vision, double vision and discharge.   Gastrointestinal:  Negative for nausea and vomiting.     Current Outpatient Medications   Medication Sig Dispense Refill    mupirocin 2 % External Ointment Apply 1 Application topically 3 (three) times daily.      HYDROcodone-acetaminophen (NORCO)  MG Oral Tab Take 1 to 2 tabs po q 4 to 6 hrs prn for pain 84 tablet 0    butalbital-acetaminophen-caffeine -40 MG Oral Tab Take 1 tablet by mouth every 8 (eight) hours as needed for Headaches. 30 tablet 0    cyclobenzaprine 10 MG Oral Tab Take 1 tablet (10 mg total) by mouth 3 (three) times daily. 90 tablet 0    Naloxone HCl 4 MG/0.1ML Nasal Liquid 4 mg by Nasal route as needed. If patient remains unresponsive, repeat dose in other nostril 2-5 minutes after first dose. 1 kit 0    tamoxifen 20 MG Oral Tab Take 1 tablet (20 mg total) by mouth daily. 90 tablet 3    omeprazole 20 MG Oral Capsule Delayed Release Take 1 capsule (20 mg total) by mouth every morning before breakfast. 30 capsule 1     Allergies:Allergies[1]    Past Medical History:    Back pain    Back problem    Cancer (HCC)    breast cancer    COVID-19    2021-headache-not hospitalized    Essential hypertension     Gestational hypertension (HCC)    Herniated disc    Herniated lumbar intervertebral disc    Hx of migraine headaches    Hyperlipidemia    Mass of left forearm    Migraines    Obesity, unspecified    Personal history of antineoplastic chemotherapy    last dose 2024    Urethral stricture      Past Surgical History:   Procedure Laterality Date    Appendectomy      Eye surgery      Insertion of access port      Blair biopsy stereo nodule 1 site right (cpt=19081)  2022    clip top hat    Mastectomy bra Bilateral     Mastectomy left      Mastectomy right      Needle biopsy left  2022    MRI 2 site    Needle biopsy right  2022    us guided bx          Other surgical history      Excision lipoma of left forearm    Other surgical history      IUD placement    Other surgical history      eye surgery    Other surgical history  2024    Second stage reconstruction with removal of bilateral breast tissue expanders, placement of permanent implants - Bilateral    Tonsillectomy        Family History   Problem Relation Age of Onset    Heart Disease Other     Hypertension Other     Glaucoma Other     Heart Disease Mother     Lipids Mother     Hypertension Mother     Heart Disorder Mother     ADHD Sister     Lipids Father     Stroke Father     Other (Other) Brother         DJD spine    Breast Cancer Paternal Cousin Female 24        also had @ 34 and 41 (last was TNBC); neg genetics     Cancer Paternal Grandmother         lung ca; smoker    Dementia Paternal Grandfather     Cancer Paternal Cousin Female 30        uterine ca (sister of cousin w/br ca)    Cancer Paternal Cousin Male 40        gastric ca (smoker); also leukemia @ 10      Social History:   Social History     Socioeconomic History    Marital status:    Tobacco Use    Smoking status: Former     Current packs/day: 0.00     Average packs/day: 0.5 packs/day for 2.0 years (1.0 ttl pk-yrs)     Types: Cigarettes     Start date:  1998     Quit date: 2000     Years since quittin.0     Passive exposure: Past    Smokeless tobacco: Never   Vaping Use    Vaping status: Never Used   Substance and Sexual Activity    Alcohol use: Yes     Alcohol/week: 0.0 standard drinks of alcohol     Comment: Rarely    Drug use: No   Other Topics Concern    Caffeine Concern No        Objective:   Physical Exam  Constitutional:       General: She is not in acute distress.     Appearance: She is not ill-appearing, toxic-appearing or diaphoretic.   HENT:      Right Ear: Tympanic membrane, ear canal and external ear normal.      Left Ear: Tympanic membrane, ear canal and external ear normal.   Eyes:      General: No scleral icterus.        Right eye: No discharge.         Left eye: No discharge.      Extraocular Movements: Extraocular movements intact.      Right eye: Normal extraocular motion.      Left eye: Normal extraocular motion.      Conjunctiva/sclera:      Right eye: Right conjunctiva is not injected. No chemosis, exudate or hemorrhage.     Left eye: Left conjunctiva is injected. No chemosis, exudate or hemorrhage.     Pupils: Pupils are equal, round, and reactive to light.      Comments: No ciliary flush   Cardiovascular:      Rate and Rhythm: Normal rate and regular rhythm.   Musculoskeletal:      Cervical back: Normal range of motion and neck supple. No rigidity or tenderness.   Lymphadenopathy:      Cervical: No cervical adenopathy.   Neurological:      Mental Status: She is alert.         Assessment & Plan:   (H10.32) Acute conjunctivitis of left eye, unspecified acute conjunctivitis type  (primary encounter diagnosis)  Plan: pt given tobrex eye drops; pt told to call back if symptom persist/worsens        No orders of the defined types were placed in this encounter.      Meds This Visit:  Requested Prescriptions      No prescriptions requested or ordered in this encounter       Imaging & Referrals:  None            [1]   Allergies  Allergen  Reactions    Aspirin SHORTNESS OF BREATH     Other reaction(s): ASPIRIN - throat swelling    Codeine SWELLING     Other reaction(s): Swelling of hands and toes    Dust Mite Extract HIVES     Nasal congestion    Grass HIVES     Nasal congestion    Adhesive Tape RASH    Chlorhexidine Gluconate Cloth ITCHING     Burning; wipes only. Okay with plain chlorhexedine

## 2025-01-24 ENCOUNTER — TELEPHONE (OUTPATIENT)
Dept: INTERNAL MEDICINE CLINIC | Facility: CLINIC | Age: 40
End: 2025-01-24

## 2025-01-24 DIAGNOSIS — M54.50 CHRONIC BILATERAL LOW BACK PAIN, UNSPECIFIED WHETHER SCIATICA PRESENT: ICD-10-CM

## 2025-01-24 DIAGNOSIS — G89.29 CHRONIC BILATERAL LOW BACK PAIN, UNSPECIFIED WHETHER SCIATICA PRESENT: ICD-10-CM

## 2025-01-24 RX ORDER — HYDROCODONE BITARTRATE AND ACETAMINOPHEN 10; 325 MG/1; MG/1
TABLET ORAL
Qty: 84 TABLET | Refills: 0 | Status: SHIPPED | OUTPATIENT
Start: 2025-01-24

## 2025-01-24 NOTE — TELEPHONE ENCOUNTER
[place] will be seeing her onco, and per pt, had been told to hold off starting adjuvant med therapy for her chronic low back pain til see her in next ov

## 2025-01-27 ENCOUNTER — E-ADVICE (OUTPATIENT)
Dept: OBGYN | Age: 40
End: 2025-01-27

## 2025-02-05 ENCOUNTER — TELEPHONE (OUTPATIENT)
Dept: INTERNAL MEDICINE CLINIC | Facility: CLINIC | Age: 40
End: 2025-02-05

## 2025-02-05 DIAGNOSIS — M54.50 CHRONIC BILATERAL LOW BACK PAIN, UNSPECIFIED WHETHER SCIATICA PRESENT: ICD-10-CM

## 2025-02-05 DIAGNOSIS — G89.29 CHRONIC BILATERAL LOW BACK PAIN, UNSPECIFIED WHETHER SCIATICA PRESENT: ICD-10-CM

## 2025-02-05 RX ORDER — HYDROCODONE BITARTRATE AND ACETAMINOPHEN 10; 325 MG/1; MG/1
TABLET ORAL
Qty: 84 TABLET | Refills: 0 | Status: SHIPPED | OUTPATIENT
Start: 2025-02-05 | End: 2025-02-07

## 2025-02-06 DIAGNOSIS — G43.009 MIGRAINE WITHOUT AURA AND WITHOUT STATUS MIGRAINOSUS, NOT INTRACTABLE: ICD-10-CM

## 2025-02-07 ENCOUNTER — TELEPHONE (OUTPATIENT)
Dept: INTERNAL MEDICINE CLINIC | Facility: CLINIC | Age: 40
End: 2025-02-07

## 2025-02-07 DIAGNOSIS — G89.29 CHRONIC BILATERAL LOW BACK PAIN, UNSPECIFIED WHETHER SCIATICA PRESENT: ICD-10-CM

## 2025-02-07 DIAGNOSIS — M54.50 CHRONIC BILATERAL LOW BACK PAIN, UNSPECIFIED WHETHER SCIATICA PRESENT: ICD-10-CM

## 2025-02-07 RX ORDER — HYDROCODONE BITARTRATE AND ACETAMINOPHEN 10; 325 MG/1; MG/1
TABLET ORAL
Qty: 84 TABLET | Refills: 0 | Status: SHIPPED | OUTPATIENT
Start: 2025-02-07

## 2025-02-07 NOTE — TELEPHONE ENCOUNTER
Pt here to re do script that needs to say max 6 tabs/day for insurance to cover as before; new script printed, previous script returned by pt

## 2025-02-10 ENCOUNTER — TELEPHONE (OUTPATIENT)
Dept: SURGERY | Facility: CLINIC | Age: 40
End: 2025-02-10
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
## 2025-02-10 NOTE — TELEPHONE ENCOUNTER
No refill protocol.  Please review pended order.     Requested Prescriptions     Pending Prescriptions Disp Refills    cyclobenzaprine 10 MG Oral Tab 90 tablet 0     Sig: Take 1 tablet (10 mg total) by mouth 3 (three) times daily.

## 2025-02-11 ENCOUNTER — OFFICE VISIT (OUTPATIENT)
Facility: CLINIC | Age: 40
End: 2025-02-11
Payer: COMMERCIAL

## 2025-02-11 DIAGNOSIS — Z90.13 S/P BILATERAL MASTECTOMY: ICD-10-CM

## 2025-02-11 DIAGNOSIS — S21.002A OPEN WOUND OF LEFT BREAST, INITIAL ENCOUNTER: Primary | ICD-10-CM

## 2025-02-11 PROCEDURE — 99212 OFFICE O/P EST SF 10 MIN: CPT | Performed by: SURGERY

## 2025-02-11 PROCEDURE — 88305 TISSUE EXAM BY PATHOLOGIST: CPT | Performed by: SURGERY

## 2025-02-11 PROCEDURE — 12031 INTMD RPR S/A/T/EXT 2.5 CM/<: CPT | Performed by: SURGERY
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
## 2025-02-11 NOTE — PROCEDURES
Debridement and closure of left breast wound.  The wound was encompassed in a transversely oriented ellipse.  It was infiltrated with 2.5 cc of 1% lidocaine with epinephrine.  The area was prepped and draped sterilely.  The ellipse was excised and sent for permanent pathologic analysis.  The capsule and deep dermis were repaired with 3-0 Vicryl sutures.  The skin was then closed with 5-0 Prolene simple and mattress sutures.  Bacitracin, Xeroform, gauze were applied.  The patient was instructed on wound care with topical antibiotic ointment.  She was directed to complete her prescribed course of oral antibiotics.  She is instructed to call for fevers, chills, or erythema.  The plan was reviewed with the patient and questions were answered.

## 2025-02-11 NOTE — TELEPHONE ENCOUNTER
Please review; protocol failed/No Protocol    Recent Fills: 09/12/2024, 10/07/2024, 12/10/2024    Last Rx Written: 12/10/2024    Last Office Visit: 01/16/2025    Requested Prescriptions   Pending Prescriptions Disp Refills    butalbital-acetaminophen-caffeine -40 MG Oral Tab 30 tablet 0     Sig: Take 1 tablet by mouth every 8 (eight) hours as needed for Headaches.       Controlled Substance Medication Failed - 2/11/2025 10:43 AM        Failed - This medication is a controlled substance - forward to provider to refill        Passed - Medication is active on med list           Future Appointments         Provider Department Appt Notes    In 2 weeks Benito Levin MD [facility] suture removal    In 3 weeks Tila Lopez MD [facility] Hematology Oncology [place] follow up visit.cl  4m          Recent Outpatient Visits              Today Open wound of left breast, initial encounter    [facility] Benito Levin MD    Office Visit    3 weeks ago Acute conjunctivitis of left eye, unspecified acute conjunctivitis type    [facility]    2 months ago Spondylolisthesis of lumbar region    [facility] Remi Ruelas MD    Office Visit    3 months ago Malignant neoplasm of right breast in female, estrogen receptor positive, unspecified site of breast (HCC)    [facility] Hematology Oncology Tila Lopez MD    Office Visit    3 months ago Annual physical exam    [facility] Visit

## 2025-02-12 RX ORDER — CYCLOBENZAPRINE HCL 10 MG
10 TABLET ORAL 3 TIMES DAILY
Qty: 90 TABLET | Refills: 0 | Status: SHIPPED | OUTPATIENT
Start: 2025-02-12

## 2025-02-12 RX ORDER — BUTALBITAL, ACETAMINOPHEN AND CAFFEINE 50; 325; 40 MG/1; MG/1; MG/1
1 TABLET ORAL EVERY 8 HOURS PRN
Qty: 30 TABLET | Refills: 0 | Status: SHIPPED | OUTPATIENT
Start: 2025-02-12

## 2025-02-18 ENCOUNTER — TELEPHONE (OUTPATIENT)
Dept: INTERNAL MEDICINE CLINIC | Facility: CLINIC | Age: 40
End: 2025-02-18

## 2025-02-18 DIAGNOSIS — G89.29 CHRONIC BILATERAL LOW BACK PAIN, UNSPECIFIED WHETHER SCIATICA PRESENT: ICD-10-CM

## 2025-02-18 DIAGNOSIS — M54.50 CHRONIC BILATERAL LOW BACK PAIN, UNSPECIFIED WHETHER SCIATICA PRESENT: ICD-10-CM

## 2025-02-18 RX ORDER — HYDROCODONE BITARTRATE AND ACETAMINOPHEN 10; 325 MG/1; MG/1
TABLET ORAL
Qty: 91 TABLET | Refills: 0 | Status: SHIPPED | OUTPATIENT
Start: 2025-02-18
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
## 2025-02-25 ENCOUNTER — OFFICE VISIT (OUTPATIENT)
Facility: CLINIC | Age: 40
End: 2025-02-25
Payer: COMMERCIAL

## 2025-02-25 DIAGNOSIS — Z90.13 S/P BILATERAL MASTECTOMY: Primary | ICD-10-CM

## 2025-02-25 NOTE — PROGRESS NOTES
Pratik Frances is a 39 year old female who presents today in follow-up.   She variance burning of the sutures and presented to emergent care where she underwent suture removal.  She denies fevers or chills.      Physical Examination:  Breasts: Breast incision is clean dry and intact.  There is no erythema noted.    Assessment and Plan:  Steri-Strips were applied.  The patient was instructed to monitor the incision and call for any fevers, chills, or further healing difficulties.  The patient will otherwise follow-up with her previously scheduled appointment.  The plan was reviewed with the patient and questions were answered.

## 2025-02-26 ENCOUNTER — E-ADVICE (OUTPATIENT)
Dept: PREADMISSION TESTING | Age: 40
End: 2025-02-26

## 2025-02-28 ENCOUNTER — TELEPHONE (OUTPATIENT)
Dept: INTERNAL MEDICINE CLINIC | Facility: CLINIC | Age: 40
End: 2025-02-28

## 2025-02-28 DIAGNOSIS — G89.29 CHRONIC BILATERAL LOW BACK PAIN, UNSPECIFIED WHETHER SCIATICA PRESENT: ICD-10-CM

## 2025-02-28 DIAGNOSIS — M54.50 CHRONIC BILATERAL LOW BACK PAIN, UNSPECIFIED WHETHER SCIATICA PRESENT: ICD-10-CM

## 2025-02-28 RX ORDER — HYDROCODONE BITARTRATE AND ACETAMINOPHEN 10; 325 MG/1; MG/1
TABLET ORAL
Qty: 84 TABLET | Refills: 0 | Status: SHIPPED | OUTPATIENT
Start: 2025-02-28

## 2025-02-28 NOTE — TELEPHONE ENCOUNTER
[place] to see onco an discuss adjuvant meds for pain as had been planned before.

## 2025-03-04 ENCOUNTER — TELEPHONE (OUTPATIENT)
Dept: INTERNAL MEDICINE CLINIC | Facility: CLINIC | Age: 40
End: 2025-03-04

## 2025-03-04 NOTE — TELEPHONE ENCOUNTER
Drug change request rcvd from the pharmacy.     Current Outpatient Medications   Medication Sig Dispense Refill    HYDROcodone-acetaminophen (NORCO)  MG Oral Tab Take 1 to 2 tabs po q 4 to 6 hrs prn for pain 84 tablet 0     Drug not covered by patient plan.  The preferred alternative is HYDROCOAPAPTABMG.  Please call/fax the pharmacy to change medication along with strength, directions, quantity and refills.

## 2025-03-05 ASSESSMENT — ACTIVITIES OF DAILY LIVING (ADL)
ADL_BEFORE_ADMISSION: INDEPENDENT
ADL_SCORE: 12
SENSORY_SUPPORT_DEVICES: EYEGLASSES

## 2025-03-06 ENCOUNTER — TELEPHONE (OUTPATIENT)
Dept: OBGYN | Age: 40
End: 2025-03-06

## 2025-03-07 ENCOUNTER — HOSPITAL ENCOUNTER (OUTPATIENT)
Age: 40
Discharge: HOME | End: 2025-03-07
Attending: OBSTETRICS & GYNECOLOGY | Admitting: OBSTETRICS & GYNECOLOGY

## 2025-03-07 ENCOUNTER — VIRTUAL PHONE E/M (OUTPATIENT)
Age: 40
End: 2025-03-07
Attending: INTERNAL MEDICINE
Payer: COMMERCIAL

## 2025-03-07 VITALS
OXYGEN SATURATION: 98 % | DIASTOLIC BLOOD PRESSURE: 84 MMHG | SYSTOLIC BLOOD PRESSURE: 130 MMHG | RESPIRATION RATE: 18 BRPM | HEART RATE: 87 BPM | WEIGHT: 194.19 LBS | BODY MASS INDEX: 31.96 KG/M2 | TEMPERATURE: 98 F | HEIGHT: 65.5 IN

## 2025-03-07 VITALS
RESPIRATION RATE: 15 BRPM | WEIGHT: 191.8 LBS | DIASTOLIC BLOOD PRESSURE: 78 MMHG | HEART RATE: 85 BPM | TEMPERATURE: 97.2 F | OXYGEN SATURATION: 96 % | HEIGHT: 65 IN | SYSTOLIC BLOOD PRESSURE: 131 MMHG | BODY MASS INDEX: 31.96 KG/M2

## 2025-03-07 DIAGNOSIS — Z17.0 MALIGNANT NEOPLASM OF RIGHT BREAST IN FEMALE, ESTROGEN RECEPTOR POSITIVE, UNSPECIFIED SITE OF BREAST (HCC): Primary | ICD-10-CM

## 2025-03-07 DIAGNOSIS — Z90.13 S/P BILATERAL MASTECTOMY: ICD-10-CM

## 2025-03-07 DIAGNOSIS — M54.50 CHRONIC LOW BACK PAIN, UNSPECIFIED BACK PAIN LATERALITY, UNSPECIFIED WHETHER SCIATICA PRESENT: ICD-10-CM

## 2025-03-07 DIAGNOSIS — Z01.818 PRE-OP TESTING: Primary | ICD-10-CM

## 2025-03-07 DIAGNOSIS — Z79.810 ENCOUNTER FOR MONITORING TAMOXIFEN THERAPY: ICD-10-CM

## 2025-03-07 DIAGNOSIS — G89.29 CHRONIC LOW BACK PAIN, UNSPECIFIED BACK PAIN LATERALITY, UNSPECIFIED WHETHER SCIATICA PRESENT: ICD-10-CM

## 2025-03-07 DIAGNOSIS — C50.911 MALIGNANT NEOPLASM OF RIGHT BREAST IN FEMALE, ESTROGEN RECEPTOR POSITIVE, UNSPECIFIED SITE OF BREAST (HCC): Primary | ICD-10-CM

## 2025-03-07 DIAGNOSIS — Z92.21 HISTORY OF ANTINEOPLASTIC CHEMOTHERAPY: ICD-10-CM

## 2025-03-07 DIAGNOSIS — G62.0 CHEMOTHERAPY-INDUCED PERIPHERAL NEUROPATHY (HCC): ICD-10-CM

## 2025-03-07 DIAGNOSIS — T45.1X5A CHEMOTHERAPY-INDUCED PERIPHERAL NEUROPATHY (HCC): ICD-10-CM

## 2025-03-07 DIAGNOSIS — Z51.81 ENCOUNTER FOR MONITORING TAMOXIFEN THERAPY: ICD-10-CM

## 2025-03-07 LAB
B-HCG UR QL: NEGATIVE
HGB BLD-MCNC: 12.1 G/DL (ref 12–15.5)
INTERNAL PROCEDURAL CONTROLS ACCEPTABLE: YES
TEST LOT EXPIRATION DATE: NORMAL
TEST LOT NUMBER: NORMAL

## 2025-03-07 PROCEDURE — 81025 URINE PREGNANCY TEST: CPT | Performed by: ANESTHESIOLOGY

## 2025-03-07 PROCEDURE — 85018 HEMOGLOBIN: CPT | Performed by: ANESTHESIOLOGY

## 2025-03-07 PROCEDURE — 10002807 HB RX 258: Performed by: ANESTHESIOLOGY

## 2025-03-07 RX ORDER — SODIUM CHLORIDE, SODIUM LACTATE, POTASSIUM CHLORIDE, CALCIUM CHLORIDE 600; 310; 30; 20 MG/100ML; MG/100ML; MG/100ML; MG/100ML
INJECTION, SOLUTION INTRAVENOUS CONTINUOUS
Status: DISCONTINUED | OUTPATIENT
Start: 2025-03-07 | End: 2025-03-07 | Stop reason: HOSPADM

## 2025-03-07 RX ORDER — 0.9 % SODIUM CHLORIDE 0.9 %
10 VIAL (ML) INJECTION PRN
Status: DISCONTINUED | OUTPATIENT
Start: 2025-03-07 | End: 2025-03-07 | Stop reason: HOSPADM

## 2025-03-07 RX ORDER — DEXTROSE MONOHYDRATE 25 G/50ML
25 INJECTION, SOLUTION INTRAVENOUS PRN
Status: DISCONTINUED | OUTPATIENT
Start: 2025-03-07 | End: 2025-03-07 | Stop reason: HOSPADM

## 2025-03-07 RX ORDER — 0.9 % SODIUM CHLORIDE 0.9 %
2 VIAL (ML) INJECTION EVERY 12 HOURS SCHEDULED
Status: DISCONTINUED | OUTPATIENT
Start: 2025-03-07 | End: 2025-03-07 | Stop reason: HOSPADM

## 2025-03-07 RX ADMIN — SODIUM CHLORIDE, POTASSIUM CHLORIDE, SODIUM LACTATE AND CALCIUM CHLORIDE: 600; 310; 30; 20 INJECTION, SOLUTION INTRAVENOUS at 11:56

## 2025-03-07 ASSESSMENT — PAIN SCALES - GENERAL: PAINLEVEL_OUTOF10: 0

## 2025-03-09 ENCOUNTER — PATIENT MESSAGE (OUTPATIENT)
Dept: SURGERY | Facility: CLINIC | Age: 40
End: 2025-03-09

## 2025-03-10 DIAGNOSIS — N63.0 SWELLING OF BREAST: Primary | ICD-10-CM

## 2025-03-10 RX ORDER — CEPHALEXIN 500 MG/1
500 CAPSULE ORAL 4 TIMES DAILY
Qty: 28 CAPSULE | Refills: 1 | Status: SHIPPED | OUTPATIENT
Start: 2025-03-10

## 2025-03-10 NOTE — TELEPHONE ENCOUNTER
Discussed photos and patient situation with Dr. Levin.   Called patient to inform her to  antibiotic prescription at the pharmacy. Confirmed patient's pharmacy. Also scheduled patient to be seen in the office tomorrow. Patient confirmed appt.

## 2025-03-11 ENCOUNTER — OFFICE VISIT (OUTPATIENT)
Facility: CLINIC | Age: 40
End: 2025-03-11
Payer: COMMERCIAL

## 2025-03-11 ENCOUNTER — OFFICE VISIT (OUTPATIENT)
Dept: INTERNAL MEDICINE CLINIC | Facility: CLINIC | Age: 40
End: 2025-03-11

## 2025-03-11 ENCOUNTER — TELEPHONE (OUTPATIENT)
Dept: INTERNAL MEDICINE CLINIC | Facility: CLINIC | Age: 40
End: 2025-03-11

## 2025-03-11 VITALS
BODY MASS INDEX: 32 KG/M2 | SYSTOLIC BLOOD PRESSURE: 136 MMHG | WEIGHT: 193.56 LBS | DIASTOLIC BLOOD PRESSURE: 80 MMHG | HEART RATE: 99 BPM

## 2025-03-11 DIAGNOSIS — S21.002S WOUND OF LEFT BREAST, SEQUELA: Primary | ICD-10-CM

## 2025-03-11 DIAGNOSIS — Z90.13 ABSENCE OF BREAST, ACQUIRED, BILATERAL: Primary | ICD-10-CM

## 2025-03-11 DIAGNOSIS — M54.50 CHRONIC BILATERAL LOW BACK PAIN, UNSPECIFIED WHETHER SCIATICA PRESENT: ICD-10-CM

## 2025-03-11 DIAGNOSIS — Z90.13 H/O BILATERAL MASTECTOMY: Primary | ICD-10-CM

## 2025-03-11 DIAGNOSIS — G89.29 CHRONIC BILATERAL LOW BACK PAIN, UNSPECIFIED WHETHER SCIATICA PRESENT: ICD-10-CM

## 2025-03-11 PROCEDURE — 99212 OFFICE O/P EST SF 10 MIN: CPT | Performed by: SURGERY

## 2025-03-11 PROCEDURE — 99213 OFFICE O/P EST LOW 20 MIN: CPT | Performed by: INTERNAL MEDICINE

## 2025-03-11 RX ORDER — HYDROCODONE BITARTRATE AND ACETAMINOPHEN 10; 325 MG/1; MG/1
TABLET ORAL
Qty: 112 TABLET | Refills: 0 | Status: SHIPPED | OUTPATIENT
Start: 2025-03-11

## 2025-03-11 NOTE — TELEPHONE ENCOUNTER
Patient calling,verified name and date of birth.    Dr Cartwright, Please advise:Your only schedule openings today or tomorrow are over book slots.  Patient called to request an urgent appointment with Dr Cartwright today to discuss medication changes prior to surgery with Dr Levin on Thursday 3/13/25 for removal of left breast implant.

## 2025-03-11 NOTE — PROGRESS NOTES
Pratik Frances is a 39 year old female who presents today in follow-up.  Reports swelling of her left breast.  She denies fevers or chills.  She wishes to undergo removal of her bilateral breast implants.    Physical Examination:  Breasts: The right breast incisions are clean dry and intact.  The left breast is noted to have significant swelling consistent with seroma.  There is no erythema noted.  Mild hyperemia of the tissue surrounding the lower pole wound is noted.    Assessment and Plan:  As the patient wishes to undergo removal of the bilateral breast implants, we discussed left breast aspiration to improve her symptoms.  The area was sterilely cleansed with Betadine and 60 cc of clear straw-colored seroma fluid were aspirated.  We discussed the plan for bilateral breast capsulectomy, implant removal, and soft tissue tailoring.  The nature of the procedure was reviewed with the patient.  We discussed the risk of surgery including but not limited to bleeding, infection, scarring, delayed wound healing, seroma, contour abnormalities, injury to adjacent structures, and need for further surgery.  We discussed expected postoperative course including need for drains, activity limitation, and compression.  Multiple questions were answered to the patient's satisfaction.  No guarantees of the outcome were offered.  The patient expresses understanding and wishes to proceed.

## 2025-03-11 NOTE — PATIENT INSTRUCTIONS
Surgeon:         Dr. Benito Levin                                        Tel:         [phone]                                  Fax:        [phone]    Surgery/Procedure: Removal of bilateral breast implant, bilateral capsulectomy,  general anesthesia, outpatient, 2 hours.     Dx Code: Z90.13    Hospital:  3/13/2025  [facility]: [address], [place] [address]               [phone]    1. Someone will need to drive you to and from the hospital if your procedure is outpatient.    2.Do not drink alcohol or smoke 24 hours prior to your procedure.    3. Bring a picture ID and your insurance card.    4. You will be contacted by the hospital the day before to confirm the procedure time and location.     5. Do not take any herbal supplements or blood thinners at least one week before your procedure/surgery. This includes NSAID's (aspirin, baby aspirin, Motrin, Ibuprofen, Aleve, Advil, Naproxen, etc), Fish oil, vitamin E, turmeric, CoQ10, or green tea supplements, etc. *TYLENOL or acetaminophen is ok to take*      7. If you take Coumadin, Plavix, Xarelto, or Eliquis, please contact your prescribing physician for special instructions on how long to hold. If you take insulin, contact your primary care physician for special instructions.     8. Please inform us if you start or change any medications at least one week before surgery (ex: blood thinners, weight loss medications, diabetic medications, herbal supplements, etc)    9. Does patient have diagnosis of sleep apnea?    [   ] Yes     [X]  No    Consent obtained  Photos taken on 3/11/2025

## 2025-03-11 NOTE — TELEPHONE ENCOUNTER
Called the patient to schedule an appointment, she was already scheduled by surgery.     Future Appointments   Date Time Provider Department Center   3/11/2025  6:15 PM Venkata Cartwright MD [facility]   6/6/2025 10:00 AM Tila Lopez MD [facility]

## 2025-03-11 NOTE — PROGRESS NOTES
Subjective:     Patient ID: Pratik Frances is a 39 year old female.    Pt had been taking 8 tabs/day for past 4 days due to pain from left breast, which had been draining from nonhealing wound and swelling. Had been ff by Dr Levin who had been treating pt's left breast wound snce her surgery. They had now decided to do removal of both breast prosthetic surgery.          History/Other:   Review of Systems   Constitutional: Negative.    Respiratory: Negative.          Left breast swelling/tenderness/pain   Cardiovascular: Negative.    Gastrointestinal: Negative.      Current Outpatient Medications   Medication Sig Dispense Refill    cephALEXin 500 MG Oral Cap Take 1 capsule (500 mg total) by mouth 4 (four) times daily. 28 capsule 1    HYDROcodone-acetaminophen (NORCO)  MG Oral Tab Take 1 to 2 tabs po q 4 to 6 hrs prn for pain 84 tablet 0    cyclobenzaprine 10 MG Oral Tab Take 1 tablet (10 mg total) by mouth 3 (three) times daily. 90 tablet 0    butalbital-acetaminophen-caffeine -40 MG Oral Tab Take 1 tablet by mouth every 8 (eight) hours as needed for Headaches. 30 tablet 0    Naloxone HCl 4 MG/0.1ML Nasal Liquid 4 mg by Nasal route as needed. If patient remains unresponsive, repeat dose in other nostril 2-5 minutes after first dose. 1 kit 0    tamoxifen 20 MG Oral Tab Take 1 tablet (20 mg total) by mouth daily. 90 tablet 3    mupirocin 2 % External Ointment Apply 1 Application topically 3 (three) times daily. (Patient not taking: Reported on 3/11/2025)       Allergies:Allergies[1]    Past Medical History:    Back pain    Back problem    Cancer (HCC)    breast cancer    COVID-19    2021-headache-not hospitalized    Essential hypertension    Gestational hypertension (HCC)    Herniated disc    Herniated lumbar intervertebral disc    Hx of migraine headaches    Hyperlipidemia    Mass of left forearm    Migraines    Obesity, unspecified    Personal history of antineoplastic chemotherapy    last dose Jan 2024     Urethral stricture      Past Surgical History:   Procedure Laterality Date    Appendectomy      Eye surgery      Insertion of access port      Blair biopsy stereo nodule 1 site right (cpt=19081)  2022    clip top hat    Mastectomy bra Bilateral     Mastectomy left      Mastectomy right      Needle biopsy left  2022    MRI 2 site    Needle biopsy right  2022    us guided bx          Other surgical history      Excision lipoma of left forearm    Other surgical history  2018    IUD placement    Other surgical history      eye surgery    Other surgical history  2024    Second stage reconstruction with removal of bilateral breast tissue expanders, placement of permanent implants - Bilateral    Tonsillectomy        Family History   Problem Relation Age of Onset    Heart Disease Other     Hypertension Other     Glaucoma Other     Heart Disease Mother     Lipids Mother     Hypertension Mother     Heart Disorder Mother     ADHD Sister     Lipids Father     Stroke Father     Other (Other) Brother         DJD spine    Breast Cancer Paternal Cousin Female 24        also had @ 34 and 41 (last was TNBC); neg genetics     Cancer Paternal Grandmother         lung ca; smoker    Dementia Paternal Grandfather     Cancer Paternal Cousin Female 30        uterine ca (sister of cousin w/br ca)    Cancer Paternal Cousin Male 40        gastric ca (smoker); also leukemia @ 10      Social History:   Social History     Socioeconomic History    Marital status:    Tobacco Use    Smoking status: Former     Current packs/day: 0.00     Average packs/day: 0.5 packs/day for 2.0 years (1.0 ttl pk-yrs)     Types: Cigarettes     Start date: 1998     Quit date: 2000     Years since quittin.2     Passive exposure: Past    Smokeless tobacco: Never   Vaping Use    Vaping status: Never Used   Substance and Sexual Activity    Alcohol use: Yes     Alcohol/week: 0.0 standard drinks of alcohol     Comment:  Rarely    Drug use: No   Other Topics Concern    Caffeine Concern No        Objective:   Physical Exam  Constitutional:       General: She is not in acute distress.     Appearance: She is obese. She is not ill-appearing, toxic-appearing or diaphoretic.   Cardiovascular:      Rate and Rhythm: Normal rate and regular rhythm.      Heart sounds: Normal heart sounds. No murmur heard.     No gallop.   Pulmonary:      Effort: Pulmonary effort is normal. No respiratory distress.      Breath sounds: Normal breath sounds. No wheezing or rales.   Abdominal:      General: Bowel sounds are normal. There is no distension.      Palpations: Abdomen is soft. There is no mass.      Tenderness: There is no abdominal tenderness. There is no guarding.   Musculoskeletal:      Cervical back: Normal range of motion and neck supple. No rigidity or tenderness.      Right lower leg: No edema.      Left lower leg: No edema.   Lymphadenopathy:      Cervical: No cervical adenopathy.   Skin:     Coloration: Skin is not jaundiced or pale.   Neurological:      Mental Status: She is alert.         Assessment & Plan:   (S21.002S) Wound of left breast, sequela  (primary encounter diagnosis)  Plan: pt had been ff by plastic surgeon for non hearling wound on left breast/.  Pt had been scheduled in 2 days for removal of breast implants; pt had to increase dose of norco to 8tabsday and will cpntinue to do so for this next 2 weeks.     (M54.50,  G89.29) Chronic bilateral low back pain, unspecified whether sciatica present  Plan: HYDROcodone-acetaminophen (NORCO)  MG         Oral Tab        Pt on chornic pain med, pt had discussed with her oncologist and was told she may use lyrica for pain adjuvant. Will plan to start after she is done with her breast surgeries.     No orders of the defined types were placed in this encounter.      Meds This Visit:  Requested Prescriptions      No prescriptions requested or ordered in this encounter       Imaging &  Referrals:  None            [1]   Allergies  Allergen Reactions    Aspirin SHORTNESS OF BREATH     Other reaction(s): ASPIRIN - throat swelling    Codeine SWELLING     Other reaction(s): Swelling of hands and toes    Dust Mite Extract HIVES     Nasal congestion    Grass HIVES     Nasal congestion    Adhesive Tape RASH    Chlorhexidine Gluconate Cloth ITCHING     Burning; wipes only. Okay with plain chlorhexedine

## 2025-03-12 NOTE — DISCHARGE INSTRUCTIONS
HOME INSTRUCTIONS    Plastic Surgery Home Care Instructions      We hope you were pleased with your care at [facility].  We wish you the best outcome and overall experience with your operation.  These instructions will help to minimize pain, optimize healing, and improve the likelihood of a successful result.    What To Expect  There will be some spotting of the incision lines for the next few days.  Your breasts will be swollen and might feel congested for the next 1-2 weeks  Please DO NOT apply heat or ice to the affected area  Temporary areas of numbness are typical in the early weeks following a breast procedure.  Normalization of sensation can typically take up to several months following the operation.    Bandages (Dressing)  Keep dressings clean and dry  Do not remove your Ace Wrap unless for hygiene purposes - compression is key - especially at night.  You are to wear your  Ace Wrap 24/7 for 6 weeks post-operatively.   Reinforce the dressing with insertion of additional padding as needed - this is not required - for your comfort only  You can change the drain dressings if you need to (if they get wet). You will be given extra supplies from the hospital.     Drain Care  Proper drain care is an important part of your home care and will help to prevent fluid collection, minimize the risk for infection, and increase the success of your breast reconstruction.  Empty your drains at 8 am and 8 pm each day  DO NOT GET DRAIN SITES WET  Record each drain separately and use the drain sheet given to you to record the drainage. Follow the instructions on the drain sheet.  Wash your hands before and after emptying your drains  Bring drain sheet with you to your first office visit    Bathing/Showers  You may shower starting tomorrow.   No baths, swimming, or hot tubs until you receive medical permission    Over-The-Counter Medication  Non-prescription anti-inflammatory medications can also help to ease the pain.  You  can take Aleve or ibuprofen starting 72 hours after surgery  Take as directed on the bottle  Drink a full glass of water with the medication    Home Medication  Resume your home medications as instructed  Do not resume herbal medications for two weeks    Diet  Resume your normal diet    Activity  No strenuous activity or heavy lifting (no lifting over 10 pounds)  No activities that involve your pectoralis muscles (no planks, push-ups, etc)  You can go up and down the stairs as tolerated.   You cannot return to work, if your work requires strenuous activity.  You cannot return to physical exercise, sports, or gym workouts until you are allowed to participate in strenuous activity.    Driving  Do not drive, if you are taking pain medication.    Return to Work or School  You can return to work when you are not taking pain medication, if your work does not involve strenuous activity.  Contact the office, if you need a medical note.     Follow-up Appointment   Our office will call you to set up a time  Call the office for an appointment in two days if you cannot remember the appointment details.    Verify your appointment date, day, time, and location.  At your 1st postoperative office visit:  Your drains will be examined, wounds will be evaluated, healing assessed, and any additional concerns and instructions will be discussed.    Questions or Concerns  Call Dr. Levin's office if you experience sudden swelling of the breast or purple/red/black discoloration of the breast.     Pratik   Thank you for coming to [facility] for your operation.  The nurses and the anesthesiologist try very hard to make sure you receive the best care possible.  Your trust in them is greatly appreciated.    Thanks so much,  Dr. Ollie Contreras, FNP-C  Thao Dominguez, FNP-BC     [facility] HOME CARE INSTRUCTIONS: POST-OP ANESTHESIA  The medication that you received for sedation or general anesthesia can last up to 24 hours. Your judgment  and reflexes may be altered, even if you feel like your normal self.      We Recommend:   Do not drive any motor vehicle or bicycle   Avoid mowing the lawn, playing sports, or working with power tools/applicances (power saws, electric knives or mixers)   That you have someone stay with you on your first night home   Do not drink alcohol or take sleeping pills or tranquilizers   Do not sign legal documents within 24 hours of your procedure   If you had a nerve block for your surgery, take extra care not to put any pressure on your arm or hand for 24 hours    It is normal:  For you to have a sore throat if you had a breathing tube during surgery (while you were asleep!). The sore throat should get better within 48 hours. You can gargle with warm salt water (1/2 tsp in 4 oz warm water) or use a throat lozenge for comfort  To feel muscle aches or soreness especially in the abdomen, chest or neck. The achy feeling should go away in the next 24 hours  To feel weak, sleepy or \"wiped out\". Your should start feeling better in the next 24 hours.   To experience mild discomforts such as sore lip or tongue, headache, cramps, gas pains or a bloated feeling in your abdomen.   To experience mild back pain or soreness for a day or two if you had spinal or epidural anesthesia.   If you had laparoscopic surgery, to feel shoulder pain or discomfort on the day of surgery.   For some patients to have nausea after surgery/anesthesia    If you feel nausea or experience vomiting:   Try to move around less.   Eat less than usual or drink only liquids until the next morning   Nausea should resolve in about 24 hours    If you have a problem when you are at home:    Call your surgeons office   Discharge Instructions: After Your Surgery  You’ve just had surgery. During surgery, you were given medicine called anesthesia to keep you relaxed and free of pain. After surgery, you may have some pain or nausea. This is common. Here are some tips for  feeling better and getting well after surgery.   Going home  Your healthcare provider will show you how to take care of yourself when you go home. They'll also answer your questions. Have an adult family member or friend drive you home. For the first 24 hours after your surgery:   Don't drive or use heavy equipment.  Don't make important decisions or sign legal papers.  Take medicines as directed.  Don't drink alcohol.  Have someone stay with you, if needed. They can watch for problems and help keep you safe.  Be sure to go to all follow-up visits with your healthcare provider. And rest after your surgery for as long as your provider tells you to.   Coping with pain  If you have pain after surgery, pain medicine will help you feel better. Take it as directed, before pain becomes severe. Also, ask your healthcare provider or pharmacist about other ways to control pain. This might be with heat, ice, or relaxation. And follow any other instructions your surgeon or nurse gives you.      Stay on schedule with your medicine.     Tips for taking pain medicine  To get the best relief possible, remember these points:   Pain medicines can upset your stomach. Taking them with a little food may help.  Most pain relievers taken by mouth need at least 20 to 30 minutes to start to work.  Don't wait till your pain becomes severe before you take your medicine. Try to time your medicine so that you can take it before starting an activity. This might be before you get dressed, go for a walk, or sit down for dinner.  Constipation is a common side effect of some pain medicines. Call your healthcare provider before taking any medicines such as laxatives or stool softeners to help ease constipation. Also ask if you should skip any foods. Drinking lots of fluids and eating foods such as fruits and vegetables that are high in fiber can also help. Remember, don't take laxatives unless your surgeon has prescribed them.  Drinking alcohol and  taking pain medicine can cause dizziness and slow your breathing. It can even be deadly. Don't drink alcohol while taking pain medicine.  Pain medicine can make you react more slowly to things. Don't drive or run machinery while taking pain medicine.  Your healthcare provider may tell you to take acetaminophen to help ease your pain. Ask them how much you're supposed to take each day. Acetaminophen or other pain relievers may interact with your prescription medicines or other over-the-counter (OTC) medicines. Some prescription medicines have acetaminophen and other ingredients in them. Using both prescription and OTC acetaminophen for pain can cause you to accidentally overdose. Read the labels on your OTC medicines with care. This will help you to clearly know the list of ingredients, how much to take, and any warnings. It may also help you not take too much acetaminophen. If you have questions or don't understand the information, ask your pharmacist or healthcare provider to explain it to you before you take the OTC medicine.   Managing nausea  Some people have an upset stomach (nausea) after surgery. This is often because of anesthesia, pain, or pain medicine, less movement of food in the stomach, or the stress of surgery. These tips will help you handle nausea and eat healthy foods as you get better. If you were on a special food plan before surgery, ask your healthcare provider if you should follow it while you get better. Check with your provider on how your eating should progress. It may depend on the surgery you had. These general tips may help:   Don't push yourself to eat. Your body will tell you when to eat and how much.  Start off with clear liquids and soup. They're easier to digest.  Next try semi-solid foods as you feel ready. These include mashed potatoes, applesauce, and gelatin.  Slowly move to solid foods. Don’t eat fatty, rich, or spicy foods at first.  Don't force yourself to have 3 large meals  a day. Instead eat smaller amounts more often.  Take pain medicines with a small amount of solid food, such as crackers or toast. This helps prevent nausea.  When to call your healthcare provider  Call your healthcare provider right away if you have any of these:   You still have too much pain, or the pain gets worse, after taking the medicine. The medicine may not be strong enough. Or there may be a complication from the surgery.  You feel too sleepy, dizzy, or groggy. The medicine may be too strong.  Side effects such as nausea or vomiting. Your healthcare provider may advise taking other medicines to .  Skin changes such as rash, itching, or hives. This may mean you have an allergic reaction. Your provider may advise taking other medicines.  The incision looks different (for instance, part of it opens up).  Bleeding or fluid leaking from the incision site, and weren't told to expect that.  Fever of 100.4°F (38°C) or higher, or as directed by your provider.  Call 911  Call 911 right away if you have:   Trouble breathing  Facial swelling    If you have obstructive sleep apnea   You were given anesthesia medicine during surgery to keep you comfortable and free of pain. After surgery, you may have more apnea spells because of this medicine and other medicines you were given. The spells may last longer than normal.    At home:  Keep using the continuous positive airway pressure (CPAP) device when you sleep. Unless your healthcare provider tells you not to, use it when you sleep, day or night. CPAP is a common device used to treat obstructive sleep apnea.  Talk with your provider before taking any pain medicine, muscle relaxants, or sedatives. Your provider will tell you about the possible dangers of taking these medicines.  Contact your provider if your sleeping changes a lot even when taking medicines as directed.  [employer] last reviewed this educational content on 10/1/2021  © 9280-2843 [employer].  All rights reserved. This information is not intended as a substitute for professional medical care. Always follow your healthcare professional's instructions.

## 2025-03-13 ENCOUNTER — ANESTHESIA EVENT (OUTPATIENT)
Dept: SURGERY | Facility: HOSPITAL | Age: 40
End: 2025-03-13
Payer: COMMERCIAL

## 2025-03-13 ENCOUNTER — HOSPITAL ENCOUNTER (OUTPATIENT)
Facility: HOSPITAL | Age: 40
Setting detail: HOSPITAL OUTPATIENT SURGERY
Discharge: HOME | End: 2025-03-13
Attending: SURGERY | Admitting: SURGERY
Payer: COMMERCIAL

## 2025-03-13 ENCOUNTER — ANESTHESIA (OUTPATIENT)
Dept: SURGERY | Facility: HOSPITAL | Age: 40
End: 2025-03-13
Payer: COMMERCIAL

## 2025-03-13 VITALS
HEART RATE: 86 BPM | BODY MASS INDEX: 31.6 KG/M2 | RESPIRATION RATE: 16 BRPM | DIASTOLIC BLOOD PRESSURE: 98 MMHG | TEMPERATURE: 98 F | HEIGHT: 65.5 IN | OXYGEN SATURATION: 98 % | SYSTOLIC BLOOD PRESSURE: 157 MMHG | WEIGHT: 192 LBS

## 2025-03-13 DIAGNOSIS — Z90.13 H/O BILATERAL MASTECTOMY: ICD-10-CM

## 2025-03-13 LAB — B-HCG UR QL: NEGATIVE

## 2025-03-13 PROCEDURE — 81025 URINE PREGNANCY TEST: CPT

## 2025-03-13 PROCEDURE — 0HPT0NZ REMOVAL OF TISSUE EXPANDER FROM RIGHT BREAST, OPEN APPROACH: ICD-10-PCS | Performed by: SURGERY

## 2025-03-13 PROCEDURE — 0HPU0NZ REMOVAL OF TISSUE EXPANDER FROM LEFT BREAST, OPEN APPROACH: ICD-10-PCS | Performed by: SURGERY

## 2025-03-13 PROCEDURE — 88300 SURGICAL PATH GROSS: CPT | Performed by: SURGERY

## 2025-03-13 PROCEDURE — 88305 TISSUE EXAM BY PATHOLOGIST: CPT | Performed by: SURGERY

## 2025-03-13 RX ORDER — HYDROMORPHONE HYDROCHLORIDE 1 MG/ML
INJECTION, SOLUTION INTRAMUSCULAR; INTRAVENOUS; SUBCUTANEOUS AS NEEDED
Status: DISCONTINUED | OUTPATIENT
Start: 2025-03-13 | End: 2025-03-13 | Stop reason: SURG

## 2025-03-13 RX ORDER — SODIUM CHLORIDE, SODIUM LACTATE, POTASSIUM CHLORIDE, CALCIUM CHLORIDE 600; 310; 30; 20 MG/100ML; MG/100ML; MG/100ML; MG/100ML
INJECTION, SOLUTION INTRAVENOUS CONTINUOUS
Status: DISCONTINUED | OUTPATIENT
Start: 2025-03-13 | End: 2025-03-13

## 2025-03-13 RX ORDER — MORPHINE SULFATE 4 MG/ML
2 INJECTION, SOLUTION INTRAMUSCULAR; INTRAVENOUS EVERY 10 MIN PRN
Status: DISCONTINUED | OUTPATIENT
Start: 2025-03-13 | End: 2025-03-13

## 2025-03-13 RX ORDER — MORPHINE SULFATE 4 MG/ML
4 INJECTION, SOLUTION INTRAMUSCULAR; INTRAVENOUS EVERY 10 MIN PRN
Status: DISCONTINUED | OUTPATIENT
Start: 2025-03-13 | End: 2025-03-13

## 2025-03-13 RX ORDER — MIDAZOLAM HYDROCHLORIDE 1 MG/ML
INJECTION INTRAMUSCULAR; INTRAVENOUS AS NEEDED
Status: DISCONTINUED | OUTPATIENT
Start: 2025-03-13 | End: 2025-03-13 | Stop reason: SURG

## 2025-03-13 RX ORDER — ACETAMINOPHEN 500 MG
1000 TABLET ORAL ONCE
Status: DISCONTINUED | OUTPATIENT
Start: 2025-03-13 | End: 2025-03-13 | Stop reason: HOSPADM

## 2025-03-13 RX ORDER — FAMOTIDINE 20 MG/1
20 TABLET, FILM COATED ORAL ONCE
Status: DISCONTINUED | OUTPATIENT
Start: 2025-03-13 | End: 2025-03-13 | Stop reason: HOSPADM

## 2025-03-13 RX ORDER — NALOXONE HYDROCHLORIDE 0.4 MG/ML
0.08 INJECTION, SOLUTION INTRAMUSCULAR; INTRAVENOUS; SUBCUTANEOUS AS NEEDED
Status: DISCONTINUED | OUTPATIENT
Start: 2025-03-13 | End: 2025-03-13

## 2025-03-13 RX ORDER — HYDROMORPHONE HYDROCHLORIDE 1 MG/ML
0.4 INJECTION, SOLUTION INTRAMUSCULAR; INTRAVENOUS; SUBCUTANEOUS EVERY 5 MIN PRN
Status: DISCONTINUED | OUTPATIENT
Start: 2025-03-13 | End: 2025-03-13

## 2025-03-13 RX ORDER — DEXAMETHASONE SODIUM PHOSPHATE 4 MG/ML
VIAL (ML) INJECTION AS NEEDED
Status: DISCONTINUED | OUTPATIENT
Start: 2025-03-13 | End: 2025-03-13 | Stop reason: SURG

## 2025-03-13 RX ORDER — HYDROMORPHONE HYDROCHLORIDE 1 MG/ML
0.6 INJECTION, SOLUTION INTRAMUSCULAR; INTRAVENOUS; SUBCUTANEOUS EVERY 5 MIN PRN
Status: DISCONTINUED | OUTPATIENT
Start: 2025-03-13 | End: 2025-03-13

## 2025-03-13 RX ORDER — LIDOCAINE HYDROCHLORIDE 10 MG/ML
INJECTION, SOLUTION EPIDURAL; INFILTRATION; INTRACAUDAL; PERINEURAL AS NEEDED
Status: DISCONTINUED | OUTPATIENT
Start: 2025-03-13 | End: 2025-03-13 | Stop reason: SURG

## 2025-03-13 RX ORDER — ACETAMINOPHEN 650 MG
TABLET, EXTENDED RELEASE ORAL AS NEEDED
Status: DISCONTINUED | OUTPATIENT
Start: 2025-03-13 | End: 2025-03-13 | Stop reason: HOSPADM

## 2025-03-13 RX ORDER — METOCLOPRAMIDE HYDROCHLORIDE 5 MG/ML
10 INJECTION INTRAMUSCULAR; INTRAVENOUS ONCE
Status: COMPLETED | OUTPATIENT
Start: 2025-03-13 | End: 2025-03-13

## 2025-03-13 RX ORDER — FAMOTIDINE 10 MG/ML
20 INJECTION, SOLUTION INTRAVENOUS ONCE
Status: DISCONTINUED | OUTPATIENT
Start: 2025-03-13 | End: 2025-03-13 | Stop reason: HOSPADM

## 2025-03-13 RX ORDER — ROCURONIUM BROMIDE 10 MG/ML
INJECTION, SOLUTION INTRAVENOUS AS NEEDED
Status: DISCONTINUED | OUTPATIENT
Start: 2025-03-13 | End: 2025-03-13 | Stop reason: SURG

## 2025-03-13 RX ORDER — BUPIVACAINE HYDROCHLORIDE 5 MG/ML
INJECTION, SOLUTION EPIDURAL; INTRACAUDAL; PERINEURAL AS NEEDED
Status: DISCONTINUED | OUTPATIENT
Start: 2025-03-13 | End: 2025-03-13 | Stop reason: HOSPADM

## 2025-03-13 RX ORDER — MORPHINE SULFATE 10 MG/ML
6 INJECTION, SOLUTION INTRAMUSCULAR; INTRAVENOUS EVERY 10 MIN PRN
Status: DISCONTINUED | OUTPATIENT
Start: 2025-03-13 | End: 2025-03-13

## 2025-03-13 RX ORDER — HYDROMORPHONE HYDROCHLORIDE 1 MG/ML
0.2 INJECTION, SOLUTION INTRAMUSCULAR; INTRAVENOUS; SUBCUTANEOUS EVERY 5 MIN PRN
Status: DISCONTINUED | OUTPATIENT
Start: 2025-03-13 | End: 2025-03-13

## 2025-03-13 RX ORDER — METOCLOPRAMIDE 10 MG/1
10 TABLET ORAL ONCE
Status: COMPLETED | OUTPATIENT
Start: 2025-03-13 | End: 2025-03-13

## 2025-03-13 RX ORDER — ONDANSETRON 2 MG/ML
INJECTION INTRAMUSCULAR; INTRAVENOUS AS NEEDED
Status: DISCONTINUED | OUTPATIENT
Start: 2025-03-13 | End: 2025-03-13 | Stop reason: SURG

## 2025-03-13 RX ADMIN — SODIUM CHLORIDE, SODIUM LACTATE, POTASSIUM CHLORIDE, CALCIUM CHLORIDE: 600; 310; 30; 20 INJECTION, SOLUTION INTRAVENOUS at 16:32:00

## 2025-03-13 RX ADMIN — HYDROMORPHONE HYDROCHLORIDE 0.5 MG: 1 INJECTION, SOLUTION INTRAMUSCULAR; INTRAVENOUS; SUBCUTANEOUS at 17:26:00

## 2025-03-13 RX ADMIN — SODIUM CHLORIDE, SODIUM LACTATE, POTASSIUM CHLORIDE, CALCIUM CHLORIDE: 600; 310; 30; 20 INJECTION, SOLUTION INTRAVENOUS at 17:53:00

## 2025-03-13 RX ADMIN — ROCURONIUM BROMIDE 20 MG: 10 INJECTION, SOLUTION INTRAVENOUS at 17:03:00

## 2025-03-13 RX ADMIN — ROCURONIUM BROMIDE 50 MG: 10 INJECTION, SOLUTION INTRAVENOUS at 16:38:00

## 2025-03-13 RX ADMIN — ONDANSETRON 4 MG: 2 INJECTION INTRAMUSCULAR; INTRAVENOUS at 18:05:00

## 2025-03-13 RX ADMIN — ROCURONIUM BROMIDE 10 MG: 10 INJECTION, SOLUTION INTRAVENOUS at 18:29:00

## 2025-03-13 RX ADMIN — LIDOCAINE HYDROCHLORIDE 50 MG: 10 INJECTION, SOLUTION EPIDURAL; INFILTRATION; INTRACAUDAL; PERINEURAL at 16:35:00

## 2025-03-13 RX ADMIN — DEXAMETHASONE SODIUM PHOSPHATE 8 MG: 4 MG/ML VIAL (ML) INJECTION at 16:43:00

## 2025-03-13 RX ADMIN — ROCURONIUM BROMIDE 20 MG: 10 INJECTION, SOLUTION INTRAVENOUS at 17:42:00

## 2025-03-13 RX ADMIN — MIDAZOLAM HYDROCHLORIDE 2 MG: 1 INJECTION INTRAMUSCULAR; INTRAVENOUS at 16:34:00

## 2025-03-13 NOTE — BRIEF OP NOTE
Pre-Operative Diagnosis: H/O bilateral mastectomy [Z90.13]     Post-Operative Diagnosis: H/O bilateral mastectomy [Z90.13]      Procedure Performed:   Removal of bilateral breast implant, bilateral capsulectomy    Surgeons and Role:     * Benito Levin MD - Primary    Assistant(s):  APN: Lala Contreras APRN     Surgical Findings: Nl     Specimen: Bilateral breast tissue     Estimated Blood Loss: 10 ml        Benito Levin MD  3/13/2025  6:54 PM

## 2025-03-13 NOTE — ANESTHESIA PROCEDURE NOTES
Airway  Date/Time: 3/13/2025 4:40 PM  Urgency: Elective      General Information and Staff    Patient location during procedure: OR  Anesthesiologist: Melsisa Francis MD  Resident/CRNA: Bekah Durant CRNA  Performed: CRNA   Performed by: Bekah Durant CRNA  Authorized by: Bekah Durant CRNA      Indications and Patient Condition  Indications for airway management: anesthesia  Sedation level: deep  Preoxygenated: yes  Patient position: sniffing  Mask difficulty assessment: 1 - vent by mask    Final Airway Details  Final airway type: endotracheal airway      Successful airway: ETT  Cuffed: yes   Successful intubation technique: direct laryngoscopy  Endotracheal tube insertion site: oral  Blade: Salomon  Blade size: #2  ETT size (mm): 7.0    Placement verified by: capnometry   Measured from: lips  ETT to lips (cm): 22  Number of attempts at approach: 1    Additional Comments  Dentition as preop

## 2025-03-13 NOTE — ANESTHESIA PREPROCEDURE EVALUATION
Anesthesia PreOp Note    HPI:     Pratik Frances is a 39 year old female who presents for preoperative consultation requested by: Benito eLvin MD    Date of Surgery: 3/13/2025    Procedure(s):  Removal of bilateral breast implant, bilateral capsulectomy  Indication: H/O bilateral mastectomy [Z90.13]    Relevant Problems   No relevant active problems       NPO:  Last Liquid Consumption Date: 03/13/25  Last Liquid Consumption Time: 0600  Last Solid Consumption Date: 03/12/25  Last Solid Consumption Time: 2200  Last Liquid Consumption Date: 03/13/25          History Review:  Patient Active Problem List    Diagnosis Date Noted    Spondylolisthesis of lumbar region 12/27/2024    Encounter for monitoring tamoxifen therapy 10/29/2024    History of antineoplastic chemotherapy 06/08/2024    Chemotherapy-induced peripheral neuropathy (HCC) 06/08/2024    S/P bilateral mastectomy 06/08/2024    Malignant neoplasm of right breast in female, estrogen receptor positive (HCC) 06/08/2024    Encounter for medication monitoring 01/02/2024    Absence of both breasts 03/27/2023    Anemia due to antineoplastic chemotherapy 10/24/2022    Malabsorption of iron (HCC) 10/24/2022    Family history of malignant neoplasm of breast 09/12/2022    Chemotherapy management, encounter for 07/20/2022    Breast cancer, stage 1, estrogen receptor positive, right (HCC) 07/13/2022    Influenza A 04/25/2022    COVID-19 virus infection 02/01/2022    Discoloration of skin of finger 01/14/2021    BMI 32.0-32.9,adult 12/17/2018    DDD (degenerative disc disease), lumbosacral 07/09/2018    PVC (premature ventricular contraction) 04/05/2016    Acute cystitis without hematuria 12/17/2015    Hypertriglyceridemia 08/28/2015    Essential hypertension 08/10/2015    Urethral stenosis 03/09/2015    Annual physical exam 09/09/2014    Chronic low back pain 09/09/2014    Migraine 08/19/2014       Past Medical History:    Back pain    Back problem    Cancer (HCC)     breast cancer    COVID-2021-headache-not hospitalized    Essential hypertension    Gestational hypertension (HCC)    Herniated disc    Herniated lumbar intervertebral disc    Hx of migraine headaches    Hyperlipidemia    Mass of left forearm    Migraines    Obesity, unspecified    Personal history of antineoplastic chemotherapy    last dose 2024    Urethral stricture    Visual impairment    Glasses       Past Surgical History:   Procedure Laterality Date    Appendectomy      Eye surgery      Insertion of access port      Removed 2024    Blair biopsy stereo nodule 1 site right (cpt=19081)  2022    clip top hat    Mastectomy bra Bilateral     Mastectomy left      Mastectomy right      Needle biopsy left  2022    MRI 2 site    Needle biopsy right  2022    us guided bx          Other surgical history      Excision lipoma of left forearm    Other surgical history      IUD placement    Other surgical history      eye surgery    Other surgical history  2024    Second stage reconstruction with removal of bilateral breast tissue expanders, placement of permanent implants - Bilateral    Tonsillectomy         Prescriptions Prior to Admission[1]  Current Medications and Prescriptions Ordered in Epic[2]    Allergies[3]    Family History   Problem Relation Age of Onset    Heart Disease Other     Hypertension Other     Glaucoma Other     Heart Disease Mother     Lipids Mother     Hypertension Mother     Heart Disorder Mother     ADHD Sister     Lipids Father     Stroke Father     Other (Other) Brother         DJD spine    Breast Cancer Paternal Cousin Female 24        also had @ 34 and 41 (last was TNBC); neg genetics     Cancer Paternal Grandmother         lung ca; smoker    Dementia Paternal Grandfather     Cancer Paternal Cousin Female 30        uterine ca (sister of cousin w/br ca)    Cancer Paternal Cousin Male 40        gastric ca (smoker); also leukemia @ 10     Social  History     Socioeconomic History    Marital status:    Tobacco Use    Smoking status: Former     Current packs/day: 0.00     Average packs/day: 0.5 packs/day for 2.0 years (1.0 ttl pk-yrs)     Types: Cigarettes     Start date: 1998     Quit date: 2000     Years since quittin.2     Passive exposure: Past    Smokeless tobacco: Never   Vaping Use    Vaping status: Never Used   Substance and Sexual Activity    Alcohol use: Yes     Alcohol/week: 0.0 standard drinks of alcohol     Comment: Rarely    Drug use: No   Other Topics Concern    Caffeine Concern No       Available pre-op labs reviewed.  Lab Results   Component Value Date    URINEPREG Negative 2025             Vital Signs:  Body mass index is 31.46 kg/m².   height is 1.664 m (5' 5.5\") and weight is 87.1 kg (192 lb). Her oral temperature is 97.8 °F (36.6 °C). Her blood pressure is 133/78 and her pulse is 86. Her respiration is 18 and oxygen saturation is 96%.   Vitals:    25 0845 25 1457   BP:  133/78   Pulse:  86   Resp:  18   Temp:  97.8 °F (36.6 °C)   TempSrc:  Oral   SpO2:  96%   Weight: 86.2 kg (190 lb) 87.1 kg (192 lb)   Height: 1.664 m (5' 5.5\") 1.664 m (5' 5.5\")        Anesthesia Evaluation     Patient summary reviewed and Nursing notes reviewed    Airway   Mallampati: II  TM distance: >3 FB  Neck ROM: full  Dental      Pulmonary     breath sounds clear to auscultation  Cardiovascular   (+) hypertension    Rhythm: regular  Rate: normal    Neuro/Psych    (+)  neuromuscular disease,        GI/Hepatic/Renal      Endo/Other    Abdominal                  Anesthesia Plan:   ASA:  3  Plan:   General  Airway:  ETT  Informed Consent Plan and Risks Discussed With:  Patient  Discussed plan with:  Attending      I have informed Pratik Frances and/or legal guardian or family member of the nature of the anesthetic plan, benefits, risks including possible dental damage if relevant, major complications, and any alternative forms of  anesthetic management.   All of the patient's questions were answered to the best of my ability. The patient desires the anesthetic management as planned.  Melissa Francis MD  3/13/2025 3:00 PM  Present on Admission:  **None**           [1]   Medications Prior to Admission   Medication Sig Dispense Refill Last Dose/Taking    HYDROcodone-acetaminophen (NORCO)  MG Oral Tab Take 1 to 2 tabs po q 4 to 6 hrs prn for pain 112 tablet 0 3/13/2025 at 11:30 AM    cephALEXin 500 MG Oral Cap Take 1 capsule (500 mg total) by mouth 4 (four) times daily. 28 capsule 1 3/12/2025 at 10:00 PM    butalbital-acetaminophen-caffeine -40 MG Oral Tab Take 1 tablet by mouth every 8 (eight) hours as needed for Headaches. 30 tablet 0 Past Month    tamoxifen 20 MG Oral Tab Take 1 tablet (20 mg total) by mouth daily. 90 tablet 3 3/3/2025    cyclobenzaprine 10 MG Oral Tab Take 1 tablet (10 mg total) by mouth 3 (three) times daily. 90 tablet 0 Unknown    mupirocin 2 % External Ointment Apply 1 Application topically 3 (three) times daily. (Patient not taking: Reported on 3/11/2025)       Naloxone HCl 4 MG/0.1ML Nasal Liquid 4 mg by Nasal route as needed. If patient remains unresponsive, repeat dose in other nostril 2-5 minutes after first dose. 1 kit 0 Unknown   [2]   Current Facility-Administered Medications Ordered in Epic   Medication Dose Route Frequency Provider Last Rate Last Admin    lactated ringers infusion   Intravenous Continuous Benito Levin MD 20 mL/hr at 03/13/25 1454 New Bag at 03/13/25 1454    acetaminophen (Tylenol Extra Strength) tab 1,000 mg  1,000 mg Oral Once Benito Levin MD        famotidine (Pepcid) tab 20 mg  20 mg Oral Once Benito Levin MD        Or    famotidine (Pepcid) 20 mg/2mL injection 20 mg  20 mg Intravenous Once Benito Levin MD        ceFAZolin (Ancef) 2g in 10mL IV syringe premix  2 g Intravenous Once Benito Levin MD         No current [facility]-ordered outpatient medications on file.   [3]    Allergies  Allergen Reactions    Aspirin SHORTNESS OF BREATH     Other reaction(s): ASPIRIN - throat swelling    Dust Mite Extract HIVES     Nasal congestion    Grass HIVES     Nasal congestion    Adhesive Tape RASH    Chlorhexidine Gluconate Cloth ITCHING     Burning; wipes only. Okay with plain chlorhexedine

## 2025-03-13 NOTE — OPERATIVE REPORT
History of Present Illness:   The patient is a 39 year old female patient noted bilateral ostectomy implant reconstruction.  The patient now has elected undergo explantation and soft tissue tailoring.    Past Medical History:      Past Medical History:    Back pain    Back problem    Cancer (HCC)    breast cancer    COVID-2021-headache-not hospitalized    Essential hypertension    Gestational hypertension (HCC)    Herniated disc    Herniated lumbar intervertebral disc    Hx of migraine headaches    Hyperlipidemia    Mass of left forearm    Migraines    Obesity, unspecified    Personal history of antineoplastic chemotherapy    last dose 2024    Urethral stricture    Visual impairment    Glasses         Past Surgical History:  Past Surgical History:   Procedure Laterality Date    Appendectomy      Eye surgery      Insertion of access port      Removed 2024    Blair biopsy stereo nodule 1 site right (cpt=19081)  2022    clip top hat    Mastectomy bra Bilateral     Mastectomy left      Mastectomy right      Needle biopsy left  2022    MRI 2 site    Needle biopsy right  2022    us guided bx          Other surgical history      Excision lipoma of left forearm    Other surgical history      IUD placement    Other surgical history      eye surgery    Other surgical history  2024    Second stage reconstruction with removal of bilateral breast tissue expanders, placement of permanent implants - Bilateral    Tonsillectomy           Medications:    Medications Ordered Prior to Encounter[1]      Allergies:    Allergies[2]      Family History:   Family History   Problem Relation Age of Onset    Heart Disease Other     Hypertension Other     Glaucoma Other     Heart Disease Mother     Lipids Mother     Hypertension Mother     Heart Disorder Mother     ADHD Sister     Lipids Father     Stroke Father     Other (Other) Brother         DJD spine    Breast Cancer Paternal Cousin Female 24         also had @ 34 and 41 (last was TNBC); neg genetics 2010    Cancer Paternal Grandmother         lung ca; smoker    Dementia Paternal Grandfather     Cancer Paternal Cousin Female 30        uterine ca (sister of cousin w/br ca)    Cancer Paternal Cousin Male 40        gastric ca (smoker); also leukemia @ 10         Social History:  History   Alcohol Use    0.0 standard drinks of alcohol/week     Comment: Rarely       History   Smoking Status    Former    Types: Cigarettes   Smokeless Tobacco    Never       History   Drug Use No           Review of Systems:    General:   The patient denies, fever, chills, night sweats, fatigue, generalized weakness, change in appetite, weight loss, or weight gain.    Endocrine:   There is no history of poor/slow wound healing, weight loss/gain, fertility or hormone problems, cold intolerance, heat intolerance, excessive thirst, or thyroid disease.     Allergic/Immunologic:  There is no history of hives, hay fever, angioedema or anaphylaxis.    HEENT:    The patient denies ear pain, ear drainage, hearing loss, change in vision, double vision, cataracts, glaucoma, nasal congestion, nosebleed, hoarseness, sore throat, or swollen glands    Respiratory:   The patient denies shortness of breath, cough, bloody cough, phlegm, asthma, or wheezing    Cardiovascular:  The patient denies chest pain/pressure, palpitations, irregular heartbeat, high blood pressure, stroke, or leg swelling    Breasts:  Patient denies breast masses, pain, change in the breast skin, skin dimpling, nipple discharge, or rash    Gastrointestinal:   There is no history of difficulty or pain with swallowing, reflux symptoms, nausea, vomiting, dark/ bloody stools, diarrhea, constipation,  change in bowel habits, or abdominal pain.     Genitourinary:  The patient denies frequent urination, needing to get up at night to urinate, urinary hesitancy or retaining urine, painful urination, urinary incontinence, decreased  urine stream, blood in the urine, or vaginal/penile discharge.    Skin:   The patient denies rash, itching, skin lesions, dry skin, change in skin color or change in moles, sunburns, or sunburns with blistering.     Hematologic/Lymphatic:  The patient denies easily bruising or bleeding, persistent swollen glands or lymph nodes, bleeding disorders, blood clots, or pulmonary embolism.     Gynecologic:  The patient denies irregular menses, pelvic pain, pain with intercourse, painful menses, or pregnancy    Musculoskeletal:  The patient denies muscle aches/pain, joint pain, stiff joints, neck pain, back pain or bone pain.    Neurologic:  There is no history of migraines or severe headaches, seizure/epilepsy, speech problems, coordination problems, trembling/tremors, fainting/black outs, dizziness, memory problems, loss of sensation/numbness, problems walking, weakness, tingling or burning in hands/feet.     Psychiatric:  There is no history of abusive relationship, bipolar disorder, sleep disturbance, anxiety, depression or feeling of despair.    Physical Exam:    Ht 1.664 m (5' 5.5\")   Wt 86.2 kg (190 lb)   LMP 03/03/2025 (Approximate)   BMI 31.14 kg/m²     The patient is awake, alert, and oriented.  She is a well-nourished, well-developed female who appears her stated age.  Her speech patterns and movements are normal, and affect is appropriate.    HEENT: The head is normocephalic. The neck is supple. The thyroid is not enlarged and is without palpable masses.  The trachea is in the midline. Conjunctiva are clear, non-icteric.    Breasts: Healed Elliott pattern scars are noted.  Mild hyperemia is noted at the lower pole of the left breast.    Lymph Nodes:  There is no cervical, supraclavicular, or axillary lymphadenopathy appreciated.    Back: There is no vertebral column tenderness.    Skin: The skin appears normal. There are no suspicious appearing rashes or lesions.    Extremities: The extremities are without  deformity, cyanosis or edema.    Impression:   The patient is a 39 year old history of bilateral mastectomy and implant-based reconstruction who wishes to undergo explantation.    Discussion and Plan:  The nature of the procedure was reviewed with the patient. We reviewed the risks of surgery including but not limited to bleeding, infection, scarring, delayed wound healing, seroma, contour abnormalities, injury to adjacent structures, and need for further surgery. We discussed expected postoperative course including need for drains, activity limitation, and compression. Multiple questions were answered to the patient's satisfaction. No guarantees of the outcome were offered. The patient expresses understanding and wishes to proceed.          [1]   No current facility-administered medications on file prior to encounter.     Current Outpatient Medications on File Prior to Encounter   Medication Sig Dispense Refill    cephALEXin 500 MG Oral Cap Take 1 capsule (500 mg total) by mouth 4 (four) times daily. 28 capsule 1    cyclobenzaprine 10 MG Oral Tab Take 1 tablet (10 mg total) by mouth 3 (three) times daily. 90 tablet 0    butalbital-acetaminophen-caffeine -40 MG Oral Tab Take 1 tablet by mouth every 8 (eight) hours as needed for Headaches. 30 tablet 0    tamoxifen 20 MG Oral Tab Take 1 tablet (20 mg total) by mouth daily. 90 tablet 3    [DISCONTINUED] HYDROcodone-acetaminophen (NORCO)  MG Oral Tab Take 1 to 2 tabs po q 4 to 6 hrs prn for pain 84 tablet 0    mupirocin 2 % External Ointment Apply 1 Application topically 3 (three) times daily. (Patient not taking: Reported on 3/11/2025)      Naloxone HCl 4 MG/0.1ML Nasal Liquid 4 mg by Nasal route as needed. If patient remains unresponsive, repeat dose in other nostril 2-5 minutes after first dose. 1 kit 0   [2]   Allergies  Allergen Reactions    Aspirin SHORTNESS OF BREATH     Other reaction(s): ASPIRIN - throat swelling    Dust Mite Extract HIVES     Nasal  congestion    Grass HIVES     Nasal congestion    Adhesive Tape RASH    Chlorhexidine Gluconate Cloth ITCHING     Burning; wipes only. Okay with plain chlorhexedine

## 2025-03-14 NOTE — PROGRESS NOTES
This RN offered HARRY Drain teaching, however, pt declined teaching. States she is well aware of how to care for HARRY drain as she has had them in the past.

## 2025-03-14 NOTE — ANESTHESIA POSTPROCEDURE EVALUATION
Patient: Pratik Frances    Procedure Summary       Date: 03/13/25 Room / Location: [facility] MAIN OR 02 / [facility] MAIN OR    Anesthesia Start: 1632 Anesthesia Stop: 1915    Procedure: Removal of bilateral breast implant, bilateral capsulectomy (Bilateral: Breast) Diagnosis:       H/O bilateral mastectomy      (H/O bilateral mastectomy [Z90.13])    Surgeons: Benito Levin MD Anesthesiologist: Renaldo Li MD    Anesthesia Type: general ASA Status: 3            Anesthesia Type: general    Vitals Value Taken Time   /93 03/13/25 1909   Temp 97.2 °F (36.2 °C) 03/13/25 1909   Pulse 91 03/13/25 1914   Resp 9 03/13/25 1914   SpO2 96 % 03/13/25 1914   Vitals shown include unfiled device data.    [facility] AN Post Evaluation:   Patient Evaluated in PACU  Patient Participation: complete - patient participated  Level of Consciousness: awake  Pain Management: adequate  Airway Patency:patent  Dental exam unchanged from preop  Yes    Cardiovascular Status: acceptable  Respiratory Status: acceptable  Postoperative Hydration acceptable      Renaldo Li MD  3/13/2025 7:15 PM

## 2025-03-14 NOTE — OPERATIVE REPORT
[facility]    PATIENT'S NAME: REGULO RIVERA   ATTENDING PHYSICIAN: Benito Levin MD   OPERATING PHYSICIAN: Benito Levin MD   PATIENT ACCOUNT#:   [id number]    LOCATION:  [facility] 3 [facility] 10  MEDICAL RECORD #:   [id number]       YOB: 1985  ADMISSION DATE:       03/13/2025      OPERATION DATE:  03/13/2025    OPERATIVE REPORT      PREOPERATIVE DIAGNOSIS:  History of bilateral mastectomy and implant reconstruction.  POSTOPERATIVE DIAGNOSIS:  History of bilateral mastectomy and implant reconstruction.  PROCEDURE:  Bilateral complete capsulectomy and implant removal.      ASSISTANT:  ISHA Joshi.     ANESTHESIA:  General.    ESTIMATED BLOOD LOSS:  25 mL.    COMPLICATIONS:  None.    INDICATIONS:  Patient is a 39-year-old female who previously underwent bilateral mastectomy via Wise pattern approach.  She completed uncomplicated expansion and exchange for permanent implants.  She is noted to have recurring wound healing difficulties due to significant dermal atrophy at the lower pole, requiring multiple debridements.  She now wishes to undergo explantation.     OPERATIVE TECHNIQUE:  Informed consent was obtained from the patient.  The risks, benefits, and alternatives were reviewed with the patient preoperatively.  She expressed understanding and wished to proceed.  The patient was marked in the preoperative holding area.  She was then taken to the operating room, properly identified, placed in a supine position.  Sequential compression devices were placed on bilateral lower extremities.  Intravenous antibiotic prophylaxis was administered.  The patient then underwent successful induction of general anesthesia and endotracheal intubation.  The arms were placed abducted on foam-padded cradles and loosely secured with Kerlix.  The chest was prepped and draped sterilely.  The procedure began on the right side.  The Elliott pattern incisions were opened with a scalpel.  Subcutaneous  flaps were then elevated with electrocautery.  Once the flaps were elevated off the entire anterior capsule, the implant and capsule were elevated off the pectoralis muscle.  An intact silicone implant was retrieved.  The capsule was sent for permanent pathologic analysis.  The wounds were irrigated with saline irrigation.  Hemostasis was secured with electrocautery.  Soft tissue excess was tailor tacked along the Elliott pattern.  The tissue was excised and sent for permanent pathologic analysis.  A 15-Macedonian Paul drain was exited through a lateral stab incision and sutured to the skin with 3-0 nylon non suture.  The flaps were then transposed and stapled in place.  Attention was then turned to the left breast.  In a similar fashion, the inframammary fold incisions were created with a scalpel.  Subcutaneous flaps were then elevated off the anterior capsule.  A paucity of subcutaneous fat was noted in the area of previous wound healing in the central aspect of the vertical scar.  Once the flaps had been elevated off the anterior capsule, the capsule was then elevated off the pectoralis muscle with electrocautery.  Again, an intact silicone implant was retrieved.  The capsule was sent for permanent pathologic analysis.  The wounds were irrigated with saline irrigation.  Hemostasis was secured with electrocautery.  The soft tissue was tailor tacked along the Wise pattern scars.  The tissue was excised and sent for permanent pathologic analysis.  A 15-Macedonian Paul drain was exited through a lateral stab incision and sutured to the skin with 3-0 nylon suture.  The drain sites were infiltrated with 10 mL of 0.5% Marcaine.  The wounds were repaired in a layered fashion with 3-0 Vicryl deep dermal suture and 4-0 Monocryl subcuticular suture.  Dermabond and Steri-Strips were placed on the incisions.  Fluff gauze and Ace wrap were applied.  The patient was awakened, extubated, and taken to Recovery in stable condition.  There  were no operative complications.  All needle, sponge, and instrument counts were correct at the end of the procedure.    Dictated By Benito Levin MD  d: 03/13/2025 18:58:37  t: 03/13/2025 23:02:51  [facility] [id number]  [facility]/

## 2025-03-18 ENCOUNTER — OFFICE VISIT (OUTPATIENT)
Facility: CLINIC | Age: 40
End: 2025-03-18
Payer: COMMERCIAL

## 2025-03-18 ENCOUNTER — TELEPHONE (OUTPATIENT)
Dept: SURGERY | Facility: CLINIC | Age: 40
End: 2025-03-18

## 2025-03-18 DIAGNOSIS — Z90.13 ABSENCE OF BOTH BREASTS: Primary | ICD-10-CM

## 2025-03-18 PROCEDURE — 99024 POSTOP FOLLOW-UP VISIT: CPT

## 2025-03-18 NOTE — PROGRESS NOTES
Pratik Frances is a 39 year old female with history of bilateral mastectomy and implant reconstruction who presents today for a follow-up after bilateral complete capsulectomy and implant removal with Dr. Levin on 3/13/2025.    She denies fever and chills. She denies nausea, vomiting, diarrhea or constipation. Her pain is controlled. She presents today for wound check and drain check.    Physical Exam     Breasts: Bilateral breast incisions clean, dry, and intact. No evidence of wound drainage or wound dehiscence. No evidence of hematoma or seroma. No erythema. Mild superficial ecchymosis on right breast. HARRY drain sites clean, dry, and intact. Drain effluent serosanguinous.    There were no vitals filed for this visit.    Assessment and Plan     Pratik Frances is doing well after bilateral complete capsulectomy and implant removal with Dr. Levin on 3/13/2025.    Her bilateral breast incisions are clean, dry, and intact. Steri strips redressed today. She will keep this in place for at least another 10-14 days. She will continue to monitor her incisions daily. We reviewed reasons to contact our office such as redness, severe pain, wound drainage, wound dehiscence, fevers, or chills.    Due to low volume output, her bilateral drains were removed today. A dressing of Neosporin, 2x2 gauze, and Tegaderm was placed over this area. She tolerated this well. She will keep this covered until a scab forms. She was provided with supplies today. We reviewed continued activity restrictions and continued compressions.     She will follow up with ISHA Reeves on 4/8 for a wound recheck. She will follow up with Dr. Levin on 4/29/25. She was encouraged to contact our office for questions or concerns. All her questions were addressed today. She verbalized understanding.    ISHA Mcknight  3/18/2025  1:19 PM

## 2025-03-18 NOTE — TELEPHONE ENCOUNTER
Patient contacted the office requesting an appointment for drain removal. Patient reports her left drain has had very minimal output the last few days, barely measurable. Will schedule patient to be seen today in [place].

## 2025-03-24 ENCOUNTER — TELEPHONE (OUTPATIENT)
Dept: INTERNAL MEDICINE CLINIC | Facility: CLINIC | Age: 40
End: 2025-03-24

## 2025-03-24 DIAGNOSIS — G89.29 CHRONIC BILATERAL LOW BACK PAIN, UNSPECIFIED WHETHER SCIATICA PRESENT: ICD-10-CM

## 2025-03-24 DIAGNOSIS — M54.50 CHRONIC BILATERAL LOW BACK PAIN, UNSPECIFIED WHETHER SCIATICA PRESENT: ICD-10-CM

## 2025-03-24 RX ORDER — HYDROCODONE BITARTRATE AND ACETAMINOPHEN 10; 325 MG/1; MG/1
TABLET ORAL
Qty: 112 TABLET | Refills: 0 | Status: SHIPPED | OUTPATIENT
Start: 2025-03-24

## 2025-03-24 NOTE — TELEPHONE ENCOUNTER
Pt here to pickup script for her norco; still recovering from removal of breast implants and also low back pain, also takes her flexeril. Told to ffup with DR Ruelas for her [place] pain.  [place]

## 2025-03-25 DIAGNOSIS — M54.50 CHRONIC BILATERAL LOW BACK PAIN, UNSPECIFIED WHETHER SCIATICA PRESENT: ICD-10-CM

## 2025-03-25 DIAGNOSIS — G89.29 CHRONIC BILATERAL LOW BACK PAIN, UNSPECIFIED WHETHER SCIATICA PRESENT: ICD-10-CM

## 2025-03-25 NOTE — TELEPHONE ENCOUNTER
Pharmacy requesting refill     HYDROcodone-acetaminophen (NORCO)  MG Oral Tab Take 1 to 2 tabs po q 4 to 6 hrs prn for pain 112 tablet 0

## 2025-03-28 RX ORDER — HYDROCODONE BITARTRATE AND ACETAMINOPHEN 10; 325 MG/1; MG/1
TABLET ORAL
Qty: 112 TABLET | Refills: 0 | OUTPATIENT
Start: 2025-03-28

## 2025-04-01 DIAGNOSIS — M54.50 CHRONIC BILATERAL LOW BACK PAIN, UNSPECIFIED WHETHER SCIATICA PRESENT: ICD-10-CM

## 2025-04-01 DIAGNOSIS — G89.29 CHRONIC BILATERAL LOW BACK PAIN, UNSPECIFIED WHETHER SCIATICA PRESENT: ICD-10-CM

## 2025-04-04 NOTE — TELEPHONE ENCOUNTER
Please review; protocol failed/ has no protocol     Please see patients MyChart Message       Pratik LAWSON Juan Daniel HERBERT [facility] (supporting Venkata Cartwright MD)3 days ago       Refills have been requested for the following medications:         HYDROcodone-acetaminophen (NORCO)  MG Oral Tab [Venkata Cartwright]      Patient Comment: Due monday but i am out of town and cant .  Can you send electronically!  Ty!    Recent fills: 03/25/2025,03/12/205,03/02/2025  Last Rx written: 03/24/2025  Last Office Visit: 03/11/2025    Recent Visits  Date Type Provider Dept   03/11/25 Office Visit Venkata Cartwright MD [facility]

## 2025-04-07 ENCOUNTER — TELEPHONE (OUTPATIENT)
Dept: INTERNAL MEDICINE CLINIC | Facility: CLINIC | Age: 40
End: 2025-04-07

## 2025-04-07 DIAGNOSIS — M54.50 CHRONIC BILATERAL LOW BACK PAIN, UNSPECIFIED WHETHER SCIATICA PRESENT: ICD-10-CM

## 2025-04-07 DIAGNOSIS — G89.29 CHRONIC BILATERAL LOW BACK PAIN, UNSPECIFIED WHETHER SCIATICA PRESENT: ICD-10-CM

## 2025-04-07 RX ORDER — HYDROCODONE BITARTRATE AND ACETAMINOPHEN 10; 325 MG/1; MG/1
TABLET ORAL
Qty: 112 TABLET | Refills: 0 | OUTPATIENT
Start: 2025-04-07

## 2025-04-07 RX ORDER — HYDROCODONE BITARTRATE AND ACETAMINOPHEN 10; 325 MG/1; MG/1
TABLET ORAL
Qty: 112 TABLET | Refills: 0 | Status: SHIPPED | OUTPATIENT
Start: 2025-04-07

## 2025-04-07 NOTE — TELEPHONE ENCOUNTER
Pt herer to pkup refill for her norco;  lower back pain still due to recent bilateral breast implant surgery so will keep same dose for now; plan to reduce next refill and start lyrica as d/w pt before.

## 2025-04-08 ENCOUNTER — OFFICE VISIT (OUTPATIENT)
Facility: CLINIC | Age: 40
End: 2025-04-08
Payer: COMMERCIAL

## 2025-04-08 DIAGNOSIS — Z90.13 ABSENCE OF BOTH BREASTS: Primary | ICD-10-CM

## 2025-04-08 NOTE — PROGRESS NOTES
Pratik Frances is a 39 year old female who presents today for a follow-up after  bilateral complete capsulectomy and implant removal with Dr. Levin on 3/13/2025.     She denies fever and chills. She denies nausea, vomiting, diarrhea or constipation.   Her pain is controlled.      Physical Exam     Breasts: Lateral breast incisions are clean, dry, tact.  No evidence of hematoma or seroma bilaterally.  The bilateral mastectomy skin is without erythema.  Bleeding suture present on the right T-junction.  No evidence of open wound or wound drainage.    There were no vitals filed for this visit.      Assessment and Plan     Pratik Frances is doing well s/p  bilateral complete capsulectomy and implant removal with Dr. Levin on 3/13/2025.     The spitting suture from the right T-junction was removed today.  The patient tolerated this well.  Compression activity guidelines were reviewed with the patient.  She was encouraged to contact the office with any other questions or concerns.  The patient is following up for wound check on 4-29.    Questions were answered. Patient understands.     ISHA Joshi  4/8/2025  8:32 AM

## 2025-04-11 ENCOUNTER — TELEPHONE (OUTPATIENT)
Dept: INTERNAL MEDICINE CLINIC | Facility: CLINIC | Age: 40
End: 2025-04-11

## 2025-04-11 RX ORDER — CYCLOBENZAPRINE HCL 10 MG
10 TABLET ORAL 3 TIMES DAILY
Qty: 90 TABLET | Refills: 0 | Status: SHIPPED | OUTPATIENT
Start: 2025-04-11

## 2025-04-18 ENCOUNTER — TELEPHONE (OUTPATIENT)
Dept: INTERNAL MEDICINE CLINIC | Facility: CLINIC | Age: 40
End: 2025-04-18

## 2025-04-18 DIAGNOSIS — G89.29 CHRONIC BILATERAL LOW BACK PAIN, UNSPECIFIED WHETHER SCIATICA PRESENT: ICD-10-CM

## 2025-04-18 DIAGNOSIS — M54.50 CHRONIC BILATERAL LOW BACK PAIN, UNSPECIFIED WHETHER SCIATICA PRESENT: ICD-10-CM

## 2025-04-18 RX ORDER — HYDROCODONE BITARTRATE AND ACETAMINOPHEN 10; 325 MG/1; MG/1
TABLET ORAL
Qty: 84 TABLET | Refills: 0 | Status: SHIPPED | OUTPATIENT
Start: 2025-04-18

## 2025-04-18 NOTE — TELEPHONE ENCOUNTER
[place] here to pickip [place]; will plan to sttart lyrica as adjuvant to help wean down her norco by next refill

## 2025-04-29 ENCOUNTER — OFFICE VISIT (OUTPATIENT)
Facility: CLINIC | Age: 40
End: 2025-04-29
Payer: COMMERCIAL

## 2025-04-29 ENCOUNTER — TELEPHONE (OUTPATIENT)
Dept: INTERNAL MEDICINE CLINIC | Facility: CLINIC | Age: 40
End: 2025-04-29

## 2025-04-29 DIAGNOSIS — M54.50 CHRONIC BILATERAL LOW BACK PAIN, UNSPECIFIED WHETHER SCIATICA PRESENT: ICD-10-CM

## 2025-04-29 DIAGNOSIS — Z90.13 ABSENCE OF BOTH BREASTS: Primary | ICD-10-CM

## 2025-04-29 DIAGNOSIS — G89.29 CHRONIC BILATERAL LOW BACK PAIN, UNSPECIFIED WHETHER SCIATICA PRESENT: ICD-10-CM

## 2025-04-29 PROCEDURE — 99024 POSTOP FOLLOW-UP VISIT: CPT | Performed by: SURGERY

## 2025-04-29 RX ORDER — PREGABALIN 25 MG/1
25 CAPSULE ORAL 2 TIMES DAILY
Qty: 60 CAPSULE | Refills: 0 | Status: SHIPPED | OUTPATIENT
Start: 2025-04-29

## 2025-04-29 RX ORDER — HYDROCODONE BITARTRATE AND ACETAMINOPHEN 10; 325 MG/1; MG/1
TABLET ORAL
Qty: 98 TABLET | Refills: 0 | Status: SHIPPED | OUTPATIENT
Start: 2025-04-29

## 2025-04-29 NOTE — PROGRESS NOTES
Pratik Frances is a 39 year old female who presents today in follow-up after undergoing bilateral capsulectomy and implant removal on 3/13. She is without new complaints and is pleased with the result.    Physical Examination:  Breasts:Bilateral breast incisions are clean dry and intact.  There is no erythema or seroma noted.      Assessment and Plan:  Patient is doing well.  We discussed scar care including massage and moisturizer and silicone products.  The patient may resume regular activity as tolerated.  She may follow-up in apparent basis but was offered to return at any time should she develop any new questions or concerns.

## 2025-04-29 NOTE — TELEPHONE ENCOUNTER
Pt here to pickup  script for norco; had continued low back pain as before. Not able to start PT yet. Wanted to get started with lyrica as pan adjuvant [place] planned before. [place] pt also leavaing out of town tomorrow so need to get script today. She will start lyrica when she is back in town. Erx sent  Pt also told should ffup with Dr Ruelas her physiatrist

## 2025-05-08 ENCOUNTER — TELEPHONE (OUTPATIENT)
Dept: INTERNAL MEDICINE CLINIC | Facility: CLINIC | Age: 40
End: 2025-05-08

## 2025-05-08 DIAGNOSIS — M54.50 CHRONIC BILATERAL LOW BACK PAIN, UNSPECIFIED WHETHER SCIATICA PRESENT: ICD-10-CM

## 2025-05-08 DIAGNOSIS — G89.29 CHRONIC BILATERAL LOW BACK PAIN, UNSPECIFIED WHETHER SCIATICA PRESENT: ICD-10-CM

## 2025-05-08 RX ORDER — HYDROCODONE BITARTRATE AND ACETAMINOPHEN 10; 325 MG/1; MG/1
TABLET ORAL
Qty: 84 TABLET | Refills: 0 | Status: SHIPPED | OUTPATIENT
Start: 2025-05-08

## 2025-05-08 RX ORDER — CYCLOBENZAPRINE HCL 10 MG
10 TABLET ORAL 3 TIMES DAILY
Qty: 90 TABLET | Refills: 0 | Status: SHIPPED | OUTPATIENT
Start: 2025-05-08

## 2025-05-08 NOTE — TELEPHONE ENCOUNTER
Pt here to pickup refill for norco. Due on Sunday; will decrease  dose as d/w pt; has not started her lyrica yet but [place] do soon[place]

## 2025-05-19 DIAGNOSIS — M54.50 CHRONIC BILATERAL LOW BACK PAIN, UNSPECIFIED WHETHER SCIATICA PRESENT: ICD-10-CM

## 2025-05-19 DIAGNOSIS — G89.29 CHRONIC BILATERAL LOW BACK PAIN, UNSPECIFIED WHETHER SCIATICA PRESENT: ICD-10-CM

## 2025-05-20 NOTE — TELEPHONE ENCOUNTER
05/12/2025  LAST WRITTEN: 05/08/2025  Quantity: 84     Recent Visits  Date Type Provider Dept   03/11/25 Office Visit Venkata Cartwright MD [facility]   01/16/25 Office Visit Venkata Cartwright MD [facility]   10/26/24 Office Visit Venakta Cartwright MD [facility]   10/15/24 Office Visit Venkata Cartwright MD [facility]   10/07/24 Office Visit Venkata Cartwright MD [facility]   08/05/24 Office Visit Venkata Cartwright MD [facility]   06/10/24 Office Visit Venkata Cartwright MD [facility]   05/23/24 Office Visit Venkata Cartwright MD [facility]   01/24/24 Office Visit Venkata Cartwright MD [facility]   Showing recent visits within past 540 days with a meds authorizing provider and meeting all other requirements  Future Appointments  No visits were found meeting these conditions.  Showing future appointments within next 150 days with a meds authorizing provider and meeting all other requirements

## 2025-05-21 RX ORDER — HYDROCODONE BITARTRATE AND ACETAMINOPHEN 10; 325 MG/1; MG/1
TABLET ORAL
Qty: 84 TABLET | Refills: 0 | Status: SHIPPED | OUTPATIENT
Start: 2025-05-21

## 2025-05-21 NOTE — TELEPHONE ENCOUNTER
Spoke with patient, Date of Birth verified.  She req rx ref for gen Norco  mg # 84.  She requested this message to be send to Merari VIEIRA, PCP is out of office.   pls advise, thanks in advance.         Requested Prescriptions     Pending Prescriptions Disp Refills    HYDROcodone-acetaminophen (NORCO)  MG Oral Tab 84 tablet 0     Sig: Take 1 to 2 tabs po q 4 to 6 hrs prn for pain       Last Office Visit with PCP: 3/11/2025

## 2025-05-21 NOTE — TELEPHONE ENCOUNTER
Protocol Failed/ No Protocol    Requested Prescriptions   Pending Prescriptions Disp Refills    HYDROcodone-acetaminophen (NORCO)  MG Oral Tab 84 tablet 0     Sig: Take 1 to 2 tabs po q 4 to 6 hrs prn for pain       Controlled Substance Medication Failed - 5/21/2025  8:57 AM        Failed - This medication is a controlled substance - forward to provider to refill        Passed - Medication is active on med list             Future Appointments         Provider Department Appt Notes    In 2 weeks Tila Lopez MD [facility] [place] 3 month follow up          Recent Outpatient Visits              3 weeks ago Absence of both breasts    [facility] Benito Levin MD    Office Visit    1 month ago Absence of both breasts    [facility] Lala Contreras, ISHA    Office Visit    2 months ago Absence of both breasts    [facility] Thao Dominguez, ISHA    Office Visit    2 months ago Wound of left breast, sequela    [facility], Venkata Jeter MD    Office Visit    2 months ago Absence of breast, acquired, bilateral    [facility]    Office Visit

## 2025-05-28 ENCOUNTER — HOSPITAL ENCOUNTER (OUTPATIENT)
Age: 40
Discharge: HOME | End: 2025-05-28
Payer: COMMERCIAL

## 2025-05-28 VITALS
RESPIRATION RATE: 18 BRPM | OXYGEN SATURATION: 100 % | DIASTOLIC BLOOD PRESSURE: 90 MMHG | TEMPERATURE: 99 F | HEART RATE: 89 BPM | SYSTOLIC BLOOD PRESSURE: 143 MMHG

## 2025-05-28 DIAGNOSIS — J06.9 VIRAL URI: Primary | ICD-10-CM

## 2025-05-28 LAB — S PYO AG THROAT QL: NEGATIVE

## 2025-05-28 PROCEDURE — 99213 OFFICE O/P EST LOW 20 MIN: CPT | Performed by: NURSE PRACTITIONER

## 2025-05-28 PROCEDURE — 87880 STREP A ASSAY W/OPTIC: CPT | Performed by: NURSE PRACTITIONER

## 2025-05-28 RX ORDER — FLUTICASONE PROPIONATE 50 MCG
2 SPRAY, SUSPENSION (ML) NASAL DAILY
Qty: 16 G | Refills: 0 | Status: SHIPPED | OUTPATIENT
Start: 2025-05-28

## 2025-05-28 NOTE — ED PROVIDER NOTES
Patient Seen in: [facility]       The following individual(s) verbally consented to be recorded using ambient AI listening technology and understand that they can each withdraw their consent to this listening technology at any point by asking the clinician to turn off or pause the recording:    Patient name: Pratik Frances        History  Chief Complaint   Patient presents with    Sore Throat     Stated Complaint: sore thoat; congestion    Subjective:   The history is provided by the patient. No  was used.        Pratik Frances is a 39 year old female who presents with persistent nasal and throat symptoms following a cold.    She developed cold symptoms after her daughter had influenza B over ten days ago, including cough, congestion, runny nose, and fatigue. She felt significantly better yesterday and the day before, but today she experiences an achy sensation on the right side of her throat, which is not sore like a typical sore throat. She also describes a sensation of water in her nose when leaning over, which burns, but this only occurs when she is not upright.    Her right ear was bothering her earlier, which is a common occurrence for her when she gets sick, but this has resolved. She has not taken any cold medications due to her blood pressure issues and allergy to aspirin. She did not undergo a flu test but assumed she had the same illness as her daughter.    On Saturday, she experienced a significant suction in her sinuses after attempting to clear her ears, resulting in a large amount of fluid pouring out of her nose. Since then, the sensation of water in her nose persists when she leans over.    Her past medical history includes a 'really bad back' for which she takes Norco, and she recently completed breast cancer treatment, having undergone several surgeries. No current ear pain, significant sore throat, or facial pain. Her sinuses were previously very bad, but this  has improved. No big lymph nodes or facial discomfort. Her ears are no longer clogged.      Objective:     Past Medical History:    Back pain    Back problem    Cancer (HCC)    breast cancer    COVID-2021-headache-not hospitalized    Essential hypertension    Gestational hypertension (HCC)    Herniated disc    Herniated lumbar intervertebral disc    Hx of migraine headaches    Hyperlipidemia    Mass of left forearm    Migraines    Obesity, unspecified    Personal history of antineoplastic chemotherapy    last dose 2024    Urethral stricture    Visual impairment    Glasses              Past Surgical History:   Procedure Laterality Date    Appendectomy      Eye surgery      Insertion of access port      Removed 2024    Blair biopsy stereo nodule 1 site right (cpt=19081)  2022    clip top hat    Mastectomy bra Bilateral     Mastectomy left      Mastectomy right      Needle biopsy left  2022    MRI 2 site    Needle biopsy right  2022    us guided bx          Other surgical history      Excision lipoma of left forearm    Other surgical history      IUD placement    Other surgical history      eye surgery    Other surgical history  2024    Second stage reconstruction with removal of bilateral breast tissue expanders, placement of permanent implants - Bilateral    Tonsillectomy                  Social History     Socioeconomic History    Marital status:    Tobacco Use    Smoking status: Former     Current packs/day: 0.00     Average packs/day: 0.5 packs/day for 2.0 years (1.0 ttl pk-yrs)     Types: Cigarettes     Start date: 1998     Quit date: 2000     Years since quittin.4     Passive exposure: Past    Smokeless tobacco: Never   Vaping Use    Vaping status: Never Used   Substance and Sexual Activity    Alcohol use: Yes     Alcohol/week: 0.0 standard drinks of alcohol     Comment: Rarely    Drug use: No   Other Topics Concern    Caffeine Concern No               Review of Systems    Positive for stated complaint: sore thoat; congestion  Other systems are as noted in HPI.  Constitutional and vital signs reviewed.      All other systems reviewed and negative except as noted above.      Physical Exam    ED Triage Vitals [05/28/25 1814]   /90   Pulse 89   Resp 18   Temp 98.8 °F (37.1 °C)   Temp src Oral   SpO2 100 %   O2 Device None (Room air)       Current Vitals:   Vital Signs  BP: 143/90  Pulse: 89  Resp: 18  Temp: 98.8 °F (37.1 °C)  Temp src: Oral    Oxygen Therapy  SpO2: 100 %  O2 Device: None (Room air)        Pertinent physical exam:      GENERAL: Alert, cooperative, well developed, no acute distress.  HEENT: Normocephalic, normal oropharynx, moist mucous membranes, ears normal, throat normal, sinuses normal.  NECK: No lymphadenopathy.  CHEST: Clear to auscultation bilaterally, no wheezes, rhonchi, or crackles.  CARDIOVASCULAR: Normal heart rate and rhythm, S1 and S2 normal without murmurs.  ABDOMEN: Soft, non-tender, non-distended, without organomegaly, normal bowel sounds.  EXTREMITIES: No cyanosis or edema.  NEUROLOGICAL: Cranial nerves grossly intact, moves all extremities without gross motor or sensory deficit.           ED Course  Labs Reviewed   POCT RAPID STREP - Normal     Recent Results (from the past 24 hours)   POCT Rapid Strep    Collection Time: 05/28/25  6:31 PM   Result Value Ref Range    POCT Rapid Strep Negative Negative     MDM     Assessment & Plan  Viral upper respiratory infection  Symptoms consistent with a viral upper respiratory infection, including nasal congestion, throat discomfort, and a sensation of water in the nose when leaning over, persisting for approximately 14 days. Negative strep test indicates no bacterial infection. No signs of abscess, thrush, or lesions in the throat. Ears are clear, and no significant lymphadenopathy is present. Symptoms are likely lingering effects of a viral infection.   - Recommend Flonase nasal  spray for symptom relief.  - Advise use of saline nasal spray.  - Suggest warm steam to the face.  - If symptoms persist, follow up with primary care physician, Dr. Ryan, upon her return.            Medical Decision Making  Differential diagnoses reflecting the complexity of care include: Strep, COVID, flu, allergies, viral illness    Results and plan of care discussed with the patient/family. They are in agreement with discharge. They understand to follow up with their primary doctor or the referral physician for further evaluation, especially if no improvement.  Also discussed the limitations of immediate care, patient is aware that if symptoms are worse they should go to the emergency room. Verbal and written discharge instructions were given.     My independent interpretation of studies of: Strep negative  Diagnostic tests and medications considered but not ordered were: No indication for antibiotics  Shared decision making was done by: patient declined viral testing  Comorbidities that add complexity to management include: Breast cancer in remission   External chart review was done and was noted: Bilateral mastectomy in March, taking Norco   History obtained by an independent source was from: N/A  Discussions and management was done with: N/A  Social determinants of health that affect care: N/A              Amount and/or Complexity of Data Reviewed  Labs: ordered. Decision-making details documented in ED Course.    Risk  OTC drugs.  Prescription drug management.        Disposition and Plan     Clinical Impression:  No diagnosis found.     Disposition:  Discharge  5/28/2025  6:42 pm    Follow-up:  Venkata Cartwright MD  [address] [place] [address]  [phone]                Medications Prescribed:  Current Discharge Medication List        START taking these medications    Details   fluticasone propionate 50 MCG/ACT Nasal Suspension 2 sprays by Nasal route daily.  Qty: 16 g, Refills:  0                   Supplementary Documentation:

## 2025-05-28 NOTE — ED INITIAL ASSESSMENT (HPI)
Cold symptoms over a week ago, have mostly resolved, but reports \"achiness\" in throat and runny nose.

## 2025-05-29 NOTE — DISCHARGE INSTRUCTIONS
Try Flonase, salt water gargles, warm steam to face.  Drink plenty of fluids.  Follow-up with your primary physician if no improvement

## 2025-06-02 ENCOUNTER — TELEPHONE (OUTPATIENT)
Dept: INTERNAL MEDICINE CLINIC | Facility: CLINIC | Age: 40
End: 2025-06-02

## 2025-06-02 DIAGNOSIS — M54.50 CHRONIC BILATERAL LOW BACK PAIN, UNSPECIFIED WHETHER SCIATICA PRESENT: ICD-10-CM

## 2025-06-02 DIAGNOSIS — G89.29 CHRONIC BILATERAL LOW BACK PAIN, UNSPECIFIED WHETHER SCIATICA PRESENT: ICD-10-CM

## 2025-06-02 RX ORDER — HYDROCODONE BITARTRATE AND ACETAMINOPHEN 10; 325 MG/1; MG/1
TABLET ORAL
Qty: 84 TABLET | Refills: 0 | Status: SHIPPED | OUTPATIENT
Start: 2025-06-02

## 2025-06-05 DIAGNOSIS — G43.009 MIGRAINE WITHOUT AURA AND WITHOUT STATUS MIGRAINOSUS, NOT INTRACTABLE: ICD-10-CM

## 2025-06-05 RX ORDER — BUTALBITAL, ACETAMINOPHEN AND CAFFEINE 50; 325; 40 MG/1; MG/1; MG/1
1 TABLET ORAL EVERY 8 HOURS PRN
Qty: 30 TABLET | Refills: 0 | Status: SHIPPED | OUTPATIENT
Start: 2025-06-05

## 2025-06-05 RX ORDER — CYCLOBENZAPRINE HCL 10 MG
10 TABLET ORAL 3 TIMES DAILY
Qty: 90 TABLET | Refills: 0 | Status: SHIPPED | OUTPATIENT
Start: 2025-06-05

## 2025-06-05 NOTE — TELEPHONE ENCOUNTER
02/12/2025  LAST WRITTEN: 02/12/2025  Quantity: 30    Recent Visits  Date Type Provider Dept   03/11/25 Office Visit Venkata aCrtwright MD [facility]   01/16/25 Office Visit Venkata Cartwright MD [facility]

## 2025-06-06 ENCOUNTER — APPOINTMENT (OUTPATIENT)
Age: 40
End: 2025-06-06
Attending: INTERNAL MEDICINE
Payer: COMMERCIAL

## 2025-06-13 ENCOUNTER — TELEPHONE (OUTPATIENT)
Dept: INTERNAL MEDICINE CLINIC | Facility: CLINIC | Age: 40
End: 2025-06-13

## 2025-06-13 DIAGNOSIS — G89.29 CHRONIC BILATERAL LOW BACK PAIN, UNSPECIFIED WHETHER SCIATICA PRESENT: ICD-10-CM

## 2025-06-13 DIAGNOSIS — M54.50 CHRONIC BILATERAL LOW BACK PAIN, UNSPECIFIED WHETHER SCIATICA PRESENT: ICD-10-CM

## 2025-06-13 RX ORDER — HYDROCODONE BITARTRATE AND ACETAMINOPHEN 10; 325 MG/1; MG/1
TABLET ORAL
Qty: 84 TABLET | Refills: 0 | Status: SHIPPED | OUTPATIENT
Start: 2025-06-13

## 2025-06-24 ENCOUNTER — MED REC SCAN ONLY (OUTPATIENT)
Dept: INTERNAL MEDICINE CLINIC | Facility: CLINIC | Age: 40
End: 2025-06-24

## 2025-06-24 ENCOUNTER — TELEPHONE (OUTPATIENT)
Dept: INTERNAL MEDICINE CLINIC | Facility: CLINIC | Age: 40
End: 2025-06-24

## 2025-06-24 DIAGNOSIS — M54.50 CHRONIC BILATERAL LOW BACK PAIN, UNSPECIFIED WHETHER SCIATICA PRESENT: ICD-10-CM

## 2025-06-24 DIAGNOSIS — G89.29 CHRONIC BILATERAL LOW BACK PAIN, UNSPECIFIED WHETHER SCIATICA PRESENT: ICD-10-CM

## 2025-06-24 RX ORDER — HYDROCODONE BITARTRATE AND ACETAMINOPHEN 10; 325 MG/1; MG/1
TABLET ORAL
Qty: 84 TABLET | Refills: 0 | Status: SHIPPED | OUTPATIENT
Start: 2025-06-24

## 2025-06-24 NOTE — TELEPHONE ENCOUNTER
Pt here to pickup script for norco refill, not due til later this week so [place] ref[place] at that time[place] will be starting soon on her lyrica once she is done taking tamoxifen for few weeks as per recommendation of oncol service.

## 2025-07-07 ENCOUNTER — TELEPHONE (OUTPATIENT)
Dept: INTERNAL MEDICINE CLINIC | Facility: CLINIC | Age: 40
End: 2025-07-07

## 2025-07-07 DIAGNOSIS — M54.50 CHRONIC BILATERAL LOW BACK PAIN, UNSPECIFIED WHETHER SCIATICA PRESENT: ICD-10-CM

## 2025-07-07 DIAGNOSIS — G89.29 CHRONIC BILATERAL LOW BACK PAIN, UNSPECIFIED WHETHER SCIATICA PRESENT: ICD-10-CM

## 2025-07-07 RX ORDER — HYDROCODONE BITARTRATE AND ACETAMINOPHEN 10; 325 MG/1; MG/1
TABLET ORAL
Qty: 84 TABLET | Refills: 0 | Status: SHIPPED | OUTPATIENT
Start: 2025-07-07

## 2025-07-07 NOTE — TELEPHONE ENCOUNTER
Patient here to  [place].  [place].  Patient ready did start her on Lyrica about a week ago.  Will see progress within about the next few weeks and we will try to start weaning down of her Norco as discussed with the patient.

## 2025-07-21 DIAGNOSIS — M54.50 CHRONIC BILATERAL LOW BACK PAIN, UNSPECIFIED WHETHER SCIATICA PRESENT: ICD-10-CM

## 2025-07-21 DIAGNOSIS — G43.009 MIGRAINE WITHOUT AURA AND WITHOUT STATUS MIGRAINOSUS, NOT INTRACTABLE: ICD-10-CM

## 2025-07-21 DIAGNOSIS — G89.29 CHRONIC BILATERAL LOW BACK PAIN, UNSPECIFIED WHETHER SCIATICA PRESENT: ICD-10-CM

## 2025-07-23 RX ORDER — CYCLOBENZAPRINE HCL 10 MG
10 TABLET ORAL 3 TIMES DAILY
Qty: 90 TABLET | Refills: 0 | Status: SHIPPED | OUTPATIENT
Start: 2025-07-23

## 2025-07-23 RX ORDER — HYDROCODONE BITARTRATE AND ACETAMINOPHEN 10; 325 MG/1; MG/1
TABLET ORAL
Qty: 84 TABLET | Refills: 0 | Status: SHIPPED | OUTPATIENT
Start: 2025-07-23

## 2025-07-23 RX ORDER — BUTALBITAL, ACETAMINOPHEN AND CAFFEINE 50; 325; 40 MG/1; MG/1; MG/1
1 TABLET ORAL EVERY 8 HOURS PRN
Qty: 30 TABLET | Refills: 0 | Status: SHIPPED | OUTPATIENT
Start: 2025-07-23

## 2025-07-23 NOTE — TELEPHONE ENCOUNTER
Please review. Protocol Failed; No Protocol    Butalbital :     Recent fills: 2/12/2025, 6/6/2025  Last Rx written: 6/5/2025  Last office visit: 3/11/2025    Hydrocodone 10 mg:    Recent fills: 6/29/2025, 7/12/2025  Last Rx written: 7/7/2025  Last office visit: 3/11/2025

## 2025-08-04 ENCOUNTER — TELEPHONE (OUTPATIENT)
Dept: INTERNAL MEDICINE CLINIC | Facility: CLINIC | Age: 40
End: 2025-08-04

## 2025-08-04 DIAGNOSIS — M54.50 CHRONIC BILATERAL LOW BACK PAIN, UNSPECIFIED WHETHER SCIATICA PRESENT: ICD-10-CM

## 2025-08-04 DIAGNOSIS — G89.29 CHRONIC BILATERAL LOW BACK PAIN, UNSPECIFIED WHETHER SCIATICA PRESENT: ICD-10-CM

## 2025-08-04 RX ORDER — HYDROCODONE BITARTRATE AND ACETAMINOPHEN 10; 325 MG/1; MG/1
TABLET ORAL
Qty: 98 TABLET | Refills: 0 | Status: SHIPPED | OUTPATIENT
Start: 2025-08-04

## 2025-08-04 RX ORDER — PREGABALIN 25 MG/1
25 CAPSULE ORAL 2 TIMES DAILY
Qty: 60 CAPSULE | Refills: 0 | Status: SHIPPED | OUTPATIENT
Start: 2025-08-04

## 2025-08-14 ENCOUNTER — TELEPHONE (OUTPATIENT)
Dept: INTERNAL MEDICINE CLINIC | Facility: CLINIC | Age: 40
End: 2025-08-14

## 2025-08-14 DIAGNOSIS — M54.50 CHRONIC BILATERAL LOW BACK PAIN, UNSPECIFIED WHETHER SCIATICA PRESENT: ICD-10-CM

## 2025-08-14 DIAGNOSIS — G89.29 CHRONIC BILATERAL LOW BACK PAIN, UNSPECIFIED WHETHER SCIATICA PRESENT: ICD-10-CM

## 2025-08-14 RX ORDER — HYDROCODONE BITARTRATE AND ACETAMINOPHEN 10; 325 MG/1; MG/1
TABLET ORAL
Qty: 84 TABLET | Refills: 0 | Status: SHIPPED | OUTPATIENT
Start: 2025-08-14

## 2025-08-18 DIAGNOSIS — G43.009 MIGRAINE WITHOUT AURA AND WITHOUT STATUS MIGRAINOSUS, NOT INTRACTABLE: ICD-10-CM

## 2025-08-21 RX ORDER — CYCLOBENZAPRINE HCL 10 MG
10 TABLET ORAL 3 TIMES DAILY PRN
Qty: 90 TABLET | Refills: 0 | Status: SHIPPED | OUTPATIENT
Start: 2025-08-21

## 2025-08-21 RX ORDER — BUTALBITAL, ACETAMINOPHEN AND CAFFEINE 50; 325; 40 MG/1; MG/1; MG/1
1 TABLET ORAL EVERY 8 HOURS PRN
Qty: 30 TABLET | Refills: 0 | Status: SHIPPED | OUTPATIENT
Start: 2025-08-21

## 2025-08-26 ENCOUNTER — TELEPHONE (OUTPATIENT)
Dept: INTERNAL MEDICINE CLINIC | Facility: CLINIC | Age: 40
End: 2025-08-26

## 2025-08-26 DIAGNOSIS — G89.29 CHRONIC BILATERAL LOW BACK PAIN, UNSPECIFIED WHETHER SCIATICA PRESENT: ICD-10-CM

## 2025-08-26 DIAGNOSIS — M54.50 CHRONIC BILATERAL LOW BACK PAIN, UNSPECIFIED WHETHER SCIATICA PRESENT: ICD-10-CM

## 2025-08-26 RX ORDER — HYDROCODONE BITARTRATE AND ACETAMINOPHEN 10; 325 MG/1; MG/1
TABLET ORAL
Qty: 84 TABLET | Refills: 0 | Status: SHIPPED | OUTPATIENT
Start: 2025-08-26

## (undated) DEVICE — ELECTRODE ES 2.75IN PTFE BLDE MOD E-Z CLN

## (undated) DEVICE — SUT PDS II 2-0 27IN ABSRB VLT L26MM CT-2

## (undated) DEVICE — PLASTIC BREAST CDS-LF: Brand: MEDLINE INDUSTRIES, INC.

## (undated) DEVICE — SUT ETHLN 3-0 30IN FS-1 NABSRB BLK 24MM 3/8 C

## (undated) DEVICE — GLOVE SUR 6.5 SENSICARE PI PIP CRM PWD F

## (undated) DEVICE — HEMOCLIP HORIZON MED 002200

## (undated) DEVICE — SPONGE: SPECIALTY PEANUT XR 100/CS: Brand: MEDICAL ACTION INDUSTRIES

## (undated) DEVICE — ADHESIVE SKIN TOP FOR WND CLSR DERMBND ADV

## (undated) DEVICE — SUTURE ETHLN 3-0 18IN NABSRB BLK 19MM PS-2

## (undated) DEVICE — SOLUTION IRRIG 1000ML 0.9% NACL USP BTL

## (undated) DEVICE — SLEEVE COMPR M KNEE LEN SGL USE KENDALL SCD

## (undated) DEVICE — STRIP SKIN CLSR W1XL5IN REINF NINVAS DSGN

## (undated) DEVICE — STAPLER SKIN ETHICON GUN PXW35

## (undated) DEVICE — STOPCOCK IV 4 WAY BD

## (undated) DEVICE — HEMOCLIP HORIZON SM 001200

## (undated) DEVICE — LIGHT HANDLE

## (undated) DEVICE — ANTIBACTERIAL UNDYED BRAIDED (POLYGLACTIN 910), SYNTHETIC ABSORBABLE SUTURE: Brand: COATED VICRYL

## (undated) DEVICE — CLOSURE EXOFIN 1.0ML

## (undated) DEVICE — SOLUTION PREP 10% POVIDONE IOD 32OZ BTL

## (undated) DEVICE — 3M™ STERI-STRIP™ REINFORCED ADHESIVE SKIN CLOSURES, R1548, 1 IN X 5 IN (25 MM X 125 MM), 4 STRIPS/ENVELOPE: Brand: 3M™ STERI-STRIP™

## (undated) DEVICE — TUBING INFLTR L13FT CNTOUR GEN DISP FOR LIPO

## (undated) DEVICE — SUTURE COAT VCRL 2-0 27IN ABSRB VLT 26MM SH

## (undated) DEVICE — DRESSING BIOPATCH 1X4 BLUE

## (undated) DEVICE — TOWEL SURG OR 17X30IN BLUE

## (undated) DEVICE — SPECIMAN CONTAINER 4OZ STERILE

## (undated) DEVICE — SYRINGE,TOOMEY,IRRIGATION,70CC,STERILE: Brand: MEDLINE

## (undated) DEVICE — PROXIMATE RH ROTATING HEAD SKIN STAPLERS (35 WIDE) CONTAINS 35 STAINLESS STEEL STAPLES: Brand: PROXIMATE

## (undated) DEVICE — SUT MONOCRYL 4-0 PS-2 Y496G

## (undated) DEVICE — DRAIN BLAKE ROUND 15FR

## (undated) DEVICE — SYRINGE 50ML LL TIP

## (undated) DEVICE — SYRINGE MED 50ML LL TIP DISP

## (undated) DEVICE — EVACUATOR URO RELIVAC 100CC

## (undated) DEVICE — MARKER SKIN PREP RESIST STRL

## (undated) DEVICE — SUT MCRYL 4-0 18IN PS-2 ABSRB UD 19MM 3/8 CIR

## (undated) DEVICE — 1010 S-DRAPE TOWEL DRAPE 10/BX: Brand: STERI-DRAPE™

## (undated) DEVICE — BLAKE SILICONE DRAIN, 15 FR ROUND, HUBLESS WITH 3/16" TROCAR: Brand: BLAKE

## (undated) DEVICE — 3M™ STERI-DRAPE™ INSTRUMENT POUCH 1018: Brand: STERI-DRAPE™

## (undated) DEVICE — UNDERPAD 23X36 LIGHT CHUX

## (undated) DEVICE — SLIM BODY SKIN STAPLER: Brand: APPOSE ULC

## (undated) DEVICE — CG INFILTRATION TUBING: Brand: CG INFILTRATION TUBING

## (undated) DEVICE — DRAPE PACK CHEST & U BAR

## (undated) DEVICE — GLOVE SURGICAL 7.5 SENSICARE P

## (undated) DEVICE — SYRINGE 5ML LL TIP

## (undated) DEVICE — SLEEVE COMPR MD KNEE LEN SGL USE KENDALL SCD

## (undated) DEVICE — 3M™ BAIR HUGGER® UNDERBODY BLANKET, FULL ACCESS, 10 PER CASE 63500: Brand: BAIR HUGGER™

## (undated) DEVICE — SUT PDS II 2-0 CT-2 Z333H

## (undated) DEVICE — SUT SILK 2-0 FS 685G

## (undated) DEVICE — DRAIN ROUND HUBLESS 15FR

## (undated) DEVICE — SUTURE MCRYL 4-0 18IN ABSRB UD 19MM PS-2 3/8

## (undated) DEVICE — SLEEVE KENDALL SCD EXPRESS MED

## (undated) DEVICE — PROVE COVER: Brand: UNBRANDED

## (undated) DEVICE — DRAPE,TOWEL,LARGE,INVISISHIELD: Brand: MEDLINE

## (undated) DEVICE — DRAPE UTIL L W18XL24IN PLAS STR ADH REINF

## (undated) DEVICE — EVACUATOR SUR 100CC SIL BLB WND

## (undated) DEVICE — 3M™ TEGADERM™ TRANSPARENT FILM DRESSING FRAME STYLE, 1624W, US-VER, 100/CARTON 4 CARTONS/CASE: Brand: 3M™ TEGADERM™

## (undated) DEVICE — SPONGE RAYTEC 4X4 RF DETECT

## (undated) DEVICE — PEN SKIN MARKING REG TIP VIOLT

## (undated) DEVICE — GAUZE,SPONGE,FLUFF,6"X6.75",STRL,5/TRAY: Brand: MEDLINE

## (undated) DEVICE — YANKAUER,FLEXIBLE HANDLE,REGLR CAPACITY: Brand: MEDLINE INDUSTRIES, INC.

## (undated) DEVICE — INTENDED USE FOR SURGICAL MARKING ON INTACT SKIN, ALSO PROVIDES A PERMANENT METHOD OF IDENTIFYING OBJECTS IN THE OPERATING ROOM: Brand: WRITESITE® PLUS MINI PREP RESISTANT MARKER

## (undated) DEVICE — STANDARD HYPODERMIC NEEDLE,POLYPROPYLENE HUB: Brand: MONOJECT

## (undated) DEVICE — SUT PROLENE 2-0 SH 8833H

## (undated) DEVICE — SUT ETHILON 3-0 PS-1 1663H

## (undated) DEVICE — STERILE POLYISOPRENE POWDER-FREE SURGICAL GLOVES: Brand: PROTEXIS

## (undated) DEVICE — SPONGE LAP W18XL18IN ST COT XR AND RF DTECT

## (undated) DEVICE — SYRINGE IRRIG 70ML TOOM TIP CTRL PIST RAISE L

## (undated) DEVICE — SUTURE COAT VCRL 3-0 27IN ABSRB UD 26MM SH

## (undated) DEVICE — SYRINGE MED 5ML STD CLR PLAS LL TIP N CTRL

## (undated) DEVICE — GOWN,SIRUS,FABRNF,RAGLAN,L,ST,30/CS: Brand: MEDLINE

## (undated) DEVICE — 3M™ IOBAN™ 2 ANTIMICROBIAL INCISE DRAPE 6651EZ: Brand: IOBAN™ 2

## (undated) DEVICE — ZZ-DISC-SUB-405166-SUT COAT VCRL 3-0 27IN SH ABSRB UD 26MM 1/2

## (undated) DEVICE — SUT VICRYL 3-0 SH J416H

## (undated) DEVICE — DRAPE SURG W33XL23IN FAB ANTIMIC GEN INCIS

## (undated) DEVICE — DECANTER BAG 9": Brand: MEDLINE INDUSTRIES, INC.

## (undated) DEVICE — SYSTEM POS W20XH1XL29IN PT PIGAZZI

## (undated) DEVICE — Device

## (undated) DEVICE — 3M™ IOBAN™ 2 ANTIMICROBIAL INCISE DRAPE 6650EZ: Brand: IOBAN™ 2

## (undated) DEVICE — SUT PLN GUT 5-0 18IN PC-1 ABSRB TAN YELLOWISH

## (undated) DEVICE — MEGADYNE E-Z CLEAN BLADE 2.75"

## (undated) DEVICE — BRA SURGICAL ELIZABETH PINK XL

## (undated) DEVICE — SUTURE PLN GUT SZ 5-0 L18IN ABSRB YELLOWISH .

## (undated) DEVICE — SOL NACL IRRIG 0.9% 1000ML BTL

## (undated) DEVICE — LAPAROTOMY SPONGE - RF AND X-RAY DETECTABLE PRE-WASHED: Brand: SITUATE

## (undated) DEVICE — SYRINGE BULB 50/CS 48/PLT: Brand: MEDEGEN MEDICAL PRODUCTS, LLC

## (undated) DEVICE — SUCTION CANISTER, 3000CC,SAFELINER: Brand: DEROYAL

## (undated) DEVICE — TRAY SURESTEP 16 BARDEX UMETR

## (undated) DEVICE — PACK CDS PLASTIC BREAST

## (undated) DEVICE — DRAPE,TAPE STRIPS,STERILE: Brand: MEDLINE

## (undated) DEVICE — VIOLET BRAIDED (POLYGLACTIN 910), SYNTHETIC ABSORBABLE SUTURE: Brand: COATED VICRYL

## (undated) DEVICE — GLOVE SUR 7.5 SENSICARE PI PIP CRM PWD F

## (undated) DEVICE — CABLE BIPOLAR 12FT DISPOSABLE

## (undated) DEVICE — APPLICATOR PREP 26ML CHG 2% ISO ALC 70%

## (undated) DEVICE — SCRUB PVP -1 PREP SOLUTION 4OZ

## (undated) DEVICE — SOLUTION PREP 4OZ 10% POVIDONE IOD SCR TOP

## (undated) DEVICE — BANDAGE,GAUZE,BULKEE II,4.5"X4.1YD,STRL: Brand: MEDLINE

## (undated) DEVICE — MAJOR GENERAL: Brand: MEDLINE INDUSTRIES, INC.

## (undated) DEVICE — #15 STERILE STAINLESS BLADE: Brand: STERILE STAINLESS BLADES

## (undated) DEVICE — GAMMEX® PI HYBRID SIZE 7.5, STERILE POWDER-FREE SURGICAL GLOVE, POLYISOPRENE AND NEOPRENE BLEND: Brand: GAMMEX

## (undated) DEVICE — SUTURE MCRYL SZ 4-0 L27IN ABSRB UD L19MM PS-2

## (undated) DEVICE — 40580 - THE PINK PAD - ADVANCED TRENDELENBURG POSITIONING KIT: Brand: 40580 - THE PINK PAD - ADVANCED TRENDELENBURG POSITIONING KIT

## (undated) NOTE — LETTER
3/25/2020      To Whom It May Concern: Yael Markham is currently under my medical care and may not return to { school/work:298301305} at this time. Please excuse Kwasi Rodgers for {NUMBERS 0-10:6171} {days weeks:3323::\"days\"}.   {HE/SHE :7711} may

## (undated) NOTE — Clinical Note
March 9, 2017     [facility]      Dear [place]:    Below are the results from your recent visit:    Influenza and RSV negative    Resulted Orders   INFLUENZA A/B AND RSV PCR   Result Value Ref Range    Influ

## (undated) NOTE — Clinical Note
Dear Dr. Cartwright,  I had the opportunity to see your patient Pratik Frances recently. I appreciate your confidence in me to care for your patients. Please feel free call me with any questions at [phone] or contact me through Epic.  Sincerely, Tone Ruelas MD Board Certified, Physical Medicine and Rehabilitation Specializing in Sports Medicine, Spine Medicine and Electrodiagnostic Medicine [facility]

## (undated) NOTE — LETTER
9/11/2018              Micheal Jackson [address] [place] [id number]         To whom it may concern,      This is to certify that she is our patient and under our care.  She suffers from migraine and will have times that she may

## (undated) NOTE — LETTER
2/1/2022              [address] [place] [phone]         To whom it may concern,    This is to certify that Ms Daniel Cueto was seen and evaluated in my clinic today. She may return to work starting tomorrow, Feb 2, 2022.        Sincerely,    Mandi Hernandez MD  [address] [place] [address], [address] [facility], [address] [place] [address]  [phone]        Document electronically generated by:  Mandi Hernandez MD

## (undated) NOTE — LETTER
2/27/2019              Idania Lacey [address] [id number]         To whom it may concern,      This is to certify that Ms Flower Buckley was seen and evaluated in our clinic today.  She had fallen on office parking gar

## (undated) NOTE — MR AVS SNAPSHOT
[address], [address]  [phone]               Thank you for choosing us for your health care visit with Aleena Britton MD.  We are glad to serve you and happy to provide you with this summar Commonly known as:  FLONASE           HYDROcodone-acetaminophen  MG Tabs   Take 1 to 2 tabs po q 6 hr prn for back pain(total of 7 tabs/day only)   What changed:  additional instructions   Commonly known as:  [address]   Start taking on:  5/18/2017 Visit [facility] online at  [email or web address]

## (undated) NOTE — MR AVS SNAPSHOT
After Visit Summary   9/7/2023    Mc Dolan   MRN: [id number]           Visit Information     Date & Time  9/7/2023 11:30 AM Provider  EM [facility] IN[facility] - Infusion Dept. Phone  [phone]      Your Vitals Were     LMP   05/23/2023 (Exact Date)             Allergies as of 9/7/2023  Review status set to In Progress on 9/7/2023       Noted Reaction Type Reactions    Codeine 08/19/2014    SWELLING    Other reaction(s): Swelling    Dust Mite Extract 05/07/2018    HIVES    Nasal congestion    Grass 05/07/2018    HIVES    Nasal congestion    Aspirin 08/19/2014    SWELLING    Other reaction(s): ASPIRIN    Adhesive Tape 07/28/2022    RASH      Your Current Medications        Dosage    HYDROcodone-acetaminophen (NORCO)  MG Oral Tab Take 1 to 2 tabs po q 4 to  6hr prn for pain    cyclobenzaprine 10 MG Oral Tab Take 1 tablet (10 mg total) by mouth 3 (three) times daily. Naloxone HCl 4 MG/0.1ML Nasal Liquid 4 mg by Nasal route as needed for Opioid Overdose. Spray 4 mg into a single nostril, if patient remains unresponsive, repeat in 2-3 minutes in alternating nostrils until medical assistance becomes available. butalbital-acetaminophen-caffeine -40 MG Oral Tab Take 1 tablet by mouth every 8 (eight) hours as needed for Headaches. tobramycin 0.3 % Ophthalmic Ointment Apply to nasal lesion daily    tamoxifen 20 MG Oral Tab Take 1 tablet (20 mg total) by mouth daily. mupirocin 2 % External Ointment Apply 1 Application topically 2 (two) times daily. diphenoxylate-atropine 2.5-0.025 MG Oral Tab Take 1-2 tablets by mouth 4 (four) times daily as needed for Diarrhea. docusate sodium 100 MG Oral Cap Take 1 capsule (100 mg total) by mouth 2 (two) times daily.     ondansetron (ZOFRAN) 4 mg tablet Take 1 tablet (4 mg total) by mouth every 8 (eight) hours as needed for Nausea.    lidocaine-prilocaine 2.5-2.5 % External Cream Apply to site 1 hour prior to port a cath needle insertion      Diagnoses for This Visit    Chemotherapy management, encounter for   [[id number]]  -  Primary           Future Appointments        Provider Department    9/14/2023 9:30 AM [facility] [address] - Infusion    10/2/2023 9:15 AM [address] [facility],4Th Floor, [place]    10/5/2023 10:30 AM [facility] [address] - Infusion    10/5/2023 11:30 AM [facility] Hematology Oncology    10/5/2023 1:00 PM EM [facility] [address] - Infusion    10/26/2023 10:30 AM [facility] [address] - Infusion    10/26/2023 11:30 AM [facility]    10/26/2023 12:30 PM EM [facility] [address] - Infusion    11/16/2023 10:00 AM EM [address] - Infusion    11/16/2023 11:00 AM [facility] Hematology Oncology    11/16/2023 11:30 AM [facility] INFRN [address]                Did you know that [facility] primary care physicians now offer Video Visits through [facility] for adult patients for a variety of conditions such as allergies, back pain and cold symptoms? Skip the drive and waiting room and online chat with a doctor face-to-face using your web-cam enabled computer or mobile device wherever you are. Video Visits cost $50 and can be paid hassle-free using a credit, debit, or health savings card. Not active on Fl[employer]aste? Ask us how to get signed up today! If you receive a survey from [employer], please take a few minutes to complete it and provide feedback. We strive to deliver the best patient experience and are looking for ways to make improvements. Your feedback will help us do so. For more information on [employer], please visit [email or web address]           No text in SmartText           No text in SmartText

## (undated) NOTE — MR AVS SNAPSHOT
[address]  [address] [facility]  [phone]               Thank you for choosing us for your health care visit with Estela Marcelo MD.  We are glad to serve you and happy to provide you with this summar Commonly known as:  HYDROMET           Ketorolac Tromethamine 10 MG Tabs   Take 1 tablet (10 mg total) by mouth 2 (two) times daily.    Commonly known as:  TORADOL           NUVARING 0.12-0.015 MG/24HR Ring   Generic drug:  Etonogestrel-Ethinyl Estradiol

## (undated) NOTE — LETTER
12/1/2021              Arabella Hammer [address] [place] [phone]         To Whom It May Concern,          This is to certify that Ms Ronak Gonzalez is our patient.  I recommend that she continue to work at home until July

## (undated) NOTE — MR AVS SNAPSHOT
[address]  [address] [facility]  [phone]               Thank you for choosing us for your health care visit with Antwon Stroud MD.  We are glad to serve you and happy to provide you with this s Take 1 tablet (10 mg total) by mouth 2 (two) times daily. Commonly known as:  TORADOL           NUVARING 0.12-0.015 MG/24HR Ring   Generic drug:  Etonogestrel-Ethinyl Estradiol   Place 1 Device vaginally every 21 days.            Zolmitriptan 5 MG Tabs

## (undated) NOTE — MR AVS SNAPSHOT
[address]  [address] [facility]  [phone]               Thank you for choosing us for your health care visit with Clotilde Sr MD.  We are glad to serve you and happy to provide you with this s NUVARING 0.12-0.015 MG/24HR Ring   Generic drug:  Etonogestrel-Ethinyl Estradiol   Place 1 Device vaginally every 21 days.            Zolmitriptan 5 MG Tabs   Take one tab po at onset of migraine headache   Commonly known as:  ZOMIG                Where to

## (undated) NOTE — MR AVS SNAPSHOT
[address]  [address] [facility]  [phone]               Thank you for choosing us for your health care visit with Elizabeth Billingsley MD.  We are glad to serve you and happy to provide you with this s Generic drug:  Etonogestrel-Ethinyl Estradiol   Place 1 Device vaginally every 21 days. Oseltamivir Phosphate 75 MG Caps   Take 1 capsule (75 mg total) by mouth 2 (two) times daily.    Commonly known as:  TAMIFLU           Zolmitriptan 5 MG Tabs

## (undated) NOTE — LETTER
11/30/2021              Sera Silva [address] [place] [phone]         To whom it may concern,      This is to certify that Ms Carson Spain is our patient.  I recommend that she continue to work at home until July 2

## (undated) NOTE — MR AVS SNAPSHOT
After Visit Summary   3/28/2023    Annalise Lo   MRN: [id number]           Visit Information     Date & Time  3/28/2023  9:15 AM Provider  Charles Benitez MD Department  [address], [address] [facility],3Rd Floor, [facility]. Phone  [phone]      Your Vitals Were     LMP   01/25/2023 (Approximate)             Allergies as of 3/28/2023  Review status set to In Progress on 3/20/2023       Noted Reaction Type Reactions    Codeine 08/19/2014    SWELLING    Other reaction(s): Swelling    Dust Mite Extract 05/07/2018    HIVES    Nasal congestion    Grass 05/07/2018    HIVES    Nasal congestion    Aspirin 08/19/2014    SWELLING    Other reaction(s): ASPIRIN    Adhesive Tape 07/28/2022    RASH      Your Current Medications        Dosage    HYDROcodone-acetaminophen (NORCO)  MG Oral Tab Take 1 to 2 tabs po q 4 to  6hr prn for pain    cyclobenzaprine 10 MG Oral Tab Take 1 tablet (10 mg total) by mouth 3 (three) times daily. butalbital-acetaminophen-caffeine -40 MG Oral Tab Take 1 tablet by mouth every 8 (eight) hours as needed for Headaches. TAKE ONE TABLET BY MOUTH THREE TIMES DAILY AS NEEDED FOR HEADACHE    diphenoxylate-atropine 2.5-0.025 MG Oral Tab Take 1-2 tablets by mouth 4 (four) times daily as needed for Diarrhea. docusate sodium 100 MG Oral Cap Take 1 capsule (100 mg total) by mouth 2 (two) times daily. ondansetron (ZOFRAN) 4 mg tablet Take 1 tablet (4 mg total) by mouth every 8 (eight) hours as needed for Nausea. mupirocin 2 % External Ointment Apply 1 Application. topically 2 (two) times daily. HYDROcodone-acetaminophen (NORCO)  MG Oral Tab Take 2 tablets by mouth every 4 to 6 hours. Last script was from on call MD. Pt still taking 10 tabs /day.  pls refill 12/14/22    urea 10 % External Cream Apply at affected area    lidocaine-prilocaine 2.5-2.5 % External Cream Apply to site 1 hour prior to port a cath needle insertion      Diagnoses for This Visit    Absence of both breasts   [[id number]]  -  Primary           We Ordered the Following     Normal Orders This Visit    SURGICAL PATHOLOGY TISSUE [[id number] CUSTOM]     Future Labs/Procedures Expected by Expires    SURGICAL PATHOLOGY TISSUE [[id number] CUSTOM]  3/28/2023 (Approximate) 3/27/2024      Future Appointments        Provider Department    4/3/2023 10:45 AM EM [address] - Infusion    4/3/2023 11:45 AM [facility]    4/3/2023 1:00 PM EM CC INFRN [address] - Infusion    4/17/2023 11:00 AM EM CC LAB2 [address] - Infusion    4/18/2023 9:00 AM [facility]                Did you know that [facility] primary care physicians now offer Video Visits through [address] for adult patients for a variety of conditions such as allergies, back pain and cold symptoms? Skip the drive and waiting room and online chat with a doctor face-to-face using your web-cam enabled computer or mobile device wherever you are. Video Visits cost $50 and can be paid hassle-free using a credit, debit, or health savings card. Not active on [email or web address]? Ask us how to get signed up today! If you receive a survey from [employer], please take a few minutes to complete it and provide feedback. We strive to deliver the best patient experience and are looking for ways to make improvements. Your feedback will help us do so. For more information on [employer], please visit [email or web address]           No text in SmartText           No text in SmartText

## (undated) NOTE — MR AVS SNAPSHOT
[address]  [phone]               Thank you for choosing us for your health care visit with Renetta Pearson MD.  We are glad to serve you and happy to provide you with this summar HYDROcodone-acetaminophen  MG Tabs   Take 1 to 2 tabs po q 6 hr prn for back pain(total of 7 tabs/day only)   Commonly known as:  NORCO           hydrocodone-homatropine 5-1.5 MG/5ML Syrp   Take 5 mL by mouth every 6 (six) hours as needed.    Commonl

## (undated) NOTE — LETTER
Caroline Santiago, [address]  [address] [facility], [address]       10/20/17        Patient: Anibal Bond   YOB: 1985   Date of Visit: 10/20/2017       Dear  Dr. Aster Gimenez MD,      Thank you for referring Anibal Bond

## (undated) NOTE — LETTER
5/31/2018              Shante Mooreer [address] [place] [id number]         To whom it may concern,    This is to certify that Ms Herlinda Valverde is our patient and under our care.  She should be allowed to wear gym shoes at work

## (undated) NOTE — ED AVS SNAPSHOT
Lon Chakraborty   MRN: [id number]    Department:  [facility] Emergency Department   Date of Visit:  9/25/2017           Disclosure     Insurance plans vary and the physician(s) referred by the ER may not be covered by your plan.  Please contact CARE PHYSICIAN AT ONCE OR RETURN IMMEDIATELY TO THE EMERGENCY DEPARTMENT. If you have been prescribed any medication(s), please fill your prescription right away and begin taking the medication(s) as directed.   If you believe that any of the medications

## (undated) NOTE — LETTER
5/24/2019              [address] [place] [phone]         Dear [place],    [facility] records indicate that the tests ordered for you by Jayro Murray MD  have not been done.   If you have, in fact, already co

## (undated) NOTE — MR AVS SNAPSHOT
[address]  [phone]               Thank you for choosing us for your health care visit with Kenith Landau, MD.  We are glad to serve you and happy to provide you with this summar Commonly known as:  NORCO           hydrocodone-homatropine 5-1.5 MG/5ML Syrp   Take 5 mL by mouth every 6 (six) hours as needed.    Commonly known as:  HYDROMET           Ketorolac Tromethamine 10 MG Tabs   Take 1 tablet (10 mg total) by mouth 2 (two) time

## (undated) NOTE — ED AVS SNAPSHOT
[facility] Emergency Department    [address] [address]    Phone:  [phone]    Fax:  [phone]           Asia Mcwilliams   MRN: [id number]    Department:  [facility] Emergency Department   Date of Visit:  1/3/ Disclosure     Insurance plans vary and the physician(s) referred by the ER may not be covered by your plan. Please contact your insurance company to determine coverage and benefits available for follow-up care and referrals.       If you have difficulty sc prescription right away and begin taking the medication(s) as directed.   If you believe that any of the medications or instructions on this list is different from what your Primary Care doctor has instructed you - please continue to take your medications a can help with your Affordable Care Act coverage, as well as all types of Medicaid plans. To get signed up and covered, please call [phone] and ask to get set up for an insurance coverage that is in-network with Yamel Esquivel

## (undated) NOTE — MR AVS SNAPSHOT
[address]  [address] [facility]  [phone]               Thank you for choosing us for your health care visit with Angela Weiss MD.  We are glad to serve you and happy to provide you with this s Zolmitriptan 5 MG Tabs   Take one tab po at onset of migraine headache   Commonly known as:  ZOMIG                Where to Get Your Medications      These medications were sent to Massimo Rendon [address] [place] [address]

## (undated) NOTE — ED AVS SNAPSHOT
Salvador Benson   MRN: [id number]    Department:  [facility] Emergency Department   Date of Visit:  8/21/2019           Disclosure     Insurance plans vary and the physician(s) referred by the ER may not be covered by your plan.  Please contact CARE PHYSICIAN AT ONCE OR RETURN IMMEDIATELY TO THE EMERGENCY DEPARTMENT. If you have been prescribed any medication(s), please fill your prescription right away and begin taking the medication(s) as directed.   If you believe that any of the medications

## (undated) NOTE — MR AVS SNAPSHOT
After Visit Summary   5/8/2023    Delores Hartman   MRN: [id number]           Visit Information     Date & Time  5/8/2023  2:00 PM Provider   [address] - Infusion Dept. Phone  [phone]      Your Vitals Were     LMP   01/25/2023 (Approximate)             Allergies as of 5/8/2023  Review status set to In Progress on 5/8/2023       Noted Reaction Type Reactions    Codeine 08/19/2014    SWELLING    Other reaction(s): Swelling    Dust Mite Extract 05/07/2018    HIVES    Nasal congestion    Grass 05/07/2018    HIVES    Nasal congestion    Aspirin 08/19/2014    SWELLING    Other reaction(s): ASPIRIN    Adhesive Tape 07/28/2022    RASH      Your Current Medications        Dosage    cyclobenzaprine 10 MG Oral Tab Take 1 tablet (10 mg total) by mouth 3 (three) times daily. nitrofurantoin monohydrate macro 100 MG Oral Cap Take 1 capsule (100 mg total) by mouth 2 (two) times daily. mupirocin 2 % External Ointment Apply 1 Application topically 2 (two) times daily. butalbital-acetaminophen-caffeine -40 MG Oral Tab Take 1 tablet by mouth every 8 (eight) hours as needed for Headaches. TAKE ONE TABLET BY MOUTH THREE TIMES DAILY AS NEEDED FOR HEADACHE    diphenoxylate-atropine 2.5-0.025 MG Oral Tab Take 1-2 tablets by mouth 4 (four) times daily as needed for Diarrhea. docusate sodium 100 MG Oral Cap Take 1 capsule (100 mg total) by mouth 2 (two) times daily. ondansetron (ZOFRAN) 4 mg tablet Take 1 tablet (4 mg total) by mouth every 8 (eight) hours as needed for Nausea. HYDROcodone-acetaminophen (NORCO)  MG Oral Tab Take 2 tablets by mouth every 4 to 6 hours. Last script was from on call MD. Pt still taking 10 tabs /day.  pls refill 12/14/22    lidocaine-prilocaine 2.5-2.5 % External Cream Apply to site 1 hour prior to port a cath needle insertion      Diagnoses for This Visit    Breast cancer, stage 1, estrogen receptor positive, right   [137293] -  Primary  Anemia due to antineoplastic chemotherapy   [940574]    Malabsorption of iron   [307358]    Chemotherapy management, encounter for   [3964103]             We Ordered the Following     Normal Orders This Visit    Nursing communication (specify) Ralph Martinez       Future Appointments        Provider Department    5/30/2023 10:30 AM O[place]ie, [address], [address] [facility] [address], [place]    5/30/2023 12:45 PM EM [address] - Infusion    5/30/2023 1:30 PM [facility] Hematology Oncology    5/30/2023 2:00 PM EM CC INFRN [address] - Infusion    6/1/2023 10:45 AM [address] [facility] [address] CARD ECHO RM [address]                Did you know that [facility] primary care physicians now offer Video Visits through [address] for adult patients for a variety of conditions such as allergies, back pain and cold symptoms? Skip the drive and waiting room and online chat with a doctor face-to-face using your web-cam enabled computer or mobile device wherever you are. Video Visits cost $50 and can be paid hassle-free using a credit, debit, or health savings card. Not active on [employer]? Ask us how to get signed up today! If you receive a survey from [employer], please take a few minutes to complete it and provide feedback. We strive to deliver the best patient experience and are looking for ways to make improvements. Your feedback will help us do so. For more information on [employer], please visit [email or web address]           No text in SmartText           No text in SmartText

## (undated) NOTE — ED AVS SNAPSHOT
[facility] Emergency Department    [address]     [place] [address]    Phone:  [phone]    Fax:  [phone]           Stephenie Kirk   MRN: [id number]    Department:  [facility] Emergency Department   Date of Visit:  1/3/ and Class Registration line at [phone] or find a doctor online by visiting [email or web address].    IF THERE IS ANY CHANGE OR WORSENING OF YOUR CONDITION, CALL YOUR PRIMARY CARE PHYSICIAN AT ONCE OR RETURN IMMEDIATELY TO [address] [place] [address].     If

## (undated) NOTE — LETTER
4/25/2022              [address] [place] [phone]         To whom it may concern,     This is to certify that Federico Loaiza is our patient and under my care. She is currently being treated for influenza A.        Sincerely,    MD Aliya Song Maxx , [facility], [address] [place] [address]  [phone]        Document electronically generated by:  Jasmin Chaney MD

## (undated) NOTE — LETTER
2/18/2019          To Whom It May Concern: Esequiel Shannon is currently under my medical care    Please excuse Guzman Porter from leaving work early on Friday due to tailbone contusion pain. She may return to work. Activity is restricted as follows: none.

## (undated) NOTE — LETTER
11/6/2020      To Whom It May Concern: Aries Wu is currently under my medical care and may not return to work at this time. Jose G Carmela needs to quarantine for 2 weeks. If you require additional information please contact our office.

## (undated) NOTE — LETTER
[address] [place] [address]  [address] [address]  [phone]  [phone]  Authorization for Imaging Procedure    1. I hereby authorize Dr. Cheryl Ivy, my physician and his/her assistants (if applicable), which may include medical students, residents, and/or fellows, to perform the following procedure and administer such anesthesia as may be determined necessary by my physician: Manuel Cortes 2 SITE LEFT BREAST BIOPSY WITH 2 CLIPS PLACEMENT on [address]. 2.  I recognize that during the procedure, unforeseen conditions may necessitate additional or different procedures than those listed above. I, therefore, further authorize and request that the above-named physician, assistants, or designees perform such procedures as are, in their judgment, necessary and desirable. 3.  My physician has discussed prior to my procedure the potential benefits, risks and side effects of this procedure; the likelihood of achieving goals; and potential problems that might occur during recuperation. They also discussed reasonable alternatives to the procedure, including risks, benefits, and side effects related to the alternatives and risks related to not receiving this procedure. I have had all my questions answered and I acknowledge that no guarantee has been made as to the result that may be obtained. 4.  Should the need arise during my procedure, which includes change of level of care prior to discharge, I also consent to the administration of blood and/or blood products. Further, I understand that despite careful testing and screening of blood or blood products by collecting agencies, I may still be subject to ill effects as a result of receiving a blood transfusion and/or blood products.  The following are some, but not all, of the potential risks that can occur: fever and allergic reactions, hemolytic reactions, transmission of diseases such as Hepatitis, AIDS and Cytomegalovirus (CMV) and fluid overload. In the event that I wish to have an autologous transfusion of my own blood, or a directed donor transfusion, I will discuss this with my physician. Check only if Refusing Blood or Blood Products  I understand refusal of blood or blood products as deemed necessary by my physician may have serious consequences to my condition to include possible death. I hereby assume responsibility for my refusal and release the hospital, its personnel, and my physicians from any responsibility for the consequences of my refusal.   [  ] Patient Refuses Blood      5. I authorize the use of any specimen, organs, tissues, body parts or foreign objects that may be removed from my body during the procedure for diagnosis, research or teaching purposes and their subsequent disposal by hospital authorities. I also authorize the release of specimen test results and/or written reports to my treating physician on the hospital medical staff or other referring or consulting physicians involved in my care, at the discretion of the Pathologist or my treating physician. 6.  I consent to the photographing or videotaping of the procedures to be performed, including appropriate portions of my body for medical, scientific, or educational purposes, provided my identity is not revealed by the pictures or by descriptive texts accompanying them. If the procedure has been photographed/videotaped, the physician will obtain the original picture, image, videotape or CD. The hospital will not be responsible for storage, release or maintenance of the picture, image, tape or CD.   7.  I consent to the presence of a  or observers in the operating room as deemed necessary by my physician or their designees. 8.  I recognize that in the event my procedure results in extended X-Ray/fluoroscopy time, I may develop a skin reaction. 9.   If I have a Do Not Attempt Resuscitation (DNAR) order in place, that status will be suspended while in the operating room, procedural suite, and during the recovery period unless otherwise explicitly stated by me (or a person authorized to consent on my behalf). The performing physician or my attending physician will determine when the applicable recovery period ends for purposes of reinstating the DNAR order. 10.  I acknowledge that my physician has explained sedation/analgesia administration to me including the risk and benefits I consent to the administration of sedation/analgesia as may be necessary or desirable in the judgment of my physician. I CERTIFY THAT I HAVE READ AND FULLY UNDERSTAND THE ABOVE CONSENT FOR THE PROCEDURE.      Signature of Patient: _____________________________________________________________  Responsible person in case of minor, unconscious: ____________________________________  Relationship to patient:  __________________________________________________________    Signature of Witness: _______________________________Date: _________Time: __________      Signature of PROVIDER: _______________________________Date: _________Time: __________  Patient Name: Kelby Pantoja : 7/15/1985  Printed: 2022   Medical Record #: [id number]

## (undated) NOTE — LETTER
[address]      Authorization for Surgical Operation and Procedure     Date:___________                                                                                                         Time:__________  1. I hereby authorize DR. Marjan Fields, my physician and his/her assistants (if applicable), which may include medical students, residents, and/or fellows, to perform the following surgical operation/ procedure and administer such anesthesia as may be determined necessary by my physician: ULTRASOUND GUIDED RIGHT BREAST BIOPSY WITH CLIP PLACEMENT AND STEREOTACTIC BIOPSY WITH TOMOSYNTHESIS OF THE RIGHT BREAST WITH CLIP PLACEMENT   on [address] [facility] [address]   2. I recognize that during the surgical operation/procedure, unforeseen conditions may necessitate additional or different procedures than those listed above. I, therefore, further authorize and request that the above-named surgeon, assistants, or designees perform such procedures as are, in their judgment, necessary and desirable. 3.   My surgeon/physician has discussed prior to my surgery the potential benefits, risks and side effects of this procedure; the likelihood of achieving goals; and potential problems that might occur during recuperation. They also discussed reasonable alternatives to the procedure, including risks, benefits, and side effects related to the alternatives and risks related to not receiving this procedure. I have had all my questions answered and I acknowledge that no guarantee has been made as to the result that may be obtained. 4.   Should the need arise during my operation or immediate post-operative period, I also consent to the administration of blood and/or blood products.   Further, I understand that despite careful testing and screening of blood or blood products by collecting agencies, I may still be subject to ill effects as a result of receiving a blood transfusion and/or blood products. The following are some, but not all, of the potential risks that can occur: fever and allergic reactions, hemolytic reactions, transmission of diseases such as Hepatitis, AIDS and Cytomegalovirus (CMV) and fluid overload. In the event that I wish to have an autologous transfusion of my own blood, or a directed donor transfusion. I will discuss this with my physician. 5.   I authorize the use of any specimen, organs, tissues, body parts or foreign objects that may be removed from my body during the operation/procedure for diagnosis, research or teaching purposes and their subsequent disposal by hospital authorities. I also authorize the release of specimen test results and/or written reports to my treating physician on the hospital medical staff or other referring or consulting physicians involved in my care, at the discretion of the Pathologist or my treating physician. 6.   I consent to the photographing or videotaping of the operations or procedures to be performed, including appropriate portions of my body for medical, scientific, or educational purposes, provided my identity is not revealed by the pictures or by descriptive texts accompanying them. If the procedure has been photographed/videotaped, the surgeon will obtain the original picture, image, videotape or CD. The hospital will not be responsible for storage, release or maintenance of the picture, image, tape or CD.    7.   I consent to the presence of a  or observers in the operating room as deemed necessary by my physician or their designees. 8.   I recognize that in the event my procedure results in extended X-Ray/fluoroscopy time, I may develop a skin reaction. 9.  If I have a Do Not Attempt Resuscitation (DNAR) order in place, that status will be suspended while in the operating room, procedural suite, and during the recovery period unless otherwise explicitly stated by me (or a person authorized to consent on my behalf). The surgeon or my attending physician will determine when the applicable recovery period ends for purposes of reinstating the DNAR order. 10. Patients having a sterilization procedure: I understand that if the procedure is successful the results will be permanent and it will therefore be impossible for me to inseminate, conceive, or bear children. I also understand that the procedure is intended to result in sterility, although the result has not been guaranteed. 11. I acknowledge that my physician has explained sedation/analgesia administration to me including the risk and benefits I consent to the administration of sedation/analgesia as may be necessary or desirable in the judgment of my physician. I CERTIFY THAT I HAVE READ AND FULLY UNDERSTAND THE ABOVE CONSENT TO OPERATION and/or OTHER PROCEDURE.    _________________________________________  __________________________________  Signature of Patient     Signature of Responsible Person         ___________________________________         Printed Name of Responsible Person           _________________________________                  Relationship to Patient  _________________________________________  ______________________________  Signature of Witness          Date  Time    STATEMENT OF PHYSICIAN My signature below affirms that prior to the time of the procedure; I have explained to the patient and/or his/her legal representative, the risks and benefits involved in the proposed treatment and any reasonable alternative to the proposed treatment. I have also explained the risks and benefits involved in refusal of the proposed treatment and alternatives to the proposed treatment and have answered the patient's questions.  If I have a significant financial interest in a co-management agreement or a significant financial interest in any product or implant, or other significant relationship used in this procedure/surgery, I have disclosed this and had a discussion with my patient.     _______________________________________________________________ _____________________________  Justine Perdomo Physician)                                                                                         (Date)                                   (Time)        Patient Name: Jarred Ramirez    : 7/15/1985   Printed: 2022      Medical Record #: [id number]                                              Page 1 of 1

## (undated) NOTE — MR AVS SNAPSHOT
After Visit Summary   1/24/2023    Melvi Broussard   MRN: [id number]           Visit Information     Date & Time  1/24/2023 10:00 AM Provider  Selene Finney MD Department  [facility] Dept. Phone  [phone]     LMP   01/01/2023             Allergies as of 1/24/2023  Review status set to Review Complete on 1/18/2023       Noted Reaction Type Reactions    Codeine 08/19/2014    SWELLING    Other reaction(s): Swelling    Dust Mite Extract 05/07/2018    HIVES    Nasal congestion    Grass 05/07/2018    HIVES    Nasal congestion    Aspirin 08/19/2014    SWELLING    Other reaction(s): ASPIRIN    Adhesive Tape 07/28/2022    RASH      Your Current Medications        Dosage    HYDROcodone-acetaminophen (NORCO)  MG Oral Tab Take 1 to 2 tabs po q 4 to  6hr prn for pain    docusate sodium 100 MG Oral Cap Take 1 capsule (100 mg total) by mouth 2 (two) times daily. cephalexin 500 MG Oral Cap Take 1 capsule (500 mg total) by mouth 4 (four) times daily. ondansetron (ZOFRAN) 4 mg tablet Take 1 tablet (4 mg total) by mouth every 8 (eight) hours as needed for Nausea. mupirocin 2 % External Ointment Apply 1 Application. topically 2 (two) times daily. HYDROcodone-acetaminophen (NORCO)  MG Oral Tab Take 2 tablets by mouth every 4 to 6 hours. Last script was from on call MD. Pt still taking 10 tabs /day. pls refill 12/14/22    butalbital-acetaminophen-caffeine -40 MG Oral Tab Take 1 tablet by mouth every 8 (eight) hours as needed for Headaches. TAKE ONE TABLET BY MOUTH THREE TIMES DAILY AS NEEDED FOR HEADACHE    cyclobenzaprine 10 MG Oral Tab Take 1 tablet (10 mg total) by mouth 3 (three) times daily. silver sulfADIAZINE 1 % External Cream Apply 1 Application topically 2 (two) times daily.     urea 10 % External Cream Apply at affected area    lidocaine-prilocaine 2.5-2.5 % External Cream Apply to site 1 hour prior to port a cath needle insertion prochlorperazine (COMPAZINE) 10 mg tablet Take 1 tablet (10 mg total) by mouth every 6 (six) hours as needed for Nausea. ondansetron (ZOFRAN) 8 MG tablet Take 1 tablet (8 mg total) by mouth every 8 (eight) hours as needed for Nausea. Diagnoses for This Visit    S/P mastectomy, bilateral   [[id number]]  -  Primary  Absence of breast, acquired, bilateral   [[id number]]             We Ordered the Following     Normal Orders This Visit    SURGICAL PATHOLOGY TISSUE [[id number] CUSTOM]     Future Labs/Procedures Expected by Expires    SURGICAL PATHOLOGY TISSUE [[id number] CUSTOM]  1/24/2023 (Approximate) 1/24/2024    SURGICAL PATHOLOGY TISSUE [[id number] CUSTOM]  1/24/2023 (Approximate) 1/24/2024      Future Appointments        Provider Department    1/30/2023 10:45 AM EM [address] - Infusion    1/30/2023 11:30 AM [facility] Hematology Oncology    1/30/2023 12:30 PM EM CC INFRN [address] - Infusion    2/7/2023 1:45 PM [facility], [address], [place]    2/8/2023 11:15 AM [place], [address]                Did you know that [facility] primary care physicians now offer Video Visits through [address] for adult patients for a variety of conditions such as allergies, back pain and cold symptoms? Skip the drive and waiting room and online chat with a doctor face-to-face using your web-cam enabled computer or mobile device wherever you are. Video Visits cost $50 and can be paid hassle-free using a credit, debit, or health savings card. Not active on [employer]? Ask us how to get signed up today! If you receive a survey from [employer], please take a few minutes to complete it and provide feedback. We strive to deliver the best patient experience and are looking for ways to make improvements. Your feedback will help us do so.  For more information on [employer], please visit [email or web address]           No text in SmartText           No text in SmartText

## (undated) NOTE — MR AVS SNAPSHOT
After Visit Summary   6/1/2023    Kay Evans   MRN: [id number]           Visit Information     Date & Time  6/1/2023  3:00 PM Provider  [facility] [address] Dept. Phone  [phone]      Your Vitals Were     LMP   05/23/2023 (Exact Date)             Allergies as of 6/1/2023  Review status set to In Progress on 6/1/2023       Noted Reaction Type Reactions    Codeine 08/19/2014    SWELLING    Other reaction(s): Swelling    Dust Mite Extract 05/07/2018    HIVES    Nasal congestion    Grass 05/07/2018    HIVES    Nasal congestion    Aspirin 08/19/2014    SWELLING    Other reaction(s): ASPIRIN    Adhesive Tape 07/28/2022    RASH      Your Current Medications        Dosage    tobramycin 0.3 % Ophthalmic Ointment Apply to nasal lesion daily    cyclobenzaprine 10 MG Oral Tab Take 1 tablet (10 mg total) by mouth 3 (three) times daily. HYDROcodone-acetaminophen (NORCO)  MG Oral Tab Take 1 to 2 tabs po q 4 to  6hr prn for pain    tamoxifen 20 MG Oral Tab Take 1 tablet (20 mg total) by mouth daily. nitrofurantoin monohydrate macro 100 MG Oral Cap Take 1 capsule (100 mg total) by mouth 2 (two) times daily. mupirocin 2 % External Ointment Apply 1 Application topically 2 (two) times daily. butalbital-acetaminophen-caffeine -40 MG Oral Tab Take 1 tablet by mouth every 8 (eight) hours as needed for Headaches. TAKE ONE TABLET BY MOUTH THREE TIMES DAILY AS NEEDED FOR HEADACHE    diphenoxylate-atropine 2.5-0.025 MG Oral Tab Take 1-2 tablets by mouth 4 (four) times daily as needed for Diarrhea. docusate sodium 100 MG Oral Cap Take 1 capsule (100 mg total) by mouth 2 (two) times daily. ondansetron (ZOFRAN) 4 mg tablet Take 1 tablet (4 mg total) by mouth every 8 (eight) hours as needed for Nausea. HYDROcodone-acetaminophen (NORCO)  MG Oral Tab Take 2 tablets by mouth every 4 to 6 hours.  Last script was from on call MD. Pt still taking 10 tabs /day. pls refill 12/14/22    lidocaine-prilocaine 2.5-2.5 % External Cream Apply to site 1 hour prior to port a cath needle insertion      Diagnoses for This Visit    Breast cancer, stage 1, estrogen receptor positive, right   [945435]  -  Primary  Anemia due to antineoplastic chemotherapy   [965924]    Malabsorption of iron   [174967]    Chemotherapy management, encounter for   [7417514]             We Ordered the Following     Normal Orders This Visit    Nursing communication (specify) Yoshi Kirby       Future Appointments        Provider Department    6/6/2023 2:45 PM Ollie, [address], [address] [facility], [place]    6/12/2023 8:40 AM Alf Botello, [address] [place] [address], [place]    6/22/2023 11:00 AM EM [facility] - Infusion    6/22/2023 11:30 AM [facility] Hematology Oncology    6/22/2023 12:30 PM EM [facility] [address] [place] [address] - Infusion    7/13/2023 11:00 AM EM [address] - Infusion    7/13/2023 11:30 AM [facility] Hematology Oncology    7/13/2023 12:30 PM EM [facility]                Did you know that [facility] primary care physicians now offer Video Visits through [address] for adult patients for a variety of conditions such as allergies, back pain and cold symptoms? Skip the drive and waiting room and online chat with a doctor face-to-face using your web-cam enabled computer or mobile device wherever you are. Video Visits cost $50 and can be paid hassle-free using a credit, debit, or health savings card. Not active on [employer]? Ask us how to get signed up today! If you receive a survey from [employer], please take a few minutes to complete it and provide feedback. We strive to deliver the best patient experience and are looking for ways to make improvements. Your feedback will help us do so.  For more information on [employer], please visit [email or web address]           No text in SmartText           No text in SmartText

## (undated) NOTE — LETTER
Date & Time: 2/13/2019, 3:02 PM  Patient: Ray Johnson  Encounter Provider(s):    ISHA Nur       To Whom It May Concern: Diaz Weaver was seen and treated in our department on 2/13/2019.  She should not return to work until Friday, Febr

## (undated) NOTE — LETTER
5/17/2022              Elvira Jw [address] [place] [id number]         To whom it may concern      This is to certify that Ms Hazel Brennan was seen and examined today in our clinic. She may go back to work starting May 18,2022.       Sincerely,    MD Aliya Borrego , [address], [address] [place] [address]  [phone]        Document electronically generated by:  Douglas Rodrigez MD

## (undated) NOTE — LETTER
WHERE IS YOUR PAIN NOW?  Destini the areas on your body where you feel the described sensations.  Use the appropriate symbol.  Destini the areas of radiation.  Include all affected areas.  Just to complete the picture, please draw in the face.     ACHE:  ^ ^ ^   NUMBNESS:  0000   PINS & NEEDLES:  = = = =                              ^ ^ ^                       0000              = = = =                                    ^ ^ ^                       0000            = = = =      BURNING:  XXXX   STABBING: ////                  XXXX                ////                         XXXX          ////     Please destini the line below indicating your degree of pain right now  with 0 being no pain 10 being the worst pain possible.                                         0             1             2              3             4              5              6              7             8             9             10         Patient Signature:

## (undated) NOTE — Clinical Note
3/13/2017              Connie Lacey [id number]         To whom it may concern,      This is to certify that Ms Asiya Cervantes is our patient under our care.  She should be excused from work on March 6, 2017

## (undated) NOTE — MR AVS SNAPSHOT
After Visit Summary   4/10/2023    Ira Gerber   MRN: [id number]           Visit Information     Date & Time  4/10/2023 12:30 PM Provider  [facility] [address] - Infusion Dept. Phone  [phone]      Your Vitals Were     LMP   01/25/2023 (Approximate)             Allergies as of 4/10/2023  Review status set to In Progress on 4/10/2023       Noted Reaction Type Reactions    Codeine 08/19/2014    SWELLING    Other reaction(s): Swelling    Dust Mite Extract 05/07/2018    HIVES    Nasal congestion    Grass 05/07/2018    HIVES    Nasal congestion    Aspirin 08/19/2014    SWELLING    Other reaction(s): ASPIRIN    Adhesive Tape 07/28/2022    RASH      Your Current Medications        Dosage    cyclobenzaprine 10 MG Oral Tab Take 1 tablet (10 mg total) by mouth 3 (three) times daily. butalbital-acetaminophen-caffeine -40 MG Oral Tab Take 1 tablet by mouth every 8 (eight) hours as needed for Headaches. TAKE ONE TABLET BY MOUTH THREE TIMES DAILY AS NEEDED FOR HEADACHE    diphenoxylate-atropine 2.5-0.025 MG Oral Tab Take 1-2 tablets by mouth 4 (four) times daily as needed for Diarrhea. docusate sodium 100 MG Oral Cap Take 1 capsule (100 mg total) by mouth 2 (two) times daily. ondansetron (ZOFRAN) 4 mg tablet Take 1 tablet (4 mg total) by mouth every 8 (eight) hours as needed for Nausea. HYDROcodone-acetaminophen (NORCO)  MG Oral Tab Take 2 tablets by mouth every 4 to 6 hours. Last script was from on call MD. Pt still taking 10 tabs /day.  pls refill 12/14/22    lidocaine-prilocaine 2.5-2.5 % External Cream Apply to site 1 hour prior to port a cath needle insertion      Diagnoses for This Visit    Anemia due to antineoplastic chemotherapy   [897378]  -  Primary  Malabsorption of iron   [009948]    Chemotherapy management, encounter for   [[id number]]             Future Appointments        Provider Department    4/17/2023 1:00 PM [facility] [address] [facility]    4/18/2023 9:00 [facility] [address]                Did you know that [facility] primary care physicians now offer Video Visits through [address] for adult patients for a variety of conditions such as allergies, back pain and cold symptoms? Skip the drive and waiting room and online chat with a doctor face-to-face using your web-cam enabled computer or mobile device wherever you are. Video Visits cost $50 and can be paid hassle-free using a credit, debit, or health savings card. Not active on [employer]? Ask us how to get signed up today! If you receive a survey from [employer], please take a few minutes to complete it and provide feedback. We strive to deliver the best patient experience and are looking for ways to make improvements. Your feedback will help us do so. For more information on [employer], please visit [email or web address]           No text in SmartText           No text in SmartText

## (undated) NOTE — MR AVS SNAPSHOT
After Visit Summary   6/22/2023    Fernando Goodpasture   MRN: [id number]           Visit Information     Date & Time  6/22/2023 12:30 PM Provider  [facility] Infusion Dept. Phone  [phone]      Your Vitals Were     LMP   05/23/2023 (Exact Date)             Allergies as of 6/22/2023  Review status set to In Progress on 6/22/2023       Noted Reaction Type Reactions    Codeine 08/19/2014    SWELLING    Other reaction(s): Swelling    Dust Mite Extract 05/07/2018    HIVES    Nasal congestion    Grass 05/07/2018    HIVES    Nasal congestion    Aspirin 08/19/2014    SWELLING    Other reaction(s): ASPIRIN    Adhesive Tape 07/28/2022    RASH      Your Current Medications        Dosage    HYDROcodone-acetaminophen (NORCO)  MG Oral Tab Take 1 to 2 tabs po q 4 to  6hr prn for pain    tobramycin 0.3 % Ophthalmic Ointment Apply to nasal lesion daily    cyclobenzaprine 10 MG Oral Tab Take 1 tablet (10 mg total) by mouth 3 (three) times daily. tamoxifen 20 MG Oral Tab Take 1 tablet (20 mg total) by mouth daily. nitrofurantoin monohydrate macro 100 MG Oral Cap Take 1 capsule (100 mg total) by mouth 2 (two) times daily. mupirocin 2 % External Ointment Apply 1 Application topically 2 (two) times daily. butalbital-acetaminophen-caffeine -40 MG Oral Tab Take 1 tablet by mouth every 8 (eight) hours as needed for Headaches. TAKE ONE TABLET BY MOUTH THREE TIMES DAILY AS NEEDED FOR HEADACHE    diphenoxylate-atropine 2.5-0.025 MG Oral Tab Take 1-2 tablets by mouth 4 (four) times daily as needed for Diarrhea. docusate sodium 100 MG Oral Cap Take 1 capsule (100 mg total) by mouth 2 (two) times daily.     ondansetron (ZOFRAN) 4 mg tablet Take 1 tablet (4 mg total) by mouth every 8 (eight) hours as needed for Nausea.    lidocaine-prilocaine 2.5-2.5 % External Cream Apply to site 1 hour prior to port a cath needle insertion      Future Appointments        Provider Department    6/29/2023 10:30 AM EM [facility] [address] - Infusion    7/20/2023 9:30 AM EM [address] - Infusion    7/20/2023 10:30 AM [facility] Hematology Oncology    7/20/2023 11:00 AM EM [facility] [address] - Infusion    8/10/2023 9:00 AM EM [address] - Infusion    8/10/2023 10:00 AM [facility] Hematology Oncology    8/10/2023 10:30 AM EM [facility] IN[facility] [address]                Did you know that [facility]  primary care physicians now offer Video Visits through [address] for adult patients for a variety of conditions such as allergies, back pain and cold symptoms? Skip the drive and waiting room and online chat with a doctor face-to-face using your web-cam enabled computer or mobile device wherever you are. Video Visits cost $50 and can be paid hassle-free using a credit, debit, or health savings card. Not active on [employer]? Ask us how to get signed up today! If you receive a survey from [employer], please take a few minutes to complete it and provide feedback. We strive to deliver the best patient experience and are looking for ways to make improvements. Your feedback will help us do so. For more information on [employer], please visit [email or web address]           No text in SmartText           No text in SmartText

## (undated) NOTE — LETTER
9/21/2020              [address] [id number]         To whom it may concern,      This is to certify that Ms Marlee Martinez is our patient and under our care.  She had tested negative for covid 19 and